# Patient Record
Sex: FEMALE | Race: BLACK OR AFRICAN AMERICAN | Employment: OTHER | URBAN - METROPOLITAN AREA
[De-identification: names, ages, dates, MRNs, and addresses within clinical notes are randomized per-mention and may not be internally consistent; named-entity substitution may affect disease eponyms.]

---

## 2017-03-15 ENCOUNTER — OFFICE VISIT (OUTPATIENT)
Dept: INTERNAL MEDICINE CLINIC | Age: 77
End: 2017-03-15

## 2017-03-15 VITALS
TEMPERATURE: 98.3 F | SYSTOLIC BLOOD PRESSURE: 136 MMHG | BODY MASS INDEX: 26.87 KG/M2 | RESPIRATION RATE: 14 BRPM | DIASTOLIC BLOOD PRESSURE: 64 MMHG | OXYGEN SATURATION: 99 % | WEIGHT: 146 LBS | HEIGHT: 62 IN | HEART RATE: 84 BPM

## 2017-03-15 DIAGNOSIS — Z23 ENCOUNTER FOR IMMUNIZATION: ICD-10-CM

## 2017-03-15 DIAGNOSIS — Z00.00 ROUTINE GENERAL MEDICAL EXAMINATION AT A HEALTH CARE FACILITY: Primary | ICD-10-CM

## 2017-03-15 DIAGNOSIS — G89.29 CHRONIC PAIN OF LEFT KNEE: ICD-10-CM

## 2017-03-15 DIAGNOSIS — N18.32 CHRONIC KIDNEY DISEASE (CKD) STAGE G3B/A1, MODERATELY DECREASED GLOMERULAR FILTRATION RATE (GFR) BETWEEN 30-44 ML/MIN/1.73 SQUARE METER AND ALBUMINURIA CREATININE RATIO LESS THAN 30 MG/G (HCC): ICD-10-CM

## 2017-03-15 DIAGNOSIS — Z13.39 SCREENING FOR ALCOHOLISM: ICD-10-CM

## 2017-03-15 DIAGNOSIS — Z13.5 GLAUCOMA SCREENING: ICD-10-CM

## 2017-03-15 DIAGNOSIS — Z71.89 ACP (ADVANCE CARE PLANNING): ICD-10-CM

## 2017-03-15 DIAGNOSIS — Z12.11 SCREEN FOR COLON CANCER: ICD-10-CM

## 2017-03-15 DIAGNOSIS — M25.562 CHRONIC PAIN OF LEFT KNEE: ICD-10-CM

## 2017-03-15 NOTE — PROGRESS NOTES
Thaddeus Dunn is a 78 y.o. female  Chief Complaint   Patient presents with    Complete Physical    Knee Pain     left knee hurting for 1 year, constant pain, rated 7 out of 10 has been taking aleve and tylenol   1. Have you been to the ER, urgent care clinic since your last visit? Hospitalized since your last visit? No    2. Have you seen or consulted any other health care providers outside of the [facility] since your last visit? Include any pap smears or colon screening. No    PHQ 2 / 9, over the last two weeks 3/15/2017   Little interest or pleasure in doing things Not at all   Feeling down, depressed or hopeless Not at all   Total Score PHQ 2 0       This is a Subsequent Medicare Annual Wellness Visit providing Personalized Prevention Plan Services (PPPS) (Performed 12 months after initial AWV and PPPS )    I have reviewed the patient's medical history in detail and updated the computerized patient record. History     Past Medical History:   Diagnosis Date    Advance care planning     documents pending    Anemia     Hypercholesterolemia     Hypertension       Past Surgical History:   Procedure Laterality Date    HX ORTHOPAEDIC      bunionectomy     Current Outpatient Prescriptions   Medication Sig Dispense Refill    CHOLECALCIFEROL, VITAMIN D3, (VITAMIN D3 PO) Take  by mouth.  losartan-hydrochlorothiazide (HYZAAR) 100-25 mg per tablet Take 1 Tab by mouth daily. 90 Tab 4    atorvastatin (LIPITOR) 40 mg tablet Take 1 Tab by mouth daily. 90 Tab 4    Biotin 2,500 mcg cap Take  by mouth.  aspirin delayed-release 81 mg tablet Take  by mouth daily.        Allergies   Allergen Reactions    Codeine Itching     Family History   Problem Relation Age of Onset    Heart Disease Mother     Heart Disease Father     Cancer Sister     Cancer Sister     Heart Disease Brother      Social History   Substance Use Topics    Smoking status: Never Smoker    Smokeless tobacco: Not on file  Alcohol use 0.5 oz/week     1 drink(s) per week     Patient Active Problem List   Diagnosis Code    HTN (hypertension) I10    Hyperlipidemia E78.5    Anemia D64.9    Chronic kidney disease (CKD) stage G3b/A1, moderately decreased glomerular filtration rate (GFR) between 30-44 mL/min/1.73 square meter and albuminuria creatinine ratio less than 30 mg/g N18.3    ACP (advance care planning) Z71.89       Depression Risk Factor Screening:     PHQ 2 / 9, over the last two weeks 3/15/2017   Little interest or pleasure in doing things Not at all   Feeling down, depressed or hopeless Not at all   Total Score PHQ 2 0     Alcohol Risk Factor Screening: On any occasion during the past 3 months, have you had more than 3 drinks containing alcohol? No    Do you average more than 7 drinks per week? No      Functional Ability and Level of Safety:     Hearing Loss   none    Activities of Daily Living   Self-care. Requires assistance with: no ADLs    Fall Risk     Fall Risk Assessment, last 12 mths 3/15/2017   Able to walk? Yes   Fall in past 12 months? No     Abuse Screen   Patient is not abused    Review of Systems   ROS: Negative except for BOLD  General: fever, chills, fatigue  Respiratory: cough, SOB, wheezing  Cardiovascular:  CP, palpitation, QUESADA, edema   Gastrointestinal: N/V/D, bleeding, constipation  Genito-Urinary: dysuria, hematuria  Musculoskeletal: muscle weakness, pain, swelling        Physical Examination     Evaluation of Cognitive Function:  Mood/affect:  happy  Appearance: age appropriate, casually dressed and well dressed  Family member/caregiver input: none present    Visit Vitals    /64 (BP 1 Location: Left arm, BP Patient Position: Sitting)    Pulse 84    Temp 98.3 °F (36.8 °C) (Oral)    Resp 14    Ht 5' 2\" (1.575 m)    Wt 146 lb (66.2 kg)    SpO2 99%    BMI 26.7 kg/m2     General:  Alert, cooperative, no distress, appears stated age.    Head:  Normocephalic, without obvious abnormality, atraumatic. Eyes:  Conjunctivae/corneas clear. PERRL, EOMs intact. Fundi benign. Ears:  Normal TMs and external ear canals both ears. Nose: Nares normal. Septum midline. Mucosa normal. No drainage or sinus tenderness. Throat: Lips, mucosa, and tongue normal. Teeth and gums normal.   Neck: Supple, symmetrical, trachea midline, no adenopathy, thyroid: no enlargement/tenderness/nodules, no carotid bruit and no JVD. Back:   Symmetric, no curvature. ROM normal. No CVA tenderness. Lungs:   Clear to auscultation bilaterally. Chest wall:  No tenderness or deformity. Heart:  Regular rate and rhythm, S1, S2 normal, no murmur, click, rub or gallop. Breast Exam:  No tenderness, masses, or nipple abnormality. Abdomen:   Soft, non-tender. Bowel sounds normal. No masses,  No organomegaly. Rectal:     Extremities: Extremities normal, atraumatic, no cyanosis or edema. Left knee FROM, bony knee prominence, no swelling. Pulses: 2+ and symmetric all extremities. Skin: Skin color, texture, turgor normal. No rashes or lesions. Lymph nodes: Cervical, supraclavicular, and axillary nodes normal.   Neurologic: CNII-XII intact. Normal strength, sensation and reflexes throughout. Patient Care Team:  Christian Mccarthy MD as PCP - General (Family Practice)  Acosta Kim MD (Family Practice)    Advice/Referrals/Counseling   Education and counseling provided:  Are appropriate based on today's review and evaluation  End-of-Life planning (with patient's consent)  Pneumococcal Vaccine  Influenza Vaccine  Hepatitis B Vaccine  Screening Mammography  Screening Pap and pelvic (covered once every 2 years)  Prostate cancer screening tests (PSA, covered annually)  Colorectal cancer screening tests  Cardiovascular screening blood test  Bone mass measurement (DEXA)  Screening for glaucoma      Assessment/Plan       ICD-10-CM ICD-9-CM    1.  Routine general medical examination at a health care facility Z00.00 V70.0 pneumococcal 13 edgar conj dip (PREVNAR-13) 0.5 mL syrg injection      LIPID PANEL      METABOLIC PANEL, BASIC      DEXA BONE DENSITY STUDY AXIAL      REFERRAL TO OPHTHALMOLOGY   2. Screening for alcoholism Z13.89 V79.1    3. Encounter for immunization Z23 V03.89 pneumococcal 13 edgar conj dip (PREVNAR-13) 0.5 mL syrg injection   4. Chronic pain of left knee M25.562 719.46 XR KNEE LT MIN 4 V    G89.29 338.29 No effusion , xray pending review by radiology. 5. Chronic kidney disease (CKD) stage G3b/A1, moderately decreased glomerular filtration rate (GFR) between 30-44 mL/min/1.73 square meter and albuminuria creatinine ratio less than 30 mg/g N18.3 585.3    6. ACP (advance care planning) Z71.89 V65.49    . Follow-up Disposition:  Return in about 4 weeks (around 4/12/2017), or if symptoms worsen or fail to improve, for Left knee.   Juan Alberto Shi MD

## 2017-03-16 LAB
BUN SERPL-MCNC: 24 MG/DL (ref 8–27)
BUN/CREAT SERPL: 18 (ref 11–26)
CALCIUM SERPL-MCNC: 9.4 MG/DL (ref 8.7–10.3)
CHLORIDE SERPL-SCNC: 102 MMOL/L (ref 96–106)
CHOLEST SERPL-MCNC: 164 MG/DL (ref 100–199)
CO2 SERPL-SCNC: 22 MMOL/L (ref 18–29)
CREAT SERPL-MCNC: 1.36 MG/DL (ref 0.57–1)
GLUCOSE SERPL-MCNC: 95 MG/DL (ref 65–99)
HDLC SERPL-MCNC: 47 MG/DL
LDLC SERPL CALC-MCNC: 100 MG/DL (ref 0–99)
POTASSIUM SERPL-SCNC: 3.4 MMOL/L (ref 3.5–5.2)
SODIUM SERPL-SCNC: 143 MMOL/L (ref 134–144)
TRIGL SERPL-MCNC: 83 MG/DL (ref 0–149)
VLDLC SERPL CALC-MCNC: 17 MG/DL (ref 5–40)

## 2017-03-23 ENCOUNTER — HOSPITAL ENCOUNTER (OUTPATIENT)
Dept: MAMMOGRAPHY | Age: 77
Discharge: HOME | End: 2017-03-23
Attending: FAMILY MEDICINE
Payer: MEDICARE

## 2017-03-23 DIAGNOSIS — Z00.00 ROUTINE GENERAL MEDICAL EXAMINATION AT A HEALTH CARE FACILITY: ICD-10-CM

## 2017-03-23 PROCEDURE — 77080 DXA BONE DENSITY AXIAL: CPT

## 2017-04-18 ENCOUNTER — TELEPHONE (OUTPATIENT)
Dept: INTERNAL MEDICINE CLINIC | Age: 77
End: 2017-04-18

## 2017-06-02 DIAGNOSIS — N18.32 CHRONIC KIDNEY DISEASE (CKD) STAGE G3B/A1, MODERATELY DECREASED GLOMERULAR FILTRATION RATE (GFR) BETWEEN 30-44 ML/MIN/1.73 SQUARE METER AND ALBUMINURIA CREATININE RATIO LESS THAN 30 MG/G (HCC): ICD-10-CM

## 2017-06-02 DIAGNOSIS — I10 ESSENTIAL HYPERTENSION WITH GOAL BLOOD PRESSURE LESS THAN 140/90: ICD-10-CM

## 2017-06-02 DIAGNOSIS — E78.2 MIXED HYPERLIPIDEMIA: ICD-10-CM

## 2017-06-02 RX ORDER — ATORVASTATIN CALCIUM 40 MG/1
40 TABLET, FILM COATED ORAL DAILY
Qty: 90 TAB | Refills: 4 | Status: SHIPPED | OUTPATIENT
Start: 2017-06-02 | End: 2018-08-09 | Stop reason: SDUPTHER

## 2017-06-02 RX ORDER — LOSARTAN POTASSIUM AND HYDROCHLOROTHIAZIDE 25; 100 MG/1; MG/1
1 TABLET ORAL DAILY
Qty: 90 TAB | Refills: 4 | Status: SHIPPED | OUTPATIENT
Start: 2017-06-02 | End: 2018-08-09 | Stop reason: SDUPTHER

## 2017-06-22 ENCOUNTER — HOSPITAL ENCOUNTER (OUTPATIENT)
Dept: MAMMOGRAPHY | Age: 77
Discharge: HOME | End: 2017-06-22
Attending: FAMILY MEDICINE
Payer: MEDICARE

## 2017-06-22 DIAGNOSIS — Z12.31 VISIT FOR SCREENING MAMMOGRAM: ICD-10-CM

## 2017-06-22 PROCEDURE — 77067 SCR MAMMO BI INCL CAD: CPT

## 2018-01-22 ENCOUNTER — OFFICE VISIT (OUTPATIENT)
Dept: INTERNAL MEDICINE CLINIC | Age: 78
End: 2018-01-22

## 2018-01-22 VITALS
OXYGEN SATURATION: 96 % | HEART RATE: 86 BPM | HEIGHT: 62 IN | WEIGHT: 147.5 LBS | RESPIRATION RATE: 18 BRPM | SYSTOLIC BLOOD PRESSURE: 146 MMHG | DIASTOLIC BLOOD PRESSURE: 67 MMHG | BODY MASS INDEX: 27.14 KG/M2 | TEMPERATURE: 97.6 F

## 2018-01-22 DIAGNOSIS — E78.2 MIXED HYPERLIPIDEMIA: ICD-10-CM

## 2018-01-22 DIAGNOSIS — I10 ESSENTIAL HYPERTENSION: ICD-10-CM

## 2018-01-22 DIAGNOSIS — M67.431 GANGLION CYST OF DORSUM OF RIGHT WRIST: Primary | ICD-10-CM

## 2018-01-22 DIAGNOSIS — N18.32 CHRONIC KIDNEY DISEASE (CKD) STAGE G3B/A1, MODERATELY DECREASED GLOMERULAR FILTRATION RATE (GFR) BETWEEN 30-44 ML/MIN/1.73 SQUARE METER AND ALBUMINURIA CREATININE RATIO LESS THAN 30 MG/G (HCC): ICD-10-CM

## 2018-01-22 DIAGNOSIS — Z13.5 SCREENING FOR GLAUCOMA: ICD-10-CM

## 2018-01-22 NOTE — PROGRESS NOTES
1. Have you been to the ER, urgent care clinic since your last visit? Hospitalized since your last visit? No    2. Have you seen or consulted any other health care providers outside of the [address] [place] [address] since your last visit? Include any pap smears or colon screening.  No

## 2018-01-22 NOTE — MR AVS SNAPSHOT
[address] [place] [address] 
[phone] Patient: Jes Francis MRN: [id number] SNK:[id number] Visit Information Date & Time Provider Department Dept. Phone Encounter #  
 1/22/2018 10:30 AM Randolph Barone MD [facility] [id number] Follow-up Instructions Return in about 8 weeks (around 3/19/2018) for Medicare Wellness, , Blood Pressure. Upcoming Health Maintenance Date Due  
 GLAUCOMA SCREENING Q2Y 11/15/2007 MEDICARE YEARLY EXAM 3/16/2018 Pneumococcal 65+ Low/Medium Risk (2 of 2 - PPSV23) 1/22/2019 DTaP/Tdap/Td series (2 - Td) 9/8/2024 Allergies as of 1/22/2018  Review Complete On: 1/22/2018 By: Tenzin Vega Severity Noted Reaction Type Reactions Codeine High 09/08/2014   Systemic Itching Current Immunizations  Reviewed on 9/8/2014 Name Date Influenza Vaccine 9/8/2014 Tdap 9/8/2014 Not reviewed this visit You Were Diagnosed With   
  
 Codes Comments Ganglion cyst of dorsum of right wrist    -  Primary ICD-10-CM: S57.936 ICD-9-CM: 727.41 Screening for glaucoma     ICD-10-CM: Z13.5 ICD-9-CM: V80.1 Essential hypertension     ICD-10-CM: I10 
ICD-9-CM: 401.9 Mixed hyperlipidemia     ICD-10-CM: E78.2 ICD-9-CM: 272.2 Chronic kidney disease (CKD) stage G3b/A1, moderately decreased glomerular filtration rate (GFR) between 30-44 mL/min/1.73 square meter and albuminuria creatinine ratio less than 30 mg/g     ICD-10-CM: N18.3 ICD-9-CM: 377. 3 Vitals BP Pulse Temp Resp Height(growth percentile) Weight(growth percentile) 146/67 (BP 1 Location: Left arm, BP Patient Position: Sitting) 86 97.6 °F (36.4 °C) (Oral) 18 5' 2\" (1.575 m) 147 lb 8 oz (66.9 kg) SpO2 BMI OB Status Smoking Status 96% 26.98 kg/m2 Menopause Never Smoker Vitals History BMI and BSA Data Body Mass Index Body Surface Area  
 26.98 kg/m 2 1.71 m 2 Preferred Pharmacy Pharmacy Name Phone [id number] [place], [address] [facility] [phone] Your Updated Medication List  
  
   
This list is accurate as of: 1/22/18 12:04 PM.  Always use your most recent med list.  
  
  
  
  
 aspirin delayed-release 81 mg tablet Take  by mouth daily. atorvastatin 40 mg tablet Commonly known as:  LIPITOR Take 1 Tab by mouth daily. Biotin 2,500 mcg Cap Take  by mouth.  
  
 losartan-hydroCHLOROthiazide 100-25 mg per tablet Commonly known as:  HYZAAR Take 1 Tab by mouth daily. VITAMIN D3 PO Take  by mouth. We Performed the Following LIPID PANEL [47346 CPT(R)] METABOLIC PANEL, BASIC [36865 CPT(R)] REFERRAL TO OPHTHALMOLOGY [REF57 Custom] Comments:  
 Screen glaucoma Follow-up Instructions Return in about 8 weeks (around 3/19/2018) for Medicare Wellness, , Blood Pressure. Referral Information Referral ID Referred By Referred To  
  
 [id number] Karl Mans [facility] [address] [place], [address] [facility] Visits Status Start Date End Date 1 New Request 1/22/18 1/22/19 If your referral has a status of pending review or denied, additional information will be sent to support the outcome of this decision. Patient Instructions [facility] Carleen Ganglions: Care Instructions Your Care Instructions A ganglion is a small sac, or cyst, filled with a clear fluid that is like jelly. A ganglion may look like a bump on the hand or wrist. It also can appear on your feet, ankles, knees, or shoulders. It is not cancer. A ganglion can grow out of the protective area, or capsule, around a joint.  It also can grow on a tendon sheath, which covers the ropelike tendons that connect muscle to bone. A ganglion may hurt or cause numbness if it presses on a nerve. Many ganglions do not need treatment, and they often go away on their own. But if a ganglion hurts, becomes larger, causes numbness, or limits your activity, your doctor may want to drain it with a needle and syringe or remove it with minor surgery. Follow-up care is a key part of your treatment and safety. Be sure to make and go to all appointments, and call your doctor if you are having problems. It's also a good idea to know your test results and keep a list of the medicines you take. How can you care for yourself at home? · Wear a wrist or finger splint as directed by your doctor. It will keep your wrist or hand from moving and help reduce the fluid in the cyst. This may be all you need for the ganglion to shrink and go away. · Do not smash a ganglion with a book or other heavy object. You may break a bone or otherwise injure your wrist by trying this folk remedy, and the ganglion may return anyway. · Do not try to drain the fluid by poking the ganglion with a pin or any other sharp object. You could cause an infection. When should you call for help? Call your doctor now or seek immediate medical care if: 
? · You have signs of infection, such as: 
¨ Increased pain, swelling, warmth, or redness. ¨ Red streaks leading from the cyst. 
¨ Pus draining from the cyst. 
¨ A fever. ? Watch closely for changes in your health, and be sure to contact your doctor if: 
? · You have increasing pain. ? · Your ganglion is getting larger. ? · You still have pain or numbness from a ganglion. Where can you learn more? Go to http://sukhwinder-korina.info/. Enter H263 in the search box to learn more about \"Ganglions: Care Instructions. \" Current as of: March 21, 2017 Content Version: 11.4 © 0444-1537 Healthwise, Incorporated.  Care instructions adapted under license by [address] (which disclaims liability or warranty for this information). If you have questions about a medical condition or this instruction, always ask your healthcare professional. [address] any warranty or liability for your use of this information. Introducing [facility]! [facility] introduces [employer] patient portal. Now you can access parts of your medical record, email your doctor's office, and request medication refills online. 1. In your internet browser, go to [email or web address] 2. Click on the First Time User? Click Here link in the Sign In box. You will see the New Member Sign Up page. 3. Enter your Clear Standards Access Code exactly as it appears below. You will not need to use this code after youve completed the sign-up process. If you do not sign up before the expiration date, you must request a new code. · [employer] Access Code: [email or web address] Expires: 4/22/2018 12:04 PM 
 
4. Enter the last four digits of your Social Security Number (xxxx) and Date of Birth (mm/dd/yyyy) as indicated and click Submit. You will be taken to the next sign-up page. 5. Create a Clear Standards ID. This will be your Clear Standards login ID and cannot be changed, so think of one that is secure and easy to remember. 6. Create a [employer] Standards password. You can change your password at any time. 7. Enter your Password Reset Question and Answer. This can be used at a later time if you forget your password. 8. Enter your e-mail address. You will receive e-mail notification when new information is available in [address] 9. Click Sign Up. You can now view and download portions of your medical record. 10. Click the Download Summary menu link to download a portable copy of your medical information. If you have questions, please visit the Frequently Asked Questions section of the [employer] website.  Remember, [employer] is NOT to be used for urgent needs. For medical emergencies, dial 911. Now available from your iPhone and Android! Please provide this summary of care documentation to your next provider. Your primary care clinician is listed as Sara Link. If you have any questions after today's visit, please call [phone].

## 2018-01-22 NOTE — LETTER
2/14/2018 1:26 PM 
 
Ms. Romayne Greenhouse [address] [place] [address] [id number] Dear Romayne Greenhouse: 
 
Please find your most recent results below. Resulted Orders METABOLIC PANEL, BASIC Result Value Ref Range Glucose 87 65 - 99 mg/dL BUN 24 8 - 27 mg/dL Creatinine 1.59 (H) 0.57 - 1.00 mg/dL GFR est non-AA 32 (L) >59 mL/min/1.73 GFR est AA 36 (L) >59 mL/min/1.73  
 BUN/Creatinine ratio 15 12 - 28 Sodium 143 134 - 144 mmol/L Potassium 4.1 3.5 - 5.2 mmol/L Chloride 103 96 - 106 mmol/L  
 CO2 24 18 - 29 mmol/L Calcium 9.8 8.7 - 10.3 mg/dL Narrative Performed at:  [address]  [id number] : Ihsan Brower MD, Phone:  [phone] LIPID PANEL Result Value Ref Range Cholesterol, total 183 100 - 199 mg/dL Triglyceride 79 0 - 149 mg/dL HDL Cholesterol 53 >39 mg/dL VLDL, calculated 16 5 - 40 mg/dL LDL, calculated 114 (H) 0 - 99 mg/dL Narrative Performed at:  [address]  [id number] : Ihsan Brower MD, Phone:  [phone] RECOMMENDATIONS: 
  
    
 Kidney function remains low. If you're not seeing a kidney specialist, this should be initiated. Cholesterol levels relatively well controlled. Please call me if you have any questions: [phone] Sincerely, 
 
 
Guido Costello MD

## 2018-01-22 NOTE — PROGRESS NOTES
Ms. Clarene Cockayne is a 78y.o. year old female who had concerns including Hypertension and Cyst.    HPI:  Chief Complaint   Patient presents with    Hypertension    Cyst     patient complain of having a cyst on her right hand. Patient states that it is painful at times   resolves on its own   BP elevated usually well controlled, K low on last lab visit. Dx hypertension  BP Readings from Last 3 Encounters:   01/22/18 146/67   03/15/17 136/64   05/16/16 158/83       Hx hyperlipidemia. Elevated above goal. On statin. Past Medical History:   Diagnosis Date    Advance care planning     documents pending    Anemia     Arthropathy     Hypercholesterolemia     Hypertension      Current Outpatient Prescriptions   Medication Sig Dispense    atorvastatin (LIPITOR) 40 mg tablet Take 1 Tab by mouth daily. 90 Tab    losartan-hydroCHLOROthiazide (HYZAAR) 100-25 mg per tablet Take 1 Tab by mouth daily. 90 Tab    CHOLECALCIFEROL, VITAMIN D3, (VITAMIN D3 PO) Take  by mouth.  Biotin 2,500 mcg cap Take  by mouth.  aspirin delayed-release 81 mg tablet Take  by mouth daily. No current facility-administered medications for this visit. Reviewed PmHx, RxHx, FmHx, SocHx, AllgHx and updated and dated in the chart. ROS: Negative except for BOLD  General: fever, chills, fatigue  Respiratory: cough, SOB, wheezing  Cardiovascular:  CP, palpitation, QUESADA, edema   Gastrointestinal: N/V/D, bleeding  Genito-Urinary: dysuria, hematuria  Musculoskeletal: muscle weakness, pain, swelling    OBJECTIVE:   Visit Vitals    /67 (BP 1 Location: Left arm, BP Patient Position: Sitting)    Pulse 86    Temp 97.6 °F (36.4 °C) (Oral)    Resp 18    Ht 5' 2\" (1.575 m)    Wt 147 lb 8 oz (66.9 kg)    SpO2 96%    BMI 26.98 kg/m2     GEN: The patient appears well, alert, oriented x 3, in no distress. Lungs: clear bilaterally, good air entry, no wheezes, rhonchi or rales. Cardiovascular: regular rate and rhythm.  S1 and S2 normal, no murmurs,  Abdomen: + BS, soft without tenderness, guarding, rebound, mass or organomegaly. Extremities: no edema, normal peripheral pulses. Neurological: normal, gross sensory and motor in tact without focal findings. Right dorsum of wrist - mild cystic elevation , non tender. Assessment/ Plan:       ICD-10-CM ICD-9-CM    1. Ganglion cyst of dorsum of right wrist M67.431 727.41 REFERRAL TO OPHTHALMOLOGY   2. Screening for glaucoma Z13.5 V80.1 REFERRAL TO OPHTHALMOLOGY   3. Essential hypertension Z47 236.8 METABOLIC PANEL, BASIC      LIPID PANEL   4. Mixed hyperlipidemia E78.2 272.2 LIPID PANEL   5. Chronic kidney disease (CKD) stage G3b/A1, moderately decreased glomerular filtration rate (GFR) between 30-44 mL/min/1.73 square meter and albuminuria creatinine ratio less than 30 mg/g F38.6 275.3 METABOLIC PANEL, BASIC     [facility] medical records release  Recheck BP  Spontaneous resolution of ganglion cyst started. Discussed character and details with patient. I have discussed the diagnosis with the patient and the intended plan as seen in the above orders. The patient has received an after-visit summary and questions were answered concerning future plans. Medication Side Effects and Warnings were discussed with patient. Follow-up Disposition:  Return in about 8 weeks (around 3/19/2018) for Medicare Wellness, , Blood Pressure. Clifton Hinkle MD      Discussed the patient's BMI with her. The BMI follow up plan is as follows:     dietary management education, guidance, and counseling  encourage exercise  monitor weight  prescribed dietary intake    An After Visit Summary was printed and given to the patient.

## 2018-01-23 LAB
BUN SERPL-MCNC: 24 MG/DL (ref 8–27)
BUN/CREAT SERPL: 15 (ref 12–28)
CALCIUM SERPL-MCNC: 9.8 MG/DL (ref 8.7–10.3)
CHLORIDE SERPL-SCNC: 103 MMOL/L (ref 96–106)
CHOLEST SERPL-MCNC: 183 MG/DL (ref 100–199)
CO2 SERPL-SCNC: 24 MMOL/L (ref 18–29)
CREAT SERPL-MCNC: 1.59 MG/DL (ref 0.57–1)
GLUCOSE SERPL-MCNC: 87 MG/DL (ref 65–99)
HDLC SERPL-MCNC: 53 MG/DL
LDLC SERPL CALC-MCNC: 114 MG/DL (ref 0–99)
POTASSIUM SERPL-SCNC: 4.1 MMOL/L (ref 3.5–5.2)
SODIUM SERPL-SCNC: 143 MMOL/L (ref 134–144)
TRIGL SERPL-MCNC: 79 MG/DL (ref 0–149)
VLDLC SERPL CALC-MCNC: 16 MG/DL (ref 5–40)

## 2018-02-13 NOTE — PROGRESS NOTES
Kidney function remains low. If patient is not seeing a kidney specialist, this should be initiated. Cholesterol levels relatively well controlled.

## 2018-02-14 ENCOUNTER — TELEPHONE (OUTPATIENT)
Dept: INTERNAL MEDICINE CLINIC | Age: 78
End: 2018-02-14

## 2018-02-14 NOTE — TELEPHONE ENCOUNTER
Patient states he is returning dr. Parisa Davies call and that she could not understand the message left and if possible could you leave it again. She does not get off until 4:30.

## 2018-02-14 NOTE — PROGRESS NOTES
I called patient and left a message to call back and I would be happy to review results with the patient will send letter as well.

## 2018-03-19 ENCOUNTER — OFFICE VISIT (OUTPATIENT)
Dept: INTERNAL MEDICINE CLINIC | Age: 78
End: 2018-03-19

## 2018-03-19 VITALS
WEIGHT: 147.9 LBS | TEMPERATURE: 98.5 F | SYSTOLIC BLOOD PRESSURE: 132 MMHG | BODY MASS INDEX: 27.22 KG/M2 | DIASTOLIC BLOOD PRESSURE: 75 MMHG | HEART RATE: 75 BPM | OXYGEN SATURATION: 97 % | RESPIRATION RATE: 18 BRPM | HEIGHT: 62 IN

## 2018-03-19 DIAGNOSIS — Z71.89 ADVANCED DIRECTIVES, COUNSELING/DISCUSSION: ICD-10-CM

## 2018-03-19 DIAGNOSIS — D63.1 ANEMIA IN STAGE 3 CHRONIC KIDNEY DISEASE (HCC): ICD-10-CM

## 2018-03-19 DIAGNOSIS — Z13.5 GLAUCOMA SCREENING: ICD-10-CM

## 2018-03-19 DIAGNOSIS — E78.2 MIXED HYPERLIPIDEMIA: ICD-10-CM

## 2018-03-19 DIAGNOSIS — I10 ESSENTIAL HYPERTENSION: ICD-10-CM

## 2018-03-19 DIAGNOSIS — Z66 DNR (DO NOT RESUSCITATE): ICD-10-CM

## 2018-03-19 DIAGNOSIS — N18.30 ANEMIA IN STAGE 3 CHRONIC KIDNEY DISEASE (HCC): ICD-10-CM

## 2018-03-19 DIAGNOSIS — Z00.00 MEDICARE ANNUAL WELLNESS VISIT, SUBSEQUENT: Primary | ICD-10-CM

## 2018-03-19 DIAGNOSIS — N18.32 CHRONIC KIDNEY DISEASE (CKD) STAGE G3B/A1, MODERATELY DECREASED GLOMERULAR FILTRATION RATE (GFR) BETWEEN 30-44 ML/MIN/1.73 SQUARE METER AND ALBUMINURIA CREATININE RATIO LESS THAN 30 MG/G (HCC): ICD-10-CM

## 2018-03-19 DIAGNOSIS — Z12.11 SCREEN FOR COLON CANCER: ICD-10-CM

## 2018-03-19 NOTE — MR AVS SNAPSHOT
[address] [place] [address] [facility] [id number] 
[phone] Patient: Benjy Del Rio MRN: [id number] Visit Information Date & Time Provider Department Dept. Phone Encounter #  
 3/19/2018 10:30 AM Marva Corley MD [facility] Sports Medicine and [address] [place] [address] [phone] [id number] Follow-up Instructions Return in about 6 months (around 9/19/2018) for Chronic Care, Annual Exam, yearly. Follow-up and Disposition History Upcoming Health Maintenance Date Due  
 MEDICARE YEARLY EXAM 3/16/2018 Pneumococcal 65+ Low/Medium Risk (2 of 2 - PPSV23) 1/22/2019 GLAUCOMA SCREENING Q2Y 3/5/2020 DTaP/Tdap/Td series (2 - Td) 9/8/2024 Allergies as of 3/19/2018  Review Complete On: 3/19/2018 By: Marva Corley MD  
  
 Severity Noted Reaction Type Reactions Codeine High 09/08/2014   Systemic Itching Current Immunizations  Reviewed on 9/8/2014 Name Date Influenza Vaccine 9/8/2014 Tdap 9/8/2014 Not reviewed this visit You Were Diagnosed With   
  
 Codes Comments Medicare annual wellness visit, subsequent    -  Primary ICD-10-CM: Z00.00 ICD-9-CM: V70.0 Glaucoma screening     ICD-10-CM: Z13.5 ICD-9-CM: V80.1 Chronic kidney disease (CKD) stage G3b/A1, moderately decreased glomerular filtration rate (GFR) between 30-44 mL/min/1.73 square meter and albuminuria creatinine ratio less than 30 mg/g     ICD-10-CM: N18.3 ICD-9-CM: 753. 3 Anemia in stage 3 chronic kidney disease     ICD-10-CM: N18.3, D63.1 ICD-9-CM: 285.21, 585.3 Essential hypertension     ICD-10-CM: I10 
ICD-9-CM: 401.9 Mixed hyperlipidemia     ICD-10-CM: E78.2 ICD-9-CM: 272.2 Screen for colon cancer     ICD-10-CM: Z12.11 ICD-9-CM: V76.51   
 DNR (do not resuscitate)     ICD-10-CM: A91 ICD-9-CM: V49.86 Advanced directives, counseling/discussion     ICD-10-CM: Z71.89 ICD-9-CM: V65.49 Vitals BP Pulse Temp Resp Height(growth percentile) Weight(growth percentile) 132/75 (BP 1 Location: Right arm, BP Patient Position: Sitting) 75 98.5 °F (36.9 °C) (Oral) 18 5' 2\" (1.575 m) 147 lb 14.4 oz (67.1 kg) SpO2 BMI OB Status Smoking Status 97% 27.05 kg/m2 Menopause Never Smoker Vitals History BMI and BSA Data Body Mass Index Body Surface Area  
 27.05 kg/m 2 1.71 m 2 Preferred Pharmacy Pharmacy Name Phone [id number] [place] [address] [facility] [phone] Your Updated Medication List  
  
   
This list is accurate as of 3/19/18 11:01 AM.  Always use your most recent med list.  
  
  
  
  
 aspirin delayed-release 81 mg tablet Take  by mouth daily. atorvastatin 40 mg tablet Commonly known as:  LIPITOR Take 1 Tab by mouth daily. Biotin 2,500 mcg Cap Take  by mouth.  
  
 losartan-hydroCHLOROthiazide 100-25 mg per tablet Commonly known as:  HYZAAR Take 1 Tab by mouth daily. VITAMIN D3 PO Take  by mouth. We Performed the Following ADVANCE CARE PLANNING FIRST 30 MINS [97136 CPT(R)] REFERRAL FOR COLONOSCOPY [YOV769 Custom] Comments:  
 Screening for colon cancer REFERRAL TO NEPHROLOGY [LIJ25 Custom] Comments:  
 Low gfr persistent. Evaluation. Follow-up Instructions Return in about 6 months (around 9/19/2018) for Chronic Care, Annual Exam, yearly. Referral Information Referral ID Referred By Referred To  
  
 [id number] RYAN, [address] [place], [address] [facility] Visits Status Start Date End Date 1 New Request 3/19/18 3/19/19 If your referral has a status of pending review or denied, additional information will be sent to support the outcome of this decision.   
 Referral ID Referred By Referred To  
 [id number] Bayron Cabezas MD  
 [address] [place] [address] [facility] [address], [address] [address] Phone: [phone] Fax: [phone] Visits Status Start Date End Date 1 New Request 3/19/18 3/19/19 If your referral has a status of pending review or denied, additional information will be sent to support the outcome of this decision. Patient Instructions Medicare Wellness Visit, Female The best way to live healthy is to have a healthy lifestyle by eating a well-balanced diet, exercising regularly, limiting alcohol and stopping smoking. Regular physical exams and screening tests are another way to keep healthy. Preventive exams provided by your health care provider can find health problems before they become diseases or illnesses. Preventive services including immunizations, screening tests, monitoring and exams can help you take care of your own health. All people over age 72 should have a pneumovax  and and a prevnar shot to prevent pneumonia. These are once in a lifetime unless you and your provider decide differently. All people over 65 should have a yearly flu shot and a tetanus vaccine every 10 years. A bone mass density to screen for osteoporosis or thinning of the bones should be done every 2 years after 65. Screening for diabetes mellitus with a blood sugar test should be done every year. Glaucoma is a disease of the eye due to increased ocular pressure that can lead to blindness and it should be done every year by an eye professional. 
 
Cardiovascular screening tests that check for elevated lipids (fatty part of blood) which can lead to heart disease and strokes should be done every 5 years. Colorectal screening that evaluates for blood or polyps in your colon should be done yearly as a stool test or every five years as a flexible sigmoidoscope or every 10 years as a colonoscopy up to age 76.  
 
Breast cancer screening with a mammogram is recommended biennially  for women age 54-69. Screening for cervical cancer with a pap smear and pelvic exam is recommended for women after age 72 years every 2 years up to age 79 or when the provider and patient decide to stop. If there is a history of cervical abnormalities or other increased risk for cancer then the test is recommended yearly. Hepatitis C screening is also recommended for anyone born between 80 through [address]. A shingles vaccine is also recommended once in a lifetime after age 61. Your Medicare Wellness Exam is recommended annually. Here is a list of your current Health Maintenance items with a due date: 
Health Maintenance Due Topic Date Due  Glaucoma Screening   11/15/2007 Francie Miller Annual Well Visit  03/16/2018 Medicare Wellness Visit, Female The best way to live healthy is to have a healthy lifestyle by eating a well-balanced diet, exercising regularly, limiting alcohol and stopping smoking. Regular physical exams and screening tests are another way to keep healthy. Preventive exams provided by your health care provider can find health problems before they become diseases or illnesses. Preventive services including immunizations, screening tests, monitoring and exams can help you take care of your own health. All people over age 72 should have a pneumovax  and and a prevnar shot to prevent pneumonia. These are once in a lifetime unless you and your provider decide differently. All people over 65 should have a yearly flu shot and a tetanus vaccine every 10 years. A bone mass density to screen for osteoporosis or thinning of the bones should be done every 2 years after 65. Screening for diabetes mellitus with a blood sugar test should be done every year.  
 
Glaucoma is a disease of the eye due to increased ocular pressure that can lead to blindness and it should be done every year by an eye professional. 
 
Cardiovascular screening tests that check for elevated lipids (fatty part of blood) which can lead to heart disease and strokes should be done every 5 years. Colorectal screening that evaluates for blood or polyps in your colon should be done yearly as a stool test or every five years as a flexible sigmoidoscope or every 10 years as a colonoscopy up to age 76. Breast cancer screening with a mammogram is recommended biennially  for women age 54-69. Screening for cervical cancer with a pap smear and pelvic exam is recommended for women after age 72 years every 2 years up to age 79 or when the provider and patient decide to stop. If there is a history of cervical abnormalities or other increased risk for cancer then the test is recommended yearly. Hepatitis C screening is also recommended for anyone born between 80 through [address]. A shingles vaccine is also recommended once in a lifetime after age 61. Your Medicare Wellness Exam is recommended annually. Here is a list of your current Health Maintenance items with a due date: 
Health Maintenance Due Topic Date Due  
 Annual Well Visit  03/16/2018 Patient Instructions History Introducing [facility]! Chandin Vo introduces Kickit With patient portal. Now you can access parts of your medical record, email your doctor's office, and request medication refills online. 1. In your internet browser, go to [email or web address] 2. Click on the First Time User? Click Here link in the Sign In box. You will see the New Member Sign Up page. 3. Enter your Kickit With Access Code exactly as it appears below. You will not need to use this code after youve completed the sign-up process. If you do not sign up before the expiration date, you must request a new code. · Kickit With Access Code: [email or web address] Expires: 4/22/2018  1:04 PM 
 
4. Enter the last four digits of your Social Security Number (xxxx) and Date of Birth (mm/dd/yyyy) as indicated and click Submit.  You will be taken to the next sign-up page. 5. Create a Good Photo ID. This will be your Good Photo login ID and cannot be changed, so think of one that is secure and easy to remember. 6. Create a Good Photo password. You can change your password at any time. 7. Enter your Password Reset Question and Answer. This can be used at a later time if you forget your password. 8. Enter your e-mail address. You will receive e-mail notification when new information is available in [address] 9. Click Sign Up. You can now view and download portions of your medical record. 10. Click the Download Summary menu link to download a portable copy of your medical information. If you have questions, please visit the Frequently Asked Questions section of the Good Photo website. Remember, Good Photo is NOT to be used for urgent needs. For medical emergencies, dial 911. Now available from your iPhone and Android! Please provide this summary of care documentation to your next provider. Your primary care clinician is listed as [employer] If you have any questions after today's visit, please call [phone].

## 2018-03-19 NOTE — PATIENT INSTRUCTIONS

## 2018-03-19 NOTE — PROGRESS NOTES
Chief Complaint   Patient presents with   [facility]     rm 6 doing well, patient complains that her leg still hurts      1. Have you been to the ER, urgent care clinic since your last visit? Hospitalized since your last visit? No    2. Have you seen or consulted any other health care providers outside of the [address] [place] [address] since your last visit? Include any pap smears or colon screening. No      This is the Subsequent Medicare Annual Wellness Exam, performed 12 months or more after the Initial AWV or the last Subsequent AWV    I have reviewed the patient's medical history in detail and updated the computerized patient record. History     Past Medical History:   Diagnosis Date    Advance care planning     documents pending    Anemia     Arthropathy     Hypercholesterolemia     Hypertension       Past Surgical History:   Procedure Laterality Date    HX ORTHOPAEDIC      bunionectomy     Current Outpatient Prescriptions   Medication Sig Dispense Refill    atorvastatin (LIPITOR) 40 mg tablet Take 1 Tab by mouth daily. 90 Tab 4    losartan-hydroCHLOROthiazide (HYZAAR) 100-25 mg per tablet Take 1 Tab by mouth daily. 90 Tab 4    CHOLECALCIFEROL, VITAMIN D3, (VITAMIN D3 PO) Take  by mouth.  Biotin 2,500 mcg cap Take  by mouth.  aspirin delayed-release 81 mg tablet Take  by mouth daily.        Allergies   Allergen Reactions    Codeine Itching     Family History   Problem Relation Age of Onset    Heart Disease Mother     Heart Disease Father     Cancer Sister     Cancer Sister     Heart Disease Brother      Social History   Substance Use Topics    Smoking status: Never Smoker    Smokeless tobacco: Never Used    Alcohol use 0.5 oz/week     1 Standard drinks or equivalent per week     Patient Active Problem List   Diagnosis Code    HTN (hypertension) I10    Hyperlipidemia E78.5    Anemia D64.9    Chronic kidney disease (CKD) stage G3b/A1, moderately decreased glomerular filtration rate (GFR) between 30-44 mL/min/1.73 square meter and albuminuria creatinine ratio less than 30 mg/g N18.3    ACP (advance care planning) Z71.89    Glaucoma screening Z13.5       Depression Risk Factor Screening:     PHQ over the last two weeks 3/19/2018   Little interest or pleasure in doing things Not at all   Feeling down, depressed or hopeless Not at all   Total Score PHQ 2 0     Alcohol Risk Factor Screening: You do not drink alcohol or very rarely. On any occasion in the past three months you have had more than 3 drinks containing alcohol. Functional Ability and Level of Safety:   Hearing Loss  Hearing is good. Activities of Daily Living  The home contains: grab bars  Patient does total self care    Fall Risk  Fall Risk Assessment, last 12 mths 3/19/2018   Able to walk? Yes   Fall in past 12 months?  No       Abuse Screen  Patient is not abused    Cognitive Screening   Evaluation of Cognitive Function:  Has your family/caregiver stated any concerns about your memory: no  Normal    Patient Care Team   Patient Care Team:  Ermelinda Cuevas MD as PCP - General (Family Practice)  Jory Mirza MD (Family Practice)    Assessment/Plan   Education and counseling provided:  Are appropriate based on today's review and evaluation  End-of-Life planning (with patient's consent)  Influenza Vaccine  Screening Mammography  Screening Pap and pelvic (covered once every 2 years)  Colorectal cancer screening tests  Cardiovascular screening blood test  Bone mass measurement (DEXA)  Screening for glaucoma  Diabetes screening test    Results for orders placed or performed in visit on 74/16/94   METABOLIC PANEL, BASIC   Result Value Ref Range    Glucose 87 65 - 99 mg/dL    BUN 24 8 - 27 mg/dL    Creatinine 1.59 (H) 0.57 - 1.00 mg/dL    GFR est non-AA 32 (L) >59 mL/min/1.73    GFR est AA 36 (L) >59 mL/min/1.73    BUN/Creatinine ratio 15 12 - 28    Sodium 143 134 - 144 mmol/L    Potassium 4.1 3.5 - 5.2 mmol/L    Chloride 103 96 - 106 mmol/L    CO2 24 18 - 29 mmol/L    Calcium 9.8 8.7 - 10.3 mg/dL   LIPID PANEL   Result Value Ref Range    Cholesterol, total 183 100 - 199 mg/dL    Triglyceride 79 0 - 149 mg/dL    HDL Cholesterol 53 >39 mg/dL    VLDL, calculated 16 5 - 40 mg/dL    LDL, calculated 114 (H) 0 - 99 mg/dL         Health Maintenance Due   Topic Date Due    MEDICARE YEARLY EXAM  03/16/2018

## 2018-06-05 ENCOUNTER — OFFICE VISIT (OUTPATIENT)
Dept: INTERNAL MEDICINE CLINIC | Age: 78
End: 2018-06-05

## 2018-06-05 VITALS
RESPIRATION RATE: 18 BRPM | HEIGHT: 62 IN | WEIGHT: 145.8 LBS | SYSTOLIC BLOOD PRESSURE: 145 MMHG | BODY MASS INDEX: 26.83 KG/M2 | OXYGEN SATURATION: 96 % | TEMPERATURE: 97.9 F | DIASTOLIC BLOOD PRESSURE: 73 MMHG | HEART RATE: 69 BPM

## 2018-06-05 DIAGNOSIS — N18.30 CKD (CHRONIC KIDNEY DISEASE), STAGE III (HCC): ICD-10-CM

## 2018-06-05 DIAGNOSIS — N18.30 ANEMIA IN STAGE 3 CHRONIC KIDNEY DISEASE (HCC): ICD-10-CM

## 2018-06-05 DIAGNOSIS — D63.1 ANEMIA IN STAGE 3 CHRONIC KIDNEY DISEASE (HCC): ICD-10-CM

## 2018-06-05 DIAGNOSIS — N18.32 CHRONIC KIDNEY DISEASE (CKD) STAGE G3B/A1, MODERATELY DECREASED GLOMERULAR FILTRATION RATE (GFR) BETWEEN 30-44 ML/MIN/1.73 SQUARE METER AND ALBUMINURIA CREATININE RATIO LESS THAN 30 MG/G (HCC): Primary | ICD-10-CM

## 2018-06-05 DIAGNOSIS — I10 ESSENTIAL HYPERTENSION: ICD-10-CM

## 2018-06-05 NOTE — PROGRESS NOTES
SPORTS MEDICINE AND PRIMARY CARE  Loraine Pope MD, [place], [place] [address] [address]  Phone:  [phone]  Fax: [phone]       Chief Complaint   Patient presents with    Leg Pain     left   . SUBJECTIVE:    Gerard Estrada is a 78 y.o. female Patient comes in today with a known history of hypertension, dyslipidemia, DJD, and was previously a patient of Dr. Raymond Manning. She complains of left leg discomfort. Her right knee was [facility] by Juli Graham [facility]. Patient is seen for evaluation. Since we last saw her she also was seen by renal specialist.           Current Outpatient Prescriptions   Medication Sig Dispense Refill    atorvastatin (LIPITOR) 40 mg tablet Take 1 Tab by mouth daily. 90 Tab 4    losartan-hydroCHLOROthiazide (HYZAAR) 100-25 mg per tablet Take 1 Tab by mouth daily. 90 Tab 4    CHOLECALCIFEROL, VITAMIN D3, (VITAMIN D3 PO) Take  by mouth.  Biotin 2,500 mcg cap Take  by mouth.  aspirin delayed-release 81 mg tablet Take  by mouth daily.        Past Medical History:   Diagnosis Date    Advance care planning     documents pending    Anemia     Arthropathy     CKD (chronic kidney disease), stage III     Hypercholesterolemia     Hypertension      Past Surgical History:   Procedure Laterality Date    HX ORTHOPAEDIC      bunionectomy     Allergies   Allergen Reactions    Codeine Itching         REVIEW OF SYSTEMS:  General: negative for - chills or fever  ENT: negative for - headaches, nasal congestion or tinnitus  Respiratory: negative for - cough, hemoptysis, shortness of breath or wheezing  Cardiovascular : negative for - chest pain, edema, palpitations or shortness of breath  Gastrointestinal: negative for - abdominal pain, blood in stools, heartburn or nausea/vomiting  Genito-Urinary: no dysuria, trouble voiding, or hematuria  Musculoskeletal: negative for - gait disturbance, joint pain, joint stiffness or joint swelling  Neurological: no TIA or stroke symptoms  Hematologic: no bruises, no bleeding, no swollen glands  Integument: no lumps, mole changes, nail changes or rash  Endocrine: no malaise/lethargy or unexpected weight changes      Social History     Social History    Marital status: SINGLE     Spouse name: N/A    Number of children: N/A    Years of education: N/A     Social History Main Topics    Smoking status: Never Smoker    Smokeless tobacco: Never Used    Alcohol use 0.5 oz/week     1 Standard drinks or equivalent per week      Comment: ocassional    Drug use: No    Sexual activity: Not Currently     Other Topics Concern    None     Social History Narrative     Family History   Problem Relation Age of Onset    Heart Disease Mother     Heart Disease Father     Cancer Sister     Cancer Sister     Heart Disease Brother        OBJECTIVE:    Visit Vitals    /73    Pulse 69    Temp 97.9 °F (36.6 °C) (Oral)    Resp 18    Ht 5' 2\" (1.575 m)    Wt 145 lb 12.8 oz (66.1 kg)    SpO2 96%    BMI 26.67 kg/m2     CONSTITUTIONAL: well , well nourished, appears age appropriate  EYES: perrla, eom intact  ENMT:moist mucous membranes, pharynx clear  NECK: supple. Thyroid normal  RESPIRATORY: Chest: clear bilaterally   CARDIOVASCULAR: Heart: regular rate and rhythm  GASTROINTESTINAL: Abdomen: soft, bowel sounds active  HEMATOLOGIC: no pathological lymph nodes palpated  MUSCULOSKELETAL: Extremities: no edema, pulse 1+   INTEGUMENT: No unusual rashes or suspicious skin lesions noted. Nails appear normal.  NEUROLOGIC: non-focal exam   MENTAL STATUS: alert and oriented, appropriate affect           ASSESSMENT:  1. Chronic kidney disease (CKD) stage G3b/A1, moderately decreased glomerular filtration rate (GFR) between 30-44 mL/min/1.73 square meter and albuminuria creatinine ratio less than 30 mg/g    2. CKD (chronic kidney disease), stage III    3. Anemia in stage 3 chronic kidney disease    4.  Essential hypertension      Patient has advanced DJD of the knee and we suggest she should consider total knee replacement on the left sooner than later. She flexes only to 90 degrees. BP is a little higher than we'd like to see it, but this is our first visit with her and therefore change will not be made. She's now seeing a kidney specialist, doesn't remember the name, but has an appointment to see him on the 9th. We asked her to give him our name so we can start communicating. She'll return to the office in four months or as needed. PLAN:  . No orders of the defined types were placed in this encounter. Follow-up Disposition:  Return in about 4 months (around 10/5/2018). ATTENTION:   This medical record was transcribed using an electronic medical records system. Although proofread, it may and can contain electronic and spelling errors. Other human spelling and other errors may be present. Corrections may be executed at a later time. Please feel free to contact us for any clarifications as needed.

## 2018-06-05 NOTE — MR AVS SNAPSHOT
[address] [facility] 
 
 
 [place] [address] [address] 
[phone] Patient: Miya Troncoso MRN: [id number] MU Visit Information Date & Time Provider Department Dept. Phone Encounter #  
 2018  1:00 PM [facility] [phone] [id number] Your Appointments 10/8/2018  9:45 AM  
Any with Camden Bustillo MD  
[address] [place] [address] and Primary Care [address] [place] [address]) Appt Note: 4 month follow up  
 [place] [address] [facility]  
  
   
 [place] [address] [address] Upcoming Health Maintenance Date Due Influenza Age 5 to Adult 2018 Pneumococcal 65+ Low/Medium Risk (2 of 2 - PPSV23) 2019 MEDICARE YEARLY EXAM 3/20/2019 GLAUCOMA SCREENING Q2Y 3/5/2020 DTaP/Tdap/Td series (2 - Td) 2024 Allergies as of 2018  Review Complete On: 2018 By: Stefany Saini LPN Severity Noted Reaction Type Reactions Codeine High 2014   Systemic Itching Current Immunizations  Reviewed on 2014 Name Date Influenza Vaccine 2014 Tdap 2014 Not reviewed this visit Vitals BP Pulse Temp Resp Height(growth percentile) Weight(growth percentile) 145/73 69 97.9 °F (36.6 °C) (Oral) 18 5' 2\" (1.575 m) 145 lb 12.8 oz (66.1 kg) SpO2 BMI OB Status Smoking Status 96% 26.67 kg/m2 Menopause Never Smoker Vitals History BMI and BSA Data Body Mass Index Body Surface Area  
 26.67 kg/m 2 1.7 m 2 Preferred Pharmacy Pharmacy Name Phone [id number] [place], [address] [facility] [phone] Your Updated Medication List  
  
   
This list is accurate as of 18  2:20 PM.  Always use your most recent med list.  
  
  
  
  
 aspirin delayed-release 81 mg tablet Take  by mouth daily. atorvastatin 40 mg tablet Commonly known as:  LIPITOR Take 1 Tab by mouth daily. Biotin 2,500 mcg Cap Take  by mouth.  
  
 losartan-hydroCHLOROthiazide 100-25 mg per tablet Commonly known as:  HYZAAR Take 1 Tab by mouth daily. VITAMIN D3 PO Take  by mouth. Introducing [facility] & HEALTH SERVICES! Phill Agarwal introduces EcoLogicLiving patient portal. Now you can access parts of your medical record, email your doctor's office, and request medication refills online. 1. In your internet browser, go to [email or web address] 2. Click on the First Time User? Click Here link in the Sign In box. You will see the New Member Sign Up page. 3. Enter your EcoLogicLiving Access Code exactly as it appears below. You will not need to use this code after youve completed the sign-up process. If you do not sign up before the expiration date, you must request a new code. · EcoLogicLiving Access Code: [email or web address] Expires: 9/3/2018  1:13 PM 
 
4. Enter the last four digits of your Social Security Number (xxxx) and Date of Birth (mm/dd/yyyy) as indicated and click Submit. You will be taken to the next sign-up page. 5. Create a EcoLogicLiving ID. This will be your EcoLogicLiving login ID and cannot be changed, so think of one that is secure and easy to remember. 6. Create a EcoLogicLiving password. You can change your password at any time. 7. Enter your Password Reset Question and Answer. This can be used at a later time if you forget your password. 8. Enter your e-mail address. You will receive e-mail notification when new information is available in [address] 9. Click Sign Up. You can now view and download portions of your medical record. 10. Click the Download Summary menu link to download a portable copy of your medical information. If you have questions, please visit the Frequently Asked Questions section of the EcoLogicLiving website.  Remember, EcoLogicLiving is NOT to be used for urgent needs. For medical emergencies, dial 911. Now available from your iPhone and Android! Please provide this summary of care documentation to your next provider. Your primary care clinician is listed as Valeriano Stringer. If you have any questions after today's visit, please call [phone]. No

## 2018-06-05 NOTE — PROGRESS NOTES
1. Have you been to the ER, urgent care clinic since your last visit? Hospitalized since your last visit? No    2. Have you seen or consulted any other health care providers outside of the [address] [place] [address] since your last visit? Include any pap smears or colon screening.  No     Left leg pain

## 2018-06-25 ENCOUNTER — HOSPITAL ENCOUNTER (OUTPATIENT)
Dept: MAMMOGRAPHY | Age: 78
Discharge: HOME | End: 2018-06-25
Attending: INTERNAL MEDICINE
Payer: MEDICARE

## 2018-06-25 DIAGNOSIS — Z12.31 VISIT FOR SCREENING MAMMOGRAM: ICD-10-CM

## 2018-06-25 PROCEDURE — 77067 SCR MAMMO BI INCL CAD: CPT

## 2018-08-09 DIAGNOSIS — E78.2 MIXED HYPERLIPIDEMIA: ICD-10-CM

## 2018-08-09 DIAGNOSIS — N18.32 CHRONIC KIDNEY DISEASE (CKD) STAGE G3B/A1, MODERATELY DECREASED GLOMERULAR FILTRATION RATE (GFR) BETWEEN 30-44 ML/MIN/1.73 SQUARE METER AND ALBUMINURIA CREATININE RATIO LESS THAN 30 MG/G (HCC): ICD-10-CM

## 2018-08-09 DIAGNOSIS — I10 ESSENTIAL HYPERTENSION WITH GOAL BLOOD PRESSURE LESS THAN 140/90: ICD-10-CM

## 2018-08-09 RX ORDER — LOSARTAN POTASSIUM AND HYDROCHLOROTHIAZIDE 25; 100 MG/1; MG/1
1 TABLET ORAL DAILY
Qty: 90 TAB | Refills: 4 | Status: SHIPPED | OUTPATIENT
Start: 2018-08-09 | End: 2019-02-04 | Stop reason: CLARIF

## 2018-08-09 RX ORDER — ATORVASTATIN CALCIUM 40 MG/1
40 TABLET, FILM COATED ORAL DAILY
Qty: 90 TAB | Refills: 4 | Status: SHIPPED | OUTPATIENT
Start: 2018-08-09 | End: 2019-10-30 | Stop reason: SDUPTHER

## 2018-10-08 ENCOUNTER — OFFICE VISIT (OUTPATIENT)
Dept: INTERNAL MEDICINE CLINIC | Age: 78
End: 2018-10-08

## 2018-10-08 VITALS
HEIGHT: 62 IN | OXYGEN SATURATION: 95 % | SYSTOLIC BLOOD PRESSURE: 121 MMHG | BODY MASS INDEX: 26.89 KG/M2 | RESPIRATION RATE: 18 BRPM | HEART RATE: 79 BPM | WEIGHT: 146.1 LBS | TEMPERATURE: 98.1 F | DIASTOLIC BLOOD PRESSURE: 72 MMHG

## 2018-10-08 DIAGNOSIS — N18.30 ANEMIA DUE TO STAGE 3 CHRONIC KIDNEY DISEASE (HCC): Primary | ICD-10-CM

## 2018-10-08 DIAGNOSIS — I10 ESSENTIAL HYPERTENSION: ICD-10-CM

## 2018-10-08 DIAGNOSIS — N18.30 CKD (CHRONIC KIDNEY DISEASE), STAGE III (HCC): ICD-10-CM

## 2018-10-08 DIAGNOSIS — D63.1 ANEMIA DUE TO STAGE 3 CHRONIC KIDNEY DISEASE (HCC): Primary | ICD-10-CM

## 2018-10-08 DIAGNOSIS — Z80.0 FAMILY HISTORY OF COLON CANCER: ICD-10-CM

## 2018-10-08 DIAGNOSIS — E78.2 MIXED HYPERLIPIDEMIA: ICD-10-CM

## 2018-10-08 NOTE — PROGRESS NOTES
SPORTS MEDICINE AND PRIMARY CARE David Pantoja MD, [address] [address] Phone:  [phone]  Fax: [phone] Chief Complaint Patient presents with  Hypertension  
  f/u  
.  ++++ SUBJECTIVE: 
  Geetha Valle is a 78 y.o. female Patient returns today with known history of primary hypertension, CKD stage 3, dyslipidemia, anemia and is seen for evaluation. Since I last saw her she's been in [place] with her niece, who has multiple myeloma. She goes to [facility], is currently hospitalized for pneumonia. She's had a stem cell transplant. Current Outpatient Prescriptions Medication Sig Dispense Refill  atorvastatin (LIPITOR) 40 mg tablet Take 1 Tab by mouth daily. 90 Tab 4  
 losartan-hydroCHLOROthiazide (HYZAAR) 100-25 mg per tablet Take 1 Tab by mouth daily. 90 Tab 4  
 CHOLECALCIFEROL, VITAMIN D3, (VITAMIN D3 PO) Take  by mouth.  Biotin 2,500 mcg cap Take  by mouth.  aspirin delayed-release 81 mg tablet Take  by mouth daily. Past Medical History:  
Diagnosis Date  Advance care planning   
 documents pending  Anemia  Arthropathy  CKD (chronic kidney disease), stage III ([facility] [address] [place]  
 Family history of colon cancer  Hypercholesterolemia  Hypertension Past Surgical History:  
Procedure Laterality Date  HX ORTHOPAEDIC    
 bunionectomy Allergies Allergen Reactions  Codeine Itching REVIEW OF SYSTEMS: 
General: negative for - chills or fever ENT: negative for - headaches, nasal congestion or tinnitus Respiratory: negative for - cough, hemoptysis, shortness of breath or wheezing Cardiovascular : negative for - chest pain, edema, palpitations or shortness of breath Gastrointestinal: negative for - abdominal pain, blood in stools, heartburn or nausea/vomiting Genito-Urinary: no dysuria, trouble voiding, or hematuria Musculoskeletal: negative for - gait disturbance, joint pain, joint stiffness or joint swelling Neurological: no TIA or stroke symptoms Hematologic: no bruises, no bleeding, no swollen glands Integument: no lumps, mole changes, nail changes or rash Endocrine: no malaise/lethargy or unexpected weight changes Social History Social History  Marital status: SINGLE Spouse name: N/A  
 Number of children: N/A  
 Years of education: N/A Social History Main Topics  Smoking status: Never Smoker  Smokeless tobacco: Never Used  Alcohol use 0.5 oz/week 1 Standard drinks or equivalent per week Comment: ocassional  
 Drug use: No  
 Sexual activity: Not Currently Other Topics Concern  None Social History Narrative Family History Problem Relation Age of Onset  Heart Disease Mother  Heart Disease Father  Cancer Sister  Cancer Sister  Heart Disease Brother   
 
Habits:  Occasional alcohol use. No cigarette or drug abuse. Social History:  The patient is . She has no children. She lives alone. She completed high school. 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 She's a member of [email or web address]. Family History:  Father passed at 59 of heart disease. Mother  80 of heart disease. She also had diabetes. One brother, one sister alive and well. One brother  of seizure disorder, one brother  of heart disease. He had LVAD. One brother  of liver issues. One sister  of colon cancer and one sister  of lung cancer. OBJECTIVE: 
 
Visit Vitals  /72  Pulse 79  Temp 98.1 °F (36.7 °C) (Oral)  Resp 18  Ht 5' 2\" (1.575 m)  Wt 146 lb 1.6 oz (66.3 kg)  SpO2 95%  BMI 26.72 kg/m2 CONSTITUTIONAL: well , well nourished, appears age appropriate EYES: perrla, eom intact ENMT:moist mucous membranes, pharynx clear NECK: supple. Thyroid normal 
RESPIRATORY: Chest: clear bilaterally CARDIOVASCULAR: Heart: regular rate and rhythm GASTROINTESTINAL: Abdomen: soft, bowel sounds active HEMATOLOGIC: no pathological lymph nodes palpated MUSCULOSKELETAL: Extremities: no edema, pulse 1+ INTEGUMENT: No unusual rashes or suspicious skin lesions noted. Nails appear normal. 
NEUROLOGIC: non-focal exam  
MENTAL STATUS: alert and oriented, appropriate affect ASSESSMENT: 
1. Anemia due to stage 3 chronic kidney disease ([address] 2. CKD (chronic kidney disease), stage III ([address] 3. Essential hypertension 4. Mixed hyperlipidemia 5. Family history of colon cancer Patient's medical status is stable, blood pressure control is at goal.  BMI is discussed. She has CKD and we'll check her blood studies and send the results to her nephrologist for his information. I'm concerned about her family history of colon cancer and will ask that she see a gastroenterologist for colonoscopy and she agrees. Discussed the patient's BMI with her. The BMI follow up plan is as follows:  
 
dietary management education, guidance, and counseling 
encourage exercise 
monitor weight 
prescribed dietary intake I have discussed the diagnosis with the patient and the intended plan as seen in the 
orders above. The patient understands and agees with the plan. The patient has  
received an after visit summary and questions were answered concerning 
future plans Patient labs and/or xrays were reviewed Past records were reviewed. PLAN: 
. Orders Placed This Encounter  URINALYSIS W/ RFLX MICROSCOPIC  CBC WITH AUTOMATED DIFF  
 METABOLIC PANEL, COMPREHENSIVE  LIPID PANEL  
 TSH 3RD GENERATION  
 HEMOGLOBIN A1C WITH EAG  
 REFERRAL TO GASTROENTEROLOGY Follow-up Disposition: 
Return in about 4 months (around 2/8/2019).   
 
 
 
 
ATTENTION:  
 This medical record was transcribed using an electronic medical records system. Although proofread, it may and can contain electronic and spelling errors. Other human spelling and other errors may be present. Corrections may be executed at a later time. Please feel free to contact us for any clarifications as needed.

## 2018-10-08 NOTE — MR AVS SNAPSHOT
[address] [place] [address] [facility] [address] [phone] Patient: Otto Gaffney MRN: [id number] Visit Information Date & Time Provider Department Dept. Phone Encounter #  
 10/8/2018  9:45 AM Domi Collazo MD [facility] Sports Medicine and [address] [address] [id number] Follow-up Instructions Return in about 4 months (around 2/8/2019). Follow-up and Disposition History Upcoming Health Maintenance Date Due Influenza Age 5 to Adult 11/8/2018* Shingrix Vaccine Age 50> (1 of 2) 10/8/2019* Pneumococcal 65+ Low/Medium Risk (2 of 2 - PPSV23) 1/22/2019 MEDICARE YEARLY EXAM 3/20/2019 GLAUCOMA SCREENING Q2Y 3/5/2020 DTaP/Tdap/Td series (2 - Td) 9/8/2024 *Topic was postponed. The date shown is not the original due date. Allergies as of 10/8/2018  Review Complete On: 10/8/2018 By: Domi Collazo MD  
  
 Severity Noted Reaction Type Reactions Codeine High 09/08/2014   Systemic Itching Current Immunizations  Reviewed on 9/8/2014 Name Date Influenza Vaccine 9/8/2014 Tdap 9/8/2014 Not reviewed this visit You Were Diagnosed With   
  
 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 3 Essential hypertension     ICD-10-CM: I10 
ICD-9-CM: 401.9 Mixed hyperlipidemia     ICD-10-CM: E78.2 ICD-9-CM: 272.2 Family history of colon cancer     ICD-10-CM: Z80.0 ICD-9-CM: V16.0 Vitals BP Pulse Temp Resp Height(growth percentile) Weight(growth percentile) 121/72 79 98.1 °F (36.7 °C) (Oral) 18 5' 2\" (1.575 m) 146 lb 1.6 oz (66.3 kg) SpO2 BMI OB Status Smoking Status 95% 26.72 kg/m2 Menopause Never Smoker Vitals History BMI and BSA Data Body Mass Index Body Surface Area  
 26.72 kg/m 2 1.7 m 2 Preferred Pharmacy Pharmacy Name Phone [id number] Lemus [address] [facility] 0 [phone] Your Updated Medication List  
  
   
This list is accurate as of 10/8/18 11:11 AM.  Always use your most recent med list.  
  
  
  
  
 aspirin delayed-release 81 mg tablet Take  by mouth daily. atorvastatin 40 mg tablet Commonly known as:  LIPITOR Take 1 Tab by mouth daily. Biotin 2,500 mcg Cap Take  by mouth.  
  
 losartan-hydroCHLOROthiazide 100-25 mg per tablet Commonly known as:  HYZAAR Take 1 Tab by mouth daily. VITAMIN D3 PO Take  by mouth. We Performed the Following CBC WITH AUTOMATED DIFF [35425 CPT(R)] COLLECTION VENOUS BLOOD,VENIPUNCTURE [id number] CPT(R)] HEMOGLOBIN A1C WITH EAG [72742 CPT(R)] LIPID PANEL [42653 CPT(R)] METABOLIC PANEL, COMPREHENSIVE [43640 CPT(R)] REFERRAL TO GASTROENTEROLOGY [SNL99 Custom] Comments:  
 Please evaluate patient for colon. TSH 3RD GENERATION [19471 CPT(R)] URINALYSIS W/ RFLX MICROSCOPIC [77447 CPT(R)] Follow-up Instructions Return in about 4 months (around 2/8/2019). Referral Information Referral ID Referred By Referred To  
  
 [id number] Damien Yeboah MD   
   [address] [facility] [address] [facility], [address] Phone: [phone] Fax: [phone] Visits Status Start Date End Date 1 New Request 10/8/18 10/8/19 If your referral has a status of pending review or denied, additional information will be sent to support the outcome of this decision. Patient Instructions Body Mass Index: Care Instructions Your Care Instructions Body mass index (BMI) can help you see if your weight is raising your risk for health problems.  It uses a formula to compare how much you weigh with how tall you are. · A BMI lower than 18.5 is considered underweight. · A BMI between 18.5 and 24.9 is considered healthy. · A BMI between 25 and 29.9 is considered overweight. A BMI of 30 or higher is considered obese. If your BMI is in the normal range, it means that you have a lower risk for weight-related health problems. If your BMI is in the overweight or obese range, you may be at increased risk for weight-related health problems, such as high blood pressure, heart disease, stroke, arthritis or joint pain, and diabetes. If your BMI is in the underweight range, you may be at increased risk for health problems such as fatigue, lower protection (immunity) against illness, muscle loss, bone loss, hair loss, and hormone problems. BMI is just one measure of your risk for weight-related health problems. You may be at higher risk for health problems if you are not active, you eat an unhealthy diet, or you drink too much alcohol or use tobacco products. Follow-up care is a key part of your treatment and safety. Be sure to make and go to all appointments, and call your doctor if you are having problems. It's also a good idea to know your test results and keep a list of the medicines you take. How can you care for yourself at home? · Practice healthy eating habits. This includes eating plenty of fruits, vegetables, whole grains, lean protein, and low-fat dairy. · If your doctor recommends it, get more exercise. Walking is a good choice. Bit by bit, increase the amount you walk every day. Try for at least 30 minutes on most days of the week. · Do not smoke. Smoking can increase your risk for health problems. If you need help quitting, talk to your doctor about stop-smoking programs and medicines. These can increase your chances of quitting for good. · Limit alcohol to 2 drinks a day for men and 1 drink a day for women. Too much alcohol can cause health problems. If you have a BMI higher than 25 · Your doctor may do other tests to check your risk for weight-related health problems. This may include measuring the distance around your waist. A waist measurement of more than 40 inches in men or 35 inches in women can increase the risk of weight-related health problems. · Talk with your doctor about steps you can take to stay healthy or improve your health. You may need to make lifestyle changes to lose weight and stay healthy, such as changing your diet and getting regular exercise. If you have a BMI lower than 18.5 · Your doctor may do other tests to check your risk for health problems. · Talk with your doctor about steps you can take to stay healthy or improve your health. You may need to make lifestyle changes to gain or maintain weight and stay healthy, such as getting more healthy foods in your diet and doing exercises to build muscle. Where can you learn more? Go to http://sukhwinder-korina.info/. Enter S176 in the search box to learn more about \"Body Mass Index: Care Instructions. \" Current as of: October 13, 2016 Content Version: 11.4 © 1815-6827 EDAN. Care instructions adapted under license by [employer] (which disclaims liability or warranty for this information). If you have questions about a medical condition or this instruction, always ask your healthcare professional. [address] any warranty or liability for your use of this information. Introducing [facility]! Jagdish Simon introduces [employer] patient portal. Now you can access parts of your medical record, email your doctor's office, and request medication refills online. 1. In your internet browser, go to [email or web address] 2. Click on the First Time User? Click Here link in the Sign In box. You will see the New Member Sign Up page. 3. Enter your [employer] Access Code exactly as it appears below.  You will not need to use this code after youve completed the sign-up process. If you do not sign up before the expiration date, you must request a new code. · [employer] Access Code: [email or web address] Expires: 1/6/2019 10:50 AM 
 
4. Enter the last four digits of your Social Security Number (xxxx) and Date of Birth (mm/dd/yyyy) as indicated and click Submit. You will be taken to the next sign-up page. 5. Create a [employer] ID. This will be your [employer] login ID and cannot be changed, so think of one that is secure and easy to remember. 6. Create a [employer] password. You can change your password at any time. 7. Enter your Password Reset Question and Answer. This can be used at a later time if you forget your password. 8. Enter your e-mail address. You will receive e-mail notification when new information is available in [address] 9. Click Sign Up. You can now view and download portions of your medical record. 10. Click the Download Summary menu link to download a portable copy of your medical information. If you have questions, please visit the Frequently Asked Questions section of the [employer] website. Remember, [employer] is NOT to be used for urgent needs. For medical emergencies, dial 911. Now available from your iPhone and Android! Please provide this summary of care documentation to your next provider. Your primary care clinician is listed as Jorje Duverney. If you have any questions after today's visit, please call [phone].

## 2018-10-08 NOTE — PATIENT INSTRUCTIONS
Body Mass Index: Care Instructions Your Care Instructions Body mass index (BMI) can help you see if your weight is raising your risk for health problems. It uses a formula to compare how much you weigh with how tall you are. · A BMI lower than 18.5 is considered underweight. · A BMI between 18.5 and 24.9 is considered healthy. · A BMI between 25 and 29.9 is considered overweight. A BMI of 30 or higher is considered obese. If your BMI is in the normal range, it means that you have a lower risk for weight-related health problems. If your BMI is in the overweight or obese range, you may be at increased risk for weight-related health problems, such as high blood pressure, heart disease, stroke, arthritis or joint pain, and diabetes. If your BMI is in the underweight range, you may be at increased risk for health problems such as fatigue, lower protection (immunity) against illness, muscle loss, bone loss, hair loss, and hormone problems. BMI is just one measure of your risk for weight-related health problems. You may be at higher risk for health problems if you are not active, you eat an unhealthy diet, or you drink too much alcohol or use tobacco products. Follow-up care is a key part of your treatment and safety. Be sure to make and go to all appointments, and call your doctor if you are having problems. It's also a good idea to know your test results and keep a list of the medicines you take. How can you care for yourself at home? · Practice healthy eating habits. This includes eating plenty of fruits, vegetables, whole grains, lean protein, and low-fat dairy. · If your doctor recommends it, get more exercise. Walking is a good choice. Bit by bit, increase the amount you walk every day. Try for at least 30 minutes on most days of the week. · Do not smoke. Smoking can increase your risk for health problems.  If you need help quitting, talk to your doctor about stop-smoking programs and medicines. These can increase your chances of quitting for good. · Limit alcohol to 2 drinks a day for men and 1 drink a day for women. Too much alcohol can cause health problems. If you have a BMI higher than 25 · Your doctor may do other tests to check your risk for weight-related health problems. This may include measuring the distance around your waist. A waist measurement of more than 40 inches in men or 35 inches in women can increase the risk of weight-related health problems. · Talk with your doctor about steps you can take to stay healthy or improve your health. You may need to make lifestyle changes to lose weight and stay healthy, such as changing your diet and getting regular exercise. If you have a BMI lower than 18.5 · Your doctor may do other tests to check your risk for health problems. · Talk with your doctor about steps you can take to stay healthy or improve your health. You may need to make lifestyle changes to gain or maintain weight and stay healthy, such as getting more healthy foods in your diet and doing exercises to build muscle. Where can you learn more? Go to http://sukhwinder-korina.info/. Enter S176 in the search box to learn more about \"Body Mass Index: Care Instructions. \" Current as of: October 13, 2016 Content Version: 11.4 © 1635-8163 Healthwise, Incorporated. Care instructions adapted under license by [employer] (which disclaims liability or warranty for this information). If you have questions about a medical condition or this instruction, always ask your healthcare professional. [address] any warranty or liability for your use of this information.

## 2018-10-08 NOTE — PROGRESS NOTES
1. Have you been to the ER, urgent care clinic since your last visit? Hospitalized since your last visit? No 
 
2. Have you seen or consulted any other health care providers outside of the [address] [place] [address] since your last visit? Include any pap smears or colon screening.  No

## 2018-10-09 LAB
ALBUMIN SERPL-MCNC: 4.4 G/DL (ref 3.5–4.8)
ALBUMIN/GLOB SERPL: 1.3 {RATIO} (ref 1.2–2.2)
ALP SERPL-CCNC: 140 IU/L (ref 39–117)
ALT SERPL-CCNC: 9 IU/L (ref 0–32)
APPEARANCE UR: ABNORMAL
AST SERPL-CCNC: 15 IU/L (ref 0–40)
BACTERIA #/AREA URNS HPF: ABNORMAL /[HPF]
BASOPHILS # BLD AUTO: 0 X10E3/UL (ref 0–0.2)
BASOPHILS NFR BLD AUTO: 1 %
BILIRUB SERPL-MCNC: 0.3 MG/DL (ref 0–1.2)
BILIRUB UR QL STRIP: NEGATIVE
BUN SERPL-MCNC: 35 MG/DL (ref 8–27)
BUN/CREAT SERPL: 23 (ref 12–28)
CALCIUM SERPL-MCNC: 9.6 MG/DL (ref 8.7–10.3)
CASTS URNS QL MICRO: ABNORMAL /LPF
CHLORIDE SERPL-SCNC: 102 MMOL/L (ref 96–106)
CHOLEST SERPL-MCNC: 154 MG/DL (ref 100–199)
CO2 SERPL-SCNC: 22 MMOL/L (ref 20–29)
COLOR UR: YELLOW
CREAT SERPL-MCNC: 1.52 MG/DL (ref 0.57–1)
EOSINOPHIL # BLD AUTO: 0.2 X10E3/UL (ref 0–0.4)
EOSINOPHIL NFR BLD AUTO: 3 %
EPI CELLS #/AREA URNS HPF: ABNORMAL /HPF
ERYTHROCYTE [DISTWIDTH] IN BLOOD BY AUTOMATED COUNT: 14.6 % (ref 12.3–15.4)
EST. AVERAGE GLUCOSE BLD GHB EST-MCNC: 126 MG/DL
GLOBULIN SER CALC-MCNC: 3.5 G/DL (ref 1.5–4.5)
GLUCOSE SERPL-MCNC: 95 MG/DL (ref 65–99)
GLUCOSE UR QL: NEGATIVE
HBA1C MFR BLD: 6 % (ref 4.8–5.6)
HCT VFR BLD AUTO: 34.6 % (ref 34–46.6)
HDLC SERPL-MCNC: 46 MG/DL
HGB BLD-MCNC: 11 G/DL (ref 11.1–15.9)
HGB UR QL STRIP: NEGATIVE
IMM GRANULOCYTES # BLD: 0 X10E3/UL (ref 0–0.1)
IMM GRANULOCYTES NFR BLD: 0 %
KETONES UR QL STRIP: NEGATIVE
LDLC SERPL CALC-MCNC: 97 MG/DL (ref 0–99)
LEUKOCYTE ESTERASE UR QL STRIP: ABNORMAL
LYMPHOCYTES # BLD AUTO: 1.8 X10E3/UL (ref 0.7–3.1)
LYMPHOCYTES NFR BLD AUTO: 24 %
MCH RBC QN AUTO: 26.1 PG (ref 26.6–33)
MCHC RBC AUTO-ENTMCNC: 31.8 G/DL (ref 31.5–35.7)
MCV RBC AUTO: 82 FL (ref 79–97)
MICRO URNS: ABNORMAL
MONOCYTES # BLD AUTO: 0.5 X10E3/UL (ref 0.1–0.9)
MONOCYTES NFR BLD AUTO: 7 %
MUCOUS THREADS URNS QL MICRO: PRESENT
NEUTROPHILS # BLD AUTO: 5 X10E3/UL (ref 1.4–7)
NEUTROPHILS NFR BLD AUTO: 65 %
NITRITE UR QL STRIP: NEGATIVE
PH UR STRIP: 5.5 [PH] (ref 5–7.5)
PLATELET # BLD AUTO: 312 X10E3/UL (ref 150–379)
POTASSIUM SERPL-SCNC: 4.8 MMOL/L (ref 3.5–5.2)
PROT SERPL-MCNC: 7.9 G/DL (ref 6–8.5)
PROT UR QL STRIP: NEGATIVE
RBC # BLD AUTO: 4.22 X10E6/UL (ref 3.77–5.28)
RBC #/AREA URNS HPF: ABNORMAL /HPF
SODIUM SERPL-SCNC: 143 MMOL/L (ref 134–144)
SP GR UR: 1.01 (ref 1–1.03)
TRIGL SERPL-MCNC: 55 MG/DL (ref 0–149)
TSH SERPL DL<=0.005 MIU/L-ACNC: 1.47 UIU/ML (ref 0.45–4.5)
UROBILINOGEN UR STRIP-MCNC: 0.2 MG/DL (ref 0.2–1)
VLDLC SERPL CALC-MCNC: 11 MG/DL (ref 5–40)
WBC # BLD AUTO: 7.5 X10E3/UL (ref 3.4–10.8)
WBC #/AREA URNS HPF: ABNORMAL /HPF

## 2018-11-29 ENCOUNTER — ANESTHESIA EVENT (OUTPATIENT)
Dept: ENDOSCOPY | Age: 78
End: 2018-11-29
Payer: MEDICARE

## 2018-11-29 ENCOUNTER — ANESTHESIA (OUTPATIENT)
Dept: ENDOSCOPY | Age: 78
End: 2018-11-29
Payer: MEDICARE

## 2018-11-29 ENCOUNTER — HOSPITAL ENCOUNTER (OUTPATIENT)
Age: 78
Setting detail: OUTPATIENT SURGERY
Discharge: HOME | End: 2018-11-29
Attending: INTERNAL MEDICINE | Admitting: INTERNAL MEDICINE
Payer: MEDICARE

## 2018-11-29 VITALS
TEMPERATURE: 97.6 F | DIASTOLIC BLOOD PRESSURE: 72 MMHG | SYSTOLIC BLOOD PRESSURE: 122 MMHG | HEART RATE: 69 BPM | RESPIRATION RATE: 17 BRPM | OXYGEN SATURATION: 100 %

## 2018-11-29 PROCEDURE — 77030027957 HC TBNG IRR ENDOGTR BUSS -B: Performed by: INTERNAL MEDICINE

## 2018-11-29 PROCEDURE — 77030009426 HC FCPS BIOP ENDOSC BSC -B: Performed by: INTERNAL MEDICINE

## 2018-11-29 PROCEDURE — 76040000019: Performed by: INTERNAL MEDICINE

## 2018-11-29 PROCEDURE — 74011250636 HC RX REV CODE- 250/636

## 2018-11-29 PROCEDURE — 76060000031 HC ANESTHESIA FIRST 0.5 HR: Performed by: INTERNAL MEDICINE

## 2018-11-29 PROCEDURE — 88305 TISSUE EXAM BY PATHOLOGIST: CPT

## 2018-11-29 RX ORDER — FENTANYL CITRATE 50 UG/ML
200 INJECTION, SOLUTION INTRAMUSCULAR; INTRAVENOUS
Status: DISCONTINUED | OUTPATIENT
Start: 2018-11-29 | End: 2018-11-29 | Stop reason: HOSPADM

## 2018-11-29 RX ORDER — FLUMAZENIL 0.1 MG/ML
0.2 INJECTION INTRAVENOUS
Status: DISCONTINUED | OUTPATIENT
Start: 2018-11-29 | End: 2018-11-29 | Stop reason: HOSPADM

## 2018-11-29 RX ORDER — SODIUM CHLORIDE 9 MG/ML
100 INJECTION, SOLUTION INTRAVENOUS CONTINUOUS
Status: DISCONTINUED | OUTPATIENT
Start: 2018-11-29 | End: 2018-11-29 | Stop reason: HOSPADM

## 2018-11-29 RX ORDER — EPINEPHRINE 0.1 MG/ML
1 INJECTION INTRACARDIAC; INTRAVENOUS
Status: DISCONTINUED | OUTPATIENT
Start: 2018-11-29 | End: 2018-11-29 | Stop reason: HOSPADM

## 2018-11-29 RX ORDER — LIDOCAINE HYDROCHLORIDE 20 MG/ML
INJECTION, SOLUTION EPIDURAL; INFILTRATION; INTRACAUDAL; PERINEURAL AS NEEDED
Status: DISCONTINUED | OUTPATIENT
Start: 2018-11-29 | End: 2018-12-04 | Stop reason: HOSPADM

## 2018-11-29 RX ORDER — ATROPINE SULFATE 0.1 MG/ML
0.5 INJECTION INTRAVENOUS
Status: DISCONTINUED | OUTPATIENT
Start: 2018-11-29 | End: 2018-11-29 | Stop reason: HOSPADM

## 2018-11-29 RX ORDER — NALOXONE HYDROCHLORIDE 0.4 MG/ML
0.4 INJECTION, SOLUTION INTRAMUSCULAR; INTRAVENOUS; SUBCUTANEOUS
Status: DISCONTINUED | OUTPATIENT
Start: 2018-11-29 | End: 2018-11-29 | Stop reason: HOSPADM

## 2018-11-29 RX ORDER — SODIUM CHLORIDE 0.9 % (FLUSH) 0.9 %
5-10 SYRINGE (ML) INJECTION AS NEEDED
Status: DISCONTINUED | OUTPATIENT
Start: 2018-11-29 | End: 2018-11-29 | Stop reason: HOSPADM

## 2018-11-29 RX ORDER — DEXTROMETHORPHAN/PSEUDOEPHED 2.5-7.5/.8
1.2 DROPS ORAL
Status: DISCONTINUED | OUTPATIENT
Start: 2018-11-29 | End: 2018-11-29 | Stop reason: HOSPADM

## 2018-11-29 RX ORDER — SODIUM CHLORIDE 0.9 % (FLUSH) 0.9 %
5-10 SYRINGE (ML) INJECTION EVERY 8 HOURS
Status: DISCONTINUED | OUTPATIENT
Start: 2018-11-29 | End: 2018-11-29 | Stop reason: HOSPADM

## 2018-11-29 RX ORDER — PROPOFOL 10 MG/ML
INJECTION, EMULSION INTRAVENOUS AS NEEDED
Status: DISCONTINUED | OUTPATIENT
Start: 2018-11-29 | End: 2018-12-04 | Stop reason: HOSPADM

## 2018-11-29 RX ORDER — SODIUM CHLORIDE 9 MG/ML
INJECTION, SOLUTION INTRAVENOUS
Status: DISCONTINUED | OUTPATIENT
Start: 2018-11-29 | End: 2018-12-04 | Stop reason: HOSPADM

## 2018-11-29 RX ORDER — MIDAZOLAM HYDROCHLORIDE 1 MG/ML
.25-1 INJECTION, SOLUTION INTRAMUSCULAR; INTRAVENOUS
Status: DISCONTINUED | OUTPATIENT
Start: 2018-11-29 | End: 2018-11-29 | Stop reason: HOSPADM

## 2018-11-29 RX ADMIN — PROPOFOL 100 MG: 10 INJECTION, EMULSION INTRAVENOUS at 14:17

## 2018-11-29 RX ADMIN — SODIUM CHLORIDE: 9 INJECTION, SOLUTION INTRAVENOUS at 14:07

## 2018-11-29 RX ADMIN — LIDOCAINE HYDROCHLORIDE 100 MG: 20 INJECTION, SOLUTION EPIDURAL; INFILTRATION; INTRACAUDAL; PERINEURAL at 14:17

## 2018-11-29 RX ADMIN — PROPOFOL 50 MG: 10 INJECTION, EMULSION INTRAVENOUS at 14:31

## 2018-11-29 RX ADMIN — PROPOFOL 100 MG: 10 INJECTION, EMULSION INTRAVENOUS at 14:24

## 2018-11-29 NOTE — PERIOP NOTES

## 2018-11-29 NOTE — DISCHARGE INSTRUCTIONS
Colon Polyps: Care Instructions  Your Care Instructions    Colon polyps are growths in the colon or the rectum. The cause of most colon polyps is not known, and most people who get them do not have any problems. But a certain kind can turn into cancer. For this reason, regular testing for colon polyps is important for people age 48 and older and anyone who has an increased risk for colon cancer. Polyps are usually found through routine colon cancer screening tests. Although most colon polyps are not cancerous, they are usually removed and then tested for cancer. Screening for colon cancer saves lives because the cancer can usually be cured if it is caught early. If you have a polyp that is the type that can turn into cancer, you may need more tests to examine your entire colon. The doctor will remove any other polyps that he or she finds, and you will be tested more often. Follow-up care is a key part of your treatment and safety. Be sure to make and go to all appointments, and call your doctor if you are having problems. It's also a good idea to know your test results and keep a list of the medicines you take. How can you care for yourself at home? Regular exams to look for colon polyps are the best way to prevent polyps from turning into colon cancer. These can include stool tests, sigmoidoscopy, colonoscopy, and CT colonography. Talk with your doctor about a testing schedule that is right for you. To prevent polyps  There is no home treatment that can prevent colon polyps. But these steps may help lower your risk for cancer. · Stay active. Being active can help you get to and stay at a healthy weight. Try to exercise on most days of the week. Walking is a good choice. · Eat well. Choose a variety of vegetables, fruits, legumes (such as peas and beans), fish, poultry, and whole grains. · Do not smoke. If you need help quitting, talk to your doctor about stop-smoking programs and medicines.  These can increase your chances of quitting for good. · If you drink alcohol, limit how much you drink. Limit alcohol to 2 drinks a day for men and 1 drink a day for women. When should you call for help? Call your doctor now or seek immediate medical care if:    · You have severe belly pain.     · Your stools are maroon or very bloody.    Watch closely for changes in your health, and be sure to contact your doctor if:    · You have a fever.     · You have nausea or vomiting.     · You have a change in bowel habits (new constipation or diarrhea).     · Your symptoms get worse or are not improving as expected. Where can you learn more? Go to http://sukhwinder-korina.info/. Enter 95 [id number] in the search box to learn more about \"Colon Polyps: Care Instructions. \"  Current as of: March 28, 2018  Content Version: 11.8  © 8770-1813 NEWLINE SOFTWARE. Care instructions adapted under license by [employer] (which disclaims liability or warranty for this information). If you have questions about a medical condition or this instruction, always ask your healthcare professional. [address] [place] [address] any warranty or liability for your use of this information.     [facility]    COLON DISCHARGE INSTRUCTIONS    Joel Plascencia  [id number]  1940    Discomfort:  Redness at IV site- apply warm compress to area; if redness or soreness persist- contact your physician  There may be a slight amount of blood passed from the rectum  Gaseous discomfort- walking, belching will help relieve any discomfort  You may not operate a vehicle for 12 hours  You may not engage in an occupation involving machinery or appliances for rest of today  You may not drink alcoholic beverages for at least 12 hours  Avoid making any critical decisions for at least 24 hour  DIET:  You may resume your regular diet - however -  remember your colon is empty and a heavy meal will produce gas. Avoid these foods:  vegetables, fried / greasy foods, carbonated drinks     ACTIVITY:  You may  resume your normal daily activities it is recommended that you spend the remainder of the day resting -  avoid any strenuous activity. CALL M.D. ANY SIGN OF:   Increasing pain, nausea, vomiting  Abdominal distension (swelling)  New increased bleeding (oral or rectal)  Fever (chills)  Pain in chest area  Bloody discharge from nose or mouth  Shortness of breath      Follow-up Instructions:   Call Dr. Haroon Richey for any questions or problems at [address] [place] [address]:   Your colonoscopy showed one polyp I removed.   Telephone # [phone]      Signed By: Haroon Richey MD     11/29/2018  2:41 PM       DISCHARGE SUMMARY from Nurse    The following personal items collected during your admission are returned to you:   Dental Appliance: Dental Appliances: Lowers, Uppers  Vision: Visual Aid: None  Hearing Aid:    Jewelry:    Clothing:    Other Valuables:    Valuables sent to safe:

## 2018-11-29 NOTE — PROCEDURES
[address]  [address]      Colonoscopy Operative Report    Sixto Poon  [id number]  1940      Procedure Type:   Colonoscopy with polypectomy (hot biopsy)     Indications:    Family history of coloretal cancer (screening only)   Date of last colonoscopy: 7 years ago, Polyps  No    Pre-operative Diagnosis: see indication above    Post-operative Diagnosis:  See findings below    :  Kelsey Vazquez MD      Referring Provider: Theola Kehr, MD      Sedation:  MAC anesthesia Propofol      Procedure Details:  After informed consent was obtained with all risks and benefits of procedure explained and preoperative exam completed, the patient was taken to the endoscopy suite and placed in the left lateral decubitus position. Upon sequential sedation as per above, a digital rectal exam was performed demonstrating internal hemorrhoids. The Olympus videocolonoscope  was inserted in the rectum and carefully advanced to the cecum, which was identified by the ileocecal valve and appendiceal orifice. The cecum was identified by the ileocecal valve and appendiceal orifice. The quality of preparation was good. The colonoscope was slowly withdrawn with careful evaluation between folds. Retroflexion in the rectum was completed . Findings:   Rectum: normal  Sigmoid: normal  Descending Colon: normal  Transverse Colon: normal  Ascending Colon: normal  Cecum: 9 mm flat polyp removed by hot biopsy        Specimen Removed:  see findings    Complications: None. EBL:  None. Impression:    see findings    Recommendations: --Await pathology.       Recommendation for next colonoscopy in 3 years      Signed By: Kelsye Vazquez MD     11/29/2018  2:38 PM

## 2018-11-29 NOTE — H&P
The patient is a 78year old female who presents with a complaint of Bloating. The patient presents consultation at the request of Dr. Estrella Serna. The bloating has been occurring in an intermittent pattern for months. There has been no associated abdominal pain, amenorrhea, anorexia, anxiety, bloody stools, chewing gum, constipation, diarrhea, drinking large amounts of carbonated beverages, dysmenorrhea, dysuria, hard stools, hematemesis, hematuria, history of abdominal surgery, melena, peripheral edema, poor fitting dentures, possible pregnancy, post-nasal drip, rapid eating, steatorrhea, vaginal bleeding, weight gain or weight loss. Note for \"Bloating\": she has strong family h/o colon cancer, her sister had it, gets colonoscopy every 5 years, last one more than 5 years ago, no major GI complaints, just mild intermittent bloating Problem List/Past Medical Jordon Woodson; 11/7/2018 11:33 AM) Hypercholesterolemia   
Hypertension   
 
Past Surgical History Jordon Woodson; 11/7/2018 11:33 AM) Foot Surgery - Both   
Carpal Tunnel Surgery - Both   
RESECTION, LARGE INTESTINE, OPEN (20193)   
Knee Replacement, Total   Right. Allergies Jordon Woodson; 11/7/2018 11:33 AM) Codeine/Codeine Derivatives   
 
Medication History (Mariya Woodson; 11/7/2018 11:33 AM) Vitamin D3  (2000UNIT Capsule, Oral daily) Active. Vitamin C ER  (1000MG Tablet ER, Oral daily) Active. Losartan Potassium-HCTZ  (100-25MG Tablet, Oral daily) Active. Atorvastatin Calcium  (40MG Tablet, Oral at bedtime) Active. Aspirin  (81MG Tablet DR, Oral daily) Active. Medications Reconciled  
 
Family History Jordon Woodson; 11/7/2018 11:33 AM) Diabetes Mellitus   Mother, Brother. Social History Jordon Woodson; 11/7/2018 11:33 AM) Blood Transfusion   Yes. Alcohol Use   Occasional alcohol use. Employment status   Retired. Marital status   . Diagnostic Studies History Ninfa MERCADO Debbie Vasquez; 11/7/2018 11:33 AM) Colonoscopy   
Endoscopy   
 
Health Maintenance History Ninfa MERCADO Debbie Vasquez; 11/7/2018 11:33 AM) Flu Vaccine  [10/2018]: 
Pneumovax   none Review of Systems ([id number] Krish Vasquez; 11/7/2018 11:33 AM) General Not Present- Chronic Fatigue, Poor Appetite, Weight Gain and Weight Loss. Skin Not Present- Itching, Rash and Skin Color Changes. HEENT Not Present- Hearing Loss and Vertigo. Respiratory Not Present- Difficulty Breathing and TB exposure. Cardiovascular Present- Hypertension. Not Present- Chest Pain, Use of Antibiotics before Dental Procedures and Use of Blood Thinners. Gastrointestinal Present- See HPI. Musculoskeletal Not Present- Arthritis, Hip Replacement Surgery and Knee Replacement Surgery. Neurological Not Present- Weakness. Psychiatric Not Present- Depression. Endocrine Not Present- Diabetes and Thyroid Problems. Hematology Not Present- Anemia. Vitals Ninfa MERCADO Debbie Vasquez; 11/7/2018 11:37 AM) 11/7/2018 11:33 AM 
Weight: 143 lb   Height: 62 in  
Body Surface Area: 1.66 m²   Body Mass Index: 26.15 kg/m²   
Pulse: 80 (Regular)    Resp.: 20 (Unlabored)    
BP: 128/70 (Sitting, Left Arm, Standard) Physical Exam Curt Chavira MD; 11/7/2018 12:18 PM) General 
Mental Status - Alert. General Appearance - Cooperative, Pleasant, Not in acute distress. Orientation - Oriented X3. Build & Nutrition - Well nourished and Well developed. Integumentary General Characteristics Overall examination of the patient's skin reveals - no rashes, no bruises and no spider angiomas. Color - normal coloration of skin. Head and Neck Neck Global Assessment - full range of motion and supple, no bruit auscultated on the right, no bruit auscultated on the left, non-tender, no lymphadenopathy. Thyroid Gland Characteristics - normal size and consistency. Eye Eyeball - Left - No Exophthalmos. Eyeball - Right - No Exophthalmos. Sclera/Conjunctiva - Left - No Jaundice. Sclera/Conjunctiva - Right - No Jaundice. Chest and Lung Exam 
Chest and lung exam reveals  - quiet, even and easy respiratory effort with no use of accessory muscles. Auscultation Breath sounds - Normal. Adventitious sounds - No Adventitious sounds. Cardiovascular Auscultation Rhythm - Regular, No Tachycardia, No Bradycardia . Heart Sounds - Normal heart sounds , S1 WNL and S2 WNL, No S3, No Summation Gallop. Murmurs & Other Heart Sounds - Auscultation of the heart reveals - No Murmurs. Abdomen Palpation/Percussion Tenderness - Non-Tender. Rebound tenderness - No rebound. Rigidity (guarding) - No Rigidity. Dullness to percussion - No abnormal dullness to percussion. Liver - No hepatosplenomegaly. Abdominal Mass Palpable - No masses. Other Characteristics - No Ascites. Auscultation Auscultation of the abdomen reveals - Bowel sounds normal, No Abdominal bruits and No Succussion splash. Rectal - Did not examine. Peripheral Vascular Upper Extremity Inspection - Left - Normal - No Clubbing, No Cyanosis, No Edema, Pulses Intact. Right - Normal - No Clubbing, No Cyanosis, No Edema, Pulses Intact. Palpation - Edema - Left - No edema. Right - No edema. Lower Extremity Inspection - Left - Inspection Normal. Right - Inspection Normal. Palpation - Edema - Left - No edema. Right - No edema. Neurologic Neurologic evaluation reveals  - Cranial nerves grossly intact, no focal neurologic deficits. Motor Involuntary Movements - Asterixis - not present. Musculoskeletal 
Global Assessment Gait and Station - normal gait and station. Assessment & Plan Tate Lan MD; 11/7/2018 12:18 PM) Bloating (787.3  R14.0) Impression: she has strong family h/o colon cancer, her sister had it, gets colonoscopy every 5 years, last one more than 5 years ago, no major GI complaints, just mild intermittent bloating Current Plans Pt Education - How to access health information online: discussed with patient and provided information. Family history of cancer of GI tract (V16.0  Z80.0) Current Plans Discussed the risks and benefits of colonoscopy with the patient. Colonoscopy (70424) (Discussed risks and benefits with the patient to include:; perforation, post polypectomy, or post biopsy bleeding, missed lesions, and sedation reactions.) Started Suprep Bowel Prep Kit 17.5-3.13-1.6GM/180ML, 1 (one) KIt container Milliliter as directed, 354 Milliliter, 1 day starting 11/07/2018, No Refill. Date of Surgery Update: 
Suad Roche was seen and examined. History and physical has been reviewed. The patient has been examined.  There have been no significant clinical changes since the completion of the originally dated History and Physical. 
 
Signed By: Sukh Vogt MD   
 November 29, 2018 2:10 PM

## 2018-11-29 NOTE — ROUTINE PROCESS
Thor Nichols 1940 
[id number] Situation: 
Verbal report received from: CLARENCE Zheng  Procedure: Procedure(s): 
COLONOSCOPY 
ENDOSCOPIC POLYPECTOMY Background: 
 
Preoperative diagnosis: FAMILY HISTORY OF COLON CANCER Postoperative diagnosis: Colon Polyp :  Dr. Penny Flaherty Assistant(s): Endoscopy Technician-1: Joy Sawant Endoscopy RN-1: Claudia Kaplan RN Specimens:  
ID Type Source Tests Collected by Time Destination 1 : cecal polyp Preservative Andrea Redd MD 2018 1430 Pathology H. Pylori  no Assessment: 
Intra-procedure medications Anesthesia gave intra-procedure sedation and medications, see anesthesia flow sheet yes Intravenous fluids: NS@ Lorry Boozer Vital signs stable Abdominal assessment: round and soft Recommendation: 
Discharge patient per MD order. Family or Friend Permission to share finding with family or friend yes

## 2018-11-29 NOTE — ANESTHESIA PREPROCEDURE EVALUATION
Anesthetic History   No history of anesthetic complications            Review of Systems / Medical History  Patient summary reviewed, nursing notes reviewed and pertinent labs reviewed    Pulmonary  Within defined limits                 Neuro/Psych   Within defined limits           Cardiovascular  Within defined limits  Hypertension                   GI/Hepatic/Renal  Within defined limits              Endo/Other  Within defined limits           Other Findings              Physical Exam    Airway  Mallampati: II  TM Distance: > 6 cm  Neck ROM: normal range of motion   Mouth opening: Normal     Cardiovascular  Regular rate and rhythm,  S1 and S2 normal,  no murmur, click, rub, or gallop             Dental  No notable dental hx       Pulmonary  Breath sounds clear to auscultation               Abdominal  GI exam deferred       Other Findings            Anesthetic Plan    ASA: 2  Anesthesia type: MAC            Anesthetic plan and risks discussed with: Patient

## 2018-12-05 NOTE — ANESTHESIA POSTPROCEDURE EVALUATION
Procedure(s):  COLONOSCOPY  ENDOSCOPIC POLYPECTOMY. Anesthesia Post Evaluation        Comments: Post-Anesthesia Evaluation and Assessment    I have evaluated the patient and they are ready for PACU discharge. Patient: Ángel Granado MRN: [id number]  SSN: [id number]   YOB: 1940  Age: 70 y.o. Sex: female      Cardiovascular Function/Vital Signs  /72   Pulse 69   Temp 36.4 °C (97.6 °F)   Resp 17   SpO2 100%   Breastfeeding? No     Patient is status post MAC anesthesia for Procedure(s):  COLONOSCOPY  ENDOSCOPIC POLYPECTOMY. Nausea/Vomiting: None    Postoperative hydration reviewed and adequate. Pain:  Pain Scale 1: Numeric (0 - 10) (11/29/18 1502)  Pain Intensity 1: 0 (11/29/18 1502)   Managed    Neurological Status: At baseline    Mental Status, Level of Consciousness: Alert and  oriented to person, place, and time    Pulmonary Status:   O2 Device: Room air (11/29/18 1502)   Adequate oxygenation and airway patent    Complications related to anesthesia: None    Post-anesthesia assessment completed. No concerns    Signed By: Pedro Dodge MD    December 5, 2018                   Visit Vitals  /72   Pulse 69   Temp 36.4 °C (97.6 °F)   Resp 17   SpO2 100%   Breastfeeding?  No

## 2018-12-13 ENCOUNTER — OFFICE VISIT (OUTPATIENT)
Dept: INTERNAL MEDICINE CLINIC | Age: 78
End: 2018-12-13

## 2018-12-13 VITALS
SYSTOLIC BLOOD PRESSURE: 150 MMHG | OXYGEN SATURATION: 92 % | RESPIRATION RATE: 20 BRPM | TEMPERATURE: 97.4 F | WEIGHT: 142.8 LBS | BODY MASS INDEX: 26.28 KG/M2 | HEIGHT: 62 IN | HEART RATE: 102 BPM | DIASTOLIC BLOOD PRESSURE: 77 MMHG

## 2018-12-13 DIAGNOSIS — N18.32 CHRONIC KIDNEY DISEASE (CKD) STAGE G3B/A1, MODERATELY DECREASED GLOMERULAR FILTRATION RATE (GFR) BETWEEN 30-44 ML/MIN/1.73 SQUARE METER AND ALBUMINURIA CREATININE RATIO LESS THAN 30 MG/G (HCC): ICD-10-CM

## 2018-12-13 DIAGNOSIS — E78.2 MIXED HYPERLIPIDEMIA: ICD-10-CM

## 2018-12-13 DIAGNOSIS — I10 ESSENTIAL HYPERTENSION: Primary | ICD-10-CM

## 2018-12-13 RX ORDER — BUDESONIDE AND FORMOTEROL FUMARATE DIHYDRATE 160; 4.5 UG/1; UG/1
2 AEROSOL RESPIRATORY (INHALATION) 2 TIMES DAILY
Qty: 1 INHALER | Refills: 11 | Status: SHIPPED | OUTPATIENT
Start: 2018-12-13 | End: 2022-03-28

## 2018-12-13 RX ORDER — AZITHROMYCIN 250 MG/1
250 TABLET, FILM COATED ORAL SEE ADMIN INSTRUCTIONS
Qty: 6 TAB | Refills: 1 | Status: SHIPPED | OUTPATIENT
Start: 2018-12-13 | End: 2018-12-18

## 2018-12-13 RX ORDER — ALBUTEROL SULFATE 90 UG/1
2 AEROSOL, METERED RESPIRATORY (INHALATION)
Qty: 1 INHALER | Refills: 11 | Status: SHIPPED | OUTPATIENT
Start: 2018-12-13 | End: 2022-03-28

## 2018-12-13 NOTE — PROGRESS NOTES
1. Have you been to the ER, urgent care clinic since your last visit? Hospitalized since your last visit? No    2. Have you seen or consulted any other health care providers outside of the [address] [place] [address] since your last visit? Include any pap smears or colon screening.  No     Wants to discuss cough

## 2018-12-13 NOTE — PROGRESS NOTES
SPORTS [facility]ICINE [facility]ARE  Xenia Colin MD, [address] [address]  Phone:  [phone]  Fax: [phone]      Chief Complaint   Patient presents with    Cough         SUBECTIVE:    Lis Christensen is a 70 y.o. female Patient returns today with known history of primary hypertension, chronic kidney disease, anemia, dyslipidemia and is seen for evaluation. Since yesterday she has noted the onset of a cough. It is now productive of mucopurulent sputum. She also has pleuritic right sided anterior chest discomfort. Patient is seen for evaluation. Current Outpatient Medications   Medication Sig Dispense Refill    azithromycin (ZITHROMAX) 250 mg tablet Take 1 Tab by mouth See Admin Instructions for 5 days. 6 Tab 1    albuterol (PROVENTIL HFA, VENTOLIN HFA, PROAIR HFA) 90 mcg/actuation inhaler Take 2 Puffs by inhalation every four (4) hours as needed for Wheezing. 1 Inhaler 11    budesonide-formoterol (SYMBICORT) 160-4.5 mcg/actuation HFAA Take 2 Puffs by inhalation two (2) times a day. 1 Inhaler 11    atorvastatin (LIPITOR) 40 mg tablet Take 1 Tab by mouth daily. 90 Tab 4    losartan-hydroCHLOROthiazide (HYZAAR) 100-25 mg per tablet Take 1 Tab by mouth daily. 90 Tab 4    CHOLECALCIFEROL, VITAMIN D3, (VITAMIN D3 PO) Take  by mouth.  Biotin 2,500 mcg cap Take  by mouth. Past Medical History:   Diagnosis Date    Advance care planning     documents pending    Anemia     Arthropathy     CKD (chronic kidney disease), stage III ([address]     Jazmin Velasquez    Family history of colon cancer     Hypercholesterolemia     Hypertension      Past Surgical History:   Procedure Laterality Date    COLONOSCOPY N/A 11/29/2018    COL[facility] performed [facility] at [facility] ENDOSCOPY    HX ORTHOPAEDIC      bunionectomy     Allergies   Allergen Reactions    Codeine Itching       REVIEW OF SYSTEMS:   No chills, no fever.         Social History     Socioeconomic History    Marital status: SINGLE     Spouse name: Not on file    Number of children: Not on file    Years of education: Not on file    Highest education level: Not on file   Tobacco Use    Smoking status: Never Smoker    Smokeless tobacco: Never Used   Substance and Sexual Activity    Alcohol use: Yes     Alcohol/week: 0.5 oz     Types: 1 Standard drinks or equivalent per week     Comment: ocassional    Drug use: No    Sexual activity: Not Currently   Social History Narrative    Habits:  Occasional alcohol use. No cigarette or drug abuse. Social History:  The patient is . She has no children. She lives alone. She completed high school. 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 She's a member of [employer]. Family History:  Father passed at 59 of heart disease. Mother  80 of heart disease. She also had diabetes. One brother, one sister alive and well. One brother  of seizure disorder, one brother  of heart disease. He had LVAD. One brother  of liver issues. One sister  of colon cancer and one sister  of lung cancer.   r  Family History   Problem Relation Age of Onset    Heart Disease Mother     Heart Disease Father     Cancer Sister     Cancer Sister     Heart Disease Brother        OBJECTIVE:  Visit Vitals  /77   Pulse (!) 102   Temp 97.4 °F (36.3 °C) (Oral)   Resp 20   Ht 5' 2\" (1.575 m)   Wt 142 lb 12.8 oz (64.8 kg)   SpO2 92%   BMI 26.12 kg/m²     ENT: perrla,  eom intact  NECK: supple.  Thyroid normal  CHEST: clear to ascultation and percussion   HEART: regular rate and rhythm  ABD: soft, bowel sounds active  EXTREMITIES: no edema, pulse 1+     Office Visit on 10/08/2018   Component Date Value Ref Range Status    Specific Gravity 10/08/2018 1.015  1.005 - 1.030 Final    pH (UA) 10/08/2018 5.5  5.0 - 7.5 Final    Color 10/08/2018 Yellow  Yellow Final    Appearance 10/08/2018 Cloudy* Clear Final    Leukocyte Esterase 10/08/2018 2+* Negative Final    Protein 10/08/2018 Negative  Negative/Trace Final    Glucose 10/08/2018 Negative  Negative Final    Ketone 10/08/2018 Negative  Negative Final    Blood 10/08/2018 Negative  Negative Final    Bilirubin 10/08/2018 Negative  Negative Final    Urobilinogen 10/08/2018 0.2  0.2 - 1.0 mg/dL Final    Nitrites 10/08/2018 Negative  Negative Final    Microscopic Examination 10/08/2018 See additional order   Final    Microscopic was indicated and was performed.  WBC 10/08/2018 7.5  3.4 - 10.8 x10E3/uL Final    RBC 10/08/2018 4.22  3.77 - 5.28 x10E6/uL Final    HGB 10/08/2018 11.0* 11.1 - 15.9 g/dL Final    HCT 10/08/2018 34.6  34.0 - 46.6 % Final    MCV 10/08/2018 82  79 - 97 fL Final    MCH 10/08/2018 26.1* 26.6 - 33.0 pg Final    MCHC 10/08/2018 31.8  31.5 - 35.7 g/dL Final    RDW 10/08/2018 14.6  12.3 - 15.4 % Final    PLATELET 70/13/3578 373  150 - 379 x10E3/uL Final    NEUTROPHILS 10/08/2018 65  Not Estab. % Final    Lymphocytes 10/08/2018 24  Not Estab. % Final    MONOCYTES 10/08/2018 7  Not Estab. % Final    EOSINOPHILS 10/08/2018 3  Not Estab. % Final    BASOPHILS 10/08/2018 1  Not Estab. % Final    ABS. NEUTROPHILS 10/08/2018 5.0  1.4 - 7.0 x10E3/uL Final    Abs Lymphocytes 10/08/2018 1.8  0.7 - 3.1 x10E3/uL Final    ABS. MONOCYTES 10/08/2018 0.5  0.1 - 0.9 x10E3/uL Final    ABS. EOSINOPHILS 10/08/2018 0.2  0.0 - 0.4 x10E3/uL Final    ABS. BASOPHILS 10/08/2018 0.0  0.0 - 0.2 x10E3/uL Final    IMMATURE GRANULOCYTES 10/08/2018 0  Not Estab. % Final    ABS. IMM. GRANS.  10/08/2018 0.0  0.0 - 0.1 x10E3/uL Final    Glucose 10/08/2018 95  65 - 99 mg/dL Final    BUN 10/08/2018 35* 8 - 27 mg/dL Final    Creatinine 10/08/2018 1.52* 0.57 - 1.00 mg/dL Final    GFR est non-AA 10/08/2018 33* >59 mL/min/1.73 Final    GFR est AA 10/08/2018 38* >59 mL/min/1.73 Final    BUN/Creatinine ratio 10/08/2018 23  12 - 28 Final    Sodium 10/08/2018 143  134 - 144 mmol/L Final    Potassium 10/08/2018 4.8  3.5 - 5.2 mmol/L Final    Chloride 10/08/2018 102  96 - 106 mmol/L Final    CO2 10/08/2018 22  20 - 29 mmol/L Final    Calcium 10/08/2018 9.6  8.7 - 10.3 mg/dL Final    Protein, total 10/08/2018 7.9  6.0 - 8.5 g/dL Final    Albumin 10/08/2018 4.4  3.5 - 4.8 g/dL Final    GLOBULIN, TOTAL 10/08/2018 3.5  1.5 - 4.5 g/dL Final    A-G Ratio 10/08/2018 1.3  1.2 - 2.2 Final    Bilirubin, total 10/08/2018 0.3  0.0 - 1.2 mg/dL Final    Alk. phosphatase 10/08/2018 140* 39 - 117 IU/L Final    AST (SGOT) 10/08/2018 15  0 - 40 IU/L Final    ALT (SGPT) 10/08/2018 9  0 - 32 IU/L Final    Cholesterol, total 10/08/2018 154  100 - 199 mg/dL Final    Triglyceride 10/08/2018 55  0 - 149 mg/dL Final    HDL Cholesterol 10/08/2018 46  >39 mg/dL Final    Comment: **Effective October 22, 2018, HDL Cholesterol**    reference interval will be changing to: Male        Female                                36 - [id number]   48 - [id number]      VLDL, calculated 10/08/2018 11  5 - 40 mg/dL Final    LDL, calculated 10/08/2018 97  0 - 99 mg/dL Final    TSH 10/08/2018 1.470  0.450 - 4.500 uIU/mL Final    Hemoglobin A1c 10/08/2018 6.0* 4.8 - 5.6 % Final    Comment:          Prediabetes: 5.7 - 6.4           Diabetes: >6.4           Glycemic control for adults with diabetes: <7.0      Estimated average glucose 10/08/2018 126  mg/dL Final    WBC 10/08/2018 11-30* 0 - 5 /hpf Final    RBC 10/08/2018 0-2  0 - 2 /hpf Final    Epithelial cells 10/08/2018 0-10  0 - 10 /hpf Final    Casts 10/08/2018 None seen  None seen /lpf Final    Mucus 10/08/2018 Present  Not Estab. Final    Bacteria 10/08/2018 Moderate* None seen/Few Final   Patient has acute bronchitis, I think there is a bronchospastic component. Will give her a Z-Aj, as well as steroid and rescue inhaler.     Her blood pressure elevation is I think directly related to this acute illness, as well as slight tachycardia. If there is no improvement she will let us know, otherwise she will be back to see us as scheduled. Her last BMP reflected stable GFR. ASSESSMENT:  1. Essential hypertension    2. Mixed hyperlipidemia    3. Chronic kidney disease (CKD) stage G3b/A1, moderately decreased glomerular filtration rate (GFR) between 30-44 mL/min/1.73 square meter and albuminuria creatinine ratio less than 30 mg/g (HCC)        I have discussed the diagnosis with the patient and the intended plan as seen in the  orders above. The patient understands and agees with the plan. The patient has   received an after visit summary and questions were answered concerning  future plans  Patient labs and/or xrays were reviewed  Past records were reviewed. PLAN:  .  Orders Placed This Encounter    azithromycin (ZITHROMAX) 250 mg tablet    albuterol (PROVENTIL HFA, VENTOLIN HFA, PROAIR HFA) 90 mcg/actuation inhaler    budesonide-formoterol (SYMBICORT) 160-4.5 mcg/actuation HFAA       Follow-up Disposition:  Return in about 2 months (around 2/19/2019). ATTENTION:   This medical record was transcribed using an electronic medical records system. Although proofread, it may and can contain electronic and spelling errors. Other human spelling and other errors may be present. Corrections may be executed at a later time. Please feel free to contact us for any clarifications as needed.

## 2019-02-04 ENCOUNTER — OFFICE VISIT (OUTPATIENT)
Dept: INTERNAL MEDICINE CLINIC | Age: 79
End: 2019-02-04

## 2019-02-04 VITALS
HEIGHT: 62 IN | HEART RATE: 85 BPM | OXYGEN SATURATION: 98 % | SYSTOLIC BLOOD PRESSURE: 129 MMHG | RESPIRATION RATE: 18 BRPM | WEIGHT: 142.8 LBS | TEMPERATURE: 97.4 F | DIASTOLIC BLOOD PRESSURE: 72 MMHG | BODY MASS INDEX: 26.28 KG/M2

## 2019-02-04 DIAGNOSIS — N18.30 ANEMIA DUE TO STAGE 3 CHRONIC KIDNEY DISEASE (HCC): ICD-10-CM

## 2019-02-04 DIAGNOSIS — I10 ESSENTIAL HYPERTENSION: Primary | ICD-10-CM

## 2019-02-04 DIAGNOSIS — D63.1 ANEMIA DUE TO STAGE 3 CHRONIC KIDNEY DISEASE (HCC): ICD-10-CM

## 2019-02-04 DIAGNOSIS — N18.30 CKD (CHRONIC KIDNEY DISEASE), STAGE III (HCC): ICD-10-CM

## 2019-02-04 RX ORDER — LOSARTAN POTASSIUM 100 MG/1
100 TABLET ORAL DAILY
Qty: 30 TAB | Refills: 11 | Status: SHIPPED | OUTPATIENT
Start: 2019-02-04 | End: 2019-08-05 | Stop reason: SDUPTHER

## 2019-02-04 NOTE — PROGRESS NOTES
1. Have you been to the ER, urgent care clinic since your last visit? Hospitalized since your last visit? No 
 
2. Have you seen or consulted any other health care providers outside of the [address] [place] [address] since your last visit? Include any pap smears or colon screening.  No

## 2019-02-04 NOTE — PROGRESS NOTES
SPORTS MEDICINE AND PRIMARY CARE Larisa Reyes MD, [address] [address] Phone:  [phone]  Fax: [phone] Chief Complaint Patient presents with  Hypertension  
  f/u Delores Gardner SUBJECTIVE: 
  Homero Bonilla is a 70 y.o. female Patient returns today with known history of chronic kidney disease, bronchial asthma, dyslipidemia, primary hypertension, and is seen for evaluation. Patient returns today voicing no new complaints. She did stop aspirin. She is doing well and is seen for evaluation. Current Outpatient Medications Medication Sig Dispense Refill  losartan (COZAAR) 100 mg tablet Take 1 Tab by mouth daily. 30 Tab 11  
 albuterol (PROVENTIL HFA, VENTOLIN HFA, PROAIR HFA) 90 mcg/actuation inhaler Take 2 Puffs by inhalation every four (4) hours as needed for Wheezing. 1 Inhaler 11  
 budesonide-formoterol (SYMBICORT) 160-4.5 mcg/actuation HFAA Take 2 Puffs by inhalation two (2) times a day. 1 Inhaler 11  
 atorvastatin (LIPITOR) 40 mg tablet Take 1 Tab by mouth daily. 90 Tab 4  
 CHOLECALCIFEROL, VITAMIN D3, (VITAMIN D3 PO) Take  by mouth.  Biotin 2,500 mcg cap Take  by mouth. Past Medical History:  
Diagnosis Date  Advance care planning   
 documents pending  Anemia  Arthropathy  CKD (chronic kidney disease), stage III ([facility] [address] [place]  
 Family history of colon cancer  Hypercholesterolemia  Hypertension Past Surgical History:  
Procedure Laterality Date  COLONOSCOPY N/A 11/29/2018 COLONOSCOPY performed by Amalia Hillman MD at [facility] ENDOSCOPY  
 HX ORTHOPAEDIC    
 bunionectomy Allergies Allergen Reactions  Codeine Itching REVIEW OF SYSTEMS: 
General: negative for - chills or fever ENT: negative for - headaches, nasal congestion or tinnitus Respiratory: negative for - cough, hemoptysis, shortness of breath or wheezing Cardiovascular : negative for - chest pain, edema, palpitations or shortness of breath Gastrointestinal: negative for - abdominal pain, blood in stools, heartburn or nausea/vomiting Genito-Urinary: no dysuria, trouble voiding, or hematuria Musculoskeletal: negative for - gait disturbance, joint pain, joint stiffness or joint swelling Neurological: no TIA or stroke symptoms Hematologic: no bruises, no bleeding, no swollen glands Integument: no lumps, mole changes, nail changes or rash Endocrine: no malaise/lethargy or unexpected weight changes Social History Socioeconomic History  Marital status: SINGLE Spouse name: Not on file  Number of children: Not on file  Years of education: Not on file  Highest education level: Not on file Tobacco Use  Smoking status: Never Smoker  Smokeless tobacco: Never Used Substance and Sexual Activity  Alcohol use: Yes Alcohol/week: 0.5 oz Types: 1 Standard drinks or equivalent per week Comment: ocassional  
 Drug use: No  
 Sexual activity: Not Currently Social History Narrative Habits:  Occasional alcohol use. No cigarette or drug abuse. Social History:  The patient is . She has no children. She lives alone. She completed high school. 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 She's a member of [employer]. Family History:  Father passed at 59 of heart disease. Mother  80 of heart disease. She also had diabetes. One brother, one sister alive and well. One brother  of seizure disorder, one brother  of heart disease. He had LVAD. One brother  of liver issues. One sister  of colon cancer and one sister  of lung cancer. Family History Problem Relation Age of Onset  Heart Disease Mother  Heart Disease Father  Cancer Sister  Cancer Sister  Heart Disease Brother OBJECTIVE: 
 
Visit Vitals /72 Pulse 85 Temp 97.4 °F (36.3 °C) (Oral) Resp 18 Ht 5' 2\" (1.575 m) Wt 142 lb 12.8 oz (64.8 kg) SpO2 98% BMI 26.12 kg/m² CONSTITUTIONAL: well , well nourished, appears age appropriate EYES: perrla, eom intact ENMT:moist mucous membranes, pharynx clear NECK: supple. Thyroid normal 
RESPIRATORY: Chest: clear bilaterally CARDIOVASCULAR: Heart: regular rate and rhythm GASTROINTESTINAL: Abdomen: soft, bowel sounds active HEMATOLOGIC: no pathological lymph nodes palpated MUSCULOSKELETAL: Extremities: no edema, pulse 1+ INTEGUMENT: No unusual rashes or suspicious skin lesions noted. Nails appear normal. 
NEUROLOGIC: non-focal exam  
MENTAL STATUS: alert and oriented, appropriate affect ASSESSMENT: 
1. Essential hypertension 2. CKD (chronic kidney disease), stage III ([address] 3. Anemia due to stage 3 chronic kidney disease ([address] Our primary concern is related to her chronic kidney disease. She is drinking fluids, but would like her to drink more than three glasses of water a day and suggested double that amount. She agrees to do that today. Her renal function has been stable as of the last visit. I think with the increase in fluids it will remain stable. Her blood pressure control is at goal. 
 
Anemia of chronic disease has been stable. Medications are reviewed with no changes indicated at this time. Will consider discontinuing the Hydrochlorothiazide and Hyzaar as I am not sure it has shown any benefit to her at this time and so we will change Hyzaar to Losartan 100 mg daily. She will return to see us in four months, sooner if she needs to. I have discussed the diagnosis with the patient and the intended plan as seen in the 
orders above. The patient understands and agees with the plan. The patient has  
received an after visit summary and questions were answered concerning 
future plans Patient labs and/or xrays were reviewed Past records were reviewed. PLAN: 
. Orders Placed This Encounter  RENAL FUNCTION PANEL  
 CBC WITH AUTOMATED DIFF  
 losartan (COZAAR) 100 mg tablet Follow-up Disposition: 
Return in about 4 months (around 6/4/2019). ATTENTION:  
This medical record was transcribed using an electronic medical records system. Although proofread, it may and can contain electronic and spelling errors. Other human spelling and other errors may be present. Corrections may be executed at a later time. Please feel free to contact us for any clarifications as needed.

## 2019-02-05 LAB
ALBUMIN SERPL-MCNC: 4.2 G/DL (ref 3.5–4.8)
BASOPHILS # BLD AUTO: 0 X10E3/UL (ref 0–0.2)
BASOPHILS NFR BLD AUTO: 1 %
BUN SERPL-MCNC: 30 MG/DL (ref 8–27)
BUN/CREAT SERPL: 22 (ref 12–28)
CALCIUM SERPL-MCNC: 9.5 MG/DL (ref 8.7–10.3)
CHLORIDE SERPL-SCNC: 102 MMOL/L (ref 96–106)
CO2 SERPL-SCNC: 23 MMOL/L (ref 20–29)
CREAT SERPL-MCNC: 1.35 MG/DL (ref 0.57–1)
EOSINOPHIL # BLD AUTO: 0.2 X10E3/UL (ref 0–0.4)
EOSINOPHIL NFR BLD AUTO: 3 %
ERYTHROCYTE [DISTWIDTH] IN BLOOD BY AUTOMATED COUNT: 14.8 % (ref 12.3–15.4)
GLUCOSE SERPL-MCNC: 93 MG/DL (ref 65–99)
HCT VFR BLD AUTO: 34.9 % (ref 34–46.6)
HGB BLD-MCNC: 10.7 G/DL (ref 11.1–15.9)
IMM GRANULOCYTES # BLD AUTO: 0 X10E3/UL (ref 0–0.1)
IMM GRANULOCYTES NFR BLD AUTO: 0 %
LYMPHOCYTES # BLD AUTO: 2.1 X10E3/UL (ref 0.7–3.1)
LYMPHOCYTES NFR BLD AUTO: 25 %
MCH RBC QN AUTO: 26 PG (ref 26.6–33)
MCHC RBC AUTO-ENTMCNC: 30.7 G/DL (ref 31.5–35.7)
MCV RBC AUTO: 85 FL (ref 79–97)
MONOCYTES # BLD AUTO: 0.5 X10E3/UL (ref 0.1–0.9)
MONOCYTES NFR BLD AUTO: 6 %
NEUTROPHILS # BLD AUTO: 5.5 X10E3/UL (ref 1.4–7)
NEUTROPHILS NFR BLD AUTO: 65 %
PHOSPHATE SERPL-MCNC: 3.9 MG/DL (ref 2.5–4.5)
PLATELET # BLD AUTO: 304 X10E3/UL (ref 150–379)
POTASSIUM SERPL-SCNC: 4.2 MMOL/L (ref 3.5–5.2)
RBC # BLD AUTO: 4.11 X10E6/UL (ref 3.77–5.28)
SODIUM SERPL-SCNC: 142 MMOL/L (ref 134–144)
WBC # BLD AUTO: 8.4 X10E3/UL (ref 3.4–10.8)

## 2019-04-01 ENCOUNTER — OFFICE VISIT (OUTPATIENT)
Dept: INTERNAL MEDICINE CLINIC | Age: 79
End: 2019-04-01

## 2019-04-01 VITALS
HEIGHT: 62 IN | DIASTOLIC BLOOD PRESSURE: 82 MMHG | WEIGHT: 140.5 LBS | OXYGEN SATURATION: 97 % | RESPIRATION RATE: 20 BRPM | TEMPERATURE: 97.9 F | SYSTOLIC BLOOD PRESSURE: 138 MMHG | HEART RATE: 84 BPM | BODY MASS INDEX: 25.86 KG/M2

## 2019-04-01 DIAGNOSIS — I10 ESSENTIAL HYPERTENSION: Primary | ICD-10-CM

## 2019-04-01 DIAGNOSIS — E78.2 MIXED HYPERLIPIDEMIA: ICD-10-CM

## 2019-04-01 DIAGNOSIS — N18.30 CKD (CHRONIC KIDNEY DISEASE), STAGE III (HCC): ICD-10-CM

## 2019-04-01 DIAGNOSIS — R73.02 IGT (IMPAIRED GLUCOSE TOLERANCE): ICD-10-CM

## 2019-04-01 NOTE — PROGRESS NOTES
1. Have you been to the ER, urgent care clinic since your last visit? Hospitalized since your last visit? No 
 
2. Have you seen or consulted any other health care providers outside of the [address] [place] [address] since your last visit? Include any pap smears or colon screening. No  
 
Stressed out requesting time off from work Wants to discuss arthritis

## 2019-04-01 NOTE — PROGRESS NOTES
SPORTS MEDICINE AND PRIMARY CARE Lara Basurto MD, [address] [address] Phone:  [phone]  Fax: [phone] Chief Complaint Patient presents with  Hypertension Nadia Hernandez SUBJECTIVE: 
  Omar Sarmiento is a 70 y.o. female Patient returns today with known history of primary hypertension, impaired glucose tolerance, dyslipidemia, anemia, chronic kidney disease, and is seen for evaluation. Patient returns today worried about her niece, who has cancer of the breast, and her cousin, who has multiple myeloma, and she thinks she needs some time off of work. She is under a great deal of stress. Patient is seen for evaluation. Current Outpatient Medications Medication Sig Dispense Refill  losartan (COZAAR) 100 mg tablet Take 1 Tab by mouth daily. 30 Tab 11  
 albuterol (PROVENTIL HFA, VENTOLIN HFA, PROAIR HFA) 90 mcg/actuation inhaler Take 2 Puffs by inhalation every four (4) hours as needed for Wheezing. 1 Inhaler 11  
 budesonide-formoterol (SYMBICORT) 160-4.5 mcg/actuation HFAA Take 2 Puffs by inhalation two (2) times a day. 1 Inhaler 11  
 atorvastatin (LIPITOR) 40 mg tablet Take 1 Tab by mouth daily. 90 Tab 4  
 CHOLECALCIFEROL, VITAMIN D3, (VITAMIN D3 PO) Take  by mouth. Past Medical History:  
Diagnosis Date  Advance care planning   
 documents pending  Anemia  Arthropathy  CKD (chronic kidney disease), stage III ([address] Herb Clemenss  
 Family history of colon cancer  Hypercholesterolemia  Hypertension  IGT (impaired glucose tolerance) 10/08/2018 Past Surgical History:  
Procedure Laterality Date  COLONOSCOPY N/A 11/29/2018 COLONOSCOPY performed by Vijaya Griggs MD at [facility] ENDOSCOPY  
 HX ORTHOPAEDIC    
 bunionectomy Allergies Allergen Reactions  Codeine Itching REVIEW OF SYSTEMS: 
General: negative for - chills or fever ENT: negative for - headaches, nasal congestion or tinnitus Respiratory: negative for - cough, hemoptysis, shortness of breath or wheezing Cardiovascular : negative for - chest pain, edema, palpitations or shortness of breath Gastrointestinal: negative for - abdominal pain, blood in stools, heartburn or nausea/vomiting Genito-Urinary: no dysuria, trouble voiding, or hematuria Musculoskeletal: negative for - gait disturbance, joint pain, joint stiffness or joint swelling Neurological: no TIA or stroke symptoms Hematologic: no bruises, no bleeding, no swollen glands Integument: no lumps, mole changes, nail changes or rash Endocrine: no malaise/lethargy or unexpected weight changes Social History Socioeconomic History  Marital status: SINGLE Spouse name: Not on file  Number of children: Not on file  Years of education: Not on file  Highest education level: Not on file Tobacco Use  Smoking status: Never Smoker  Smokeless tobacco: Never Used Substance and Sexual Activity  Alcohol use: Yes Alcohol/week: 0.5 oz Types: 1 Standard drinks or equivalent per week Comment: ocassional  
 Drug use: No  
 Sexual activity: Not Currently Social History Narrative Habits:  Occasional alcohol use. No cigarette or drug abuse. Social History:  The patient is . She has no children. She lives alone. She completed high school. 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 Family History:  Father passed at 59 of heart disease. Mother  80 of heart disease. She also had diabetes. One brother, one sister alive and well. One brother  of seizure disorder, one brother  of heart disease. He had LVAD. One brother  of liver issues. One sister  of colon cancer and one sister  of lung cancer. Family History Problem Relation Age of Onset  Heart Disease Mother  Heart Disease Father  Cancer Sister  Cancer Sister  Heart Disease Brother OBJECTIVE: 
 
Visit Vitals BP (!) 179/94 Pulse 84 Temp 97.9 °F (36.6 °C) (Oral) Resp 20 Ht 5' 2\" (1.575 m) Wt 140 lb 8 oz (63.7 kg) SpO2 97% BMI 25.70 kg/m² CONSTITUTIONAL: well , well nourished, appears age appropriate EYES: perrla, eom intact ENMT:moist mucous membranes, pharynx clear NECK: supple. Thyroid normal 
RESPIRATORY: Chest: clear bilaterally CARDIOVASCULAR: Heart: regular rate and rhythm GASTROINTESTINAL: Abdomen: soft, bowel sounds active HEMATOLOGIC: no pathological lymph nodes palpated MUSCULOSKELETAL: Extremities: no edema, pulse 1+ INTEGUMENT: No unusual rashes or suspicious skin lesions noted. Nails appear normal. 
NEUROLOGIC: non-focal exam  
MENTAL STATUS: alert and oriented, appropriate affect ASSESSMENT: 
1. Essential hypertension 2. Mixed hyperlipidemia 3. CKD (chronic kidney disease), stage III ([address] 4. IGT (impaired glucose tolerance) The stress she is experiencing is of such magnitude it raised her blood pressure to unusually high range. Repeat blood pressure after discussion is 138/82. I agree with her that she should be off work for the rest of the week. BMI is at her ideal body weight. We continue to follow her renal function on a regular basis. Her creatinine in February was 1.35, which is the best that we have seen since we have been following her. We encouraged her to continue fluids and advised her to avoid nephrotoxic agents. She will be back to see us in three to four months, sooner if she needs to. She is advised she can walk in to see us any time should she have an issue and unable to get an appointment. She will have a return to work date on Monday.  
 
 
I have discussed the diagnosis with the patient and the intended plan as seen in the 
 orders above. The patient understands and agees with the plan. The patient has  
received an after visit summary and questions were answered concerning 
future plans Patient labs and/or xrays were reviewed Past records were reviewed. PLAN: 
. No orders of the defined types were placed in this encounter. Follow-up and Dispositions · Return in about 3 months (around 7/1/2019). ATTENTION:  
This medical record was transcribed using an electronic medical records system. Although proofread, it may and can contain electronic and spelling errors. Other human spelling and other errors may be present. Corrections may be executed at a later time. Please feel free to contact us for any clarifications as needed.

## 2019-07-09 ENCOUNTER — HOSPITAL ENCOUNTER (OUTPATIENT)
Dept: MAMMOGRAPHY | Age: 79
Discharge: HOME | End: 2019-07-09
Attending: INTERNAL MEDICINE
Payer: MEDICARE

## 2019-07-09 DIAGNOSIS — Z12.39 BREAST SCREENING, UNSPECIFIED: ICD-10-CM

## 2019-07-09 PROCEDURE — 77067 SCR MAMMO BI INCL CAD: CPT

## 2019-08-05 ENCOUNTER — OFFICE VISIT (OUTPATIENT)
Dept: INTERNAL MEDICINE CLINIC | Age: 79
End: 2019-08-05

## 2019-08-05 VITALS
RESPIRATION RATE: 16 BRPM | BODY MASS INDEX: 25.47 KG/M2 | SYSTOLIC BLOOD PRESSURE: 171 MMHG | DIASTOLIC BLOOD PRESSURE: 90 MMHG | HEIGHT: 62 IN | WEIGHT: 138.4 LBS | TEMPERATURE: 97.7 F | HEART RATE: 81 BPM

## 2019-08-05 DIAGNOSIS — N18.32 CHRONIC KIDNEY DISEASE (CKD) STAGE G3B/A1, MODERATELY DECREASED GLOMERULAR FILTRATION RATE (GFR) BETWEEN 30-44 ML/MIN/1.73 SQUARE METER AND ALBUMINURIA CREATININE RATIO LESS THAN 30 MG/G (HCC): ICD-10-CM

## 2019-08-05 DIAGNOSIS — I10 ESSENTIAL HYPERTENSION: Primary | ICD-10-CM

## 2019-08-05 DIAGNOSIS — R73.02 IGT (IMPAIRED GLUCOSE TOLERANCE): ICD-10-CM

## 2019-08-05 DIAGNOSIS — E78.2 MIXED HYPERLIPIDEMIA: ICD-10-CM

## 2019-08-05 RX ORDER — AMLODIPINE BESYLATE 2.5 MG/1
2.5 TABLET ORAL DAILY
Qty: 30 TAB | Refills: 11 | Status: SHIPPED | OUTPATIENT
Start: 2019-08-05 | End: 2019-08-12 | Stop reason: SDUPTHER

## 2019-08-05 RX ORDER — LOSARTAN POTASSIUM 100 MG/1
100 TABLET ORAL DAILY
Qty: 30 TAB | Refills: 11 | Status: SHIPPED | OUTPATIENT
Start: 2019-08-05 | End: 2020-09-03 | Stop reason: SDUPTHER

## 2019-08-05 NOTE — PROGRESS NOTES
SPORTS MEDICINE AND PRIMARY CARE  Balbir Ge MD, [place] [address] [address]  Phone:  [phone]  Fax: [phone]       Chief Complaint   Patient presents with    Hypertension     follow up    . SUBJECTIVE:    Brittany Woodward is a 70 y.o. female Patient returns today with known history of primary hypertension, dyslipidemia, impaired glucose tolerance, family history of colon cancer, chronic kidney disease stage 3, and is seen for evaluation. Patient states that she walks, does her yard work, has not worked since the last time we saw her. She was seen by Mario Terrazas, MSN, ANP, and was found to have a blood pressure of 146/74. Currently she is on Losartan for blood pressure control only and is seen for evaluation. She takes her medications regularly and is taking the medication this morning. Current Outpatient Medications   Medication Sig Dispense Refill    amLODIPine (NORVASC) 2.5 mg tablet Take 1 Tab by mouth daily. 30 Tab 11    losartan (COZAAR) 100 mg tablet Take 1 Tab by mouth daily. 30 Tab 11    albuterol (PROVENTIL HFA, VENTOLIN HFA, PROAIR HFA) 90 mcg/actuation inhaler Take 2 Puffs by inhalation every four (4) hours as needed for Wheezing. 1 Inhaler 11    budesonide-formoterol (SYMBICORT) 160-4.5 mcg/actuation HFAA Take 2 Puffs by inhalation two (2) times a day. 1 Inhaler 11    atorvastatin (LIPITOR) 40 mg tablet Take 1 Tab by mouth daily. 90 Tab 4    CHOLECALCIFEROL, VITAMIN D3, (VITAMIN D3 PO) Take  by mouth.        Past Medical History:   Diagnosis Date    Advance care planning     documents pending    Anemia     Arthropathy     CKD (chronic kidney disease), stage III ([facility] [address] 09/08/2014    Yessica Cuadra md    Family history of colon cancer     Hypercholesterolemia     Hypertension     IGT (impaired glucose tolerance) 10/08/2018     Past Surgical History:   Procedure Laterality Date    COLONOSCOPY N/A 11/29/2018 COLONOSCOPY performed by Burgess Torito MD at [address] [place] [address] ENDOSCOPY    HX ORTHOPAEDIC      bunionectomy     Allergies   Allergen Reactions    Codeine Itching         REVIEW OF SYSTEMS:  General: negative for - chills or fever  ENT: negative for - headaches, nasal congestion or tinnitus  Respiratory: negative for - cough, hemoptysis, shortness of breath or wheezing  Cardiovascular : negative for - chest pain, edema, palpitations or shortness of breath  Gastrointestinal: negative for - abdominal pain, blood in stools, heartburn or nausea/vomiting  Genito-Urinary: no dysuria, trouble voiding, or hematuria  Musculoskeletal: negative for - gait disturbance, joint pain, joint stiffness or joint swelling  Neurological: no TIA or stroke symptoms  Hematologic: no bruises, no bleeding, no swollen glands  Integument: no lumps, mole changes, nail changes or rash  Endocrine: no malaise/lethargy or unexpected weight changes      Social History     Socioeconomic History    Marital status: SINGLE     Spouse name: Not on file    Number of children: Not on file    Years of education: Not on file    Highest education level: Not on file   Tobacco Use    Smoking status: Never Smoker    Smokeless tobacco: Never Used   Substance and Sexual Activity    Alcohol use: Yes     Alcohol/week: 0.8 standard drinks     Types: 1 Standard drinks or equivalent per week     Comment: ocassional    Drug use: No    Sexual activity: Not Currently   Social History Narrative    Habits:  Occasional alcohol use. No cigarette or drug abuse. Social History:  The patient is . She has no children. She lives alone. She completed high school. 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 Family History:  Father passed at 59 of heart disease. Mother  80 of heart disease. She also had diabetes.   One brother, one sister alive and well.  One brother  of seizure disorder, one brother  of heart disease. He had LVAD. One brother  of liver issues. One sister  of colon cancer and one sister  of lung cancer. Family History   Problem Relation Age of Onset    Heart Disease Mother     Heart Disease Father     Cancer Sister     Cancer Sister     Heart Disease Brother        OBJECTIVE:    Visit Vitals  /90   Pulse 81   Temp 97.7 °F (36.5 °C) (Oral)   Resp 16   Ht 5' 2\" (1.575 m)   Wt 138 lb 6.4 oz (62.8 kg)   BMI 25.31 kg/m²     CONSTITUTIONAL: well , well nourished, appears age appropriate  EYES: perrla, eom intact  ENMT:moist mucous membranes, pharynx clear  NECK: supple. Thyroid normal  RESPIRATORY: Chest: clear bilaterally   CARDIOVASCULAR: Heart: regular rate and rhythm  GASTROINTESTINAL: Abdomen: soft, bowel sounds active  HEMATOLOGIC: no pathological lymph nodes palpated  MUSCULOSKELETAL: Extremities: no edema, pulse 1+   INTEGUMENT: No unusual rashes or suspicious skin lesions noted. Nails appear normal.  NEUROLOGIC: non-focal exam   MENTAL STATUS: alert and oriented, appropriate affect           ASSESSMENT:  1. Essential hypertension    2. Mixed hyperlipidemia    3. IGT (impaired glucose tolerance)    4. Chronic kidney disease (CKD) stage G3b/A1, moderately decreased glomerular filtration rate (GFR) between 30-44 mL/min/1.73 square meter and albuminuria creatinine ratio less than 30 mg/g (HCC)      Blood pressure control is lacking. When I check the blood pressure it went up to 190/80. When my nurse checked it, it was 171/90, and obviously it was elevated when the nurse practitioner checked it from [facility]. Will ask her to get a blood pressure check periodically at her home if she can afford to do so, but she states she cannot. We will place her on Amlodipine for additional blood pressure control.   Will ask her to come back in a week so the blood pressure can be repeated as I would like to see the blood pressure less than 130/80. Her BMI approaches ideal body weight. We note that the [facility] nurse practitioner raised the question of her last A1c, which was performed in October and is done on a yearly exam and is not due for her yearly exam for laboratory studies until that date. Each time we see her, her renal function has been stable with chronic kidney disease stage 3 with a GFR of 44 on the last visit, which is acceptable. Mammograms are up to date and were just done in July and are unremarkable. She will come by the office just to get a blood pressure check in about a week. She will see us in about four months. I have discussed the diagnosis with the patient and the intended plan as seen in the  orders above. The patient understands and agees with the plan. The patient has   received an after visit summary and questions were answered concerning  future plans  Patient labs and/or xrays were reviewed  Past records were reviewed. PLAN:  .  Orders Placed This Encounter    RENAL FUNCTION PANEL    amLODIPine (NORVASC) 2.5 mg tablet    losartan (COZAAR) 100 mg tablet       Follow-up and Dispositions    · Return in about 1 week (around 8/12/2019). ATTENTION:   This medical record was transcribed using an electronic medical records system. Although proofread, it may and can contain electronic and spelling errors. Other human spelling and other errors may be present. Corrections may be executed at a later time. Please feel free to contact us for any clarifications as needed.

## 2019-08-05 NOTE — PROGRESS NOTES
Chief Complaint   Patient presents with    Hypertension     follow up      1. Have you been to the ER, urgent care clinic since your last visit? Hospitalized since your last visit? No    2. Have you seen or consulted any other health care providers outside of the [address] [place] [address] since your last visit? Include any pap smears or colon screening.  No

## 2019-08-06 LAB
ALBUMIN SERPL-MCNC: 4.2 G/DL (ref 3.5–4.8)
BUN SERPL-MCNC: 16 MG/DL (ref 8–27)
BUN/CREAT SERPL: 14 (ref 12–28)
CALCIUM SERPL-MCNC: 9.6 MG/DL (ref 8.7–10.3)
CHLORIDE SERPL-SCNC: 106 MMOL/L (ref 96–106)
CO2 SERPL-SCNC: 24 MMOL/L (ref 20–29)
CREAT SERPL-MCNC: 1.12 MG/DL (ref 0.57–1)
GLUCOSE SERPL-MCNC: 95 MG/DL (ref 65–99)
PHOSPHATE SERPL-MCNC: 3.6 MG/DL (ref 2.5–4.5)
POTASSIUM SERPL-SCNC: 4.3 MMOL/L (ref 3.5–5.2)
SODIUM SERPL-SCNC: 147 MMOL/L (ref 134–144)

## 2019-08-12 ENCOUNTER — CLINICAL SUPPORT (OUTPATIENT)
Dept: INTERNAL MEDICINE CLINIC | Age: 79
End: 2019-08-12

## 2019-08-12 VITALS
SYSTOLIC BLOOD PRESSURE: 158 MMHG | WEIGHT: 137.9 LBS | BODY MASS INDEX: 25.22 KG/M2 | HEART RATE: 90 BPM | DIASTOLIC BLOOD PRESSURE: 84 MMHG

## 2019-08-12 DIAGNOSIS — I10 ESSENTIAL HYPERTENSION: Primary | ICD-10-CM

## 2019-08-12 RX ORDER — AMLODIPINE BESYLATE 5 MG/1
5 TABLET ORAL DAILY
Qty: 30 TAB | Refills: 11 | Status: SHIPPED | OUTPATIENT
Start: 2019-08-12 | End: 2020-08-12

## 2019-08-12 NOTE — PROGRESS NOTES
Pt came in today for a bp check. Pt states she is taking   Amlodipine 2.5 mg 1 tab PO daily  Losartan 100 mg 1 tab PO daily  Weight-137.9lb  Pulse-90  BP-158/84  Per Dr. Liliam Storm pt is to start taking Amlodipine 5 mg 1 tab PO daily as well as the Losartan 100 mg. Return in 3 weeks for another BP check.     Yamilet De Los Santos

## 2019-09-03 ENCOUNTER — CLINICAL SUPPORT (OUTPATIENT)
Dept: INTERNAL MEDICINE CLINIC | Age: 79
End: 2019-09-03

## 2019-09-03 VITALS
HEART RATE: 61 BPM | DIASTOLIC BLOOD PRESSURE: 73 MMHG | SYSTOLIC BLOOD PRESSURE: 165 MMHG | BODY MASS INDEX: 25.15 KG/M2 | WEIGHT: 137.5 LBS

## 2019-09-03 DIAGNOSIS — I10 ESSENTIAL HYPERTENSION: Primary | ICD-10-CM

## 2019-09-03 NOTE — PROGRESS NOTES
Pt came in today for a bp check. Pt states she is taking   Amlodipine 5 mg 1 tab PO daily  Losartan 100 mg 1 tab PO daily  Weight-137.5lb  Pulse-61  BP-165/73  Per Dr. Louis Brunner pt is to start taking Hydralazine 25 mg PO TID as well as continue other bp meds as directed above. Return in 1 week for another bp check.     Sabrina Kinney

## 2019-09-04 RX ORDER — HYDRALAZINE HYDROCHLORIDE 25 MG/1
25 TABLET, FILM COATED ORAL 3 TIMES DAILY
Qty: 90 TAB | Refills: 11 | Status: SHIPPED | OUTPATIENT
Start: 2019-09-04 | End: 2019-09-10

## 2019-09-10 ENCOUNTER — CLINICAL SUPPORT (OUTPATIENT)
Dept: INTERNAL MEDICINE CLINIC | Age: 79
End: 2019-09-10

## 2019-09-10 DIAGNOSIS — I10 ESSENTIAL HYPERTENSION: Primary | ICD-10-CM

## 2019-09-10 RX ORDER — HYDRALAZINE HYDROCHLORIDE 50 MG/1
25 TABLET, FILM COATED ORAL 2 TIMES DAILY
Qty: 60 TAB | Refills: 11 | Status: SHIPPED | OUTPATIENT
Start: 2019-09-10 | End: 2020-01-10 | Stop reason: SDUPTHER

## 2019-09-13 VITALS
OXYGEN SATURATION: 96 % | BODY MASS INDEX: 25.33 KG/M2 | WEIGHT: 138.5 LBS | HEART RATE: 99 BPM | SYSTOLIC BLOOD PRESSURE: 141 MMHG | DIASTOLIC BLOOD PRESSURE: 74 MMHG

## 2019-09-13 NOTE — PROGRESS NOTES
Chief Complaint   Patient presents with    Blood Pressure Check     Patient is here for  a blood pressure check. Per patient she is taking Hydalazine 25mg, Losartan 100mg, Amlodipine 5mg, Atorvastatin 40mg. Visit Vitals  /74   Pulse 99   Wt 138 lb 8 oz (62.8 kg)   SpO2 96%   BMI 25.33 kg/m²     Per Dr. Sheyla Salamanca Patient is to take Hydralazine 25mg twice a day and return in 1 month for a blood pressure check.

## 2019-09-18 RX ORDER — HYDRALAZINE HYDROCHLORIDE 25 MG/1
25 TABLET, FILM COATED ORAL 3 TIMES DAILY
Qty: 100 TAB | Refills: 11 | Status: SHIPPED | OUTPATIENT
Start: 2019-09-18 | End: 2020-01-10 | Stop reason: SDUPTHER

## 2019-10-10 ENCOUNTER — CLINICAL SUPPORT (OUTPATIENT)
Dept: INTERNAL MEDICINE CLINIC | Age: 79
End: 2019-10-10

## 2019-10-30 DIAGNOSIS — E78.2 MIXED HYPERLIPIDEMIA: ICD-10-CM

## 2019-10-30 RX ORDER — ATORVASTATIN CALCIUM 40 MG/1
40 TABLET, FILM COATED ORAL DAILY
Qty: 90 TAB | Refills: 4 | Status: SHIPPED | OUTPATIENT
Start: 2019-10-30 | End: 2020-12-17

## 2020-01-10 ENCOUNTER — OFFICE VISIT (OUTPATIENT)
Dept: INTERNAL MEDICINE CLINIC | Age: 80
End: 2020-01-10

## 2020-01-10 VITALS
WEIGHT: 137.7 LBS | BODY MASS INDEX: 25.34 KG/M2 | SYSTOLIC BLOOD PRESSURE: 114 MMHG | HEART RATE: 93 BPM | RESPIRATION RATE: 18 BRPM | HEIGHT: 62 IN | OXYGEN SATURATION: 96 % | TEMPERATURE: 97.9 F | DIASTOLIC BLOOD PRESSURE: 69 MMHG

## 2020-01-10 DIAGNOSIS — Z80.0 FAMILY HISTORY OF COLON CANCER: ICD-10-CM

## 2020-01-10 DIAGNOSIS — Z13.31 SCREENING FOR DEPRESSION: ICD-10-CM

## 2020-01-10 DIAGNOSIS — E78.2 MIXED HYPERLIPIDEMIA: ICD-10-CM

## 2020-01-10 DIAGNOSIS — R73.02 IGT (IMPAIRED GLUCOSE TOLERANCE): ICD-10-CM

## 2020-01-10 DIAGNOSIS — Z13.39 SCREENING FOR ALCOHOLISM: ICD-10-CM

## 2020-01-10 DIAGNOSIS — D63.1 ANEMIA DUE TO STAGE 3 CHRONIC KIDNEY DISEASE (HCC): ICD-10-CM

## 2020-01-10 DIAGNOSIS — R73.03 PREDIABETES: ICD-10-CM

## 2020-01-10 DIAGNOSIS — N18.30 CKD (CHRONIC KIDNEY DISEASE), STAGE III (HCC): ICD-10-CM

## 2020-01-10 DIAGNOSIS — I10 ESSENTIAL HYPERTENSION: ICD-10-CM

## 2020-01-10 DIAGNOSIS — Z00.00 MEDICARE ANNUAL WELLNESS VISIT, SUBSEQUENT: Primary | ICD-10-CM

## 2020-01-10 DIAGNOSIS — N18.30 ANEMIA DUE TO STAGE 3 CHRONIC KIDNEY DISEASE (HCC): ICD-10-CM

## 2020-01-10 RX ORDER — HYDRALAZINE HYDROCHLORIDE 25 MG/1
25 TABLET, FILM COATED ORAL 2 TIMES DAILY
Qty: 60 TAB | Refills: 11 | Status: SHIPPED | OUTPATIENT
Start: 2020-01-10 | End: 2020-11-26

## 2020-01-10 NOTE — PATIENT INSTRUCTIONS
Medicare Wellness Visit, Female     The best way to live healthy is to have a lifestyle where you eat a well-balanced diet, exercise regularly, limit alcohol use, and quit all forms of tobacco/nicotine, if applicable. Regular preventive services are another way to keep healthy. Preventive services (vaccines, screening tests, monitoring & exams) can help personalize your care plan, which helps you manage your own care. Screening tests can find health problems at the earliest stages, when they are easiest to treat. [facility] follows the current, evidence-based guidelines published by the [facility] ([facility]) when recommending preventive services for our patients. Because we follow these guidelines, sometimes recommendations change over time as research supports it. (For example, mammograms used to be recommended annually. Even though Medicare will still pay for an annual mammogram, the newer guidelines recommend a mammogram every two years for women of average risk). Of course, you and your doctor may decide to screen more often for some diseases, based on your risk and your co-morbidities (chronic disease you are already diagnosed with). Preventive services for you include:  - Medicare offers their members a free annual wellness visit, which is time for you and your primary care provider to discuss and plan for your preventive service needs. Take advantage of this benefit every year!  -All adults over the age of 72 should receive the recommended pneumonia vaccines. Current USPSTF guidelines recommend a series of two vaccines for the best pneumonia protection.   -All adults should have a flu vaccine yearly and a tetanus vaccine every 10 years.   -All adults age 48 and older should receive the shingles vaccines (series of two vaccines).       -All adults age 38-68 who are overweight should have a diabetes screening test once every three years.   -All adults born between [address] and 1963 should be screened once for Hepatitis C.  -Other screening tests and preventive services for persons with diabetes include: an eye exam to screen for diabetic retinopathy, a kidney function test, a foot exam, and stricter control over your cholesterol.   -Cardiovascular screening for adults with routine risk involves an electrocardiogram (ECG) at intervals determined by your doctor.   -Colorectal cancer screenings should be done for adults age 54-65 with no increased risk factors for colorectal cancer. There are a number of acceptable methods of screening for this type of cancer. Each test has its own benefits and drawbacks. Discuss with your doctor what is most appropriate for you during your annual wellness visit. The different tests include: colonoscopy (considered the best screening method), a fecal occult blood test, a fecal DNA test, and sigmoidoscopy.    -A bone mass density test is recommended when a woman turns 65 to screen for osteoporosis. This test is only recommended one time, as a screening. Some providers will use this same test as a disease monitoring tool if you already have osteoporosis. -Breast cancer screenings are recommended every other year for women of normal risk, age 54-69.  -Cervical cancer screenings for women over age 72 are only recommended with certain risk factors.      Here is a list of your current Health Maintenance items (your personalized list of preventive services) with a due date:  Health Maintenance Due   Topic Date Due    Annual Well Visit  03/20/2019    Glaucoma Screening   03/05/2020

## 2020-01-10 NOTE — PROGRESS NOTES
1. Have you been to the ER, urgent care clinic since your last visit? Hospitalized since your last visit? No    2. Have you seen or consulted any other health care providers outside of the [address] [place] [address] since your last visit? Include any pap smears or colon screening. No  This is the Subsequent Medicare Annual Wellness Exam, performed 12 months or more after the Initial AWV or the last Subsequent AWV    I have reviewed the patient's medical history in detail and updated the computerized patient record. History     Patient Active Problem List   Diagnosis Code    HTN (hypertension) I10    Hyperlipidemia E78.5    Anemia D64.9    Chronic kidney disease (CKD) stage G3b/A1, moderately decreased glomerular filtration rate (GFR) between 30-44 mL/min/1.73 square meter and albuminuria creatinine ratio less than 30 mg/g (HCC) N18.3    ACP (advance care planning) Z71.89    Glaucoma screening Z13.5    CKD (chronic kidney disease), stage III ([facility] [address] N18.3    Family history of colon cancer Z80.0    IGT (impaired glucose tolerance) R73.02     Past Medical History:   Diagnosis Date    Advance care planning     documents pending    Anemia     Arthropathy     CKD (chronic kidney disease), stage III ([facility] [address] 09/08/2014    Jordin Zamudio    Family history of colon cancer     Hypercholesterolemia     Hypertension     IGT (impaired glucose tolerance) 10/08/2018      Past Surgical History:   Procedure Laterality Date    COLONOSCOPY N/A 11/29/2018    COLONOSCOPY performed by René Ordoñez MD [facility]    HX ORTHOPAEDIC      bunionectomy     Current Outpatient Medications   Medication Sig Dispense Refill    atorvastatin (LIPITOR) 40 mg tablet Take 1 Tab by mouth daily. 90 Tab 4    hydrALAZINE (APRESOLINE) 50 mg tablet Take 0.5 Tabs by mouth two (2) times a day. 60 Tab 11    amLODIPine (NORVASC) 5 mg tablet Take 1 Tab by mouth daily.  30 Tab 11    losartan (COZAAR) 100 mg tablet Take 1 Tab by mouth daily. 30 Tab 11    albuterol (PROVENTIL HFA, VENTOLIN HFA, PROAIR HFA) 90 mcg/actuation inhaler Take 2 Puffs by inhalation every four (4) hours as needed for Wheezing. 1 Inhaler 11    budesonide-formoterol (SYMBICORT) 160-4.5 mcg/actuation HFAA Take 2 Puffs by inhalation two (2) times a day. 1 Inhaler 11    CHOLECALCIFEROL, VITAMIN D3, (VITAMIN D3 PO) Take  by mouth.  hydrALAZINE (APRESOLINE) 25 mg tablet Take 1 Tab by mouth three (3) times daily. 100 Tab 11     Allergies   Allergen Reactions    Codeine Itching       Family History   Problem Relation Age of Onset    Heart Disease Mother     Heart Disease Father     Cancer Sister     Cancer Sister     Heart Disease Brother      Social History     Tobacco Use    Smoking status: Never Smoker    Smokeless tobacco: Never Used   Substance Use Topics    Alcohol use: Yes     Alcohol/week: 0.8 standard drinks     Types: 1 Standard drinks or equivalent per week     Comment: ocassional       Depression Risk Factor Screening:     3 most recent PHQ Screens 1/10/2020   Little interest or pleasure in doing things Not at all   Feeling down, depressed, irritable, or hopeless Not at all   Total Score PHQ 2 0       Alcohol Risk Factor Screening:   Do you average 1 drink per night or more than 7 drinks a week:  No    On any one occasion in the past three months have you have had more than 3 drinks containing alcohol:  No      Functional Ability and Level of Safety:   Hearing: Hearing is good. Activities of Daily Living: The home contains: no safety equipment. Patient does total self care    Ambulation: with no difficulty    Fall Risk:  Fall Risk Assessment, last 12 mths 1/10/2020   Able to walk? Yes   Fall in past 12 months?  No   Fall with injury? -   Number of falls in past 12 months -   Fall Risk Score -       Abuse Screen:  Patient is not abused    Cognitive Screening   Has your family/caregiver stated any concerns about your memory: no  Cognitive Screening: Normal - MMSE (Mini Mental Status Exam)    Patient Care Team   Patient Care Team:  Sheldon Smiley MD as PCP - General (Internal Medicine)  Sheldon Smiley MD as PCP - [facility]  Deyanira Pantoja MD (Family Practice)    Assessment/Plan   Education and counseling provided:  Are appropriate based on today's review and evaluation        Health Maintenance Due   Topic Date Due    MEDICARE YEARLY EXAM  03/20/2019    GLAUCOMA SCREENING Q2Y  03/05/2020

## 2020-01-10 NOTE — ACP (ADVANCE CARE PLANNING)
Advance Care Planning (ACP) Provider Conversation Snapshot    Date of ACP Conversation: 01/10/20  Persons included in Conversation:  patient  Length of ACP Conversation in minutes:  <16 minutes (Non-Billable)    Authorized Decision Maker (if patient is incapable of making informed decisions):    This person is:   Healthcare Agent/Medical Power of  under Advance Directive            For Patients with Decision Making Capacity:   Values/Goals: Exploration of values, goals, and preferences if recovery is not expected, even with continued medical treatment in the event of:  Imminent death    Conversation Outcomes / Follow-Up Plan:   Entered DNR order (If yes, complete Durable DNR form)

## 2020-01-12 LAB
ALBUMIN SERPL-MCNC: 4.5 G/DL (ref 3.5–4.8)
ALBUMIN/GLOB SERPL: 1.4 {RATIO} (ref 1.2–2.2)
ALP SERPL-CCNC: 116 IU/L (ref 39–117)
ALT SERPL-CCNC: 12 IU/L (ref 0–32)
APPEARANCE UR: ABNORMAL
AST SERPL-CCNC: 23 IU/L (ref 0–40)
BACTERIA #/AREA URNS HPF: ABNORMAL /[HPF]
BASOPHILS # BLD AUTO: 0.1 X10E3/UL (ref 0–0.2)
BASOPHILS NFR BLD AUTO: 1 %
BILIRUB SERPL-MCNC: 0.3 MG/DL (ref 0–1.2)
BILIRUB UR QL STRIP: NEGATIVE
BUN SERPL-MCNC: 24 MG/DL (ref 8–27)
BUN/CREAT SERPL: 17 (ref 12–28)
CALCIUM SERPL-MCNC: 10.1 MG/DL (ref 8.7–10.3)
CASTS URNS QL MICRO: ABNORMAL /LPF
CHLORIDE SERPL-SCNC: 106 MMOL/L (ref 96–106)
CHOLEST SERPL-MCNC: 165 MG/DL (ref 100–199)
CO2 SERPL-SCNC: 16 MMOL/L (ref 20–29)
COLOR UR: YELLOW
CREAT SERPL-MCNC: 1.39 MG/DL (ref 0.57–1)
EOSINOPHIL # BLD AUTO: 0.1 X10E3/UL (ref 0–0.4)
EOSINOPHIL NFR BLD AUTO: 1 %
EPI CELLS #/AREA URNS HPF: ABNORMAL /HPF (ref 0–10)
ERYTHROCYTE [DISTWIDTH] IN BLOOD BY AUTOMATED COUNT: 13 % (ref 11.7–15.4)
EST. AVERAGE GLUCOSE BLD GHB EST-MCNC: 114 MG/DL
GLOBULIN SER CALC-MCNC: 3.2 G/DL (ref 1.5–4.5)
GLUCOSE SERPL-MCNC: 92 MG/DL (ref 65–99)
GLUCOSE UR QL: NEGATIVE
HBA1C MFR BLD: 5.6 % (ref 4.8–5.6)
HCT VFR BLD AUTO: 34.1 % (ref 34–46.6)
HDLC SERPL-MCNC: 55 MG/DL
HGB BLD-MCNC: 11.1 G/DL (ref 11.1–15.9)
HGB UR QL STRIP: NEGATIVE
IMM GRANULOCYTES # BLD AUTO: 0 X10E3/UL (ref 0–0.1)
IMM GRANULOCYTES NFR BLD AUTO: 0 %
KETONES UR QL STRIP: NEGATIVE
LDLC SERPL CALC-MCNC: 98 MG/DL (ref 0–99)
LEUKOCYTE ESTERASE UR QL STRIP: ABNORMAL
LYMPHOCYTES # BLD AUTO: 1.4 X10E3/UL (ref 0.7–3.1)
LYMPHOCYTES NFR BLD AUTO: 16 %
MCH RBC QN AUTO: 26.2 PG (ref 26.6–33)
MCHC RBC AUTO-ENTMCNC: 32.6 G/DL (ref 31.5–35.7)
MCV RBC AUTO: 80 FL (ref 79–97)
MICRO URNS: ABNORMAL
MONOCYTES # BLD AUTO: 0.6 X10E3/UL (ref 0.1–0.9)
MONOCYTES NFR BLD AUTO: 7 %
MUCOUS THREADS URNS QL MICRO: PRESENT
NEUTROPHILS # BLD AUTO: 6.6 X10E3/UL (ref 1.4–7)
NEUTROPHILS NFR BLD AUTO: 75 %
NITRITE UR QL STRIP: NEGATIVE
PH UR STRIP: 7 [PH] (ref 5–7.5)
PLATELET # BLD AUTO: 301 X10E3/UL (ref 150–450)
POTASSIUM SERPL-SCNC: 5 MMOL/L (ref 3.5–5.2)
PROT SERPL-MCNC: 7.7 G/DL (ref 6–8.5)
PROT UR QL STRIP: ABNORMAL
RBC # BLD AUTO: 4.24 X10E6/UL (ref 3.77–5.28)
RBC #/AREA URNS HPF: ABNORMAL /HPF (ref 0–2)
SODIUM SERPL-SCNC: 144 MMOL/L (ref 134–144)
SP GR UR: 1.01 (ref 1–1.03)
TRIGL SERPL-MCNC: 62 MG/DL (ref 0–149)
TSH SERPL DL<=0.005 MIU/L-ACNC: 1.81 UIU/ML (ref 0.45–4.5)
UROBILINOGEN UR STRIP-MCNC: 0.2 MG/DL (ref 0.2–1)
VLDLC SERPL CALC-MCNC: 12 MG/DL (ref 5–40)
WBC # BLD AUTO: 8.9 X10E3/UL (ref 3.4–10.8)
WBC #/AREA URNS HPF: ABNORMAL /HPF (ref 0–5)

## 2020-07-09 ENCOUNTER — HOSPITAL ENCOUNTER (OUTPATIENT)
Dept: MAMMOGRAPHY | Age: 80
Discharge: HOME | End: 2020-07-09
Attending: INTERNAL MEDICINE
Payer: MEDICARE

## 2020-07-09 DIAGNOSIS — Z12.31 VISIT FOR SCREENING MAMMOGRAM: ICD-10-CM

## 2020-07-09 PROCEDURE — 77067 SCR MAMMO BI INCL CAD: CPT

## 2020-07-21 NOTE — PROGRESS NOTES
Routing refill request to provider for review/approval because:  Estrace was dc'd on 9/23/19.    Estriol micronized powder:  0.2% Estriol in Vanicream. 0.25 g 2x/week vaginally was ordered instead.    Clarify.  Advise.  KpavelRN            [facility]  Kuldeep Dill MD, [place] [address] [address]  Phone:  [phone]  Fax: [phone]      Chief Complaint   Patient presents with    Annual Wellness Visit         SUBECTIVE:    Gosia Pimentel is a 70 y.o. female Patient returns today with known history of impaired glucose tolerance, dyslipidemia, primary hypertension, family history of colon cancer, CKD stage 3, and is seen for evaluation. Patient returns today voicing no new complaints. She had the sniffles over the holidays, but is feeling better now. Current Outpatient Medications   Medication Sig Dispense Refill    hydrALAZINE (APRESOLINE) 25 mg tablet Take 1 Tab by mouth two (2) times a day. 60 Tab 11    atorvastatin (LIPITOR) 40 mg tablet Take 1 Tab by mouth daily. 90 Tab 4    amLODIPine (NORVASC) 5 mg tablet Take 1 Tab by mouth daily. 30 Tab 11    losartan (COZAAR) 100 mg tablet Take 1 Tab by mouth daily. 30 Tab 11    albuterol (PROVENTIL HFA, VENTOLIN HFA, PROAIR HFA) 90 mcg/actuation inhaler Take 2 Puffs by inhalation every four (4) hours as needed for Wheezing. 1 Inhaler 11    budesonide-formoterol (SYMBICORT) 160-4.5 mcg/actuation HFAA Take 2 Puffs by inhalation two (2) times a day. 1 Inhaler 11    CHOLECALCIFEROL, VITAMIN D3, (VITAMIN D3 PO) Take  by mouth.        Past Medical History:   Diagnosis Date    Advance care planning     documents pending    Anemia     Arthropathy     CKD (chronic kidney disease), stage III ([facility] [address] 09/08/2014    Yessica Cuadra md    Family history of colon cancer     Hypercholesterolemia     Hypertension     IGT (impaired glucose tolerance) 10/08/2018     Past Surgical History:   Procedure Laterality Date    COLONOSCOPY N/A 11/29/2018    COLON[facility] performed by Perlita Eaton [facility] at [facility]COPY    HX ORTHOPAEDIC      bunionectomy     Allergies   Allergen Reactions    Codeine Itching       REVIEW OF SYSTEMS:   No chest pain, no shortness of breath. Social History     Socioeconomic History    Marital status: SINGLE     Spouse name: Not on file    Number of children: Not on file    Years of education: Not on file    Highest education level: Not on file   Tobacco Use    Smoking status: Never Smoker    Smokeless tobacco: Never Used   Substance and Sexual Activity    Alcohol use: Yes     Alcohol/week: 0.8 standard drinks     Types: 1 Standard drinks or equivalent per week     Comment: ocassional    Drug use: No    Sexual activity: Not Currently   Social History Narrative    Habits:  Occasional alcohol use. No cigarette or drug abuse. Social History:  The patient is . She has no children. She lives alone. She completed high school. 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 She's a member of [employer]. Family History:  Father passed at 59 of heart disease. Mother  80 of heart disease. She also had diabetes. One brother, one sister alive and well. One brother  of seizure disorder, one brother  of heart disease. He had LVAD. One brother  of liver issues. One sister  of colon cancer and one sister  of lung cancer.   r  Family History   Problem Relation Age of Onset    Heart Disease Mother     Heart Disease Father     Cancer Sister     Cancer Sister     Heart Disease Brother        OBJECTIVE:  Visit Vitals  /69   Pulse 93   Temp 97.9 °F (36.6 °C) (Oral)   Resp 18   Ht 5' 2\" (1.575 m)   Wt 137 lb 11.2 oz (62.5 kg)   SpO2 96%   BMI 25.19 kg/m²     ENT: perrla,  eom intact  NECK: supple. Thyroid normal  CHEST: clear to ascultation and percussion   HEART: regular rate and rhythm  ABD: soft, bowel sounds active  EXTREMITIES: no edema, pulse 1+     No visits with results within 3 Month(s) from this visit.    Latest known visit with results is:   Office Visit on 2019   Component Date Value Ref Range Status    Glucose 08/05/2019 95  65 - 99 mg/dL Final    BUN 08/05/2019 16  8 - 27 mg/dL Final    Creatinine 08/05/2019 1.12* 0.57 - 1.00 mg/dL Final    GFR est non-AA 08/05/2019 48* >59 mL/min/1.73 Final    GFR est AA 08/05/2019 55* >59 mL/min/1.73 Final    BUN/Creatinine ratio 08/05/2019 14  12 - 28 Final    Sodium 08/05/2019 147* 134 - 144 mmol/L Final    Potassium 08/05/2019 4.3  3.5 - 5.2 mmol/L Final    Chloride 08/05/2019 106  96 - 106 mmol/L Final    CO2 08/05/2019 24  20 - 29 mmol/L Final    Calcium 08/05/2019 9.6  8.7 - 10.3 mg/dL Final    Phosphorus 08/05/2019 3.6  2.5 - 4.5 mg/dL Final    Albumin 08/05/2019 4.2  3.5 - 4.8 g/dL Final          ASSESSMENT:  1. Medicare annual wellness visit, subsequent    2. Screening for alcoholism    3. Screening for depression    4. Mixed hyperlipidemia    5. IGT (impaired glucose tolerance)    6. Essential hypertension    7. Family history of colon cancer    8. CKD (chronic kidney disease), stage III ([facility] [address]    9. Anemia due to stage 3 chronic kidney disease ([facility] [address]    10. Prediabetes       This completes her Medicare wellness screening exam.  In addition, we discussed advanced care planning with her today and she requested DNR status. Will also check her lipid panel, as well as hemoglobin A1c, with a history of impaired glucose tolerance. Blood pressure control is at goal.  We decreased Hydralazine to 25 b.i.d. On next visit hopefully we can stop the Hydralazine. We continue to follow her kidneys because of her history of chronic kidney disease. She has a family history of colon cancer, but is now 70, so hopefully she is out of that risk range. She will return to our office in four months. I have discussed the diagnosis with the patient and the intended plan as seen in the  orders above. The patient understands and agees with the plan.   The patient has   received an after visit summary and questions were answered concerning  future plans  Patient labs and/or xrays were reviewed  Past records were reviewed. PLAN:  .  Orders Placed This Encounter    Depression Screen Annual    URINALYSIS W/ RFLX MICROSCOPIC    CBC WITH AUTOMATED DIFF    METABOLIC PANEL, COMPREHENSIVE    LIPID PANEL    TSH 3RD GENERATION    HEMOGLOBIN A1C WITH EAG    hydrALAZINE (APRESOLINE) 25 mg tablet       Follow-up and Dispositions    · Return in about 4 months (around 5/10/2020). ATTENTION:   This medical record was transcribed using an electronic medical records system. Although proofread, it may and can contain electronic and spelling errors. Other human spelling and other errors may be present. Corrections may be executed at a later time. Please feel free to contact us for any clarifications as needed.

## 2020-08-11 ENCOUNTER — OFFICE VISIT (OUTPATIENT)
Dept: INTERNAL MEDICINE CLINIC | Age: 80
End: 2020-08-11
Payer: MEDICARE

## 2020-08-11 VITALS
HEART RATE: 85 BPM | TEMPERATURE: 97.9 F | WEIGHT: 136.9 LBS | RESPIRATION RATE: 18 BRPM | DIASTOLIC BLOOD PRESSURE: 78 MMHG | SYSTOLIC BLOOD PRESSURE: 129 MMHG | OXYGEN SATURATION: 95 % | BODY MASS INDEX: 25.19 KG/M2 | HEIGHT: 62 IN

## 2020-08-11 DIAGNOSIS — M25.562 CHRONIC PAIN OF LEFT KNEE: Primary | ICD-10-CM

## 2020-08-11 DIAGNOSIS — N18.30 ANEMIA DUE TO STAGE 3 CHRONIC KIDNEY DISEASE (HCC): ICD-10-CM

## 2020-08-11 DIAGNOSIS — I10 ESSENTIAL HYPERTENSION: ICD-10-CM

## 2020-08-11 DIAGNOSIS — R73.02 IGT (IMPAIRED GLUCOSE TOLERANCE): ICD-10-CM

## 2020-08-11 DIAGNOSIS — E78.2 MIXED HYPERLIPIDEMIA: ICD-10-CM

## 2020-08-11 DIAGNOSIS — M70.50 PES ANSERINE BURSITIS: ICD-10-CM

## 2020-08-11 DIAGNOSIS — D63.1 ANEMIA DUE TO STAGE 3 CHRONIC KIDNEY DISEASE (HCC): ICD-10-CM

## 2020-08-11 DIAGNOSIS — N18.30 CKD (CHRONIC KIDNEY DISEASE), STAGE III (HCC): ICD-10-CM

## 2020-08-11 DIAGNOSIS — G89.29 CHRONIC PAIN OF LEFT KNEE: Primary | ICD-10-CM

## 2020-08-11 PROCEDURE — G8754 DIAS BP LESS 90: HCPCS | Performed by: INTERNAL MEDICINE

## 2020-08-11 PROCEDURE — G8752 SYS BP LESS 140: HCPCS | Performed by: INTERNAL MEDICINE

## 2020-08-11 PROCEDURE — 1101F PT FALLS ASSESS-DOCD LE1/YR: CPT | Performed by: INTERNAL MEDICINE

## 2020-08-11 PROCEDURE — 99214 OFFICE O/P EST MOD 30 MIN: CPT | Performed by: INTERNAL MEDICINE

## 2020-08-11 PROCEDURE — 1090F PRES/ABSN URINE INCON ASSESS: CPT | Performed by: INTERNAL MEDICINE

## 2020-08-11 PROCEDURE — G8419 CALC BMI OUT NRM PARAM NOF/U: HCPCS | Performed by: INTERNAL MEDICINE

## 2020-08-11 PROCEDURE — G8427 DOCREV CUR MEDS BY ELIG CLIN: HCPCS | Performed by: INTERNAL MEDICINE

## 2020-08-11 PROCEDURE — 36415 COLL VENOUS BLD VENIPUNCTURE: CPT | Performed by: INTERNAL MEDICINE

## 2020-08-11 PROCEDURE — G8536 NO DOC ELDER MAL SCRN: HCPCS | Performed by: INTERNAL MEDICINE

## 2020-08-11 PROCEDURE — G8399 PT W/DXA RESULTS DOCUMENT: HCPCS | Performed by: INTERNAL MEDICINE

## 2020-08-11 PROCEDURE — G8432 DEP SCR NOT DOC, RNG: HCPCS | Performed by: INTERNAL MEDICINE

## 2020-08-11 NOTE — PROGRESS NOTES
1. Have you been to the ER, urgent care clinic since your last visit? Hospitalized since your last visit? No    2. Have you seen or consulted any other health care providers outside of the [address] [place] [address] since your last visit? Include any pap smears or colon screening.  No     Wants to discuss left knee pain

## 2020-08-11 NOTE — PATIENT INSTRUCTIONS
Bursitis: Care Instructions Your Care Instructions A bursa is a small sac of fluid that helps the tissues around a joint slide over one another easily. Injury or overuse of a joint can cause pain, redness, and inflammation in the bursa (bursitis). Bursitis usually gets better if you avoid the activity that caused it. You can help prevent bursitis from coming back by doing stretching and strengthening exercises. You may also need to change the way you do some activities. Follow-up care is a key part of your treatment and safety. Be sure to make and go to all appointments, and call your doctor if you are having problems. It's also a good idea to know your test results and keep a list of the medicines you take. How can you care for yourself at home? · Put ice or a cold pack on the area for 10 to 20 minutes at a time. Try to do this every 1 to 2 hours for the next 3 days (when you are awake) or until the swelling goes down. Put a thin cloth between the ice and your skin. · After the 3 days of using ice, you may use heat on the area. You can use a hot water bottle; a warm, moist towel; or a heating pad set on low. You can also try alternating heat and ice. · Rest the area where you have pain. Stop any activities that cause pain. Switch to activities that do not stress the area. · Take pain medicines exactly as directed. ? If the doctor gave you a prescription medicine for pain, take it as prescribed. ? If you are not taking a prescription pain medicine, ask your doctor if you can take an over-the-counter medicine. ? Do not take two or more pain medicines at the same time unless the doctor told you to. Many pain medicines have acetaminophen, which is Tylenol. Too much acetaminophen (Tylenol) can be harmful. · To prevent stiffness, gently move the joint as much as you can without pain every day.  As the pain gets better, keep doing range-of-motion exercises. Ask your doctor for exercises that will make the muscles around the joint stronger. Do these as directed. · You can slowly return to the activity that caused the pain, but do it with less effort until you can do it without pain or swelling. Be sure to warm up before and stretch after you do the activity. When should you call for help? Call your doctor now or seek immediate medical care if: 
· You have new or worse symptoms of infection, such as: 
? Increased pain, swelling, warmth, or redness. ? Red streaks leading from the area. ? Pus draining from the area. ? A fever. Watch closely for changes in your health, and be sure to contact your doctor if: 
· You do not get better as expected. Where can you learn more? Go to http://sukhwinder-korina.info/ Enter X345 in the search box to learn more about \"Bursitis: Care Instructions. \" Current as of: March 2, 2020               Content Version: 12.5 © 2006-2020 [employer]. Care instructions adapted under license by [employer] (which disclaims liability or warranty for this information). If you have questions about a medical condition or this instruction, always ask your healthcare professional. [place] [address] any warranty or liability for your use of this information.

## 2020-08-11 NOTE — PROGRESS NOTES
SPORTS MEDICINE AND PRIMARY CARE  Wanda Corado MD, [address] [address]  Phone:  [phone]  Fax: [phone]       Chief Complaint   Patient presents with    Hypertension   . SUBJECTIVE:    Vu Rubin is a 70 y.o. female Patient complains of pain on the medial aspect of her left knee. She has tried Aleve once in a while, Tylenol, and she has rubbed it with Biofreeze, all with little success, she still has the pain. She ends up just dealing with it, she states. She still works at the [employer] 2-3 days a week. She has been tested for COVID virus 2-3 times, fortunately all have been negative, but she is very mindful when she goes to work. She wears the mask, wears gloves and does everything she is supposed to do to stay safe. There is a history of anemia with hypertension and dyslipidemia, and she is seen for evaluation. Current Outpatient Medications   Medication Sig Dispense Refill    hydrALAZINE (APRESOLINE) 25 mg tablet Take 1 Tab by mouth two (2) times a day. 60 Tab 11    atorvastatin (LIPITOR) 40 mg tablet Take 1 Tab by mouth daily. 90 Tab 4    amLODIPine (NORVASC) 5 mg tablet Take 1 Tab by mouth daily. 30 Tab 11    losartan (COZAAR) 100 mg tablet Take 1 Tab by mouth daily. 30 Tab 11    albuterol (PROVENTIL HFA, VENTOLIN HFA, PROAIR HFA) 90 mcg/actuation inhaler Take 2 Puffs by inhalation every four (4) hours as needed for Wheezing. 1 Inhaler 11    budesonide-formoterol (SYMBICORT) 160-4.5 mcg/actuation HFAA Take 2 Puffs by inhalation two (2) times a day. 1 Inhaler 11    CHOLECALCIFEROL, VITAMIN D3, (VITAMIN D3 PO) Take  by mouth.        Past Medical History:   Diagnosis Date    Advance care planning     documents pending    Anemia     Arthropathy     CKD (chronic kidney disease), stage III ([facility] [address] 09/08/2014    Nalini Jacob    Family history of colon cancer     Hypercholesterolemia     Hypertension     IGT (impaired glucose tolerance) 10/08/2018    Knee pain, left     Pes anserine bursitis 08/11/2020     Past Surgical History:   Procedure Laterality Date    COLONOSCOPY N/A 11/29/2018    COLONOSCOPY performed by Po Mckenzie [facility] at [facility] ENDOSCOPY    HX ORTHOPAEDIC      bunionectomy     Allergies   Allergen Reactions    Codeine Itching         REVIEW OF SYSTEMS:  General: negative for - chills or fever  ENT: negative for - headaches, nasal congestion or tinnitus  Respiratory: negative for - cough, hemoptysis, shortness of breath or wheezing  Cardiovascular : negative for - chest pain, edema, palpitations or shortness of breath  Gastrointestinal: negative for - abdominal pain, blood in stools, heartburn or nausea/vomiting  Genito-Urinary: no dysuria, trouble voiding, or hematuria  Musculoskeletal: negative for - gait disturbance, joint pain, joint stiffness or joint swelling  Neurological: no TIA or stroke symptoms  Hematologic: no bruises, no bleeding, no swollen glands  Integument: no lumps, mole changes, nail changes or rash  Endocrine: no malaise/lethargy or unexpected weight changes      Social History     Socioeconomic History    Marital status: SINGLE     Spouse name: Not on file    Number of children: Not on file    Years of education: Not on file    Highest education level: Not on file   Tobacco Use    Smoking status: Never Smoker    Smokeless tobacco: Never Used   Substance and Sexual Activity    Alcohol use: Yes     Alcohol/week: 0.8 standard drinks     Types: 1 Standard drinks or equivalent per week     Comment: ocassional    Drug use: No    Sexual activity: Not Currently   Social History Narrative    Habits:  Occasional alcohol use. No cigarette or drug abuse. Social History:  The patient is . She has no children. She lives alone. She completed high school. 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   She's a member of [employer]. Family History:  Father passed at 59 of heart disease. Mother  80 of heart disease. She also had diabetes. One brother, one sister alive and well. One brother  of seizure disorder, one brother  of heart disease. He had LVAD. One brother  of liver issues. One sister  of colon cancer and one sister  of lung cancer. Family History   Problem Relation Age of Onset    Heart Disease Mother     Heart Disease Father     Cancer Sister     Cancer Sister     Heart Disease Brother        OBJECTIVE:    Visit Vitals  /78   Pulse 85   Temp 97.9 °F (36.6 °C) (Oral)   Resp 18   Ht 5' 2\" (1.575 m)   Wt 136 lb 14.4 oz (62.1 kg)   SpO2 95%   BMI 25.04 kg/m²     CONSTITUTIONAL: well , well nourished, appears age appropriate  EYES: perrla, eom intact  ENMT:moist mucous membranes, pharynx clear  NECK: supple. Thyroid normal  RESPIRATORY: Chest: clear bilaterally   CARDIOVASCULAR: Heart: regular rate and rhythm  GASTROINTESTINAL: Abdomen: soft, bowel sounds active  HEMATOLOGIC: no pathological lymph nodes palpated  MUSCULOSKELETAL: Extremities: no edema, pulse 1+   INTEGUMENT: No unusual rashes or suspicious skin lesions noted. Nails appear normal.  NEUROLOGIC: non-focal exam   MENTAL STATUS: alert and oriented, appropriate affect           ASSESSMENT:  1. Chronic pain of left knee    2. IGT (impaired glucose tolerance)    3. CKD (chronic kidney disease), stage III ([address]    4. Essential hypertension    5. Anemia due to stage 3 chronic kidney disease ([address]    6. Mixed hyperlipidemia    7. Pes anserine bursitis      The left knee pain is actually more of a discomfort in the anserine bursa area, so I think she has an anserine bursitis and she would prefer not have a steroid injection. She has an ace-type brace that she wears, I think that is good. She is also using Biofreeze, which is completely appropriate.   Because of her CKD, we suggest she avoid NSAIDs, which she will do. BP control is at goal.    She has dyslipidemia, for which she takes Atorvastatin. Her last cholesterol was in January, LDL was acceptable at 98, total cholesterol 165. No adjustments are needed. Her CKD has been stable, although the last GFR was 42. It ranges from 36-55. For that, we encourage her to drink plenty of fluids, particularly water. There is a previous history of prediabetes and hemoglobin A1c in 2018 was 6.0. Her most recent hemoglobin A1c was normal at 5.6. She will be back to see us in four to six months, sooner if needed. I have discussed the diagnosis with the patient and the intended plan as seen in the  orders above. The patient understands and agees with the plan. The patient has   received an after visit summary and questions were answered concerning  future plans  Patient labs and/or xrays were reviewed  Past records were reviewed. PLAN:  .  Orders Placed This Encounter    RENAL FUNCTION PANEL    CBC WITH AUTOMATED DIFF                  ATTENTION:   This medical record was transcribed using an electronic medical records system. Although proofread, it may and can contain electronic and spelling errors. Other human spelling and other errors may be present. Corrections may be executed at a later time. Please feel free to contact us for any clarifications as needed.

## 2020-08-12 LAB
ALBUMIN SERPL-MCNC: 4.3 G/DL (ref 3.7–4.7)
BASOPHILS # BLD AUTO: 0 X10E3/UL (ref 0–0.2)
BASOPHILS NFR BLD AUTO: 1 %
BUN SERPL-MCNC: 20 MG/DL (ref 8–27)
BUN/CREAT SERPL: 14 (ref 12–28)
CALCIUM SERPL-MCNC: 9.5 MG/DL (ref 8.7–10.3)
CHLORIDE SERPL-SCNC: 104 MMOL/L (ref 96–106)
CO2 SERPL-SCNC: 21 MMOL/L (ref 20–29)
CREAT SERPL-MCNC: 1.47 MG/DL (ref 0.57–1)
EOSINOPHIL # BLD AUTO: 0.1 X10E3/UL (ref 0–0.4)
EOSINOPHIL NFR BLD AUTO: 2 %
ERYTHROCYTE [DISTWIDTH] IN BLOOD BY AUTOMATED COUNT: 13.6 % (ref 11.7–15.4)
GLUCOSE SERPL-MCNC: 94 MG/DL (ref 65–99)
HCT VFR BLD AUTO: 32.4 % (ref 34–46.6)
HGB BLD-MCNC: 10.2 G/DL (ref 11.1–15.9)
IMM GRANULOCYTES # BLD AUTO: 0 X10E3/UL (ref 0–0.1)
IMM GRANULOCYTES NFR BLD AUTO: 0 %
LYMPHOCYTES # BLD AUTO: 1.2 X10E3/UL (ref 0.7–3.1)
LYMPHOCYTES NFR BLD AUTO: 21 %
MCH RBC QN AUTO: 25.8 PG (ref 26.6–33)
MCHC RBC AUTO-ENTMCNC: 31.5 G/DL (ref 31.5–35.7)
MCV RBC AUTO: 82 FL (ref 79–97)
MONOCYTES # BLD AUTO: 0.6 X10E3/UL (ref 0.1–0.9)
MONOCYTES NFR BLD AUTO: 10 %
NEUTROPHILS # BLD AUTO: 3.7 X10E3/UL (ref 1.4–7)
NEUTROPHILS NFR BLD AUTO: 66 %
PHOSPHATE SERPL-MCNC: 3.1 MG/DL (ref 3–4.3)
PLATELET # BLD AUTO: 170 X10E3/UL (ref 150–450)
POTASSIUM SERPL-SCNC: 4.4 MMOL/L (ref 3.5–5.2)
RBC # BLD AUTO: 3.96 X10E6/UL (ref 3.77–5.28)
SODIUM SERPL-SCNC: 143 MMOL/L (ref 134–144)
WBC # BLD AUTO: 5.7 X10E3/UL (ref 3.4–10.8)

## 2020-08-12 RX ORDER — AMLODIPINE BESYLATE 5 MG/1
TABLET ORAL
Qty: 30 TAB | Refills: 11 | Status: SHIPPED | OUTPATIENT
Start: 2020-08-12 | End: 2021-02-24 | Stop reason: SDUPTHER

## 2020-09-03 RX ORDER — LOSARTAN POTASSIUM 100 MG/1
100 TABLET ORAL DAILY
Qty: 90 TAB | Refills: 3 | Status: SHIPPED | OUTPATIENT
Start: 2020-09-03 | End: 2020-10-12

## 2020-10-12 RX ORDER — LOSARTAN POTASSIUM 100 MG/1
TABLET ORAL
Qty: 90 TAB | Refills: 3 | Status: SHIPPED | OUTPATIENT
Start: 2020-10-12 | End: 2021-08-12

## 2020-11-26 RX ORDER — HYDRALAZINE HYDROCHLORIDE 25 MG/1
TABLET, FILM COATED ORAL
Qty: 180 TAB | Refills: 3 | Status: SHIPPED | OUTPATIENT
Start: 2020-11-26 | End: 2022-04-07

## 2020-12-17 DIAGNOSIS — E78.2 MIXED HYPERLIPIDEMIA: ICD-10-CM

## 2020-12-17 RX ORDER — ATORVASTATIN CALCIUM 40 MG/1
TABLET, FILM COATED ORAL
Qty: 90 TAB | Refills: 4 | Status: SHIPPED | OUTPATIENT
Start: 2020-12-17 | End: 2022-08-08 | Stop reason: SDUPTHER

## 2021-02-15 ENCOUNTER — OFFICE VISIT (OUTPATIENT)
Dept: INTERNAL MEDICINE CLINIC | Age: 81
End: 2021-02-15
Payer: MEDICARE

## 2021-02-15 VITALS
DIASTOLIC BLOOD PRESSURE: 74 MMHG | HEART RATE: 97 BPM | TEMPERATURE: 97.6 F | HEIGHT: 62 IN | SYSTOLIC BLOOD PRESSURE: 134 MMHG | WEIGHT: 142.2 LBS | RESPIRATION RATE: 20 BRPM | OXYGEN SATURATION: 97 % | BODY MASS INDEX: 26.17 KG/M2

## 2021-02-15 DIAGNOSIS — M25.562 CHRONIC PAIN OF LEFT KNEE: ICD-10-CM

## 2021-02-15 DIAGNOSIS — R73.02 IGT (IMPAIRED GLUCOSE TOLERANCE): ICD-10-CM

## 2021-02-15 DIAGNOSIS — Z13.31 SCREENING FOR DEPRESSION: ICD-10-CM

## 2021-02-15 DIAGNOSIS — E78.2 MIXED HYPERLIPIDEMIA: ICD-10-CM

## 2021-02-15 DIAGNOSIS — G89.29 CHRONIC PAIN OF LEFT KNEE: ICD-10-CM

## 2021-02-15 DIAGNOSIS — I10 ESSENTIAL HYPERTENSION: ICD-10-CM

## 2021-02-15 DIAGNOSIS — N18.32 CHRONIC KIDNEY DISEASE (CKD) STAGE G3B/A1, MODERATELY DECREASED GLOMERULAR FILTRATION RATE (GFR) BETWEEN 30-44 ML/MIN/1.73 SQUARE METER AND ALBUMINURIA CREATININE RATIO LESS THAN 30 MG/G (HCC): ICD-10-CM

## 2021-02-15 DIAGNOSIS — Z00.00 MEDICARE ANNUAL WELLNESS VISIT, SUBSEQUENT: Primary | ICD-10-CM

## 2021-02-15 DIAGNOSIS — Z80.0 FAMILY HISTORY OF COLON CANCER: ICD-10-CM

## 2021-02-15 PROCEDURE — 1090F PRES/ABSN URINE INCON ASSESS: CPT | Performed by: INTERNAL MEDICINE

## 2021-02-15 PROCEDURE — G0439 PPPS, SUBSEQ VISIT: HCPCS | Performed by: INTERNAL MEDICINE

## 2021-02-15 PROCEDURE — G8754 DIAS BP LESS 90: HCPCS | Performed by: INTERNAL MEDICINE

## 2021-02-15 PROCEDURE — G8536 NO DOC ELDER MAL SCRN: HCPCS | Performed by: INTERNAL MEDICINE

## 2021-02-15 PROCEDURE — G8432 DEP SCR NOT DOC, RNG: HCPCS | Performed by: INTERNAL MEDICINE

## 2021-02-15 PROCEDURE — 36415 COLL VENOUS BLD VENIPUNCTURE: CPT | Performed by: INTERNAL MEDICINE

## 2021-02-15 PROCEDURE — G8752 SYS BP LESS 140: HCPCS | Performed by: INTERNAL MEDICINE

## 2021-02-15 PROCEDURE — 1101F PT FALLS ASSESS-DOCD LE1/YR: CPT | Performed by: INTERNAL MEDICINE

## 2021-02-15 PROCEDURE — 99213 OFFICE O/P EST LOW 20 MIN: CPT | Performed by: INTERNAL MEDICINE

## 2021-02-15 PROCEDURE — G8419 CALC BMI OUT NRM PARAM NOF/U: HCPCS | Performed by: INTERNAL MEDICINE

## 2021-02-15 PROCEDURE — G8399 PT W/DXA RESULTS DOCUMENT: HCPCS | Performed by: INTERNAL MEDICINE

## 2021-02-15 PROCEDURE — G8427 DOCREV CUR MEDS BY ELIG CLIN: HCPCS | Performed by: INTERNAL MEDICINE

## 2021-02-15 NOTE — PROGRESS NOTES
SPORTS MEDICINE AND PRIMARY CARE  Lio Go MD, [address] [address]  Phone:  [phone]  Fax: [phone]      Chief Complaint   Patient presents with    Annual Wellness Visit         SUBECTIVE:    Gal Cash is a 68 y.o. female Patient returns today with a known history of primary hypertension, dyslipidemia, chronic kidney disease, family history of colon cancer, impaired glucose tolerance and arthritic pains in her knees. Patient returns today saying she works at the [employer] sometimes. She has had both COVID vaccines, she received the Ferrell Peter vaccine on December 31st and second one on January 21st.  No ill effects. She is seen for evaluation. Current Outpatient Medications   Medication Sig Dispense Refill    atorvastatin (LIPITOR) 40 mg tablet TAKE 1 TABLET EVERY DAY 90 Tab 4    hydrALAZINE (APRESOLINE) 25 mg tablet TAKE 1 TABLET TWICE DAILY 180 Tab 3    losartan (COZAAR) 100 mg tablet TAKE 1 TABLET BY MOUTH EVERY DAY 90 Tab 3    amLODIPine (NORVASC) 5 mg tablet TAKE 1 TABLET BY MOUTH EVERY DAY 30 Tab 11    albuterol (PROVENTIL HFA, VENTOLIN HFA, PROAIR HFA) 90 mcg/actuation inhaler Take 2 Puffs by inhalation every four (4) hours as needed for Wheezing. 1 Inhaler 11    budesonide-formoterol (SYMBICORT) 160-4.5 mcg/actuation HFAA Take 2 Puffs by inhalation two (2) times a day. 1 Inhaler 11    CHOLECALCIFEROL, VITAMIN D3, (VITAMIN D3 PO) Take  by mouth.        Past Medical History:   Diagnosis Date    Advance care planning     documents pending    Anemia     Arthropathy     CKD (chronic kidney disease), stage III 09/08/2014    Yessica Cuadra md    Family history of colon cancer     Hypercholesterolemia     Hypertension     IGT (impaired glucose tolerance) 10/08/2018    Knee pain, left     Pes anserine bursitis 08/11/2020     Past Surgical History:   Procedure Laterality Date    COLONOSCOPY N/A 11/29/2018    COL[facility] by Felix MD Marielena at [facility] ENDOSCOPY    HX ORTHOPAEDIC      bunionectomy     Allergies   Allergen Reactions    Codeine Itching       REVIEW OF SYSTEMS:   She complains of knee pain. She fell on the right knee, which is where she had a total knee replacement by Dr. Cynthia Marrero, and the left knee is bothering her. She would like to get his opinion again. Social History     Socioeconomic History    Marital status: SINGLE     Spouse name: Not on file    Number of children: Not on file    Years of education: Not on file    Highest education level: Not on file   Tobacco Use    Smoking status: Never Smoker    Smokeless tobacco: Never Used   Substance and Sexual Activity    Alcohol use: Yes     Alcohol/week: 0.8 standard drinks     Types: 1 Standard drinks or equivalent per week     Comment: ocassional    Drug use: No    Sexual activity: Not Currently   Social History Narrative    Habits:  Occasional alcohol use. No cigarette or drug abuse. Social History:  The patient is . She has no children. She lives alone. She completed high school. 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 Part time at [employer]        Family History:  Father passed at 59 of heart disease. Mother  80 of heart disease. She also had diabetes. One brother, one sister alive and well. One brother  of seizure disorder, one brother  of heart disease. He had LVAD. One brother  of liver issues.   One sister  of colon cancer and one sister  of lung cancer.   r  Family History   Problem Relation Age of Onset    Heart Disease Mother     Heart Disease Father     Cancer Sister     Cancer Sister     Heart Disease Brother        OBJECTIVE:  Visit Vitals  /74   Pulse 97   Temp 97.6 °F (36.4 °C) (Oral)   Resp 20   Ht 5' 2\" (1.575 m)   Wt 142 lb 3.2 oz (64.5 kg)   SpO2 97%   BMI 26.01 kg/m²     ENT: perrla,  eom intact  NECK: supple. Thyroid normal  CHEST: clear to ascultation and percussion   HEART: regular rate and rhythm  ABD: soft, bowel sounds active  EXTREMITIES: no edema, pulse 1+     No visits with results within 3 Month(s) from this visit. Latest known visit with results is:   Office Visit on 08/11/2020   Component Date Value Ref Range Status    Glucose 08/11/2020 94  65 - 99 mg/dL Final    BUN 08/11/2020 20  8 - 27 mg/dL Final    Creatinine 08/11/2020 1.47* 0.57 - 1.00 mg/dL Final    GFR est non-AA 08/11/2020 34* >59 mL/min/1.73 Final    GFR est AA 08/11/2020 39* >59 mL/min/1.73 Final    BUN/Creatinine ratio 08/11/2020 14  12 - 28 Final    Sodium 08/11/2020 143  134 - 144 mmol/L Final    Potassium 08/11/2020 4.4  3.5 - 5.2 mmol/L Final    Chloride 08/11/2020 104  96 - 106 mmol/L Final    CO2 08/11/2020 21  20 - 29 mmol/L Final    Calcium 08/11/2020 9.5  8.7 - 10.3 mg/dL Final    Phosphorus 08/11/2020 3.1  3.0 - 4.3 mg/dL Final    Albumin 08/11/2020 4.3  3.7 - 4.7 g/dL Final    WBC 08/11/2020 5.7  3.4 - 10.8 x10E3/uL Final    RBC 08/11/2020 3.96  3.77 - 5.28 x10E6/uL Final    HGB 08/11/2020 10.2* 11.1 - 15.9 g/dL Final    HCT 08/11/2020 32.4* 34.0 - 46.6 % Final    MCV 08/11/2020 82  79 - 97 fL Final    MCH 08/11/2020 25.8* 26.6 - 33.0 pg Final    MCHC 08/11/2020 31.5  31.5 - 35.7 g/dL Final    RDW 08/11/2020 13.6  11.7 - 15.4 % Final    PLATELET 98/57/0973 489  150 - 450 x10E3/uL Final    NEUTROPHILS 08/11/2020 66  Not Estab. % Final    Lymphocytes 08/11/2020 21  Not Estab. % Final    MONOCYTES 08/11/2020 10  Not Estab. % Final    EOSINOPHILS 08/11/2020 2  Not Estab. % Final    BASOPHILS 08/11/2020 1  Not Estab. % Final    ABS. NEUTROPHILS 08/11/2020 3.7  1.4 - 7.0 x10E3/uL Final    Abs Lymphocytes 08/11/2020 1.2  0.7 - 3.1 x10E3/uL Final    ABS. MONOCYTES 08/11/2020 0.6  0.1 - 0.9 x10E3/uL Final    ABS.  EOSINOPHILS 08/11/2020 0.1  0.0 - 0.4 x10E3/uL Final    ABS. BASOPHILS 08/11/2020 0.0  0.0 - 0.2 x10E3/uL Final    IMMATURE GRANULOCYTES 08/11/2020 0  Not Estab. % Final    ABS. IMM. GRANS. 08/11/2020 0.0  0.0 - 0.1 x10E3/uL Final          ASSESSMENT:  1. Medicare annual wellness visit, subsequent    2. Screening for depression    3. Chronic kidney disease (CKD) stage G3b/A1, moderately decreased glomerular filtration rate (GFR) between 30-44 mL/min/1.73 square meter and albuminuria creatinine ratio less than 30 mg/g    4. IGT (impaired glucose tolerance)    5. Family history of colon cancer    6. Chronic pain of left knee    7. Essential hypertension    8. Mixed hyperlipidemia      She has chronic kidney disease, stage 3, and we cautioned her about the use of Motrin and Aleve. We will check her renal function today. She also has impaired glucose tolerance, for which we will check hemoglobin A1c. She has chronic knee pain on the left with crepitation with range of motion, suggesting some significant arthritis in her left knee now, which is probably contributing to the pain. Blood pressure control is at goal and we will check her cholesterol for the lipid studies. She will be back to see us in four to six months, sooner if needed. I have discussed the diagnosis with the patient and the intended plan as seen in the  orders above. The patient understands and agees with the plan. The patient has   received an after visit summary and questions were answered concerning  future plans  Patient labs and/or xrays were reviewed  Past records were reviewed. PLAN:  .  Orders Placed This Encounter    [address] 8-15 MIN    URINALYSIS W/ RFLX MICROSCOPIC    CBC WITH AUTOMATED DIFF    METABOLIC PANEL, COMPREHENSIVE    LIPID PANEL    TSH 3RD GENERATION    HEMOGLOBIN A1C WITH EAG    VITAMIN D, 25 HYDROXY    REFERRAL TO ORTHOPEDIC SURGERY       Follow-up and Dispositions    · Return in about 4 months (around 6/15/2021).                 ATTENTION:   This medical record was transcribed using an electronic medical records system.  Although proofread, it may and can contain electronic and spelling errors.  Other human spelling and other errors may be present.  Corrections may be executed at a later time.  Please feel free to contact us for any clarifications as needed.

## 2021-02-15 NOTE — PATIENT INSTRUCTIONS
Medicare Wellness Visit, Female The best way to live healthy is to have a lifestyle where you eat a well-balanced diet, exercise regularly, limit alcohol use, and quit all forms of tobacco/nicotine, if applicable. Regular preventive services are another way to keep healthy. Preventive services (vaccines, screening tests, monitoring & exams) can help personalize your care plan, which helps you manage your own care. Screening tests can find health problems at the earliest stages, when they are easiest to treat. [facility] follows the current, evidence-based guidelines published by the [facility] ([facility]) when recommending preventive services for our patients. Because we follow these guidelines, sometimes recommendations change over time as research supports it. (For example, mammograms used to be recommended annually. Even though Medicare will still pay for an annual mammogram, the newer guidelines recommend a mammogram every two years for women of average risk). Of course, you and your doctor may decide to screen more often for some diseases, based on your risk and your co-morbidities (chronic disease you are already diagnosed with). Preventive services for you include: - Medicare offers their members a free annual wellness visit, which is time for you and your primary care provider to discuss and plan for your preventive service needs. Take advantage of this benefit every year! 
-All adults over the age of 72 should receive the recommended pneumonia vaccines. Current USPSTF guidelines recommend a series of two vaccines for the best pneumonia protection.  
-All adults should have a flu vaccine yearly and a tetanus vaccine every 10 years.  
-All adults age 48 and older should receive the shingles vaccines (series of two vaccines). -All adults age 38-68 who are overweight should have a diabetes screening test once every three years. -All adults born between [birth year] and 1963 should be screened once for Hepatitis C. 
-Other screening tests and preventive services for persons with diabetes include: an eye exam to screen for diabetic retinopathy, a kidney function test, a foot exam, and stricter control over your cholesterol.  
-Cardiovascular screening for adults with routine risk involves an electrocardiogram (ECG) at intervals determined by your doctor.  
-Colorectal cancer screenings should be done for adults age 54-65 with no increased risk factors for colorectal cancer. There are a number of acceptable methods of screening for this type of cancer. Each test has its own benefits and drawbacks. Discuss with your doctor what is most appropriate for you during your annual wellness visit. The different tests include: colonoscopy (considered the best screening method), a fecal occult blood test, a fecal DNA test, and sigmoidoscopy. 
 
-A bone mass density test is recommended when a woman turns 65 to screen for osteoporosis. This test is only recommended one time, as a screening. Some providers will use this same test as a disease monitoring tool if you already have osteoporosis. -Breast cancer screenings are recommended every other year for women of normal risk, age 54-69. 
-Cervical cancer screenings for women over age 72 are only recommended with certain risk factors. Here is a list of your current Health Maintenance items (your personalized list of preventive services) with a due date: 
Health Maintenance Due Topic Date Due  Shingles Vaccine (1 of 2) 11/15/1992  Glaucoma Screening   03/05/2020  Yearly Flu Vaccine (1) 09/01/2020  Cholesterol Test   01/10/2021  COVID-19 Vaccine (2 of 2 - Pfizer series) 01/21/2021

## 2021-02-15 NOTE — PROGRESS NOTES
1. Have you been to the ER, urgent care clinic since your last visit? Hospitalized since your last visit? No    2. Have you seen or consulted any other health care providers outside of the [address] [place] [address] since your last visit? Include any pap smears or colon screening. No     Wants to discuss L&R knee pain  This is the Subsequent Medicare Annual Wellness Exam, performed 12 months or more after the Initial AWV or the last Subsequent AWV    I have reviewed the patient's medical history in detail and updated the computerized patient record. Depression Risk Factor Screening:     3 most recent PHQ Screens 2/15/2021   Little interest or pleasure in doing things Not at all   Feeling down, depressed, irritable, or hopeless Not at all   Total Score PHQ 2 0       Alcohol Risk Screen    Do you average more than 1 drink per night or more than 7 drinks a week:  No    On any one occasion in the past three months have you have had more than 3 drinks containing alcohol:  No        Functional Ability and Level of Safety:    Hearing: Hearing is good. Activities of Daily Living: The home contains: no safety equipment. Patient does total self care      Ambulation: with no difficulty     Fall Risk:  Fall Risk Assessment, last 12 mths 2/15/2021   Able to walk? Yes   Fall in past 12 months? 0   Do you feel unsteady? 0   Are you worried about falling 0   Number of falls in past 12 months -   Fall with injury?  -      Abuse Screen:  Patient is not abused       Cognitive Screening    Has your family/caregiver stated any concerns about your memory: no     Cognitive Screening: not necessary    Assessment/Plan   Education and counseling provided:  Are appropriate based on today's review and evaluation        Health Maintenance Due     Health Maintenance Due   Topic Date Due    COVID-19 Vaccine (1 of 2) 11/15/1956    Shingrix Vaccine Age 50> (1 of 2) 11/15/1992    GLAUCOMA SCREENING Q2Y  03/05/2020    Flu Vaccine (1) 09/01/2020    Medicare Yearly Exam  01/10/2021    Lipid Screen  01/10/2021       Patient Care Team   Patient Care Team:  Jeramy Mcdowell MD as PCP - General (Internal Medicine)  Jeramy Mcdowell MD as PCP - [facility]  Apolinar Carbajal MD (Family Medicine)    History     Patient Active Problem List   Diagnosis Code    HTN (hypertension) I10    Hyperlipidemia E78.5    Anemia D64.9    Chronic kidney disease (CKD) stage G3b/A1, moderately decreased glomerular filtration rate (GFR) between 30-44 mL/min/1.73 square meter and albuminuria creatinine ratio less than 30 mg/g N18.32    ACP (advance care planning) Z71.89    Glaucoma screening Z13.5    CKD (chronic kidney disease), stage III N18.30    Family history of colon cancer Z80.0    IGT (impaired glucose tolerance) R73.02    Knee pain, left M25.562    Pes anserine bursitis M70.50     Past Medical History:   Diagnosis Date    Advance care planning     documents pending    Anemia     Arthropathy     CKD (chronic kidney disease), stage III ([address] 09/08/2014    Cale Fierro    Family history of colon cancer     Hypercholesterolemia     Hypertension     IGT (impaired glucose tolerance) 10/08/2018    Knee pain, left     Pes anserine bursitis 08/11/2020      Past Surgical History:   Procedure Laterality Date    COLONOSCOPY N/A 11/29/2018    COL[facility]he Camargo [facility] at [facility] ENDOSCOPY    HX ORTHOPAEDIC      bunionectomy     Current Outpatient Medications   Medication Sig Dispense Refill    atorvastatin (LIPITOR) 40 mg tablet TAKE 1 TABLET EVERY DAY 90 Tab 4    hydrALAZINE (APRESOLINE) 25 mg tablet TAKE 1 TABLET TWICE DAILY 180 Tab 3    losartan (COZAAR) 100 mg tablet TAKE 1 TABLET BY MOUTH EVERY DAY 90 Tab 3    amLODIPine (NORVASC) 5 mg tablet TAKE 1 TABLET BY MOUTH EVERY DAY 30 Tab 11    albuterol (PROVENTIL HFA, VENTOLIN HFA, PROAIR HFA) 90 mcg/actuation inhaler Take 2 Puffs by inhalation every four (4) hours as needed for Wheezing. 1 Inhaler 11    budesonide-formoterol (SYMBICORT) 160-4.5 mcg/actuation HFAA Take 2 Puffs by inhalation two (2) times a day. 1 Inhaler 11    CHOLECALCIFEROL, VITAMIN D3, (VITAMIN D3 PO) Take  by mouth. Allergies   Allergen Reactions    Codeine Itching       Family History   Problem Relation Age of Onset    Heart Disease Mother     Heart Disease Father     Cancer Sister     Cancer Sister     Heart Disease Brother      Social History     Tobacco Use    Smoking status: Never Smoker    Smokeless tobacco: Never Used   Substance Use Topics    Alcohol use:  Yes     Alcohol/week: 0.8 standard drinks     Types: 1 Standard drinks or equivalent per week     Comment: ocassional

## 2021-02-16 LAB
25(OH)D3 SERPL-MCNC: 73.3 NG/ML (ref 30–100)
ALBUMIN SERPL-MCNC: 4.4 G/DL (ref 3.5–5)
ALBUMIN/GLOB SERPL: 1.2 {RATIO} (ref 1.1–2.2)
ALP SERPL-CCNC: 136 U/L (ref 45–117)
ALT SERPL-CCNC: 25 U/L (ref 12–78)
AMORPH CRY URNS QL MICRO: ABNORMAL
ANION GAP SERPL CALC-SCNC: 7 MMOL/L (ref 5–15)
APPEARANCE UR: CLEAR
AST SERPL-CCNC: 28 U/L (ref 15–37)
BACTERIA URNS QL MICRO: NEGATIVE /HPF
BASOPHILS # BLD: 0.1 K/UL (ref 0–0.1)
BASOPHILS NFR BLD: 1 % (ref 0–1)
BILIRUB SERPL-MCNC: 0.5 MG/DL (ref 0.2–1)
BILIRUB UR QL: NEGATIVE
BUN SERPL-MCNC: 23 MG/DL (ref 6–20)
BUN/CREAT SERPL: 19 (ref 12–20)
CALCIUM SERPL-MCNC: 9.8 MG/DL (ref 8.5–10.1)
CHLORIDE SERPL-SCNC: 109 MMOL/L (ref 97–108)
CHOLEST SERPL-MCNC: 149 MG/DL
CO2 SERPL-SCNC: 26 MMOL/L (ref 21–32)
COLOR UR: ABNORMAL
CREAT SERPL-MCNC: 1.19 MG/DL (ref 0.55–1.02)
DIFFERENTIAL METHOD BLD: ABNORMAL
EOSINOPHIL # BLD: 0.3 K/UL (ref 0–0.4)
EOSINOPHIL NFR BLD: 4 % (ref 0–7)
EPITH CASTS URNS QL MICRO: ABNORMAL /LPF
ERYTHROCYTE [DISTWIDTH] IN BLOOD BY AUTOMATED COUNT: 15.9 % (ref 11.5–14.5)
EST. AVERAGE GLUCOSE BLD GHB EST-MCNC: 117 MG/DL
GLOBULIN SER CALC-MCNC: 3.6 G/DL (ref 2–4)
GLUCOSE SERPL-MCNC: 95 MG/DL (ref 65–100)
GLUCOSE UR STRIP.AUTO-MCNC: NEGATIVE MG/DL
HBA1C MFR BLD: 5.7 % (ref 4–5.6)
HCT VFR BLD AUTO: 36.1 % (ref 35–47)
HDLC SERPL-MCNC: 64 MG/DL
HDLC SERPL: 2.3 {RATIO} (ref 0–5)
HGB BLD-MCNC: 10.7 G/DL (ref 11.5–16)
HGB UR QL STRIP: NEGATIVE
IMM GRANULOCYTES # BLD AUTO: 0 K/UL (ref 0–0.04)
IMM GRANULOCYTES NFR BLD AUTO: 0 % (ref 0–0.5)
KETONES UR QL STRIP.AUTO: NEGATIVE MG/DL
LDLC SERPL CALC-MCNC: 75.8 MG/DL (ref 0–100)
LEUKOCYTE ESTERASE UR QL STRIP.AUTO: ABNORMAL
LIPID PROFILE,FLP: NORMAL
LYMPHOCYTES # BLD: 1.4 K/UL (ref 0.8–3.5)
LYMPHOCYTES NFR BLD: 21 % (ref 12–49)
MCH RBC QN AUTO: 24.9 PG (ref 26–34)
MCHC RBC AUTO-ENTMCNC: 29.6 G/DL (ref 30–36.5)
MCV RBC AUTO: 84.1 FL (ref 80–99)
MONOCYTES # BLD: 0.6 K/UL (ref 0–1)
MONOCYTES NFR BLD: 8 % (ref 5–13)
NEUTS SEG # BLD: 4.3 K/UL (ref 1.8–8)
NEUTS SEG NFR BLD: 66 % (ref 32–75)
NITRITE UR QL STRIP.AUTO: NEGATIVE
NRBC # BLD: 0 K/UL (ref 0–0.01)
NRBC BLD-RTO: 0 PER 100 WBC
PH UR STRIP: 7 [PH] (ref 5–8)
PLATELET # BLD AUTO: 268 K/UL (ref 150–400)
PMV BLD AUTO: 11.3 FL (ref 8.9–12.9)
POTASSIUM SERPL-SCNC: 4.3 MMOL/L (ref 3.5–5.1)
PROT SERPL-MCNC: 8 G/DL (ref 6.4–8.2)
PROT UR STRIP-MCNC: NEGATIVE MG/DL
RBC # BLD AUTO: 4.29 M/UL (ref 3.8–5.2)
RBC #/AREA URNS HPF: ABNORMAL /HPF (ref 0–5)
SODIUM SERPL-SCNC: 142 MMOL/L (ref 136–145)
SP GR UR REFRACTOMETRY: 1.01 (ref 1–1.03)
TRIGL SERPL-MCNC: 46 MG/DL (ref ?–150)
TSH SERPL DL<=0.05 MIU/L-ACNC: 1.48 UIU/ML (ref 0.36–3.74)
UROBILINOGEN UR QL STRIP.AUTO: 0.2 EU/DL (ref 0.2–1)
VLDLC SERPL CALC-MCNC: 9.2 MG/DL
WBC # BLD AUTO: 6.7 K/UL (ref 3.6–11)
WBC URNS QL MICRO: ABNORMAL /HPF (ref 0–4)

## 2021-02-24 RX ORDER — AMLODIPINE BESYLATE 5 MG/1
TABLET ORAL
Qty: 90 TAB | Refills: 3 | Status: SHIPPED | OUTPATIENT
Start: 2021-02-24 | End: 2021-03-04 | Stop reason: SDUPTHER

## 2021-03-04 RX ORDER — AMLODIPINE BESYLATE 5 MG/1
TABLET ORAL
Qty: 90 TAB | Refills: 3 | Status: SHIPPED | OUTPATIENT
Start: 2021-03-04 | End: 2022-02-03

## 2021-06-21 ENCOUNTER — OFFICE VISIT (OUTPATIENT)
Dept: INTERNAL MEDICINE CLINIC | Age: 81
End: 2021-06-21
Payer: MEDICARE

## 2021-06-21 VITALS
HEIGHT: 62 IN | BODY MASS INDEX: 25.58 KG/M2 | DIASTOLIC BLOOD PRESSURE: 64 MMHG | SYSTOLIC BLOOD PRESSURE: 145 MMHG | RESPIRATION RATE: 16 BRPM | TEMPERATURE: 98.8 F | OXYGEN SATURATION: 99 % | HEART RATE: 97 BPM | WEIGHT: 139 LBS

## 2021-06-21 DIAGNOSIS — Z96.651 HISTORY OF TOTAL RIGHT KNEE REPLACEMENT (TKR): ICD-10-CM

## 2021-06-21 DIAGNOSIS — Z80.0 FAMILY HISTORY OF COLON CANCER: ICD-10-CM

## 2021-06-21 DIAGNOSIS — M25.562 CHRONIC PAIN OF LEFT KNEE: ICD-10-CM

## 2021-06-21 DIAGNOSIS — I10 ESSENTIAL HYPERTENSION: Primary | ICD-10-CM

## 2021-06-21 DIAGNOSIS — E78.2 MIXED HYPERLIPIDEMIA: ICD-10-CM

## 2021-06-21 DIAGNOSIS — R73.02 IGT (IMPAIRED GLUCOSE TOLERANCE): ICD-10-CM

## 2021-06-21 DIAGNOSIS — G89.29 CHRONIC PAIN OF LEFT KNEE: ICD-10-CM

## 2021-06-21 DIAGNOSIS — N18.31 STAGE 3A CHRONIC KIDNEY DISEASE (HCC): ICD-10-CM

## 2021-06-21 PROBLEM — M16.11 OSTEOARTHRITIS OF RIGHT HIP: Status: ACTIVE | Noted: 2021-02-01

## 2021-06-21 LAB
ALBUMIN SERPL-MCNC: 3.9 G/DL (ref 3.5–5)
ANION GAP SERPL CALC-SCNC: 6 MMOL/L (ref 5–15)
BUN SERPL-MCNC: 20 MG/DL (ref 6–20)
BUN/CREAT SERPL: 17 (ref 12–20)
CALCIUM SERPL-MCNC: 9.7 MG/DL (ref 8.5–10.1)
CHLORIDE SERPL-SCNC: 113 MMOL/L (ref 97–108)
CO2 SERPL-SCNC: 25 MMOL/L (ref 21–32)
CREAT SERPL-MCNC: 1.16 MG/DL (ref 0.55–1.02)
GLUCOSE SERPL-MCNC: 102 MG/DL (ref 65–100)
PHOSPHATE SERPL-MCNC: 2.8 MG/DL (ref 2.6–4.7)
POTASSIUM SERPL-SCNC: 4.3 MMOL/L (ref 3.5–5.1)
SODIUM SERPL-SCNC: 144 MMOL/L (ref 136–145)

## 2021-06-21 PROCEDURE — G8399 PT W/DXA RESULTS DOCUMENT: HCPCS | Performed by: INTERNAL MEDICINE

## 2021-06-21 PROCEDURE — 1101F PT FALLS ASSESS-DOCD LE1/YR: CPT | Performed by: INTERNAL MEDICINE

## 2021-06-21 PROCEDURE — G8427 DOCREV CUR MEDS BY ELIG CLIN: HCPCS | Performed by: INTERNAL MEDICINE

## 2021-06-21 PROCEDURE — G8510 SCR DEP NEG, NO PLAN REQD: HCPCS | Performed by: INTERNAL MEDICINE

## 2021-06-21 PROCEDURE — 1090F PRES/ABSN URINE INCON ASSESS: CPT | Performed by: INTERNAL MEDICINE

## 2021-06-21 PROCEDURE — G8753 SYS BP > OR = 140: HCPCS | Performed by: INTERNAL MEDICINE

## 2021-06-21 PROCEDURE — G8419 CALC BMI OUT NRM PARAM NOF/U: HCPCS | Performed by: INTERNAL MEDICINE

## 2021-06-21 PROCEDURE — G8536 NO DOC ELDER MAL SCRN: HCPCS | Performed by: INTERNAL MEDICINE

## 2021-06-21 PROCEDURE — G8754 DIAS BP LESS 90: HCPCS | Performed by: INTERNAL MEDICINE

## 2021-06-21 PROCEDURE — 99214 OFFICE O/P EST MOD 30 MIN: CPT | Performed by: INTERNAL MEDICINE

## 2021-06-21 RX ORDER — PREDNISONE 20 MG/1
40 TABLET ORAL DAILY
Qty: 100 TABLET | Refills: 0 | Status: SHIPPED | OUTPATIENT
Start: 2021-06-21 | End: 2021-07-15

## 2021-06-21 NOTE — PROGRESS NOTES
SPORTS MEDICINE AND PRIMARY CARE  Anne Barfield MD, [address] [address]  Phone:  [phone]  Fax: [phone]       Chief Complaint   Patient presents with    Knee Pain     Patient is here for a knee pain. .      SUBJECTIVE:    Cristina Masters is a 68 y.o. female Patient returns today with a known history of primary hypertension, dyslipidemia, right total knee replacement, left knee pain, impaired glucose tolerance and a history of colon cancer, history of chronic kidney disease, stage 3, and is seen for evaluation. Patient returns today complaining of bilateral knee pain. Dr. Chelsey Rojas injected the left knee when he saw her in February and gave her some Diclofenac. The injection lasted maybe a good month. She tried Diclofenac, which is not helping that much, she has tried Biofreeze, she has different things she has tried and nothing seems to help with the pain. Now she is also complaining of pain in her right groin. She is still working, however. She works in housekeeping. Other new complaints denied and patient is seen for evaluation. Current Outpatient Medications   Medication Sig Dispense Refill    predniSONE (DELTASONE) 20 mg tablet Take 40 mg by mouth daily. For 7 days then 30 mg daily for 7 days then 20 mg daily for 7 days then 10 mg daily 100 Tablet 0    amLODIPine (NORVASC) 5 mg tablet TAKE 1 TABLET BY MOUTH EVERY DAY 90 Tab 3    atorvastatin (LIPITOR) 40 mg tablet TAKE 1 TABLET EVERY DAY 90 Tab 4    hydrALAZINE (APRESOLINE) 25 mg tablet TAKE 1 TABLET TWICE DAILY 180 Tab 3    losartan (COZAAR) 100 mg tablet TAKE 1 TABLET BY MOUTH EVERY DAY 90 Tab 3    albuterol (PROVENTIL HFA, VENTOLIN HFA, PROAIR HFA) 90 mcg/actuation inhaler Take 2 Puffs by inhalation every four (4) hours as needed for Wheezing. 1 Inhaler 11    budesonide-formoterol (SYMBICORT) 160-4.5 mcg/actuation HFAA Take 2 Puffs by inhalation two (2) times a day.  1 Inhaler 11    CHOLECALCIFEROL, VITAMIN D3, (VITAMIN D3 PO) Take  by mouth. Past Medical History:   Diagnosis Date    Advance care planning     documents pending    Anemia     Arthropathy     CKD (chronic kidney disease), stage III ([facility] [address] 09/08/2014    Yessica Cuadra md    Family history of colon cancer     History of total right knee replacement (TKR) 2011    ghazal mirza md    Hypercholesterolemia     Hypertension     IGT (impaired glucose tolerance) 10/08/2018    Knee pain, left     Osteoarthritis of right hip 02/2021    Pes anserine bursitis 08/11/2020     Past Surgical History:   Procedure Laterality Date    COLONOSCOPY N/A 11/29/2018    COL[facility] performed by Gladis Braun [facility] at [facility] ENDOSCOPY    HX ORTHOPAEDIC      bunionectomy     Allergies   Allergen Reactions    Codeine Itching         REVIEW OF SYSTEMS:  General: negative for - chills or fever  ENT: negative for - headaches, nasal congestion or tinnitus  Respiratory: negative for - cough, hemoptysis, shortness of breath or wheezing  Cardiovascular : negative for - chest pain, edema, palpitations or shortness of breath  Gastrointestinal: negative for - abdominal pain, blood in stools, heartburn or nausea/vomiting  Genito-Urinary: no dysuria, trouble voiding, or hematuria  Musculoskeletal: negative for - gait disturbance, joint pain, joint stiffness or joint swelling  Neurological: no TIA or stroke symptoms  Hematologic: no bruises, no bleeding, no swollen glands  Integument: no lumps, mole changes, nail changes or rash  Endocrine: no malaise/lethargy or unexpected weight changes      Social History     Socioeconomic History    Marital status: SINGLE     Spouse name: Not on file    Number of children: Not on file    Years of education: Not on file    Highest education level: Not on file   Tobacco Use    Smoking status: Never Smoker    Smokeless tobacco: Never Used   Substance and Sexual Activity    Alcohol use:  Yes     Alcohol/week: 0.8 standard drinks     Types: 1 Standard drinks or equivalent per week     Comment: ocassional    Drug use: No    Sexual activity: Not Currently   Social History Narrative    Habits:  Occasional alcohol use. No cigarette or drug abuse. Social History:  The patient is . She has no children. She lives alone. She completed high school. 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 She's a member of [employer]. Part time at [employer]        Family History:  Father passed at 59 of heart disease. Mother  80 of heart disease. She also had diabetes. One brother, one sister alive and well. One brother  of seizure disorder, one brother  of heart disease. He had LVAD. One brother  of liver issues. One sister  of colon cancer and one sister  of lung cancer. Social Determinants of Health     Financial Resource Strain:     Difficulty of Paying Living Expenses:    Food Insecurity:     Worried About Running Out of Food in the Last Year:     [address] in the Last Year:    Transportation Needs:     Lack of Transportation (Medical):      Lack of Transportation (Non-Medical):    Physical Activity:     Days of Exercise per Week:     Minutes of Exercise per Session:    Stress:     Feeling of Stress :    Social Connections:     Frequency of Communication with Friends and Family:     Frequency of Social Gatherings with Friends and Family:     Attends Nondenominational Services:     Active Member of Clubs or Organizations:     Attends Club or Organization Meetings:     Marital Status:      Family History   Problem Relation Age of Onset    Heart Disease Mother     Heart Disease Father     Cancer Sister     Cancer Sister     Heart Disease Brother        OBJECTIVE:    Visit Vitals  BP (!) 145/64   Pulse 97   Temp 98.8 °F (37.1 °C)   Resp 16   Ht 5' 2\" (1.575 m)   Wt 139 lb (63 kg)   SpO2 99%   BMI 25.42 kg/m²     CONSTITUTIONAL: well , well nourished, appears age appropriate  EYES: perrla, eom intact  ENMT:moist mucous membranes, pharynx clear  NECK: supple. Thyroid normal  RESPIRATORY: Chest: clear bilaterally   CARDIOVASCULAR: Heart: regular rate and rhythm  GASTROINTESTINAL: Abdomen: soft, bowel sounds active  HEMATOLOGIC: no pathological lymph nodes palpated  MUSCULOSKELETAL: Extremities: no edema, pulse 1+   INTEGUMENT: No unusual rashes or suspicious skin lesions noted. Nails appear normal.  NEUROLOGIC: non-focal exam   MENTAL STATUS: alert and oriented, appropriate affect           ASSESSMENT:  1. Essential hypertension    2. Mixed hyperlipidemia    3. History of total right knee replacement (TKR)    4. Chronic pain of left knee    5. IGT (impaired glucose tolerance)    6. Family history of colon cancer    7. Stage 3a chronic kidney disease (HCC)      Blood pressure repeat is 130/68, which is completely acceptable. She was up to 145/64 when she first came in. She has had a total knee replacement on the right. She has advanced DJD in both the left knee and the right hip. This is most likely the cause of the pain. She would like to try a course of prednisone to see if there is an inflammatory component. We give her a prednisone taper and then ask her to take the smallest dose possible. For impaired glucose tolerance, we continue to monitor her blood sugars. There is a family history of colon cancer. She had a colonoscopy three years ago and Dr. Gladis Ayala suggested repeat colonoscopy in three years. Therefore, we will refer her back to Dr. Gladis Ayala for repeat colonoscopy. Because of the NSAIDs she has been using and the fact that she has a history of chronic kidney disease stage 3, we will ask for a renal panel today. She will be back to see me in four to six months, sooner if needed.       I have discussed the diagnosis with the patient and the intended plan as seen in the  Orders. The patient understands and agees with the plan. The patient has   received an after visit summary and questions were answered concerning  future plans  Patient labs and/or xrays were reviewed  Past records were reviewed. PLAN:  .  Orders Placed This Encounter    RENAL FUNCTION PANEL    REFERRAL TO GASTROENTEROLOGY    predniSONE (DELTASONE) 20 mg tablet       Follow-up and Dispositions    · Return in about 4 months (around 10/21/2021). ATTENTION:   This medical record was transcribed using an electronic medical records system. Although proofread, it may and can contain electronic and spelling errors. Other human spelling and other errors may be present. Corrections may be executed at a later time. Please feel free to contact us for any clarifications as needed.

## 2021-06-21 NOTE — PROGRESS NOTES
Chief Complaint   Patient presents with    Knee Pain     Patient is here for a knee pain. 1. Have you been to the ER, urgent care clinic since your last visit? Hospitalized since your last visit? No    2. Have you seen or consulted any other health care providers outside of the [address] [place] [address] since your last visit? Include any pap smears or colon screening.  No

## 2021-06-22 ENCOUNTER — TRANSCRIBE ORDER (OUTPATIENT)
Dept: SCHEDULING | Age: 81
End: 2021-06-22

## 2021-06-22 DIAGNOSIS — Z12.31 VISIT FOR SCREENING MAMMOGRAM: Primary | ICD-10-CM

## 2021-07-15 RX ORDER — PREDNISONE 20 MG/1
TABLET ORAL
Qty: 100 TABLET | Refills: 0 | Status: SHIPPED | OUTPATIENT
Start: 2021-07-15 | End: 2022-02-22

## 2021-08-12 ENCOUNTER — HOSPITAL ENCOUNTER (OUTPATIENT)
Dept: MAMMOGRAPHY | Age: 81
Discharge: HOME | End: 2021-08-12
Attending: INTERNAL MEDICINE
Payer: MEDICARE

## 2021-08-12 DIAGNOSIS — Z12.31 VISIT FOR SCREENING MAMMOGRAM: ICD-10-CM

## 2021-08-12 PROCEDURE — 77067 SCR MAMMO BI INCL CAD: CPT

## 2021-08-12 RX ORDER — LOSARTAN POTASSIUM 100 MG/1
TABLET ORAL
Qty: 90 TABLET | Refills: 3 | Status: SHIPPED | OUTPATIENT
Start: 2021-08-12 | End: 2022-10-07

## 2021-10-21 ENCOUNTER — OFFICE VISIT (OUTPATIENT)
Dept: INTERNAL MEDICINE CLINIC | Age: 81
End: 2021-10-21
Payer: MEDICARE

## 2021-10-21 VITALS
OXYGEN SATURATION: 94 % | HEART RATE: 101 BPM | SYSTOLIC BLOOD PRESSURE: 144 MMHG | DIASTOLIC BLOOD PRESSURE: 68 MMHG | WEIGHT: 141 LBS | TEMPERATURE: 98.1 F | RESPIRATION RATE: 14 BRPM | HEIGHT: 62 IN | BODY MASS INDEX: 25.95 KG/M2

## 2021-10-21 DIAGNOSIS — N18.31 ANEMIA DUE TO STAGE 3A CHRONIC KIDNEY DISEASE (HCC): ICD-10-CM

## 2021-10-21 DIAGNOSIS — M17.0 PRIMARY OSTEOARTHRITIS OF BOTH KNEES: ICD-10-CM

## 2021-10-21 DIAGNOSIS — I10 PRIMARY HYPERTENSION: ICD-10-CM

## 2021-10-21 DIAGNOSIS — E78.2 MIXED HYPERLIPIDEMIA: ICD-10-CM

## 2021-10-21 DIAGNOSIS — R68.89 OTHER GENERAL SYMPTOMS AND SIGNS: ICD-10-CM

## 2021-10-21 DIAGNOSIS — R73.02 IGT (IMPAIRED GLUCOSE TOLERANCE): ICD-10-CM

## 2021-10-21 DIAGNOSIS — D63.1 ANEMIA DUE TO STAGE 3A CHRONIC KIDNEY DISEASE (HCC): ICD-10-CM

## 2021-10-21 DIAGNOSIS — N18.31 STAGE 3A CHRONIC KIDNEY DISEASE (HCC): Primary | ICD-10-CM

## 2021-10-21 PROBLEM — M17.9 DJD (DEGENERATIVE JOINT DISEASE) OF KNEE: Status: ACTIVE | Noted: 2021-10-21

## 2021-10-21 PROCEDURE — 1101F PT FALLS ASSESS-DOCD LE1/YR: CPT | Performed by: INTERNAL MEDICINE

## 2021-10-21 PROCEDURE — 99214 OFFICE O/P EST MOD 30 MIN: CPT | Performed by: INTERNAL MEDICINE

## 2021-10-21 PROCEDURE — G8754 DIAS BP LESS 90: HCPCS | Performed by: INTERNAL MEDICINE

## 2021-10-21 PROCEDURE — 1090F PRES/ABSN URINE INCON ASSESS: CPT | Performed by: INTERNAL MEDICINE

## 2021-10-21 PROCEDURE — G8419 CALC BMI OUT NRM PARAM NOF/U: HCPCS | Performed by: INTERNAL MEDICINE

## 2021-10-21 PROCEDURE — G8399 PT W/DXA RESULTS DOCUMENT: HCPCS | Performed by: INTERNAL MEDICINE

## 2021-10-21 PROCEDURE — G8432 DEP SCR NOT DOC, RNG: HCPCS | Performed by: INTERNAL MEDICINE

## 2021-10-21 PROCEDURE — G8427 DOCREV CUR MEDS BY ELIG CLIN: HCPCS | Performed by: INTERNAL MEDICINE

## 2021-10-21 PROCEDURE — G8753 SYS BP > OR = 140: HCPCS | Performed by: INTERNAL MEDICINE

## 2021-10-21 PROCEDURE — G8536 NO DOC ELDER MAL SCRN: HCPCS | Performed by: INTERNAL MEDICINE

## 2021-10-21 NOTE — PROGRESS NOTES
Chief Complaint   Patient presents with    Hypertension     4 month follow up          1. Have you been to the ER, urgent care clinic since your last visit? Hospitalized since your last visit? No    2. Have you seen or consulted any other health care providers outside of the [address] [place] [address] since your last visit? Include any pap smears or colon screening.  No

## 2021-10-21 NOTE — PROGRESS NOTES
SPORTS MEDICINE AND PRIMARY CARE  Yanet Jansen MD, [address] [address]  Phone:  [phone]  Fax: [phone]       Chief Complaint   Patient presents with    Hypertension     4 month follow up    . SUBJECTIVE:    Eleazar Pinto is a 68 y.o. female Patient returns today with a known history of CKD stage 3, primary hypertension, dyslipidemia, anemia, impaired glucose tolerance and is seen for evaluation. Patient complains bitterly of her knees. She saw Nalini Dean MD at [address] [place] [address] at [address] [facility], who gave her an injection and told her nothing was wrong with her knees. Other new complaints denied and patient is seen for evaluation. She is up to date with her COVID shot and she has had her booster. Patient is still working at the [employer]. She lives right across the street from [employer], so therefore can walk to it. She works Mondays and Wednesdays and sometimes they will call her in for extra. She enjoys her job. Other new complaints denied and patient is seen for evaluation. Current Outpatient Medications   Medication Sig Dispense Refill    losartan (COZAAR) 100 mg tablet TAKE 1 TABLET EVERY DAY 90 Tablet 3    predniSONE (DELTASONE) 20 mg tablet TAKE 2 TABLETS BY MOUTH DAILY. FOR 7 DAYS THEN 1 AND 1/2 TABLETS DAILY FOR 7 DAYS THEN 1 TABLET DAILY FOR 7 DAYS THEN 1/2 TABLET DAILY 100 Tablet 0    amLODIPine (NORVASC) 5 mg tablet TAKE 1 TABLET BY MOUTH EVERY DAY 90 Tab 3    atorvastatin (LIPITOR) 40 mg tablet TAKE 1 TABLET EVERY DAY 90 Tab 4    hydrALAZINE (APRESOLINE) 25 mg tablet TAKE 1 TABLET TWICE DAILY 180 Tab 3    albuterol (PROVENTIL HFA, VENTOLIN HFA, PROAIR HFA) 90 mcg/actuation inhaler Take 2 Puffs by inhalation every four (4) hours as needed for Wheezing. 1 Inhaler 11    budesonide-formoterol (SYMBICORT) 160-4.5 mcg/actuation HFAA Take 2 Puffs by inhalation two (2) times a day.  1 Inhaler 11    CHOLECALCIFEROL, VITAMIN D3, (VITAMIN D3 PO) Take  by mouth.        Past Medical History:   Diagnosis Date    Advance care planning     documents pending    Anemia     Arthropathy     CKD (chronic kidney disease), stage III ([facility] [address] 09/08/2014    Yessica Cuadra md    DJD (degenerative joint disease) of knee 10/21/2021    Mellisa Knox    Family history of colon cancer     History of total right knee replacement (TKR) 2011    ghazal mirza md    Hypercholesterolemia     Hypertension     IGT (impaired glucose tolerance) 10/08/2018    Knee pain, left     Osteoarthritis of right hip 02/2021    Pes anserine bursitis 08/11/2020     Past Surgical History:   Procedure Laterality Date    COLONOSCOPY N/A 11/29/2018    COL[facility] performed by Kiko Collins MD at [facility] ENDOSCOPY    HX ORTHOPAEDIC      bunionectomy     Allergies   Allergen Reactions    Codeine Itching         REVIEW OF SYSTEMS:  General: negative for - chills or fever  ENT: negative for - headaches, nasal congestion or tinnitus  Respiratory: negative for - cough, hemoptysis, shortness of breath or wheezing  Cardiovascular : negative for - chest pain, edema, palpitations or shortness of breath  Gastrointestinal: negative for - abdominal pain, blood in stools, heartburn or nausea/vomiting  Genito-Urinary: no dysuria, trouble voiding, or hematuria  Musculoskeletal: negative for - gait disturbance, joint pain, joint stiffness or joint swelling  Neurological: no TIA or stroke symptoms  Hematologic: no bruises, no bleeding, no swollen glands  Integument: no lumps, mole changes, nail changes or rash  Endocrine: no malaise/lethargy or unexpected weight changes      Social History     Socioeconomic History    Marital status: SINGLE     Spouse name: Not on file    Number of children: Not on file    Years of education: Not on file    Highest education level: Not on file   Tobacco Use    Smoking status: Never Smoker    Smokeless tobacco: Never Used   Vaping Use    Vaping Use: Never used Substance and Sexual Activity    Alcohol use: Yes     Alcohol/week: 0.8 standard drinks     Types: 1 Standard drinks or equivalent per week     Comment: ocassional    Drug use: No    Sexual activity: Not Currently   Social History Narrative    Habits:  Occasional alcohol use. No cigarette or drug abuse. Social History:  The patient is . She has no children. She lives alone. She completed high school. 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 Part time at [employer] home        Family History:  Father passed at 59 of heart disease. Mother  80 of heart disease. She also had diabetes. One brother, one sister alive and well. One brother  of seizure disorder, one brother  of heart disease. He had LVAD. One brother  of liver issues. One sister  of colon cancer and one sister  of lung cancer. Social Determinants of Health     Financial Resource Strain:     Difficulty of Paying Living Expenses:    Food Insecurity:     Worried About Running Out of Food in the Last Year:     [address] in the Last Year:    Transportation Needs:     Lack of Transportation (Medical):      Lack of Transportation (Non-Medical):    Physical Activity:     Days of Exercise per Week:     Minutes of Exercise per Session:    Stress:     Feeling of Stress :    Social Connections:     Frequency of Communication with Friends and Family:     Frequency of Social Gatherings with Friends and Family:     Attends Evangelical Services:     Active Member of Clubs or Organizations:     Attends Club or Organization Meetings:     Marital Status:      Family History   Problem Relation Age of Onset    Heart Disease Mother     Heart Disease Father     Cancer Sister     Cancer Sister     Heart Disease Brother        OBJECTIVE:    Visit Vitals  BP (!) 144/68   Pulse (!) 101 Temp 98.1 °F (36.7 °C) (Oral)   Resp 14   Ht 5' 2\" (1.575 m)   Wt 141 lb (64 kg)   SpO2 94%   BMI 25.79 kg/m²     CONSTITUTIONAL: well , well nourished, appears age appropriate  EYES: perrla, eom intact  ENMT:moist mucous membranes, pharynx clear  NECK: supple. Thyroid normal  RESPIRATORY: Chest: clear bilaterally   CARDIOVASCULAR: Heart: regular rate and rhythm  GASTROINTESTINAL: Abdomen: soft, bowel sounds active  HEMATOLOGIC: no pathological lymph nodes palpated  MUSCULOSKELETAL: Extremities: no edema, pulse 1+   INTEGUMENT: No unusual rashes or suspicious skin lesions noted. Nails appear normal.  NEUROLOGIC: non-focal exam   MENTAL STATUS: alert and oriented, appropriate affect           ASSESSMENT:  1. Stage 3a chronic kidney disease ([address]    2. Primary hypertension    3. Mixed hyperlipidemia    4. Anemia due to stage 3a chronic kidney disease ([address]    5. IGT (impaired glucose tolerance)    6. Primary osteoarthritis of both knees    7. Other general symptoms and signs       Repeat blood pressure is 134/68, which is excellent. Patient has arranged for a colonoscopy and will have it done by Dr. Heather Mckeon on November 30th. She has CKD stage 3A. We remind her not to use NSAIDs. Will continue to follow renal function and do appropriate lab studies to rule out a particular cause for it. Cholesterol control is at goal with an LDL of 75.8. She has anemia, presumably due to CKD, other pathology will be excluded. She has impaired glucose tolerance and hemoglobin A1c has remained at a stable level. She will be back to see me in four months, sooner if any problems. [facility] and we will communicate with her that way. I have discussed the diagnosis with the patient and the intended plan as seen in the  Orders. The patient understands and agees with the plan.   The patient has   received an after visit summary and questions were answered concerning  future plans  Patient labs and/or xrays were reviewed  Past records were reviewed. PLAN:  .  Orders Placed This Encounter    GAMMOPATHY EVAL, SPEP/ANNAMARIA, IG QT/FLC    RENAL FUNCTION PANEL    CBC WITH AUTOMATED DIFF    IRON PROFILE    VITAMIN B12 & FOLATE    RETICULOCYTE COUNT    FERRITIN    HEPATITIS C AB    REFERRAL TO PHYSICAL THERAPY       Follow-up and Dispositions    · Return in about 4 months (around 2/21/2022). ATTENTION:   This medical record was transcribed using an electronic medical records system. Although proofread, it may and can contain electronic and spelling errors. Other human spelling and other errors may be present. Corrections may be executed at a later time. Please feel free to contact us for any clarifications as needed.

## 2021-10-22 LAB
ALBUMIN SERPL-MCNC: 3.8 G/DL (ref 3.5–5)
ANION GAP SERPL CALC-SCNC: 7 MMOL/L (ref 5–15)
BASOPHILS # BLD: 0.1 K/UL (ref 0–0.1)
BASOPHILS NFR BLD: 1 % (ref 0–1)
BUN SERPL-MCNC: 21 MG/DL (ref 6–20)
BUN/CREAT SERPL: 16 (ref 12–20)
CALCIUM SERPL-MCNC: 10.2 MG/DL (ref 8.5–10.1)
CHLORIDE SERPL-SCNC: 111 MMOL/L (ref 97–108)
CO2 SERPL-SCNC: 24 MMOL/L (ref 21–32)
CREAT SERPL-MCNC: 1.34 MG/DL (ref 0.55–1.02)
DIFFERENTIAL METHOD BLD: ABNORMAL
EOSINOPHIL # BLD: 0.1 K/UL (ref 0–0.4)
EOSINOPHIL NFR BLD: 1 % (ref 0–7)
ERYTHROCYTE [DISTWIDTH] IN BLOOD BY AUTOMATED COUNT: 14 % (ref 11.5–14.5)
FERRITIN SERPL-MCNC: 74 NG/ML (ref 8–252)
FOLATE SERPL-MCNC: 10 NG/ML (ref 5–21)
GLUCOSE SERPL-MCNC: 98 MG/DL (ref 65–100)
HCT VFR BLD AUTO: 36.5 % (ref 35–47)
HCV AB SERPL QL IA: NONREACTIVE
HGB BLD-MCNC: 10.8 G/DL (ref 11.5–16)
IMM GRANULOCYTES # BLD AUTO: 0 K/UL (ref 0–0.04)
IMM GRANULOCYTES NFR BLD AUTO: 0 % (ref 0–0.5)
IRON SATN MFR SERPL: 22 % (ref 20–50)
IRON SERPL-MCNC: 63 UG/DL (ref 35–150)
LYMPHOCYTES # BLD: 2 K/UL (ref 0.8–3.5)
LYMPHOCYTES NFR BLD: 19 % (ref 12–49)
MCH RBC QN AUTO: 26.4 PG (ref 26–34)
MCHC RBC AUTO-ENTMCNC: 29.6 G/DL (ref 30–36.5)
MCV RBC AUTO: 89.2 FL (ref 80–99)
MONOCYTES # BLD: 0.9 K/UL (ref 0–1)
MONOCYTES NFR BLD: 9 % (ref 5–13)
NEUTS SEG # BLD: 7.4 K/UL (ref 1.8–8)
NEUTS SEG NFR BLD: 70 % (ref 32–75)
NRBC # BLD: 0 K/UL (ref 0–0.01)
NRBC BLD-RTO: 0 PER 100 WBC
PHOSPHATE SERPL-MCNC: 3.4 MG/DL (ref 2.6–4.7)
PLATELET # BLD AUTO: 302 K/UL (ref 150–400)
PMV BLD AUTO: 10.9 FL (ref 8.9–12.9)
POTASSIUM SERPL-SCNC: 4.3 MMOL/L (ref 3.5–5.1)
RBC # BLD AUTO: 4.09 M/UL (ref 3.8–5.2)
RETICS # AUTO: 0.04 M/UL (ref 0.02–0.08)
RETICS/RBC NFR AUTO: 0.9 % (ref 0.7–2.1)
SODIUM SERPL-SCNC: 142 MMOL/L (ref 136–145)
TIBC SERPL-MCNC: 283 UG/DL (ref 250–450)
VIT B12 SERPL-MCNC: 540 PG/ML (ref 193–986)
WBC # BLD AUTO: 10.6 K/UL (ref 3.6–11)

## 2021-10-25 LAB
ALBUMIN SERPL ELPH-MCNC: 3.7 G/DL (ref 2.9–4.4)
ALBUMIN/GLOB SERPL: 1.3 {RATIO} (ref 0.7–1.7)
ALPHA1 GLOB SERPL ELPH-MCNC: 0.2 G/DL (ref 0–0.4)
ALPHA2 GLOB SERPL ELPH-MCNC: 0.7 G/DL (ref 0.4–1)
B-GLOBULIN SERPL ELPH-MCNC: 1.1 G/DL (ref 0.7–1.3)
GAMMA GLOB SERPL ELPH-MCNC: 0.9 G/DL (ref 0.4–1.8)
GLOBULIN SER-MCNC: 2.9 G/DL (ref 2.2–3.9)
IGA SERPL-MCNC: 267 MG/DL (ref 64–422)
IGG SERPL-MCNC: 975 MG/DL (ref 586–1602)
IGM SERPL-MCNC: 75 MG/DL (ref 26–217)
INTERPRETATION SERPL IEP-IMP: ABNORMAL
KAPPA LC FREE SER-MCNC: 33.9 MG/L (ref 3.3–19.4)
KAPPA LC FREE/LAMBDA FREE SER: 1.63 {RATIO} (ref 0.26–1.65)
LAMBDA LC FREE SERPL-MCNC: 20.8 MG/L (ref 5.7–26.3)
M PROTEIN SERPL ELPH-MCNC: ABNORMAL G/DL
PROT SERPL-MCNC: 6.6 G/DL (ref 6–8.5)

## 2021-10-26 ENCOUNTER — HOSPITAL ENCOUNTER (OUTPATIENT)
Dept: PHYSICAL THERAPY | Age: 81
Discharge: HOME | End: 2021-10-26
Payer: MEDICARE

## 2021-10-26 DIAGNOSIS — M17.0 PRIMARY OSTEOARTHRITIS OF BOTH KNEES: ICD-10-CM

## 2021-10-26 DIAGNOSIS — N18.31 STAGE 3A CHRONIC KIDNEY DISEASE (HCC): ICD-10-CM

## 2021-10-26 PROCEDURE — 97161 PT EVAL LOW COMPLEX 20 MIN: CPT

## 2021-10-26 PROCEDURE — 97110 THERAPEUTIC EXERCISES: CPT

## 2021-10-26 NOTE — PROGRESS NOTES
PT INITIAL EVALUATION NOTE - [facility] 2-15    Patient Name: Christian Angulo  Date:10/26/2021  : 1940  [x]  Patient  Verified  Payor: Yanelis Yap / Plan: [address] [place] [address] HMO / Product Type: Managed Care Medicare /    In time: 10:10 A  Out time: 11:10 A  Total Treatment Time (min):  60  Total Timed Codes (min):  15  1:1 Treatment Time ( only): 15   Visit #: 1     Treatment Area: Stage 3a chronic kidney disease ([facility] [address] [N18.31]  Primary osteoarthritis of both knees [M17.0]    SUBJECTIVE  Pain Level (0-10 scale):  0/10  Any medication changes, allergies to medications, adverse drug reactions, diagnosis change, or new procedure performed?: [] No    [x] Yes (see summary sheet for update)  Subjective:      Pt presents with chief complaint of bilat knee/lower leg pain, R>L; describes long hx of R knee pain, R TKA , performed by Jessica Saldaña MD w/ [address] [place] [address]; reports she had a fall on to the R knee ~ 2 years ago, had R knee pain afterward and became concerned about her knee replacement; saw Dr. Sam Bateman ~ 1 year ago, xrays bilat knee, R \"said was good\", L \"arthritis\", gave cortisone injection L knee, no signif change in sx, also prescribed topical diclofenac which she did not feel had any signif impact on sx; reports that she has tried several different over the counter topical creams w/ minimal benefit; also purchased compression sleeve - feels this gives some relief however is inconsistent w/ wear; saw Dr. Nasrin Gorman for regular visit last week, discussed bilat knee/LE pain, per pt discussed concerns regarding oral medications such as aleve and advil to control sx due to her kidney disease, advised she take tylenol; pt reports she also takes prednisone prescription PRN for pain, sometimes every few days or sometimes 1x/wk, unsure if she discussed this specifically w MD; was given referral for for PT       Pt expresses interest in aquatic PT, reports she has friends that do that and [employer] which she was going to join prior to pandemic.     Pain:   7/10 max 0/10 min 0/10 now     Aggravated by: standing >5 min, walking, occas wakes at night   Eased by: rest, arthritis cream   Location/description of symptoms:  R>L, R knee lat dital LE to ankle, L knee ant/med/lat; also reports occas R ant hip pain      Diagnostic Tests: [] Lab work [x] X-rays    [] CT [] MRI     [] Other:  Results (per report of the patient): ~ 1 year ago w/ Dr. Rodger Jason, R TKA normal, L knee OA     PMH: Significant for R TKA 2011, stage 3 kidney disease - currently stable, HTN, arthritis, high cholesterol    Social/Recreation/Work: pt works at [employer], assisting at elevator, occasionally requires pushing wheelchair, primarily sits; lives alone, all ADLs w/out limitation, has yard and does all the yard work independently - just rests as needed; home has steps to enter, no handrail; steps in home with handrail, bedroom on second floor; pt does not drive, takes bus or walks; walks for exercise ~ 1 mile - no signif incr pain     Prior level of function: ADLs without limitation     Patient goal(s): \"walk and feel better\"     OBJECTIVE/EXAMINATION    Observation:   In stance   L LE severe genu varus w/ resulting functional leg length discrepancy   Normal scar s/p R TKA   L knee bony changes     Movement/gait:   Trunk flexion & R SB, decr step length & foot clear, decr knee movement throughout gait cycle, L knee severe genu varus, decr R UE swing - held in elbow flexed position      Sit to stand w/out UE assist, good control     SLS - unable to perform > 2 sec bilat before UE assist    DL stance NBOS EO & EC able to hold 30 seconds    Tandem stance lead  R 6 secs                L 3 secs        ROM:  [] Unable to assess                 PROM                                           Right Left   Hip Flexion limited  wnl    Extension limited limited    Abduction wnl wnl    Adduction wnl wnl    IR Severe limited limited    ER Severe limited wnl   Knee Flexion 90 90    Extension 5 hypo 10 hypo   Ankle Plantarflexion wnl wnl    Dorsiflexion limited limited    Inversion wnl wnl    Eversion wnl wnl       Strength:  [] Unable to assess    Right Left   Hip Flexion 4 4    Extension 4 4    Abduction 4 4    Adduction 5 5    IR 5 5    ER 5 5   Knee Flexion 4 4    Extension 4 4   Ankle Plantarflexion 4 4    Dorsiflexion 5 5    Inversion 5 5    Eversion 5 5       QS R good L fair   SLR 3/5 bilat     Neurosensory:   bilat LE sensation intact to light touch     Palpation:   Tender to     Joint Mobility:  Patellar    []R []L  Hypermobile     [x]R [x]L Hypomobile    Special Tests:  90-90                           [] Neg    [x] Pos  Ely's   [] Neg    [x] Pos R LE limited by knee joint, L LE limited by knee joint and muscle tighness  Other:                         Outcome Measure: Patient presents with an initial FOTO score pt started but did not complete       15 min Therapeutic Exercise:  [x] See flow sheet : patient education, instruction HEP    Rationale: increase ROM, increase strength, improve coordination, improve balance and increase proprioception to improve the patients ability to tolerate standing, walking           With   [x] TE   [] TA   [] neuro   [] other: Patient Education: [x] Review HEP    [] Progressed/Changed HEP based on:   [x] positioning   [x] body mechanics   [] transfers   [x] heat/ice application    [x] other:  ethel/path, POC and role of PT, activity modification, postural principles, sleeping position, workstation ergonomic         Pain Level (0-10 scale) post treatment: 0/10      ASSESSMENT:      [x]  See Plan of [address] [facility], PT 10/26/2021  10:05 AM

## 2021-10-26 NOTE — PROGRESS NOTES
Physical Therapy at [facility],   a part of [address] [place], [place] [address], [address] [place] [address], [address] [place] [address]  Phone: [phone]  Fax: [phone]      Plan of Care/Statement of Necessity for Physical Therapy Services  2-15    Patient name: Andres Addison  : 1940  Provider#: [id number]  Referral source: Maryjane Billings, *      Medical/Treatment Diagnosis: Stage 3a chronic kidney disease ([facility] [address] [N18.31]  Primary osteoarthritis of both knees [M17.0]     Prior Hospitalization: see medical history     Comorbidities: kidney disease, R TKA, HTN, high cholesterol   Prior Level of Function: able to keron prol standing, walk, sleep through the night w/out pain/limitation   Medications: Verified on Patient Summary List  Start of Care: 10/26/2021      Onset Date:    The Plan of Care and following information is based on the information from the initial evaluation.     Assessment/ key information: pt is a 68year old female presents w/ signs/symptoms consistent with bilat knee OA including gait/balance dysfunction resulting from loss of joint mobility, muscle weakness and faulty movement patterns     Evaluation Complexity History MEDIUM  Complexity : 1-2 comorbidities / personal factors will impact the outcome/ POC ; Examination LOW Complexity : 1-2 Standardized tests and measures addressing body structure, function, activity limitation and / or participation in recreation  ;Presentation LOW Complexity : Stable, uncomplicated  ;Clinical Decision Making MEDIUM Complexity : FOTO score of 26-74  Overall Complexity Rating: LOW     Problem List: pain affecting function, decrease ROM, decrease strength, impaired gait/ balance, decrease ADL/ functional abilitiies, decrease activity tolerance, decrease flexibility/ joint mobility and decrease transfer abilities   Treatment Plan may include any combination of the following: Therapeutic exercise, Therapeutic activities, Physical agent/modality, Gait/balance training, Manual therapy, Patient education, Functional mobility training, Home safety training and Stair training  Patient / Family readiness to learn indicated by: asking questions, trying to perform skills and interest  Persons(s) to be included in education: patient (P)  Barriers to Learning/Limitations: None  Patient Goal (s): walk and feel better   Patient Self Reported Health Status: good  Rehabilitation Potential: good    Short Term Goals: To be accomplished in 3-4 treatments:  1) Patient will be independent with HEP  2) Patient will demonstrate understanding/application of ice/heat recommendations to assist w/ managing pain    Long Term Goals: To be accomplished in 8-10 treatments:  1) Patient will report ability to stand >/= 10 minutes without increase knee pain so can cook breakfast   2) Patient will increase FOTO score by >/= 10 points to demonstrate increased function   3) Patient will report ability to amb >/= 30 minutes without increase knee pain  4) Patient will demo independent with modified gym/exercise program without aggravation of symptoms      Frequency / Duration: Patient to be seen 1-2 times per week for 8-10 treatments. Patient/ Caregiver education and instruction: self care, activity modification, brace/ splint application and exercises    [x]  Plan of care has been reviewed with PTA    Certification Period: 10/26/2021-1/20/2022    Juani Foley, PT 10/26/2021     ________________________________________________________________________    I certify that the above Therapy Services are being furnished while the patient is under my care. I agree with the treatment plan and certify that this therapy is necessary.     Physician's Signature:____________________  Date:____________Time: _________         Ced Menjivar, *

## 2021-10-27 ENCOUNTER — TELEPHONE (OUTPATIENT)
Dept: INTERNAL MEDICINE CLINIC | Age: 81
End: 2021-10-27

## 2021-10-27 NOTE — TELEPHONE ENCOUNTER
We spoke to the patient about taking the prednisone for knee pain anytime. She states she only did that maybe 1 pill for maybe 2 or 3 days. She does not do it all the time.

## 2021-11-02 ENCOUNTER — HOSPITAL ENCOUNTER (OUTPATIENT)
Dept: PHYSICAL THERAPY | Age: 81
Discharge: HOME | End: 2021-11-02
Payer: MEDICARE

## 2021-11-02 PROCEDURE — 97110 THERAPEUTIC EXERCISES: CPT

## 2021-11-02 NOTE — PROGRESS NOTES
PT DAILY TREATMENT NOTE - [facility] 2-15    Patient Name: Zoila Section  Date:2021  : 1940  [x]  Patient  Verified  Payor: Zay Barnard / Plan: [address] [place] [address] HMO / Product Type: Managed Care Medicare /    In time: 11:45A  Out time: 12:30P  Total Treatment Time (min): 45  Total Timed Codes (min): 45  1:1 Treatment Time ([facility] only): 40   Visit #:  2    Treatment Area: Bilateral primary osteoarthritis of knee [M17.0]    SUBJECTIVE  Pain Level (0-10 scale): 8/10 R knee  Any medication changes, allergies to medications, adverse drug reactions, diagnosis change, or new procedure performed?: [x] No    [] Yes (see summary sheet for update)  Subjective functional status/changes:   [] No changes reported   Pt reports she has been working on the HEP at home the SLS causes some pain    OBJECTIVE    45 min Therapeutic Exercise:  [x] See flow sheet :   Rationale: increase ROM, increase strength, improve coordination, improve balance and increase proprioception to improve the patients ability to increase function and mobility            With   [] TE   [] TA   [] Neuro   [] SC   [] other: Patient Education: [x] Review HEP    [] Progressed/Changed HEP based on:   [] positioning   [] body mechanics   [] transfers   [] heat/ice application    [] other:      Other Objective/Functional Measures: --     Pain Level (0-10 scale) post treatment: 7/10    ASSESSMENT/Changes in Function:   Pt tolerated added strengthening well today. Pt declined ice at end of session/ Educated on benefits of ice. Did provide information on aquatic PT location. pt will let us know if she decides to transition.  Patient will continue to benefit from skilled PT services to modify and progress therapeutic interventions, address functional mobility deficits, address ROM deficits, address strength deficits, analyze and address soft tissue restrictions, analyze and cue movement patterns, analyze and modify body mechanics/ergonomics and assess and modify postural abnormalities to attain remaining goals. []  See Plan of Care  []  See progress note/recertification  []  See Discharge Summary         Progress towards goals / Updated goals:  Short Term Goals: To be accomplished in 3-4 treatments:  1) Patient will be independent with HEP  2) Patient will demonstrate understanding/application of ice/heat recommendations to assist w/ managing pain     Long Term Goals: To be accomplished in 8-10 treatments:  1) Patient will report ability to stand >/= 10 minutes without increase knee pain so can cook breakfast   2) Patient will increase FOTO score by >/= 10 points to demonstrate increased function   3) Patient will report ability to amb >/= 30 minutes without increase knee pain  4) Patient will demo independent with modified gym/exercise program without aggravation of symptoms       Frequency / Duration: Patient to be seen 1-2 times per week for 8-10 treatments.     PLAN  [x]  Upgrade activities as tolerated     [x]  Continue plan of care  []  Update interventions per flow sheet       []  Discharge due to:_  []  Other:_      Seema Chavez PTA, [facility] 11/2/2021

## 2021-11-04 ENCOUNTER — HOSPITAL ENCOUNTER (OUTPATIENT)
Dept: PHYSICAL THERAPY | Age: 81
Discharge: HOME | End: 2021-11-04
Payer: MEDICARE

## 2021-11-04 PROCEDURE — 97110 THERAPEUTIC EXERCISES: CPT

## 2021-11-04 NOTE — PROGRESS NOTES
PT DAILY TREATMENT NOTE - [facility] 2-15    Patient Name: Janette Blanco  Date:2021  : 1940  [x]  Patient  Verified  Payor: Janina Chatterjee / Plan: [address] [place] [address] HMO / Product Type: Managed Care Medicare /    In time:3:17 pm  Out time:4:18 pm  Total Treatment Time (min): 61  Total Timed Codes (min): 51  1:1 Treatment Time ( [address] only): 55  Visit #: 3    Treatment Area: Bilateral primary osteoarthritis of knee [M17.0]    SUBJECTIVE  Pain Level (0-10 scale): 7/10 L knee  Any medication changes, allergies to medications, adverse drug reactions, diagnosis change, or new procedure performed?: [x]? No    []? Yes (see summary sheet for update)  Subjective functional status/changes:   []? No changes reported   Pt reports she was feeling pretty good yesterday, but quite a bit of left knee pain today.      OBJECTIVE    Modality rationale: decrease pain and increase tissue extensibility to improve the patients ability to ambulate with decreased pain or discomfort. Min Type Additional Details       [] Estim: []Att   []Unatt    []TENS instruct                  []IFC  []Premod   []NMES                     []Other:  []w/US   []w/ice   []w/heat  Position:  Location:       []  Traction: [] Cervical       []Lumbar                       [] Prone          []Supine                       []Intermittent   []Continuous Lbs:  [] before manual  [] after manual  []w/heat    []  Ultrasound: []Continuous   [] Pulsed                       at: []1MHz   []3MHz Location:  W/cm2:    [] Paraffin         Location:   []w/heat   10 []  Ice     [x]  Heat  []  Ice massage Position: Supine  Location: Bilateral knees    []  Laser  []  Other: Position:  Location:      []  Vasopneumatic Device Pressure:       [] lo [] med [] hi   Temperature:      [x] Skin assessment post-treatment:  [x]intact []redness- no adverse reaction    []redness  adverse reaction:        46 min Therapeutic Exercise:  [x]?  See flow sheet :   Rationale: increase ROM, increase strength, improve coordination, improve balance and increase proprioception to improve the patients ability to increase function and mobility                                                                    With   []? TE   []? TA   []? Neuro   []? SC   []? other: Patient Education: [x]? Review HEP    []? Progressed/Changed HEP based on:   []? positioning   []? body mechanics   []? transfers   []? heat/ice application    []? other:       Other Objective/Functional Measures:   Knee flexion:  R: 95  L: 93 (no pain, just restricted)       Pain Level (0-10 scale) post treatment: 5/10     ASSESSMENT/Changes in Function:   Pt tolerated exercises well with cueing provided for correct form and technique. Pt responded well to heat at end of session, stating knees felt much better than when she came in. Patient will continue to benefit from skilled PT services to modify and progress therapeutic interventions, address functional mobility deficits, address ROM deficits, address strength deficits, analyze and address soft tissue restrictions, analyze and cue movement patterns, analyze and modify body mechanics/ergonomics and assess and modify postural abnormalities to attain remaining goals. []? See Plan of Care  []? See progress note/recertification  []?   See Discharge Summary         Progress towards goals / Updated goals:  Short Term Goals: To be accomplished in 3-4 treatments:  1) Patient will be independent with HEP  2) Patient will demonstrate understanding/application of ice/heat recommendations to assist w/ managing pain     Long Term Goals: To be accomplished in 8-10 treatments:  1) Patient will report ability to stand >/= 10 minutes without increase knee pain so can cook breakfast   2) Patient will increase FOTO score by >/= 10 points to demonstrate increased function   3) Patient will report ability to amb >/= 30 minutes without increase knee pain  4) Patient will demo independent with modified gym/exercise program without aggravation of symptoms       Frequency / Duration: Patient to be seen 1-2 times per week for 8-10 treatments.     PLAN  [x]? Upgrade activities as tolerated     [x]? Continue plan of care  []? Update interventions per flow sheet       []? Discharge due to:_  []?   Other:_        Mar Smith PT, DPT 11/4/2021

## 2021-11-09 ENCOUNTER — HOSPITAL ENCOUNTER (OUTPATIENT)
Dept: PHYSICAL THERAPY | Age: 81
Discharge: HOME | End: 2021-11-09
Payer: MEDICARE

## 2021-11-09 PROCEDURE — 97110 THERAPEUTIC EXERCISES: CPT

## 2021-11-09 NOTE — PROGRESS NOTES
PT DAILY TREATMENT NOTE - [facility] 2-15    Patient Name: Rosario Range  Date:2021  : 1940  [x]  Patient  Verified  Payor: Shanon Staton / Plan: [address] [place] [address] HMO / Product Type: Managed Care Medicare /    In time: 11:44A  Out time: 12:46P  Total Treatment Time (min): 62  Total Timed Codes (min): 52  1:1 Treatment Time ([facility] only): 52  Visit #: 4    Treatment Area: Bilateral primary osteoarthritis of knee [M17.0]    SUBJECTIVE  Pain Level (0-10 scale): 6/10 L knee  Any medication changes, allergies to medications, adverse drug reactions, diagnosis change, or new procedure performed?: [x]? No    []? Yes (see summary sheet for update)  Subjective functional status/changes:   []? No changes reported   Pt reports less pain in the knee today. Feels like she still waddles when she walks     OBJECTIVE    Modality rationale: decrease pain and increase tissue extensibility to improve the patients ability to ambulate with decreased pain or discomfort. Min Type Additional Details       [] Estim: []Att   []Unatt    []TENS instruct                  []IFC  []Premod   []NMES                     []Other:  []w/US   []w/ice   []w/heat  Position:  Location:       []  Traction: [] Cervical       []Lumbar                       [] Prone          []Supine                       []Intermittent   []Continuous Lbs:  [] before manual  [] after manual  []w/heat    []  Ultrasound: []Continuous   [] Pulsed                       at: []1MHz   []3MHz Location:  W/cm2:    [] Paraffin         Location:   []w/heat   10 []  Ice     [x]  Heat  []  Ice massage Position: Supine  Location: Bilateral knees    []  Laser  []  Other: Position:  Location:      []  Vasopneumatic Device Pressure:       [] lo [] med [] hi   Temperature:      [x] Skin assessment post-treatment:  [x]intact []redness- no adverse reaction    []redness  adverse reaction:        52 min Therapeutic Exercise:  [x]?  See flow sheet :   Rationale: increase ROM, increase strength, improve coordination, improve balance and increase proprioception to improve the patients ability to increase function and mobility                                                                    With   []? TE   []? TA   []? Neuro   []? SC   []? other: Patient Education: [x]? Review HEP    []? Progressed/Changed HEP based on:   []? positioning   []? body mechanics   []? transfers   []? heat/ice application    []? other:       Other Objective/Functional Measures: --     Pain Level (0-10 scale) post treatment: 6/10     ASSESSMENT/Changes in Function:   Pt requires cuing for proper form with HS curls. No pain noted with exercises today. Patient will continue to benefit from skilled PT services to modify and progress therapeutic interventions, address functional mobility deficits, address ROM deficits, address strength deficits, analyze and address soft tissue restrictions, analyze and cue movement patterns, analyze and modify body mechanics/ergonomics and assess and modify postural abnormalities to attain remaining goals. []? See Plan of Care  []? See progress note/recertification  []? See Discharge Summary         Progress towards goals / Updated goals:  Short Term Goals: To be accomplished in 3-4 treatments:  1) Patient will be independent with HEP  2) Patient will demonstrate understanding/application of ice/heat recommendations to assist w/ managing pain     Long Term Goals: To be accomplished in 8-10 treatments:  1) Patient will report ability to stand >/= 10 minutes without increase knee pain so can cook breakfast   2) Patient will increase FOTO score by >/= 10 points to demonstrate increased function   3) Patient will report ability to amb >/= 30 minutes without increase knee pain  4) Patient will demo independent with modified gym/exercise program without aggravation of symptoms       Frequency / Duration: Patient to be seen 1-2 times per week for 8-10 treatments.     PLAN  [x]? Upgrade activities as tolerated     [x]? Continue plan of care  []? Update interventions per flow sheet       []? Discharge due to:_  []?   Other:_        Catarina Lyn PTA, [facility] 11/9/2021

## 2021-11-11 ENCOUNTER — HOSPITAL ENCOUNTER (OUTPATIENT)
Dept: PHYSICAL THERAPY | Age: 81
Discharge: HOME | End: 2021-11-11
Payer: MEDICARE

## 2021-11-11 PROCEDURE — 97110 THERAPEUTIC EXERCISES: CPT

## 2021-11-11 NOTE — PROGRESS NOTES
PT DAILY TREATMENT NOTE - [facility] 2-15    Patient Name: Shaji Salgado  Date:2021  : 1940  [x]  Patient  Verified  Payor: Kenzie Morgan / Plan: [address] [place] [address] HMO / Product Type: Managed Care Medicare /    In time:10:51am Out time:12:01 pm  Total Treatment Time (min): 70  Total Timed Codes (min): 60  1:1 Treatment Time ( [address] only): 54   Visit #: 5    Treatment Area: Bilateral primary osteoarthritis of knee [M17.0]    SUBJECTIVE  Pain Level (0-10 scale): 5/10  Any medication changes, allergies to medications, adverse drug reactions, diagnosis change, or new procedure performed?: [x]? ? No    []? ? Yes (see summary sheet for update)  Subjective functional status/changes:   []? ? No changes reported   Pt reports her knees are feeling pretty good today, but still frequent pain in bilateral knees. She thinks her mobility has been better overall. OBJECTIVE            Modality rationale: decrease pain and increase tissue extensibility to improve the patients ability to ambulate with decreased pain or discomfort. Min Type Additional Details        []? Estim: []? Att   []? Unatt    []? TENS instruct                  []?IFC  []? Premod   []? NMES                     []?Other:  []?w/US   []?w/ice   []?w/heat  Position:  Location:        []? Traction: []? Cervical       []? Lumbar                       []? Prone          []? Supine                       []?Intermittent   []? Continuous Lbs:  []? before manual  []? after manual  []?w/heat     []? Ultrasound: []? Continuous   []? Pulsed                       at: []?1MHz   []? 3MHz Location:  W/cm2:     []? Paraffin         Location:   []?w/heat   10 []? Ice     [x]? Heat  []? Ice massage Position: Supine  Location: Bilateral knees     []? Laser  []? Other: Position:  Location:        []? Vasopneumatic Device Pressure:       []? lo []? med []? hi   Temperature:       [x]? Skin assessment post-treatment:  [x]? intact []? redness- no adverse reaction []?redness  adverse reaction:      55 min Therapeutic Exercise:  [x]? ? See flow sheet :   Rationale: increase ROM, increase strength, improve coordination, improve balance and increase proprioception to improve the patients ability to increase function and mobility                                                                 With   []?? TE   []?? TA   []? ? Neuro   []?? SC   []?? other: Patient Education: [x]? ? Review HEP    []? ? Progressed/Changed HEP based on:   []?? positioning   []? ? body mechanics   []? ? transfers   []? ? heat/ice application    []? ? other:       Other Objective/Functional Measures: --     Pain Level (0-10 scale) post treatment: 4/10     ASSESSMENT/Changes in Function:   Pt reported being able to hear cracking within her knees with forward step-ups, though no associated pain; felt \"good\" muscle fatigue with exercise. Introduced standing hip flexor stretch due to hip hinge posture. Patient will continue to benefit from skilled PT services to modify and progress therapeutic interventions, address functional mobility deficits, address ROM deficits, address strength deficits, analyze and address soft tissue restrictions, analyze and cue movement patterns, analyze and modify body mechanics/ergonomics and assess and modify postural abnormalities to attain remaining goals.     [x]? ?  See Plan of Care  []? ?  See progress note/recertification  []? ?  See Discharge Summary         Progress towards goals / Updated goals:  Short Term Goals: To be accomplished in 3-4 treatments:  1) Patient will be independent with HEP. - MET  2) Patient will demonstrate understanding/application of ice/heat recommendations to assist w/ managing pain     Long Term Goals: To be accomplished in 8-10 treatments:  1) Patient will report ability to stand >/= 10 minutes without increase knee pain so can cook breakfast   2) Patient will increase FOTO score by >/= 10 points to demonstrate increased function   3) Patient will report ability to amb >/= 30 minutes without increase knee pain   4) Patient will demo independent with modified gym/exercise program without aggravation of symptoms       Frequency / Duration: Patient to be seen 1-2 times per week for 8-10 treatments.     PLAN  [x]? ?  Upgrade activities as tolerated     [x]? ?  Continue plan of care  []? ?  Update interventions per flow sheet       []? ?  Discharge due to:_  []??  Other:_      Sapna Hills, PT, DPT 11/11/2021

## 2021-11-16 ENCOUNTER — APPOINTMENT (OUTPATIENT)
Dept: PHYSICAL THERAPY | Age: 81
End: 2021-11-16
Payer: MEDICARE

## 2021-11-18 ENCOUNTER — HOSPITAL ENCOUNTER (OUTPATIENT)
Dept: PHYSICAL THERAPY | Age: 81
Discharge: HOME | End: 2021-11-18
Payer: MEDICARE

## 2021-11-18 PROCEDURE — 97110 THERAPEUTIC EXERCISES: CPT

## 2021-11-18 NOTE — PROGRESS NOTES
PT DAILY TREATMENT NOTE - [facility] 2-15    Patient Name: Gil Espinosa  Date:2021  : 1940  [x]  Patient  Verified  Payor: Sonam Brizuela / Plan: [address] [place] [address] HMO / Product Type: Managed Care Medicare /    In time: 10:55 am Out time: 12:06 pm  Total Treatment Time (min): 71  Total Timed Codes (min): 61  1:1 Treatment Time ( [address] only): 62   Visit #: 6    Treatment Area: Bilateral primary osteoarthritis of knee [M17.0]    SUBJECTIVE  Pain Level (0-10 scale): 8/10  Any medication changes, allergies to medications, adverse drug reactions, diagnosis change, or new procedure performed?: [x]? ? No    []? ? Yes (see summary sheet for update)  Subjective functional status/changes:   []? ? No changes reported   Pt reports she \"felt so bad\" on Tuesday; she felt pretty good up until that point. She worked on Monday night, but does not think it was due to her work, and is unsure what caused the increase in pain. OBJECTIVE            Modality rationale: decrease pain and increase tissue extensibility to improve the patients ability to ambulate with decreased pain or discomfort. Min Type Additional Details        []? Estim: []? Att   []? Unatt    []? TENS instruct                  []?IFC  []? Premod   []? NMES                     []?Other:  []?w/US   []?w/ice   []?w/heat  Position:  Location:        []? Traction: []? Cervical       []? Lumbar                       []? Prone          []? Supine                       []?Intermittent   []? Continuous Lbs:  []? before manual  []? after manual  []?w/heat     []? Ultrasound: []? Continuous   []? Pulsed                       at: []?1MHz   []? 3MHz Location:  W/cm2:     []? Paraffin         Location:   []?w/heat   10 []? Ice     [x]? Heat  []? Ice massage Position: Supine  Location: Bilateral knees     []? Laser  []? Other: Position:  Location:        []? Vasopneumatic Device Pressure:       []? lo []? med []? hi   Temperature:       [x]?  Skin assessment post-treatment:  [x]? intact []? redness- no adverse reaction    []? redness  adverse reaction:      61 min Therapeutic Exercise:  [x]? ? See flow sheet :   Rationale: increase ROM, increase strength, improve coordination, improve balance and increase proprioception to improve the patients ability to increase function and mobility                                                                 With   []?? TE   []?? TA   []? ? Neuro   []?? SC   []?? other: Patient Education: [x]? ? Review HEP    []? ? Progressed/Changed HEP based on:   []?? positioning   []? ? body mechanics   []? ? transfers   []? ? heat/ice application    []? ? other:       Other Objective/Functional Measures: --     Pain Level (0-10 scale) post treatment: 2/10     ASSESSMENT/Changes in Function:   Pt unable to tolerate stationary bike warm-up due to increased knee pain today. Modified session to more [place] strengthening and stretching today due to increased pain; Pt tolerated well and noted reduced pain post-therex. Patient will continue to benefit from skilled PT services to modify and progress therapeutic interventions, address functional mobility deficits, address ROM deficits, address strength deficits, analyze and address soft tissue restrictions, analyze and cue movement patterns, analyze and modify body mechanics/ergonomics and assess and modify postural abnormalities to attain remaining goals.     [x]? ?  See Plan of Care  []? ?  See progress note/recertification  []? ?  See Discharge Summary         Progress towards goals / Updated goals:  Short Term Goals: To be accomplished in 3-4 treatments:  1) Patient will be independent with HEP. - MET  2) Patient will demonstrate understanding/application of ice/heat recommendations to assist w/ managing pain - MET     Long Term Goals: To be accomplished in 8-10 treatments:  1) Patient will report ability to stand >/= 10 minutes without increase knee pain so can cook breakfast   2) Patient will increase FOTO score by >/= 10 points to demonstrate increased function   3) Patient will report ability to amb >/= 30 minutes without increase knee pain   4) Patient will demo independent with modified gym/exercise program without aggravation of symptoms        PLAN  [x]? ?  Upgrade activities as tolerated     [x]? ?  Continue plan of care  []? ?  Update interventions per flow sheet       []? ?  Discharge due to:_  []??  Other:_      Ann Cough, PT, DPT 11/18/2021

## 2021-11-23 ENCOUNTER — APPOINTMENT (OUTPATIENT)
Dept: PHYSICAL THERAPY | Age: 81
End: 2021-11-23
Payer: MEDICARE

## 2021-11-26 ENCOUNTER — TRANSCRIBE ORDER (OUTPATIENT)
Dept: REGISTRATION | Age: 81
End: 2021-11-26

## 2021-11-26 ENCOUNTER — HOSPITAL ENCOUNTER (OUTPATIENT)
Dept: PREADMISSION TESTING | Age: 81
Discharge: HOME | End: 2021-11-26
Attending: INTERNAL MEDICINE
Payer: MEDICARE

## 2021-11-26 DIAGNOSIS — U07.1 COVID-19: Primary | ICD-10-CM

## 2021-11-26 DIAGNOSIS — U07.1 COVID-19: ICD-10-CM

## 2021-11-26 PROCEDURE — U0005 INFEC AGEN DETEC AMPLI PROBE: HCPCS

## 2021-11-27 LAB
SARS-COV-2, XPLCVT: NOT DETECTED
SOURCE, COVRS: NORMAL

## 2021-11-30 ENCOUNTER — ANESTHESIA EVENT (OUTPATIENT)
Dept: ENDOSCOPY | Age: 81
End: 2021-11-30
Payer: MEDICARE

## 2021-11-30 ENCOUNTER — ANESTHESIA (OUTPATIENT)
Dept: ENDOSCOPY | Age: 81
End: 2021-11-30
Payer: MEDICARE

## 2021-11-30 ENCOUNTER — HOSPITAL ENCOUNTER (OUTPATIENT)
Age: 81
Setting detail: OUTPATIENT SURGERY
Discharge: HOME | End: 2021-11-30
Attending: INTERNAL MEDICINE | Admitting: INTERNAL MEDICINE
Payer: MEDICARE

## 2021-11-30 ENCOUNTER — APPOINTMENT (OUTPATIENT)
Dept: PHYSICAL THERAPY | Age: 81
End: 2021-11-30
Payer: MEDICARE

## 2021-11-30 VITALS
OXYGEN SATURATION: 100 % | HEIGHT: 62 IN | WEIGHT: 133 LBS | SYSTOLIC BLOOD PRESSURE: 112 MMHG | BODY MASS INDEX: 24.48 KG/M2 | HEART RATE: 80 BPM | TEMPERATURE: 97.1 F | DIASTOLIC BLOOD PRESSURE: 55 MMHG | RESPIRATION RATE: 16 BRPM

## 2021-11-30 PROCEDURE — 76040000019: Performed by: INTERNAL MEDICINE

## 2021-11-30 PROCEDURE — 74011250637 HC RX REV CODE- 250/637: Performed by: INTERNAL MEDICINE

## 2021-11-30 PROCEDURE — 74011250636 HC RX REV CODE- 250/636: Performed by: INTERNAL MEDICINE

## 2021-11-30 PROCEDURE — 74011250636 HC RX REV CODE- 250/636: Performed by: NURSE ANESTHETIST, CERTIFIED REGISTERED

## 2021-11-30 PROCEDURE — 76060000031 HC ANESTHESIA FIRST 0.5 HR: Performed by: INTERNAL MEDICINE

## 2021-11-30 RX ORDER — FENTANYL CITRATE 50 UG/ML
25-200 INJECTION, SOLUTION INTRAMUSCULAR; INTRAVENOUS
Status: DISCONTINUED | OUTPATIENT
Start: 2021-11-30 | End: 2021-11-30 | Stop reason: HOSPADM

## 2021-11-30 RX ORDER — SODIUM CHLORIDE 0.9 % (FLUSH) 0.9 %
5-40 SYRINGE (ML) INJECTION EVERY 8 HOURS
Status: DISCONTINUED | OUTPATIENT
Start: 2021-11-30 | End: 2021-11-30 | Stop reason: HOSPADM

## 2021-11-30 RX ORDER — PROPOFOL 10 MG/ML
INJECTION, EMULSION INTRAVENOUS AS NEEDED
Status: DISCONTINUED | OUTPATIENT
Start: 2021-11-30 | End: 2021-11-30 | Stop reason: HOSPADM

## 2021-11-30 RX ORDER — SODIUM CHLORIDE 9 MG/ML
INJECTION, SOLUTION INTRAVENOUS
Status: DISCONTINUED | OUTPATIENT
Start: 2021-11-30 | End: 2021-11-30 | Stop reason: HOSPADM

## 2021-11-30 RX ORDER — NALOXONE HYDROCHLORIDE 0.4 MG/ML
0.4 INJECTION, SOLUTION INTRAMUSCULAR; INTRAVENOUS; SUBCUTANEOUS
Status: DISCONTINUED | OUTPATIENT
Start: 2021-11-30 | End: 2021-11-30 | Stop reason: HOSPADM

## 2021-11-30 RX ORDER — ATROPINE SULFATE 0.1 MG/ML
0.5 INJECTION INTRAVENOUS
Status: DISCONTINUED | OUTPATIENT
Start: 2021-11-30 | End: 2021-11-30 | Stop reason: HOSPADM

## 2021-11-30 RX ORDER — EPINEPHRINE 0.1 MG/ML
1 INJECTION INTRACARDIAC; INTRAVENOUS
Status: DISCONTINUED | OUTPATIENT
Start: 2021-11-30 | End: 2021-11-30 | Stop reason: HOSPADM

## 2021-11-30 RX ORDER — SODIUM CHLORIDE 9 MG/ML
50 INJECTION, SOLUTION INTRAVENOUS CONTINUOUS
Status: DISCONTINUED | OUTPATIENT
Start: 2021-11-30 | End: 2021-11-30 | Stop reason: HOSPADM

## 2021-11-30 RX ORDER — DEXTROMETHORPHAN/PSEUDOEPHED 2.5-7.5/.8
1.2 DROPS ORAL
Status: DISCONTINUED | OUTPATIENT
Start: 2021-11-30 | End: 2021-11-30 | Stop reason: HOSPADM

## 2021-11-30 RX ORDER — SODIUM CHLORIDE 0.9 % (FLUSH) 0.9 %
5-40 SYRINGE (ML) INJECTION AS NEEDED
Status: DISCONTINUED | OUTPATIENT
Start: 2021-11-30 | End: 2021-11-30 | Stop reason: HOSPADM

## 2021-11-30 RX ORDER — FLUMAZENIL 0.1 MG/ML
0.2 INJECTION INTRAVENOUS
Status: DISCONTINUED | OUTPATIENT
Start: 2021-11-30 | End: 2021-11-30 | Stop reason: HOSPADM

## 2021-11-30 RX ORDER — MIDAZOLAM HYDROCHLORIDE 1 MG/ML
.25-5 INJECTION, SOLUTION INTRAMUSCULAR; INTRAVENOUS
Status: DISCONTINUED | OUTPATIENT
Start: 2021-11-30 | End: 2021-11-30 | Stop reason: HOSPADM

## 2021-11-30 RX ADMIN — PROPOFOL 50 MG: 10 INJECTION, EMULSION INTRAVENOUS at 09:44

## 2021-11-30 RX ADMIN — PROPOFOL 50 MG: 10 INJECTION, EMULSION INTRAVENOUS at 09:36

## 2021-11-30 RX ADMIN — PROPOFOL 50 MG: 10 INJECTION, EMULSION INTRAVENOUS at 09:33

## 2021-11-30 RX ADMIN — SODIUM CHLORIDE: 900 INJECTION, SOLUTION INTRAVENOUS at 09:30

## 2021-11-30 RX ADMIN — PROPOFOL 50 MG: 10 INJECTION, EMULSION INTRAVENOUS at 09:40

## 2021-11-30 NOTE — ANESTHESIA POSTPROCEDURE EVALUATION
Procedure(s):  COLONOSCOPY   V.    MAC    Anesthesia Post Evaluation        Patient participation: complete - patient participated  Level of consciousness: awake  Pain management: adequate  Airway patency: patent  Anesthetic complications: no  Cardiovascular status: hemodynamically stable  Respiratory status: acceptable  Hydration status: acceptable  Comments: The patient is ready for PACU discharge. Lindsay Lion DO                   Post anesthesia nausea and vomiting:  controlled      INITIAL Post-op Vital signs:   Vitals Value Taken Time   /70 11/30/21 1035   Temp 36.2 °C (97.1 °F) 11/30/21 0956   Pulse 71 11/30/21 1035   Resp 16 11/30/21 1035   SpO2 96 % 11/30/21 1035   Vitals shown include unvalidated device data.

## 2021-11-30 NOTE — H&P
[address]  [address] [facility], [address] [place] [address]      History and Physical       NAME:  Gal Cash   :   1940   MRN:   [id number]             History of Present Illness:  Patient is a 80 y.o. who is seen for h/o colon polyps. PMH:  Past Medical History:   Diagnosis Date    Advance care planning     documents pending    Anemia     Arthropathy     CKD (chronic kidney disease), stage III ([address] 2014    Yessica Cuadra md    DJD (degenerative joint disease) of knee 10/21/2021    Sheryle Pipe    Family history of colon cancer     History of total right knee replacement (TKR)     ghazal mirza md    Hypercholesterolemia     Hypertension     IGT (impaired glucose tolerance) 10/08/2018    Knee pain, left     Osteoarthritis of right hip 2021    Pes anserine bursitis 2020       PSH:  Past Surgical History:   Procedure Laterality Date    COLONOSCOPY N/A 2018    COL[facility] by [facility] at [facility] ENDOSCOPY    HX ORTHOPAEDIC      bunionectomy       Allergies: Allergies   Allergen Reactions    Codeine Itching       Home Medications:  Prior to Admission Medications   Prescriptions Last Dose Informant Patient Reported? Taking? CHOLECALCIFEROL, VITAMIN D3, (VITAMIN D3 PO)   Yes No   Sig: Take  by mouth. albuterol (PROVENTIL HFA, VENTOLIN HFA, PROAIR HFA) 90 mcg/actuation inhaler   No No   Sig: Take 2 Puffs by inhalation every four (4) hours as needed for Wheezing. amLODIPine (NORVASC) 5 mg tablet 2021 at Unknown time  No Yes   Sig: TAKE 1 TABLET BY MOUTH EVERY DAY   atorvastatin (LIPITOR) 40 mg tablet   No No   Sig: TAKE 1 TABLET EVERY DAY   budesonide-formoterol (SYMBICORT) 160-4.5 mcg/actuation HFAA   No No   Sig: Take 2 Puffs by inhalation two (2) times a day.    hydrALAZINE (APRESOLINE) 25 mg tablet 2021 at Unknown time  No Yes   Sig: TAKE 1 TABLET TWICE DAILY   losartan (COZAAR) 100 mg tablet 2021 at Unknown time  No Yes   Sig: TAKE 1 TABLET EVERY DAY   predniSONE (DELTASONE) 20 mg tablet   No No   Sig: TAKE 2 TABLETS BY MOUTH DAILY. FOR 7 DAYS THEN 1 AND 1/2 TABLETS DAILY FOR 7 DAYS THEN 1 TABLET DAILY FOR 7 DAYS THEN 1/2 TABLET DAILY      Facility-Administered Medications: None       Hospital Medications:  Current Facility-Administered Medications   Medication Dose Route Frequency    0.9% sodium chloride infusion  50 mL/hr IntraVENous CONTINUOUS    sodium chloride (NS) flush 5-40 mL  5-40 mL IntraVENous Q8H    sodium chloride (NS) flush 5-40 mL  5-40 mL IntraVENous PRN    midazolam (VERSED) injection 0.25-5 mg  0.25-5 mg IntraVENous Multiple    fentaNYL citrate (PF) injection  mcg   mcg IntraVENous Multiple    naloxone (NARCAN) injection 0.4 mg  0.4 mg IntraVENous Multiple    flumazeniL (ROMAZICON) 0.1 mg/mL injection 0.2 mg  0.2 mg IntraVENous Multiple    simethicone (MYLICON) 26SF/0.3GW oral drops 80 mg  1.2 mL Oral Multiple    atropine injection 0.5 mg  0.5 mg IntraVENous ONCE PRN    EPINEPHrine (ADRENALIN) 0.1 mg/mL syringe 1 mg  1 mg Endoscopically ONCE PRN       Social History:  Social History     Tobacco Use    Smoking status: Never Smoker    Smokeless tobacco: Never Used   Substance Use Topics    Alcohol use: Yes     Alcohol/week: 0.8 standard drinks     Types: 1 Standard drinks or equivalent per week     Comment: ocassional       Family History:  Family History   Problem Relation Age of Onset    Heart Disease Mother     Heart Disease Father     Cancer Sister         colon    Cancer Sister         lung    Heart Disease Brother          The patient was counseled at length about the risks of mallory Covid-19 in the myra-operative and post-operative states including the recovery window of their procedure.   The patient was made aware that mallory Covid-19 after a surgical procedure may worsen their prognosis for recovering from the virus and lend to a higher morbidity and or mortality risk. The patient was given the options of postponing their procedure. All of the risks, benefits, and alternatives were discussed. The patient does  wish to proceed with the procedure. Review of Systems:      Constitutional: negative fever, negative chills, negative weight loss  Eyes:   negative visual changes  ENT:   negative sore throat, tongue or lip swelling  Respiratory:  negative cough, negative dyspnea  Cards:  negative for chest pain, palpitations, lower extremity edema  GI:   See HPI  :  negative for frequency, dysuria  Integument:  negative for rash and pruritus  Heme:  negative for easy bruising and gum/nose bleeding  Musculoskel: negative for myalgias,  back pain and muscle weakness  Neuro: negative for headaches, dizziness, vertigo  Psych:  negative for feelings of anxiety, depression       Objective:     Patient Vitals for the past 8 hrs:   Height Weight   11/30/21 0925 5' 2\" (1.575 m) 60.3 kg (133 lb)     No intake/output data recorded. No intake/output data recorded. EXAM:     NEURO-a&o   HEENT-wnl   LUNGS-clear    COR-regular rate and rhythym     ABD-soft , no tenderness, no rebound, good bs     EXT-no edema     Data Review     No results for input(s): WBC, HGB, HCT, PLT, HGBEXT, HCTEXT, PLTEXT in the last 72 hours. No results for input(s): NA, K, CL, CO2, BUN, CREA, GLU, PHOS, CA in the last 72 hours. No results for input(s): AP, TBIL, TP, ALB, GLOB, GGT, AML, LPSE in the last 72 hours. No lab exists for component: SGOT, GPT, AMYP, HLPSE  No results for input(s): INR, PTP, APTT, INREXT in the last 72 hours.        Assessment:     · FHX of colon cancer  · H/o colon polyps     Patient Active Problem List   Diagnosis Code    HTN (hypertension) I10    Hyperlipidemia E78.5    Anemia D64.9    ACP (advance care planning) Z71.89    Glaucoma screening Z13.5    CKD (chronic kidney disease), stage III ([facility] [address] N18.30    Family history of colon cancer Z80.0    IGT (impaired glucose tolerance) R73.02    Knee pain, left M25.562    Pes anserine bursitis M70.50    History of total right knee replacement (TKR) Z96.651    Osteoarthritis of right hip M16.11    DJD (degenerative joint disease) of knee M17.10         Plan:   ·   · Endoscopic procedure with sedation     Signed By: Moustapha Sung MD     11/30/2021  9:28 AM

## 2021-11-30 NOTE — DISCHARGE INSTRUCTIONS
[facility]    COLON DISCHARGE INSTRUCTIONS    Maryann Morgan  [id number]  1940    Discomfort:  Redness at IV site- apply warm compress to area; if redness or soreness persist- contact your physician  There may be a slight amount of blood passed from the rectum  Gaseous discomfort- walking, belching will help relieve any discomfort  You may not operate a vehicle for 12 hours  You may not engage in an occupation involving machinery or appliances for rest of today  You may not drink alcoholic beverages for at least 12 hours  Avoid making any critical decisions for at least 24 hour  DIET:  You may resume your regular diet - however -  remember your colon is empty and a heavy meal will produce gas. Avoid these foods:  vegetables, fried / greasy foods, carbonated drinks     ACTIVITY:  You may  resume your normal daily activities it is recommended that you spend the remainder of the day resting -  avoid any strenuous activity. CALL M.D. ANY SIGN OF:   Increasing pain, nausea, vomiting  Abdominal distension (swelling)  New increased bleeding (oral or rectal)  Fever (chills)  Pain in chest area  Bloody discharge from nose or mouth  Shortness of breath      Follow-up Instructions:   Call Dr. Mac Marrero for any questions or problems at [phone]   High fiber diet          ENDOSCOPY FINDINGS:   Your colonoscopy showed small internal hemorrhoids, rest of exam was normal.  Telephone # [phone]      Signed By: Mac Marrero MD     11/30/2021  9:50 AM       DISCHARGE SUMMARY from Nurse    The following personal items collected during your admission are returned to you:   Dental Appliance: Dental Appliances:  With patient  Vision: Visual Aid: None  Hearing Aid:    Jewelry:    Clothing:    Other Valuables:    Valuables sent to safe:        Learning About Coronavirus (COVID-19)  Coronavirus (COVID-19): Overview  What is coronavirus (COVID-19)? The coronavirus disease (COVID-19) is caused by a virus. It is an illness that was first found in [place], [place], in December 2019. It has since spread worldwide. The virus can cause fever, cough, and trouble breathing. In severe cases, it can cause pneumonia and make it hard to breathe without help. It can cause death. Coronaviruses are a large group of viruses. They cause the common cold. They also cause more serious illnesses like Middle East respiratory syndrome (MERS) and severe acute respiratory syndrome (SARS). COVID-19 is caused by a novel coronavirus. That means it's a new type that has not been seen in people before. This virus spreads person-to-person through droplets from coughing and sneezing. It can also spread when you are close to someone who is infected. And it can spread when you touch something that has the virus on it, such as a doorknob or a tabletop. What can you do to protect yourself from coronavirus (COVID-19)? The best way to protect yourself from getting sick is to:  · Avoid areas where there is an outbreak. · Avoid contact with people who may be infected. · Wash your hands often with soap or alcohol-based hand sanitizers. · Avoid crowds and try to stay at least 6 feet away from other people. · Wash your hands often, especially after you cough or sneeze. Use soap and water, and scrub for at least 20 seconds. If soap and water aren't available, use an alcohol-based hand . · Avoid touching your mouth, nose, and eyes. What can you do to avoid spreading the virus to others? To help avoid spreading the virus to others:  · Cover your mouth with a tissue when you cough or sneeze. Then throw the tissue in the trash. · Use a disinfectant to clean things that you touch often. · Stay home if you are sick or have been exposed to the virus. Don't go to school, work, or public areas. And don't use public transportation.   · If you are sick:  ? Leave your home only if you need to get medical care. But call the doctor's office first so they know you're coming. And wear a face mask, if you have one.  ? If you have a face mask, wear it whenever you're around other people. It can help stop the spread of the virus when you cough or sneeze. ? Clean and disinfect your home every day. Use household  and disinfectant wipes or sprays. Take special care to clean things that you grab with your hands. These include doorknobs, remote controls, phones, and handles on your refrigerator and microwave. And don't forget countertops, tabletops, bathrooms, and computer keyboards. When to call for help  Call 911 anytime you think you may need emergency care. For example, call if:  · You have severe trouble breathing. (You can't talk at all.)  · You have constant chest pain or pressure. · You are severely dizzy or lightheaded. · You are confused or can't think clearly. · Your face and lips have a blue color. · You pass out (lose consciousness) or are very hard to wake up. Call your doctor now if you develop symptoms such as:  · Shortness of breath. · Fever. · Cough. If you need to get care, call ahead to the doctor's office for instructions before you go. Make sure you wear a face mask, if you have one, to prevent exposing other people to the virus. Where can you get the latest information? The following health organizations are tracking and studying this virus. Their websites contain the most up-to-date information. Arron Main also learn what to do if you think you may have been exposed to the virus. · U.S. Centers for Disease Control and Prevention (CDC): The CDC provides updated news about the disease and travel advice. The website also tells you how to prevent the spread of infection. www.cdc.gov  · World Health Organization [facility]): WHO offers information about the virus outbreaks.  WHO also has travel advice. www.who.int  Current as of: April 1, 2020               Content Version: 12.4  © 2483-6921 [employer]orporated. Care instructions adapted under license by your healthcare professional. If you have questions about a medical condition or this instruction, always ask your healthcare professional. [address] any warranty or liability for your use of this information.

## 2021-11-30 NOTE — PROGRESS NOTES
Rosario Range  1940  [id number]    Situation:  Verbal report received from: Jenae Plaza, procedural RN  Procedure: Procedure(s):  COLONOSCOPY   V    Background:    Preoperative diagnosis: FAMILY HX OF COLON CANCER  Postoperative diagnosis: 1. hemorrhoids      :  Dr. Brittany Shah  Assistant(s): Endoscopy Technician-1: Kamar Pereira  Endoscopy RN-1: Sanju Godoy    Specimens: * No specimens in log *  H. Pylori  no    Assessment:    Anesthesia gave intra-procedure sedation and medications, see anesthesia flow sheet yes    Intravenous fluids: NS@ KVO     Vital signs stable     Abdominal assessment: round and soft     Recommendation:  Discharge patient per MD order.     Family : sister-in-law in waiting room  Permission to share finding with family or friend no

## 2021-11-30 NOTE — PROCEDURES
[address]  174 [facility], [address] [place] [address]      Colonoscopy Operative Report    Gal Cash  [id number]  1940      Procedure Type:   Colonoscopy --diagnostic     Indications:    Family history of coloretal cancer (screening only), Personal history of colon polyps (screening only)       Pre-operative Diagnosis: see indication above    Post-operative Diagnosis:  See findings below    :  Jermaine Riley MD    Surgical Assistant: Endoscopy Technician-1: Monica Adams  Endoscopy RN-1: Jasen Dasilva    Implants:  None    Referring Provider: Diane Handley MD      Sedation:  MAC anesthesia Propofol      Procedure Details:  After informed consent was obtained with all risks and benefits of procedure explained and preoperative exam completed, the patient was taken to the endoscopy suite and placed in the left lateral decubitus position. Upon sequential sedation as per above, a digital rectal exam was performed demonstrating internal hemorrhoids. The Olympus videocolonoscope  was inserted in the rectum and carefully advanced to the cecum, which was identified by the ileocecal valve and appendiceal orifice. The cecum was identified by the ileocecal valve and appendiceal orifice. The quality of preparation was good. The colonoscope was slowly withdrawn with careful evaluation between folds. Retroflexion in the rectum was completed . Findings:   Rectum: normal  Grade 2 internal hemorrhoids  Sigmoid: normal  Descending Colon: normal  Transverse Colon: normal  Ascending Colon: normal  Cecum: normal  Specimen Removed:  none    Complications: None. EBL:  None.     Impression:    see findings    Recommendations: --Repeat colonoscopy in 3 years if still in good medical condition  High fiber diet    Signed By: Jermaine Riley MD     11/30/2021  9:48 AM

## 2021-11-30 NOTE — PERIOP NOTES

## 2021-12-02 ENCOUNTER — HOSPITAL ENCOUNTER (OUTPATIENT)
Dept: PHYSICAL THERAPY | Age: 81
Discharge: HOME | End: 2021-12-02
Payer: MEDICARE

## 2021-12-02 PROCEDURE — 97110 THERAPEUTIC EXERCISES: CPT

## 2021-12-02 NOTE — PROGRESS NOTES
PT DAILY TREATMENT NOTE - [facility] 2-15    Patient Name: Janette Blanco  Date:2021  : 1940  [x]  Patient  Verified  Payor: Janina Chatterjee / Plan: [address] [place] [address] HMO / Product Type: Managed Care Medicare /    In time: 11:46 am Out time: 12:50 pm  Total Treatment Time (min): 64  Total Timed Codes (min): 54  1:1 Treatment Time ( only): 40   Visit #: 7    Treatment Area: Bilateral primary osteoarthritis of knee [M17.0]    SUBJECTIVE  Pain Level (0-10 scale): 7/10  Any medication changes, allergies to medications, adverse drug reactions, diagnosis change, or new procedure performed?: [x]? ? No    []? ? Yes (see summary sheet for update)  Subjective functional status/changes:   []? ? No changes reported  Pt reports she has had a lot going on with a burst pipe and colonoscopy. She feels like she has improved significantly since beginning therapy. OBJECTIVE    54 min Therapeutic Exercise:  [x]? ? See flow sheet :   Rationale: increase ROM, increase strength, improve coordination, improve balance and increase proprioception to improve the patients ability to increase function and mobility                                                                 With   []?? TE   []?? TA   []? ? Neuro   []?? SC   []?? other: Patient Education: [x]? ? Review HEP    []? ? Progressed/Changed HEP based on:   []?? positioning   []? ? body mechanics   []? ? transfers   []? ? heat/ice application    []? ? other:       Other Objective/Functional Measures:   0-100% improved - 90% improved  Tandem stance lead   R 6 secs  30+\"                                      L 3 secs    30+\"    Knee flexion: L: 93 no pain; R: 95 no pain     Pain Level (0-10 scale) post treatment: 2/10     ASSESSMENT/Changes in Function:      []? ?  See Plan of Care  []? ?  See progress note/recertification  [x]? ?  See Discharge Summary         Progress towards goals / Updated goals:  Short Term Goals: To be accomplished in 3-4 treatments:  1) Patient will be independent with HEP. - MET  2) Patient will demonstrate understanding/application of ice/heat recommendations to assist w/ managing pain - MET     Long Term Goals: To be accomplished in 8-10 treatments:  1) Patient will report ability to stand >/= 10 minutes without increase knee pain so can cook breakfast  - MET (30 minutes)  2) Patient will increase FOTO score by >/= 10 points to demonstrate increased function -  Not assessed  3) Patient will report ability to amb >/= 30 minutes without increase knee pain - MET  4) Patient will demo independent with modified gym/exercise program without aggravation of symptoms  - MET      PLAN  []??  Upgrade activities as tolerated     []? ?  Continue plan of care  []? ?  Update interventions per flow sheet       [x]? ?  Discharge due to: Pt decision based on upcoming holidays, and feeling better.   []??  Other:_      Kita Rock, PT, DPT 12/2/2021

## 2021-12-02 NOTE — PROGRESS NOTES
Physical Therapy at [facility],   a part of [address], [address] [place] [address], [address]  Phone: [phone] Fax: [phone]      Discharge Summary 2-15      Patient name: Janette Blanco  : 1940  Provider#: [id number]  Referral source: Evin Saleh, *      Medical/Treatment Diagnosis: Bilateral primary osteoarthritis of knee [M17.0]     Prior Hospitalization: see medical history     Comorbidities: kidney disease, R TKA, HTN, high cholesterol   Prior Level of Function: able to keron prol standing, walk, sleep through the night w/out pain/limitation   Medications: Verified on Patient Summary List    Start of Care: 10/26/2021                                                          Onset Date:             Visits from Start of Care: 7     Missed Visits: 0  Reporting Period : 10/26/21 to 21    Assessment/Summary of care: Ms. Claudy Benavides was seen for a total of 7 skilled physical therapy visits secondary to primary OA of bilateral knees with history of R TKA (). Pt demonstrated good improvement with her therapy program overall and reports feeling 90% improved since initial evaluation. Pt continues to demonstrate very limited knee flexion (95 degrees), though presents with a hard end-feel, and Pt does not experience any pain with the motion. She is now able to tolerate prolonged standing and walking up to 30 minutes without increased knee pain. Ms. Claudy Benavides also demonstrates significantly improved balance/proprioception. She has been provided a comprehensive HEP and is planning to begin aquatics therapy in January to further improve pain and function.  Thank you for this referral!    0-100% improved - 90% improved    Knee flexion: L: 93 no pain; R: 95 no pain    Tandem stance lead   R 6 secs on eval  30+\" today                                      L 3 secs on eval    30+\" today     Pain Level (0-10 scale) post treatment: 2/10     ASSESSMENT/Changes in Function:      []? ??  See Plan of Care  []? ??  See progress note/recertification  [x]? ??  See Discharge Summary         Progress towards goals / Updated goals:  Short Term Goals: To be accomplished in 3-4 treatments:  1) Patient will be independent with HEP. - MET  2) Patient will demonstrate understanding/application of ice/heat recommendations to assist w/ managing pain - MET     Long Term Goals: To be accomplished in 8-10 treatments:  1) Patient will report ability to stand >/= 10 minutes without increase knee pain so can cook breakfast  - MET (30 minutes)  2) Patient will increase FOTO score by >/= 10 points to demonstrate increased function -  Not assessed  3) Patient will report ability to amb >/= 30 minutes without increase knee pain - MET  4) Patient will demo independent with modified gym/exercise program without aggravation of symptoms  - MET      RECOMMENDATIONS:  [x]Discontinue therapy: [x]Patient has reached or is progressing toward set goals     []Patient is non-compliant or has abdicated     []Due to lack of appreciable progress towards set goals    Jaylin Flores, PT, DPT 12/2/2021

## 2021-12-14 ENCOUNTER — TELEPHONE (OUTPATIENT)
Dept: INTERNAL MEDICINE CLINIC | Age: 81
End: 2021-12-14

## 2021-12-14 NOTE — TELEPHONE ENCOUNTER
Patient called complaining of leg pain and she thinks its her cholesterol medication. Patient is told per Dr. Collin Srivastava to stay off cholesterol med x 1 monthwhiich is atorvastatin.  She states she stopped last night and her legs did not hurt last night

## 2022-02-03 RX ORDER — AMLODIPINE BESYLATE 5 MG/1
TABLET ORAL
Qty: 90 TABLET | Refills: 3 | Status: SHIPPED | OUTPATIENT
Start: 2022-02-03 | End: 2022-04-07

## 2022-02-22 ENCOUNTER — OFFICE VISIT (OUTPATIENT)
Dept: INTERNAL MEDICINE CLINIC | Age: 82
End: 2022-02-22
Payer: MEDICARE

## 2022-02-22 VITALS
OXYGEN SATURATION: 98 % | DIASTOLIC BLOOD PRESSURE: 81 MMHG | BODY MASS INDEX: 24.95 KG/M2 | WEIGHT: 135.6 LBS | HEART RATE: 103 BPM | RESPIRATION RATE: 18 BRPM | TEMPERATURE: 98.1 F | SYSTOLIC BLOOD PRESSURE: 138 MMHG | HEIGHT: 62 IN

## 2022-02-22 DIAGNOSIS — R06.02 SOB (SHORTNESS OF BREATH): ICD-10-CM

## 2022-02-22 DIAGNOSIS — N18.31 ANEMIA DUE TO STAGE 3A CHRONIC KIDNEY DISEASE (HCC): ICD-10-CM

## 2022-02-22 DIAGNOSIS — M16.11 PRIMARY OSTEOARTHRITIS OF RIGHT HIP: ICD-10-CM

## 2022-02-22 DIAGNOSIS — I10 PRIMARY HYPERTENSION: ICD-10-CM

## 2022-02-22 DIAGNOSIS — D63.1 ANEMIA DUE TO STAGE 3A CHRONIC KIDNEY DISEASE (HCC): ICD-10-CM

## 2022-02-22 DIAGNOSIS — Z98.890 S/P COLONOSCOPY: ICD-10-CM

## 2022-02-22 DIAGNOSIS — N18.31 STAGE 3A CHRONIC KIDNEY DISEASE (HCC): ICD-10-CM

## 2022-02-22 DIAGNOSIS — R73.02 IGT (IMPAIRED GLUCOSE TOLERANCE): ICD-10-CM

## 2022-02-22 DIAGNOSIS — Z96.651 HISTORY OF TOTAL RIGHT KNEE REPLACEMENT (TKR): ICD-10-CM

## 2022-02-22 DIAGNOSIS — Z79.891 USE OF OPIATES FOR THERAPEUTIC PURPOSES: ICD-10-CM

## 2022-02-22 DIAGNOSIS — Z13.39 SCREENING FOR ALCOHOLISM: ICD-10-CM

## 2022-02-22 DIAGNOSIS — M17.0 PRIMARY OSTEOARTHRITIS OF BOTH KNEES: ICD-10-CM

## 2022-02-22 DIAGNOSIS — Z00.00 MEDICARE ANNUAL WELLNESS VISIT, SUBSEQUENT: Primary | ICD-10-CM

## 2022-02-22 DIAGNOSIS — E78.2 MIXED HYPERLIPIDEMIA: ICD-10-CM

## 2022-02-22 DIAGNOSIS — Z80.0 FAMILY HISTORY OF COLON CANCER: ICD-10-CM

## 2022-02-22 PROCEDURE — 99214 OFFICE O/P EST MOD 30 MIN: CPT | Performed by: INTERNAL MEDICINE

## 2022-02-22 PROCEDURE — G8420 CALC BMI NORM PARAMETERS: HCPCS | Performed by: INTERNAL MEDICINE

## 2022-02-22 PROCEDURE — 1090F PRES/ABSN URINE INCON ASSESS: CPT | Performed by: INTERNAL MEDICINE

## 2022-02-22 PROCEDURE — G8752 SYS BP LESS 140: HCPCS | Performed by: INTERNAL MEDICINE

## 2022-02-22 PROCEDURE — G8510 SCR DEP NEG, NO PLAN REQD: HCPCS | Performed by: INTERNAL MEDICINE

## 2022-02-22 PROCEDURE — G8427 DOCREV CUR MEDS BY ELIG CLIN: HCPCS | Performed by: INTERNAL MEDICINE

## 2022-02-22 PROCEDURE — G8754 DIAS BP LESS 90: HCPCS | Performed by: INTERNAL MEDICINE

## 2022-02-22 PROCEDURE — 93000 ELECTROCARDIOGRAM COMPLETE: CPT | Performed by: INTERNAL MEDICINE

## 2022-02-22 PROCEDURE — G8536 NO DOC ELDER MAL SCRN: HCPCS | Performed by: INTERNAL MEDICINE

## 2022-02-22 PROCEDURE — G0439 PPPS, SUBSEQ VISIT: HCPCS | Performed by: INTERNAL MEDICINE

## 2022-02-22 PROCEDURE — G8399 PT W/DXA RESULTS DOCUMENT: HCPCS | Performed by: INTERNAL MEDICINE

## 2022-02-22 PROCEDURE — 1101F PT FALLS ASSESS-DOCD LE1/YR: CPT | Performed by: INTERNAL MEDICINE

## 2022-02-22 RX ORDER — TRAMADOL HYDROCHLORIDE 50 MG/1
50 TABLET ORAL
Qty: 60 TABLET | Refills: 1 | Status: SHIPPED | OUTPATIENT
Start: 2022-02-22 | End: 2022-03-24

## 2022-02-22 NOTE — PROGRESS NOTES
SPORTS MEDICINE AND PRIMARY CARE  Krish Soliz MD, [address] [address] [address]  Phone:  [phone]  Fax: [phone]       Chief Complaint   Patient presents with    Knee Pain    Hypertension   . SUBJECTIVE:    Chula Choudhury is a 80 y.o. female Patient returns today with a known history of primary hypertension, CKD stage 3, osteoarthritis, and since we last saw her, in November she had a colonoscopy by Dr. Barry Means that was unremarkable and recommendation that if she is in good health three years from now, to repeat it. She has a family history of colon cancer in both her mother and sister. Patient returns today saying that her knees are just bothering her. It is interfering with her mobility and her standing. She has not heard from Dr. Saira Glover and would like to get a second opinion from a different orthopedic physician. The only thing physical therapy did was give her a pill, she states. Patient notes when she fell, she continued to have pain in her left hip. Patient is seen for evaluation. Current Outpatient Medications   Medication Sig Dispense Refill    traMADoL (ULTRAM) 50 mg tablet Take 1 Tablet by mouth every six (6) hours as needed for Pain for up to 30 days. Max Daily Amount: 200 mg. 60 Tablet 1    amLODIPine (NORVASC) 5 mg tablet TAKE 1 TABLET EVERY DAY 90 Tablet 3    losartan (COZAAR) 100 mg tablet TAKE 1 TABLET EVERY DAY 90 Tablet 3    atorvastatin (LIPITOR) 40 mg tablet TAKE 1 TABLET EVERY DAY 90 Tab 4    hydrALAZINE (APRESOLINE) 25 mg tablet TAKE 1 TABLET TWICE DAILY 180 Tab 3    CHOLECALCIFEROL, VITAMIN D3, (VITAMIN D3 PO) Take  by mouth.  predniSONE (DELTASONE) 20 mg tablet TAKE 2 TABLETS BY MOUTH DAILY.  FOR 7 DAYS THEN 1 AND 1/2 TABLETS DAILY FOR 7 DAYS THEN 1 TABLET DAILY FOR 7 DAYS THEN 1/2 TABLET DAILY 100 Tablet 0    albuterol (PROVENTIL HFA, VENTOLIN HFA, PROAIR HFA) 90 mcg/actuation inhaler Take 2 Puffs by inhalation every four (4) hours as needed for Wheezing. 1 Inhaler 11    budesonide-formoterol (SYMBICORT) 160-4.5 mcg/actuation HFAA Take 2 Puffs by inhalation two (2) times a day.  1 Inhaler 11     Past Medical History:   Diagnosis Date    Advance care planning     documents pending    Anemia     Arthropathy     CKD (chronic kidney disease), stage III ([facility] [address] 09/08/2014    Yessica Cuadra md    DJD (degenerative joint disease) of knee 10/21/2021    Yuliya Forrest    Family history of colon cancer     History of total right knee replacement (TKR) 2011    ghazal mirza md    Hypercholesterolemia     Hypertension     IGT (impaired glucose tolerance) 10/08/2018    Knee pain, left     Osteoarthritis of right hip 02/2021    Pes anserine bursitis 08/11/2020    S/P colonoscopy 11/30/2021    Larry Jasmine MD -repeat 3 yrs     Past Surgical History:   Procedure Laterality Date    COLONOSCOPY N/A 11/29/2018    COLONOSCOPY performed by Larry Jasmine MD at [facility] ENDOSCOPY    COLONOSCOPY N/A 11/30/2021    COLONOSCOPY   V performed by Larry Jasmine MD at [facility] ENDOSCOPY    HX ORTHOPAEDIC      bunionectomy     Allergies   Allergen Reactions    Codeine Itching         REVIEW OF SYSTEMS:  General: negative for - chills or fever  ENT: negative for - headaches, nasal congestion or tinnitus  Respiratory: negative for - cough, hemoptysis, shortness of breath or wheezing  Cardiovascular : negative for - chest pain, edema, palpitations or shortness of breath  Gastrointestinal: negative for - abdominal pain, blood in stools, heartburn or nausea/vomiting  Genito-Urinary: no dysuria, trouble voiding, or hematuria  Musculoskeletal: negative for - gait disturbance, joint pain, joint stiffness or joint swelling  Neurological: no TIA or stroke symptoms  Hematologic: no bruises, no bleeding, no swollen glands  Integument: no lumps, mole changes, nail changes or rash  Endocrine: no malaise/lethargy or unexpected weight changes      Social History     Socioeconomic History    Marital status: SINGLE   Tobacco Use    Smoking status: Never Smoker    Smokeless tobacco: Never Used   Vaping Use    Vaping Use: Never used   Substance and Sexual Activity    Alcohol use: Yes     Alcohol/week: 0.8 standard drinks     Types: 1 Standard drinks or equivalent per week     Comment: ocassional    Drug use: No    Sexual activity: Not Currently   Social History Narrative    Habits:  Occasional alcohol use. No cigarette or drug abuse. Social History:  The patient is . She has no children. She lives alone. She completed high school. 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 She's a member of [employer]. Part time at [employer]        Family History:  Father passed at 59 of heart disease. Mother  80 of heart disease. She also had diabetes. One brother, one sister alive and well. One brother  of seizure disorder, one brother  of heart disease. He had LVAD. One brother  of liver issues. One sister  of colon cancer and one sister  of lung cancer. Family History   Problem Relation Age of Onset    Heart Disease Mother     Heart Disease Father     Cancer Sister         colon    Cancer Sister         lung    Heart Disease Brother        OBJECTIVE:    Visit Vitals  /81 (BP 1 Location: Right arm, BP Patient Position: Sitting)   Pulse (!) 103   Temp 98.1 °F (36.7 °C) (Oral)   Resp 18   Ht 5' 2\" (1.575 m)   Wt 135 lb 9.6 oz (61.5 kg)   SpO2 98%   BMI 24.80 kg/m²     CONSTITUTIONAL: well , well nourished, appears age appropriate  EYES: perrla, eom intact  ENMT:moist mucous membranes, pharynx clear  NECK: supple.  Thyroid normal  RESPIRATORY: Chest: clear bilaterally   CARDIOVASCULAR: Heart: regular rate and rhythm  GASTROINTESTINAL: Abdomen: soft, bowel sounds active  HEMATOLOGIC: no pathological lymph nodes palpated  MUSCULOSKELETAL: Extremities: no edema, pulse 1+   INTEGUMENT: No unusual rashes or suspicious skin lesions noted. Nails appear normal.  NEUROLOGIC: non-focal exam   MENTAL STATUS: alert and oriented, appropriate affect           ASSESSMENT:  1. Medicare annual wellness visit, subsequent    2. Primary hypertension    3. IGT (impaired glucose tolerance)    4. Primary osteoarthritis of both knees    5. Primary osteoarthritis of right hip    6. Stage 3a chronic kidney disease ([facility] [address]    7. Mixed hyperlipidemia    8. Anemia due to stage 3a chronic kidney disease ([facility]    9. Family history of colon cancer    10. History of total right knee replacement (TKR)    11. S/P colonoscopy    12. Screening for alcoholism    13. Use of opiates for therapeutic purposes      Blood pressure control is at goal and no titration is needed. However, she has a tachycardia, I am not sure if it is related to pain or if there is other pathology present. We will check thyroid studies, as well as an EKG, as well as her remaining yearly evaluation. She has impaired glucose tolerance and we will check hemoglobin A1c. Osteoarthritis of both the hip and the knees, but the knees are causing the most problem. She states she would be willing to undergo a knee replacement if necessary. It has been almost ten years snice she had the right total knee replacement and it may be that the knee is just wearing out on the right; the left obviously is related to the primary osteoarthritis. We will get an opinion from Dr. Deacon Guardado. History of CKD, which precludes the use of NSAIDs for pain and therefore for the pain we have given her Tramadol with appropriate precautions, even to the point that we have asked a family member to call her after she takes the first dose to be sure there are no ill effects. She agrees with the plan. She has dyslipidemia, for which we will check a lipid panel today.     The anemia is felt to be related to chronic kidney disease, but we will rule out other pathology. It is reassuring that the GI evaluation was negative. She will be back to see me in three months, sooner if she has any problems. We ask for an echocardiogram, EKG, as well as BMP in preparation for a possible surgical procedure on her knees. I have discussed the diagnosis with the patient and the intended plan as seen in the  Orders. The patient understands and agees with the plan. The patient has   received an after visit summary and questions were answered concerning  future plans  Patient labs and/or xrays were reviewed  Past records were reviewed. PLAN:  .  Orders Placed This Encounter    GAMMOPATHY EVAL, SPEP/ANNAMARIA, IG QT/FLC    URINALYSIS W/ RFLX MICROSCOPIC    CBC WITH AUTOMATED DIFF    METABOLIC PANEL, COMPREHENSIVE    LIPID PANEL    HEMOGLOBIN A1C WITH EAG    TSH 3RD GENERATION    T4, FREE    PAIN MGMT PANEL, URINE W/RFLX CONFIRM    [facility]    AMB POC EKG ROUTINE W/ 12 LEADS, INTER & REP    traMADoL (ULTRAM) 50 mg tablet       Follow-up and Dispositions    · Return in about 3 months (around 5/22/2022). ATTENTION:   This medical record was transcribed using an electronic medical records system. Although proofread, it may and can contain electronic and spelling errors. Other human spelling and other errors may be present. Corrections may be executed at a later time. Please feel free to contact us for any clarifications as needed.

## 2022-02-22 NOTE — PROGRESS NOTES
Lyric Grande is a 80 y.o. female    Chief Complaint   Patient presents with    Knee Pain    Hypertension     1. Have you been to the ER, urgent care clinic since your last visit? Hospitalized since your last visit? No       2. Have you seen or consulted any other health care providers outside of the [address] [place] [address] since your last visit? Include any pap smears or colon screening. No  This is the Subsequent Medicare Annual Wellness Exam, performed 12 months or more after the Initial AWV or the last Subsequent AWV    I have reviewed the patient's medical history in detail and updated the computerized patient record. Assessment/Plan   Education and counseling provided:  Are appropriate based on today's review and evaluation    1. Primary hypertension  2. IGT (impaired glucose tolerance)  3. Primary osteoarthritis of both knees  4. Primary osteoarthritis of right hip  5. Stage 3a chronic kidney disease ([facility] [address]  Assessment & Plan:   monitored by specialist. No acute findings meriting change in the plan  6. Mixed hyperlipidemia  7. Anemia due to stage 3a chronic kidney disease ([facility] [address]  8. Family history of colon cancer  9. History of total right knee replacement (TKR)  10. S/P colonoscopy       Depression Risk Factor Screening     3 most recent PHQ Screens 2/22/2022   Little interest or pleasure in doing things Not at all   Feeling down, depressed, irritable, or hopeless Not at all   Total Score PHQ 2 0       Alcohol & Drug Abuse Risk Screen    Do you average more than 1 drink per night or more than 7 drinks a week:  No    On any one occasion in the past three months have you have had more than 3 drinks containing alcohol:  No          Functional Ability and Level of Safety    Hearing: Hearing is good. Activities of Daily Living: The home contains: no safety equipment.   Patient does total self care      Ambulation: with no difficulty     Fall Risk:  Fall Risk Assessment, last 12 mths 2/22/2022 Able to walk? Yes   Fall in past 12 months? 0   Do you feel unsteady? 0   Are you worried about falling 0   Number of falls in past 12 months -   Fall with injury?  -      Abuse Screen:  Patient is not abused       Cognitive Screening    Has your family/caregiver stated any concerns about your memory: no     Cognitive Screening: Normal - Verbal Fluency Test    Health Maintenance Due     Health Maintenance Due   Topic Date Due    Pneumococcal 65+ years (1 of 1 - PPSV23) Never done    Lipid Screen  02/15/2022    Medicare Yearly Exam  02/16/2022       Patient Care Team   Patient Care Team:  Aries Davis MD as PCP - General (Internal Medicine)  Aries Davis MD as PCP - [address] [place] [address] [facility]  Fitz Barone MD (Family Medicine)    History     Patient Active Problem List   Diagnosis Code    HTN (hypertension) I10    Hyperlipidemia E78.5    Anemia D64.9    ACP (advance care planning) Z71.89    Glaucoma screening Z13.5    CKD (chronic kidney disease), stage III ([address] N18.30    Family history of colon cancer Z80.0    IGT (impaired glucose tolerance) R73.02    Knee pain, left M25.562    Pes anserine bursitis M70.50    History of total right knee replacement (TKR) Z96.651    Osteoarthritis of right hip M16.11    DJD (degenerative joint disease) of knee M17.10    S/P colonoscopy Z98.890     Past Medical History:   Diagnosis Date    Advance care planning     documents pending    Anemia     Arthropathy     CKD (chronic kidney disease), stage III ([address] 09/08/2014    Yessica Cudara md    DJD (degenerative joint disease) of knee 10/21/2021    Gasendy Cj    Family history of colon cancer     History of total right knee replacement (TKR) 2011    ghazal mirza md    Hypercholesterolemia     Hypertension     IGT (impaired glucose tolerance) 10/08/2018    Knee pain, left     Osteoarthritis of right hip 02/2021    Pes anserine bursitis 08/11/2020    S/P colonoscopy 11/30/2021 Roberts Kocher, MD -repeat 3 yrs      Past Surgical History:   Procedure Laterality Date    COLONOSCOPY N/A 11/29/2018    COLONOSCOPY performed by Roberts Kocher, MD at [address] COLONOSCOPY N/A 11/30/2021    COLONOSCOPY   V performed by Roberts Kocher, MD at [facility] ENDOSCOPY    HX ORTHOPAEDIC      bunionectomy     Current Outpatient Medications   Medication Sig Dispense Refill    amLODIPine (NORVASC) 5 mg tablet TAKE 1 TABLET EVERY DAY 90 Tablet 3    losartan (COZAAR) 100 mg tablet TAKE 1 TABLET EVERY DAY 90 Tablet 3    atorvastatin (LIPITOR) 40 mg tablet TAKE 1 TABLET EVERY DAY 90 Tab 4    hydrALAZINE (APRESOLINE) 25 mg tablet TAKE 1 TABLET TWICE DAILY 180 Tab 3    CHOLECALCIFEROL, VITAMIN D3, (VITAMIN D3 PO) Take  by mouth.  predniSONE (DELTASONE) 20 mg tablet TAKE 2 TABLETS BY MOUTH DAILY. FOR 7 DAYS THEN 1 AND 1/2 TABLETS DAILY FOR 7 DAYS THEN 1 TABLET DAILY FOR 7 DAYS THEN 1/2 TABLET DAILY 100 Tablet 0    albuterol (PROVENTIL HFA, VENTOLIN HFA, PROAIR HFA) 90 mcg/actuation inhaler Take 2 Puffs by inhalation every four (4) hours as needed for Wheezing. 1 Inhaler 11    budesonide-formoterol (SYMBICORT) 160-4.5 mcg/actuation HFAA Take 2 Puffs by inhalation two (2) times a day. 1 Inhaler 11     Allergies   Allergen Reactions    Codeine Itching       Family History   Problem Relation Age of Onset    Heart Disease Mother     Heart Disease Father     Cancer Sister         colon    Cancer Sister         lung    Heart Disease Brother      Social History     Tobacco Use    Smoking status: Never Smoker    Smokeless tobacco: Never Used   Substance Use Topics    Alcohol use:  Yes     Alcohol/week: 0.8 standard drinks     Types: 1 Standard drinks or equivalent per week     Comment: yasmany Apple MA

## 2022-02-22 NOTE — PATIENT INSTRUCTIONS
Medicare Wellness Visit, Female     The best way to live healthy is to have a lifestyle where you eat a well-balanced diet, exercise regularly, limit alcohol use, and quit all forms of tobacco/nicotine, if applicable. Regular preventive services are another way to keep healthy. Preventive services (vaccines, screening tests, monitoring & exams) can help personalize your care plan, which helps you manage your own care. Screening tests can find health problems at the earliest stages, when they are easiest to treat. [facility] follows the current, evidence-based guidelines published by the [facility] ([facility]) when recommending preventive services for our patients. Because we follow these guidelines, sometimes recommendations change over time as research supports it. (For example, mammograms used to be recommended annually. Even though Medicare will still pay for an annual mammogram, the newer guidelines recommend a mammogram every two years for women of average risk). Of course, you and your doctor may decide to screen more often for some diseases, based on your risk and your co-morbidities (chronic disease you are already diagnosed with). Preventive services for you include:  - Medicare offers their members a free annual wellness visit, which is time for you and your primary care provider to discuss and plan for your preventive service needs. Take advantage of this benefit every year!  -All adults over the age of 72 should receive the recommended pneumonia vaccines. Current USPSTF guidelines recommend a series of two vaccines for the best pneumonia protection.   -All adults should have a flu vaccine yearly and a tetanus vaccine every 10 years.   -All adults age 48 and older should receive the shingles vaccines (series of two vaccines).       -All adults age 38-68 who are overweight should have a diabetes screening test once every three years.   -All adults born between [birth year] and 1963 should be screened once for Hepatitis C.  -Other screening tests and preventive services for persons with diabetes include: an eye exam to screen for diabetic retinopathy, a kidney function test, a foot exam, and stricter control over your cholesterol.   -Cardiovascular screening for adults with routine risk involves an electrocardiogram (ECG) at intervals determined by your doctor.   -Colorectal cancer screenings should be done for adults age 54-65 with no increased risk factors for colorectal cancer. There are a number of acceptable methods of screening for this type of cancer. Each test has its own benefits and drawbacks. Discuss with your doctor what is most appropriate for you during your annual wellness visit. The different tests include: colonoscopy (considered the best screening method), a fecal occult blood test, a fecal DNA test, and sigmoidoscopy.    -A bone mass density test is recommended when a woman turns 65 to screen for osteoporosis. This test is only recommended one time, as a screening. Some providers will use this same test as a disease monitoring tool if you already have osteoporosis. -Breast cancer screenings are recommended every other year for women of normal risk, age 54-69.  -Cervical cancer screenings for women over age 72 are only recommended with certain risk factors.      Here is a list of your current Health Maintenance items (your personalized list of preventive services) with a due date:  Health Maintenance Due   Topic Date Due    Pneumococcal Vaccine (1 of 1 - PPSV23) Never done    Cholesterol Test   02/15/2022

## 2022-02-23 LAB
ALBUMIN SERPL-MCNC: 3.7 G/DL (ref 3.5–5)
ALBUMIN/GLOB SERPL: 1.1 {RATIO} (ref 1.1–2.2)
ALP SERPL-CCNC: 134 U/L (ref 45–117)
ALT SERPL-CCNC: 23 U/L (ref 12–78)
ANION GAP SERPL CALC-SCNC: 7 MMOL/L (ref 5–15)
APPEARANCE UR: ABNORMAL
AST SERPL-CCNC: 24 U/L (ref 15–37)
BACTERIA URNS QL MICRO: ABNORMAL /HPF
BASOPHILS # BLD: 0.1 K/UL (ref 0–0.1)
BASOPHILS NFR BLD: 1 % (ref 0–1)
BILIRUB SERPL-MCNC: 0.3 MG/DL (ref 0.2–1)
BILIRUB UR QL: NEGATIVE
BNP SERPL-MCNC: 152 PG/ML
BUN SERPL-MCNC: 18 MG/DL (ref 6–20)
BUN/CREAT SERPL: 17 (ref 12–20)
CALCIUM SERPL-MCNC: 9.9 MG/DL (ref 8.5–10.1)
CHLORIDE SERPL-SCNC: 109 MMOL/L (ref 97–108)
CHOLEST SERPL-MCNC: 141 MG/DL
CO2 SERPL-SCNC: 27 MMOL/L (ref 21–32)
COLOR UR: ABNORMAL
CREAT SERPL-MCNC: 1.07 MG/DL (ref 0.55–1.02)
DIFFERENTIAL METHOD BLD: ABNORMAL
EOSINOPHIL # BLD: 0.2 K/UL (ref 0–0.4)
EOSINOPHIL NFR BLD: 3 % (ref 0–7)
EPITH CASTS URNS QL MICRO: ABNORMAL /LPF
ERYTHROCYTE [DISTWIDTH] IN BLOOD BY AUTOMATED COUNT: 14.7 % (ref 11.5–14.5)
EST. AVERAGE GLUCOSE BLD GHB EST-MCNC: 114 MG/DL
GLOBULIN SER CALC-MCNC: 3.5 G/DL (ref 2–4)
GLUCOSE SERPL-MCNC: 84 MG/DL (ref 65–100)
GLUCOSE UR STRIP.AUTO-MCNC: NEGATIVE MG/DL
HBA1C MFR BLD: 5.6 % (ref 4–5.6)
HCT VFR BLD AUTO: 36.4 % (ref 35–47)
HDLC SERPL-MCNC: 48 MG/DL
HDLC SERPL: 2.9 {RATIO} (ref 0–5)
HGB BLD-MCNC: 11.1 G/DL (ref 11.5–16)
HGB UR QL STRIP: NEGATIVE
IMM GRANULOCYTES # BLD AUTO: 0 K/UL (ref 0–0.04)
IMM GRANULOCYTES NFR BLD AUTO: 0 % (ref 0–0.5)
KETONES UR QL STRIP.AUTO: NEGATIVE MG/DL
LDLC SERPL CALC-MCNC: 79 MG/DL (ref 0–100)
LEUKOCYTE ESTERASE UR QL STRIP.AUTO: ABNORMAL
LYMPHOCYTES # BLD: 2 K/UL (ref 0.8–3.5)
LYMPHOCYTES NFR BLD: 26 % (ref 12–49)
MCH RBC QN AUTO: 26.2 PG (ref 26–34)
MCHC RBC AUTO-ENTMCNC: 30.5 G/DL (ref 30–36.5)
MCV RBC AUTO: 86.1 FL (ref 80–99)
MONOCYTES # BLD: 0.5 K/UL (ref 0–1)
MONOCYTES NFR BLD: 7 % (ref 5–13)
NEUTS SEG # BLD: 4.8 K/UL (ref 1.8–8)
NEUTS SEG NFR BLD: 63 % (ref 32–75)
NITRITE UR QL STRIP.AUTO: NEGATIVE
NRBC # BLD: 0 K/UL (ref 0–0.01)
NRBC BLD-RTO: 0 PER 100 WBC
PH UR STRIP: 6.5 [PH] (ref 5–8)
PLATELET # BLD AUTO: 286 K/UL (ref 150–400)
PMV BLD AUTO: 11.3 FL (ref 8.9–12.9)
POTASSIUM SERPL-SCNC: 3.9 MMOL/L (ref 3.5–5.1)
PROT SERPL-MCNC: 7.2 G/DL (ref 6.4–8.2)
PROT UR STRIP-MCNC: NEGATIVE MG/DL
RBC # BLD AUTO: 4.23 M/UL (ref 3.8–5.2)
RBC #/AREA URNS HPF: ABNORMAL /HPF (ref 0–5)
SODIUM SERPL-SCNC: 143 MMOL/L (ref 136–145)
SP GR UR REFRACTOMETRY: 1.01 (ref 1–1.03)
T4 FREE SERPL-MCNC: 0.9 NG/DL (ref 0.8–1.5)
TRIGL SERPL-MCNC: 70 MG/DL (ref ?–150)
TSH SERPL DL<=0.05 MIU/L-ACNC: 1.99 UIU/ML (ref 0.36–3.74)
UROBILINOGEN UR QL STRIP.AUTO: 0.2 EU/DL (ref 0.2–1)
VLDLC SERPL CALC-MCNC: 14 MG/DL
WBC # BLD AUTO: 7.6 K/UL (ref 3.6–11)
WBC URNS QL MICRO: ABNORMAL /HPF (ref 0–4)

## 2022-02-24 LAB
AMPHETAMINES UR QL SCN: NEGATIVE NG/ML
BARBITURATES UR QL SCN: NEGATIVE NG/ML
BENZODIAZ UR QL SCN: NEGATIVE NG/ML
BZE UR QL SCN: NEGATIVE NG/ML
CANNABINOIDS UR QL SCN: NEGATIVE NG/ML
CREAT UR-MCNC: 66 MG/DL (ref 20–300)
FENTANYL+NORFENTANYL UR QL SCN: NEGATIVE PG/ML
MEPERIDINE UR QL: NEGATIVE NG/ML
METHADONE UR QL SCN: NEGATIVE NG/ML
OPIATES UR QL SCN: NEGATIVE NG/ML
OXYCODONE+OXYMORPHONE UR QL SCN: NEGATIVE NG/ML
PCP UR QL: NEGATIVE NG/ML
PH UR: 6.9 [PH] (ref 4.5–8.9)
PLEASE NOTE:, 733157: NORMAL
PROPOXYPH UR QL SCN: NEGATIVE NG/ML
SP GR UR: 1.01
TRAMADOL UR QL SCN: NEGATIVE NG/ML

## 2022-02-25 LAB
ALBUMIN SERPL ELPH-MCNC: 3.9 G/DL (ref 2.9–4.4)
ALBUMIN/GLOB SERPL: 1.3 {RATIO} (ref 0.7–1.7)
ALPHA1 GLOB SERPL ELPH-MCNC: 0.2 G/DL (ref 0–0.4)
ALPHA2 GLOB SERPL ELPH-MCNC: 0.6 G/DL (ref 0.4–1)
B-GLOBULIN SERPL ELPH-MCNC: 1.1 G/DL (ref 0.7–1.3)
GAMMA GLOB SERPL ELPH-MCNC: 1.1 G/DL (ref 0.4–1.8)
GLOBULIN SER-MCNC: 3.1 G/DL (ref 2.2–3.9)
IGA SERPL-MCNC: 292 MG/DL (ref 64–422)
IGG SERPL-MCNC: 1278 MG/DL (ref 586–1602)
IGM SERPL-MCNC: 84 MG/DL (ref 26–217)
INTERPRETATION SERPL IEP-IMP: ABNORMAL
KAPPA LC FREE SER-MCNC: 28.7 MG/L (ref 3.3–19.4)
KAPPA LC FREE/LAMBDA FREE SER: 1.66 {RATIO} (ref 0.26–1.65)
LAMBDA LC FREE SERPL-MCNC: 17.3 MG/L (ref 5.7–26.3)
M PROTEIN SERPL ELPH-MCNC: ABNORMAL G/DL
PROT SERPL-MCNC: 7 G/DL (ref 6–8.5)

## 2022-03-09 ENCOUNTER — OFFICE VISIT (OUTPATIENT)
Dept: ORTHOPEDIC SURGERY | Age: 82
End: 2022-03-09
Payer: MEDICARE

## 2022-03-09 VITALS — HEIGHT: 62 IN | WEIGHT: 136 LBS | BODY MASS INDEX: 25.03 KG/M2

## 2022-03-09 DIAGNOSIS — M16.11 PRIMARY OSTEOARTHRITIS OF RIGHT HIP: Primary | ICD-10-CM

## 2022-03-09 DIAGNOSIS — M17.12 PRIMARY OSTEOARTHRITIS OF LEFT KNEE: ICD-10-CM

## 2022-03-09 DIAGNOSIS — M25.551 RIGHT HIP PAIN: ICD-10-CM

## 2022-03-09 DIAGNOSIS — Z96.651 STATUS POST RIGHT KNEE REPLACEMENT: ICD-10-CM

## 2022-03-09 PROCEDURE — G8427 DOCREV CUR MEDS BY ELIG CLIN: HCPCS | Performed by: PHYSICIAN ASSISTANT

## 2022-03-09 PROCEDURE — 99204 OFFICE O/P NEW MOD 45 MIN: CPT | Performed by: PHYSICIAN ASSISTANT

## 2022-03-09 PROCEDURE — G8432 DEP SCR NOT DOC, RNG: HCPCS | Performed by: PHYSICIAN ASSISTANT

## 2022-03-09 PROCEDURE — G8536 NO DOC ELDER MAL SCRN: HCPCS | Performed by: PHYSICIAN ASSISTANT

## 2022-03-09 PROCEDURE — G8756 NO BP MEASURE DOC: HCPCS | Performed by: PHYSICIAN ASSISTANT

## 2022-03-09 PROCEDURE — 1090F PRES/ABSN URINE INCON ASSESS: CPT | Performed by: PHYSICIAN ASSISTANT

## 2022-03-09 PROCEDURE — G8420 CALC BMI NORM PARAMETERS: HCPCS | Performed by: PHYSICIAN ASSISTANT

## 2022-03-09 PROCEDURE — 1101F PT FALLS ASSESS-DOCD LE1/YR: CPT | Performed by: PHYSICIAN ASSISTANT

## 2022-03-09 PROCEDURE — G8399 PT W/DXA RESULTS DOCUMENT: HCPCS | Performed by: PHYSICIAN ASSISTANT

## 2022-03-09 RX ORDER — DEXTROMETHORPHAN HYDROBROMIDE, GUAIFENESIN 5; 100 MG/5ML; MG/5ML
650 LIQUID ORAL
COMMUNITY
End: 2022-04-07

## 2022-03-09 NOTE — PROGRESS NOTES
Mainor Schumacher (: 1940) is a 80 y.o. female, patient, here for evaluation of the following chief complaint(s):  Hip Pain (right groin) and Knee Pain (right and left (RTK))       SUBJECTIVE/OBJECTIVE:  Mainor Schumacher presents today complaining of right hip pain and bilateral knee pain. Chief complaint is right anterior hip and groin pain, worsening over last few years. Wakening night pain is present. Groin pain radiates to the thigh. Difficulty with pulling weeds and doing yard work. Severe pain at start up. Walking distance is very limited. Difficulty with shoes and socks, getting dressed, getting in and out of a car and in and out of bed. S/p right TKA  by Dr. Tad Dotson. Unremarkable recovery. She did well for many years. Archana De Jesus a few years ago. Was seen by Dr. Tad Dotson 2021. Right knee has been sore, painful with activities. It seems that symptoms started around the time of her fall, but so did her right hip pain. Trusts the knee, no instability. Uses a cane as needed. PHYSICAL EXAM:  Vitals: Ht 5' 2\" (1.575 m)   Wt 136 lb (61.7 kg)   BMI 24.87 kg/m²   Body mass index is 24.87 kg/m². 80y.o. year old F in no acute distress. Ambulates with a short stride on the right,, mild limp, positive Stinchfield right hip. Cane for assistance. Painful passive ROM ER 20, IR neutral, . Significant pain at extremes. No TTP right greater trochanter. Right knee neutral alignment, well healed anterior scar. No effusion. No TTP. ROM 0-90/95. Mild flexion laxity. Left knee lacks a few degrees full extension with flexion to 105. Varus deformity. Bilaterally, Motor 5/5. No distal edema. Symmetrical palpable distal pulses. IMAGING:  Radiographs: XR Results (maximum last 2):   Results from Appointment encounter on 22    XR KNEE RT 3 V    Narrative  Three views (AP, lateral & sunrise) of the right knee were taken and reviewed today using digital radiography which reveal s/p TKA. Lucency / bone resorption at the anterior flange of the femoral component, no gross motion. Partial lucent line around the tibial component with area of lysis anterior to the tibial keel. Retained posterior osteophytes. Incidental finding left knee complete loss medial joint space. XR HIP RT W OR WO PELV 2-3 VWS    Narrative  3 views (standing AP pelvis, true AP and frog leg lateral) of the right hip were taken and reviewed today using digital radiography which reveal severe loss hip joint space, cystic change, osteophytes. Incidental finding DDD and lumbar spondylosis lower levels. ASSESSMENT/PLAN:  1. Primary osteoarthritis of right hip  2. Primary osteoarthritis of left knee  3. Status post right knee replacement  -     XR KNEE RT 3 V; Future  4. Right hip pain  -     XR HIP RT W OR WO PELV 2-3 VWS; Future    The xray and exam findings were discussed with the patient today. Severe right hip OA, possible loosening right TKA 10 years postop, & severe left knee OA. Right total knee is probably loose, but her chief complaint and biggest issue with function right now is her severe right hip osteoarthritis. She is miserable. She would like to move forward with hip replacement, which I think is the most reasonable next step. However, she understands after recovery she may also need revision right total knee replacement. We discussed continued conservative treatment measures vs right total hip replacement. Symptoms have progressed despite conservative treatment measures outlined above and she desires to proceed with right total hip replacement. Discussed risks, benefits, and alternatives in detail, as well as anticipated hospital stay and course of rehabilitation. She will see primary care physician prior to surgery. All questions answered. Right leg feels shorter than left. Plan for IV TXA, and ASA for DVT prophylaxis. Has an echo Monday.   Will let  Miles determine whether cardiology clearance is necessary. Return for surgery. Review Of Systems  ROS     Positive for: Musculoskeletal    Last edited by Bennett Prader, RN on 3/9/2022 10:37 AM. (History)         Patient denies any recent fever, chills, nausea, vomiting, chest pain, or shortness of breath. Allergies   Allergen Reactions    Codeine Itching       Current Outpatient Medications   Medication Sig    acetaminophen (Tylenol Arthritis Pain) 650 mg TbER Take 650 mg by mouth every eight (8) hours as needed for Pain.  traMADoL (ULTRAM) 50 mg tablet Take 1 Tablet by mouth every six (6) hours as needed for Pain for up to 30 days. Max Daily Amount: 200 mg.    amLODIPine (NORVASC) 5 mg tablet TAKE 1 TABLET EVERY DAY    losartan (COZAAR) 100 mg tablet TAKE 1 TABLET EVERY DAY    atorvastatin (LIPITOR) 40 mg tablet TAKE 1 TABLET EVERY DAY    hydrALAZINE (APRESOLINE) 25 mg tablet TAKE 1 TABLET TWICE DAILY    CHOLECALCIFEROL, VITAMIN D3, (VITAMIN D3 PO) Take  by mouth.  albuterol (PROVENTIL HFA, VENTOLIN HFA, PROAIR HFA) 90 mcg/actuation inhaler Take 2 Puffs by inhalation every four (4) hours as needed for Wheezing.  budesonide-formoterol (SYMBICORT) 160-4.5 mcg/actuation HFAA Take 2 Puffs by inhalation two (2) times a day. No current facility-administered medications for this visit.        Past Medical History:   Diagnosis Date    Advance care planning     documents pending    Anemia     Arthropathy     CKD (chronic kidney disease), stage III ([facility] [address] 09/08/2014    Yessica Cuadra md    DJD (degenerative joint disease) of knee 10/21/2021    Vicky Suresh    Family history of colon cancer     History of total right knee replacement (TKR) 2011    ghazal mirza md    Hypercholesterolemia     Hypertension     IGT (impaired glucose tolerance) 10/08/2018    Knee pain, left     Osteoarthritis of right hip 02/2021    Pes anserine bursitis 08/11/2020    S/P colonoscopy 11/30/2021 Snow Guy MD -repeat 3 yrs        Past Surgical History:   Procedure Laterality Date    COLONOSCOPY N/A 2018    COLONOSCOPY performed by Snow Guy [facility] at [facility] ENDOSCOPY    COLONOSCOPY N/A 2021    COLONOSCOPY   V performed by Snow Guy MD at [facility] ENDOSCOPY    HX ORTHOPAEDIC      bunionectomy       Family History   Problem Relation Age of Onset    Heart Disease Mother     Heart Disease Father     Cancer Sister         colon    Cancer Sister         lung    Heart Disease Brother         Social History     Socioeconomic History    Marital status: SINGLE     Spouse name: Not on file    Number of children: Not on file    Years of education: Not on file    Highest education level: Not on file   Occupational History    Not on file   Tobacco Use    Smoking status: Never Smoker    Smokeless tobacco: Never Used   Vaping Use    Vaping Use: Never used   Substance and Sexual Activity    Alcohol use: Yes     Alcohol/week: 0.8 standard drinks     Types: 1 Standard drinks or equivalent per week     Comment: ocassional    Drug use: No    Sexual activity: Not Currently   Other Topics Concern    Not on file   Social History Narrative    Habits:  Occasional alcohol use. No cigarette or drug abuse. Social History:  The patient is . She has no children. She lives alone. She completed high school. 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 She's a member of [employer]. Part time at [place]        Family History:  Father passed at 59 of heart disease. Mother  80 of heart disease. She also had diabetes. One brother, one sister alive and well. One brother  of seizure disorder, one brother  of heart disease. He had LVAD. One brother  of liver issues. One sister  of colon cancer and one sister  of lung cancer.      Social Determinants of Health Financial Resource Strain:     Difficulty of Paying Living Expenses: Not on file   Food Insecurity:     Worried About Running Out of Food in the Last Year: Not on file    Hailey of Food in the Last Year: Not on file   Transportation Needs:     Lack of Transportation (Medical): Not on file    Lack of Transportation (Non-Medical): Not on file   Physical Activity:     Days of Exercise per Week: Not on file    Minutes of Exercise per Session: Not on file   Stress:     Feeling of Stress : Not on file   Social Connections:     Frequency of Communication with Friends and Family: Not on file    Frequency of Social Gatherings with Friends and Family: Not on file    Attends Sabianist Services: Not on file    Active Member of [address] [place] [address] or Organizations: Not on file    Attends Club or Organization Meetings: Not on file    Marital Status: Not on file   Intimate Partner Violence:     Fear of Current or Ex-Partner: Not on file    Emotionally Abused: Not on file    Physically Abused: Not on file    Sexually Abused: Not on file   Housing Stability:     Unable to Pay for Housing in the Last Year: Not on file    Number of [place] in the Last Year: Not on file    Unstable Housing in the Last Year: Not on file       Orders Placed This Encounter    XR HIP RT [address] [facility]. 2-3 VWS     Standing Status:   Future     Number of Occurrences:   1     Standing Expiration Date:   3/10/2023    XR KNEE RT 3 V     Standing Status:   Future     Number of Occurrences:   1     Standing Expiration Date:   3/10/2023      Cristian Arce.  Carina Mcrae M.D.

## 2022-03-09 NOTE — LETTER
3/9/2022    Patient: Maynor Carrasquillo   YOB: 1940   Date of Visit: 3/9/2022     Satinder Bustamante MD  [id number] [address]  [facility] [address]  [place] [facility]    Dear Satinder Bustamante MD,      Thank you for referring Ms. Bartholomew Essex to [facility] for evaluation. My notes for this consultation are attached. If you have questions, please do not hesitate to call me. I look forward to following your patient along with you.       Sincerely,    Talita Mcdermott PA-C

## 2022-03-14 ENCOUNTER — HOSPITAL ENCOUNTER (OUTPATIENT)
Dept: NON INVASIVE DIAGNOSTICS | Age: 82
Discharge: HOME | End: 2022-03-14
Attending: INTERNAL MEDICINE
Payer: MEDICARE

## 2022-03-14 VITALS
WEIGHT: 134.48 LBS | BODY MASS INDEX: 24.75 KG/M2 | HEIGHT: 62 IN | SYSTOLIC BLOOD PRESSURE: 138 MMHG | DIASTOLIC BLOOD PRESSURE: 81 MMHG

## 2022-03-14 DIAGNOSIS — I10 PRIMARY HYPERTENSION: ICD-10-CM

## 2022-03-14 LAB
ECHO AV PEAK GRADIENT: 5 MMHG
ECHO AV PEAK VELOCITY: 1.1 M/S
ECHO AV VELOCITY RATIO: 1
ECHO EST RA PRESSURE: 3 MMHG
ECHO LA DIAMETER INDEX: 1.49 CM/M2
ECHO LA DIAMETER: 2.4 CM
ECHO LV E' LATERAL VELOCITY: 10 CM/S
ECHO LV E' SEPTAL VELOCITY: 10 CM/S
ECHO LV FRACTIONAL SHORTENING: 26 % (ref 28–44)
ECHO LV INTERNAL DIMENSION DIASTOLE INDEX: 2.11 CM/M2
ECHO LV INTERNAL DIMENSION DIASTOLIC: 3.4 CM (ref 3.9–5.3)
ECHO LV INTERNAL DIMENSION SYSTOLIC INDEX: 1.55 CM/M2
ECHO LV INTERNAL DIMENSION SYSTOLIC: 2.5 CM
ECHO LV IVSD: 1.5 CM (ref 0.6–0.9)
ECHO LV MASS 2D: 166.2 G (ref 67–162)
ECHO LV MASS INDEX 2D: 103.2 G/M2 (ref 43–95)
ECHO LV POSTERIOR WALL DIASTOLIC: 1.3 CM (ref 0.6–0.9)
ECHO LV RELATIVE WALL THICKNESS RATIO: 0.76
ECHO LVOT PEAK GRADIENT: 5 MMHG
ECHO LVOT PEAK VELOCITY: 1.1 M/S
ECHO MV E VELOCITY: 1.01 M/S
ECHO MV E/E' LATERAL: 10.1
ECHO MV E/E' RATIO (AVERAGED): 10.1
ECHO MV E/E' SEPTAL: 10.1
ECHO PV MAX VELOCITY: 0.7 M/S
ECHO PV PEAK GRADIENT: 2 MMHG
ECHO RIGHT VENTRICULAR SYSTOLIC PRESSURE (RVSP): 34 MMHG
ECHO RV TAPSE: 1.9 CM (ref 1.5–2)
ECHO TV REGURGITANT MAX VELOCITY: 2.79 M/S
ECHO TV REGURGITANT PEAK GRADIENT: 31 MMHG

## 2022-03-14 PROCEDURE — 93306 TTE W/DOPPLER COMPLETE: CPT

## 2022-03-18 PROBLEM — M16.11 OSTEOARTHRITIS OF RIGHT HIP: Status: ACTIVE | Noted: 2021-02-01

## 2022-03-19 PROBLEM — M17.9 DJD (DEGENERATIVE JOINT DISEASE) OF KNEE: Status: ACTIVE | Noted: 2021-10-21

## 2022-03-19 PROBLEM — Z13.5 GLAUCOMA SCREENING: Status: ACTIVE | Noted: 2017-03-15

## 2022-03-19 PROBLEM — M70.50 PES ANSERINE BURSITIS: Status: ACTIVE | Noted: 2020-08-11

## 2022-03-20 PROBLEM — R73.02 IGT (IMPAIRED GLUCOSE TOLERANCE): Status: ACTIVE | Noted: 2018-10-08

## 2022-03-20 PROBLEM — Z98.890 S/P COLONOSCOPY: Status: ACTIVE | Noted: 2021-11-30

## 2022-03-28 ENCOUNTER — HOSPITAL ENCOUNTER (OUTPATIENT)
Dept: PREADMISSION TESTING | Age: 82
Discharge: HOME | End: 2022-03-28
Payer: MEDICARE

## 2022-03-28 VITALS
WEIGHT: 132.72 LBS | RESPIRATION RATE: 12 BRPM | HEART RATE: 97 BPM | BODY MASS INDEX: 24.42 KG/M2 | HEIGHT: 62 IN | SYSTOLIC BLOOD PRESSURE: 163 MMHG | OXYGEN SATURATION: 98 % | DIASTOLIC BLOOD PRESSURE: 65 MMHG | TEMPERATURE: 97.8 F

## 2022-03-28 LAB
ABO + RH BLD: NORMAL
APPEARANCE UR: CLEAR
BACTERIA URNS QL MICRO: ABNORMAL /HPF
BILIRUB UR QL: NEGATIVE
BLOOD GROUP ANTIBODIES SERPL: NORMAL
COLOR UR: ABNORMAL
EPITH CASTS URNS QL MICRO: ABNORMAL /LPF
GLUCOSE UR STRIP.AUTO-MCNC: NEGATIVE MG/DL
HGB UR QL STRIP: NEGATIVE
HYALINE CASTS URNS QL MICRO: ABNORMAL /LPF (ref 0–5)
INR PPP: 1.1 (ref 0.9–1.1)
KETONES UR QL STRIP.AUTO: NEGATIVE MG/DL
LEUKOCYTE ESTERASE UR QL STRIP.AUTO: ABNORMAL
NITRITE UR QL STRIP.AUTO: NEGATIVE
PH UR STRIP: 6.5 [PH] (ref 5–8)
PROT UR STRIP-MCNC: NEGATIVE MG/DL
PROTHROMBIN TIME: 11.2 SEC (ref 9–11.1)
RBC #/AREA URNS HPF: ABNORMAL /HPF (ref 0–5)
SP GR UR REFRACTOMETRY: <1.005 (ref 1–1.03)
SPECIMEN EXP DATE BLD: NORMAL
UA: UC IF INDICATED,UAUC: ABNORMAL
UROBILINOGEN UR QL STRIP.AUTO: 0.2 EU/DL (ref 0.2–1)
WBC URNS QL MICRO: ABNORMAL /HPF (ref 0–4)

## 2022-03-28 PROCEDURE — 85610 PROTHROMBIN TIME: CPT

## 2022-03-28 PROCEDURE — 86900 BLOOD TYPING SEROLOGIC ABO: CPT

## 2022-03-28 PROCEDURE — 81001 URINALYSIS AUTO W/SCOPE: CPT

## 2022-03-28 RX ORDER — TRAMADOL HYDROCHLORIDE 50 MG/1
50 TABLET ORAL
COMMUNITY
End: 2022-04-07

## 2022-03-28 NOTE — PERIOP NOTES
Preoperative instructions reviewed with patient. Patient given two bottles of CHG soap. Instructions reviewed on use of CHG soap. Patient given SSI infection FAQS sheet, as well as a MRSA/MSSA treatment instruction sheet with an explanation to patient that they will be notified if treatment instructions need to be initiated. Patient was given the opportunity to ask questions on the information provided. COPY OF COVID VACCINATION CARD PLACED ON CHART. PATIENT DECIDED TO ONLINE JOINT REPLACEMENT CLASS INSTEAD OF ATTENDING IN-PERSON CLASS TODAY.

## 2022-03-28 NOTE — PERIOP NOTES
[address]  ORTHOPAEDIC    Surgery Date:   4/5/22    [facility] staff will call you between 4 PM- 8 PM the day before surgery with your arrival time. If your surgery is on a Monday, we will call you the preceding Friday. Please call [phone] after 8 PM if you did not receive your arrival time. 1. Please report to [facility] Patient Access/Admitting on the 1st floor. Bring your insurance card, photo identification, and any copayment (if applicable). 2. If you are going home the same day of your surgery, you must have a responsible adult to drive you home. You need to have a responsible adult to stay with you the first 24 hours after surgery and you should not drive a car for 24 hours following your surgery. 3. Do NOT eat any solid foods after midnight the night before surgery including candy, mints or gum. You may drink clear liquids from midnight until 1 hour prior to arrival time. You may drink up to 12 ounces at one time every 4 hours. 4. Do NOT drink alcohol or smoke 24 hours before surgery. STOP smoking for 14 days prior as it helps with breathing and healing after surgery. 5. If your arrival time is 3pm or later, you may eat a light breakfast before 8am (toast, bagel-no butter, black coffee, plain tea, fruit juice-no pulp) Please note special instructions, if applicable, below for medications. 6. If you are being admitted to the hospital,please leave personal belongings/luggage in your car until you have an assigned hospital room number. 7. Please wear comfortable clothes. Wear your glasses instead of contacts. We ask that all money, jewelry and valuables be left at home. Wear no make up, particularly mascara, the day of surgery. 8.  All body piercings, rings, and jewelry need to be removed and left at home. Please remove any nail polish or artificial nails from your fingernails. Please wear your hair loose or down.  Please no pony-tails, buns, or any metal hair accessories. If you shower the morning of surgery, please do not apply any lotions or powders afterwards. You may wear deodorant. Do not shave any body area within 24 hours of your surgery. 9. Please follow all instructions to avoid any potential surgical cancellation. 10. Should your physical condition change, (i.e. fever, cold, flu, etc.) please notify your surgeon as soon as possible. 11. It is important to be on time. If a situation occurs where you may be delayed, please call:  [phone] / [phone] on the day of surgery. 12. The Preadmission Testing staff can be reached at [phone]. 13. Special instructions: [address] DAY OF SURGERY    Current Outpatient Medications   Medication Sig    traMADoL (ULTRAM) 50 mg tablet Take 50 mg by mouth every six (6) hours as needed for Pain.  multivit-minerals/folic acid (VITAJOY ADULT MULTI PO) Take 1 Tablet by mouth daily.  acetaminophen (Tylenol Arthritis Pain) 650 mg TbER Take 650 mg by mouth every eight (8) hours as needed for Pain.  amLODIPine (NORVASC) 5 mg tablet TAKE 1 TABLET EVERY DAY (Patient taking differently: Take 5 mg by mouth daily. TAKE 1 TABLET EVERY DAY)    losartan (COZAAR) 100 mg tablet TAKE 1 TABLET EVERY DAY (Patient taking differently: Take 100 mg by mouth daily.)    atorvastatin (LIPITOR) 40 mg tablet TAKE 1 TABLET EVERY DAY (Patient taking differently: Take 40 mg by mouth nightly.)    hydrALAZINE (APRESOLINE) 25 mg tablet TAKE 1 TABLET TWICE DAILY (Patient taking differently: Take 25 mg by mouth two (2) times a day.)    CHOLECALCIFEROL, VITAMIN D3, (VITAMIN D3 PO) Take  by mouth daily. No current facility-administered medications for this encounter. 1. YOU MUST ONLY TAKE THESE MEDICATIONS THE MORNING OF SURGERY WITH A SIP OF WATER: HYDRALAZINE, AMLODIPINE  2. MEDICATIONS TO TAKE THE MORNING OF SURGERY ONLY IF NEEDED: TYLENOL.   YOU MAY ALSO TAKE TRAMADOL 4 HOURS PRIOR TO ARRIVAL DAY OF SURGERY  3. HOLD these prescription medications BEFORE Surgery: DO NOT TAKE LOSARTAN DAY OF SURGERY  4. Ask your surgeon/prescribing physician about when/if to STOP taking these medications: NONE  5. Stop any non-steroidal anti-inflammatory drugs (i.e. Ibuprofen, Naproxen, Advil, Aleve) 3 days before surgery. You may take Tylenol. STOP all vitamins and herbal supplements 1 week prior to  surgery. 6. If you are currently taking Plavix, Coumadin, or any other blood-thinning/anticoagulant medication contact your prescribing physician for instructions. Preventing Infections Before and After  Your Surgery    IMPORTANT INSTRUCTIONS    You play an important role in your health and preparation for surgery. To reduce the germs on your skin you will need to shower with CHG soap (Chorhexidine gluconate 4%) two times before surgery. CHG soap (Hibiclens, Hex-A-Clens or store brand)   CHG soap will be provided at your Preadmission Testing (PAT) appointment.  If you do not have a PAT appointment before surgery, you may arrange to  CHG soap from our office or purchase CHG soap at a pharmacy, grocery or department store.  You need to purchase TWO 4 ounce bottles to use for your 2 showers. Steps to follow:  1. Wash your hair with your normal shampoo and your body with regular soap and rinse well to remove shampoo and soap from your skin. 2. Wet a clean washcloth and turn off the shower. 3. Put CHG soap on washcloth and apply to your entire body from the neck down. Do not use on your head, face or private parts(genitals). Do not use CHG soap on open sores, wounds or areas of skin irritation. 4. Wash you body gently for 5 minutes. Do not wash your skin too hard. This soap does not create lather. Pay special attention to your underarms and from your belly button to your feet. 5. Turn the shower back on and rinse well to get CHG soap off your body. 6. Pat your skin dry with a clean, dry towel.  Do not apply lotions or moisturizer. 7. Put on clean clothes and sleep on fresh bed sheets and do not allow pets to sleep with you. Shower with CHG soap 2 times before your surgery   The evening before your surgery   The morning of your surgery      Tips to help prevent infections after your surgery:  1. Protect your surgical wound from germs:  ? Hand washing is the most important thing you and your caregivers can do to prevent infections. ? Keep your bandage clean and dry! ? Do not touch your surgical wound. 2. Use clean, freshly washed towels and washcloths every time you shower; do not share bath linens with others. 3. Until your surgical wound is healed, wear clothing and sleep on bed linens each day that are clean and freshly washed. 4. Do not allow pets to sleep in your bed with you or touch your surgical wound. 5. Do not smoke  smoking delays wound healing. This may be a good time to stop smoking. 6. If you have diabetes, it is important for you to manage your blood sugar levels properly before your surgery as well as after your surgery. Poorly managed blood sugar levels slow down wound healing and prevent you from healing completely. Prevention of Infection  Testing for Staphylococcus aureus on your skin before surgery    Staphylococcus aureus (staph) is a common bacteria that is found on the body. It normally does not cause infection on healthy skin. Before surgery, you will be tested to see if you have staph by swabbing the inside of your nose. When you have an incision with surgery, the goal is to protect that incision from infection. Removal of the staph bacteria before surgery can decrease the risk of a surgical site infection. If your nose swab is positive for staph you will be called. Your treatment will include 2 steps:   Prescription for Mupirocin ointment to be used in each nostril twice a day for 5 days.  Showering with Chlorhexidine (CHG) liquid soap for 5 days prior to surgery.     How to use Mupirocin ointment in your nose  1.  the prescription from your pharmacy. You will receive a large tube of ointment which will be big enough for all of your treatments. You will apply this ointment to each nostril 2 times a day for 5 days. 2. Wash your hands with  gel or soap and water for 20 seconds before using ointment. 3. Place a pea-sized amount of ointment on a cotton Q-tip. 4. Apply ointment just inside of each nostril with the Q-tip. Do not push Q-tip or ointment deep inside you nose. 5. Press your nostrils together and massage for a few seconds. 6. Wash your hands with  gel or soap and water after you are finished. 7. Do not get ointment near your eyes. If it gets into your eyes, rinse them with cool water. 8. If you need to use nasal spray, clean the tip of the bottle with alcohol before use and do not use both at the same time. 9. If you are scheduled for COVID testing during the 5 days, do NOT apply morning dose until after the COVID test has been performed. How to use Chlorhexidine (CHG) 4% liquid soap  1. Purchase an 8 ounce bottle of CHG liquid soap (Chlorhexidine 4%, Hibiclens, Hex-A-Clens or store brand) at a pharmacy or grocery store. 2. Wash your hair with your normal shampoo and your body with regular soap and rinse well to remove shampoo and soap from your skin. 3. Wet a clean washcloth and turn off the shower. 4. Put CHG soap on washcloth and apply to your entire body from the neck down. Do not use on your head, face or private parts(genitals). Do not use CHG soap on open sores, wounds or areas of skin irritation. 5. Wash your body gently for 5 minutes. Do not wash your skin too hard. This soap does not create lather. Pay special attention to your underarms and from your belly button to your feet. 6. Turn the shower back on and rinse well to get CHG soap off your body. 7. Pat your skin dry with a clean, dry towel.  Do not apply lotions or moisturizer. 8. Put on clean clothes and sleep on fresh bed sheets the night before surgery. Do not allow pets to sleep with you. Eating and Drinking Before Surgery     You may eat a regular dinner at the usual time on the day before your surgery.  Do NOT eat any solid foods after midnight unless your arrival time at the hospital is 3pm or later.  You may drink clear liquids only from 12 midnight until 1 hours prior to your arrival time at the hospital on the day of your surgery. Do NOT drink alcohol.  Clear liquids include:  o Water  o Fruit juices without pulp( i.e. apple juice)  o Carbonated beverages  o Black coffee (no cream/milk)  o Tea (no cream/milk)  o Gatorade   You may drink up to 12-16 ounces at one time every 4 hours between the hours of midnight and 1 hour before your arrival time at the hospital. Example- if your arrival time at the hospital is 6am, you may drink 12-16 ounces of clear liquids no later than 5am.   If your arrival time at the hospital is 3pm or later, you may eat a light breakfast before 8am.   A light breakfast includes:  o Toast or bagel (no butter)  o Black coffee (no cream/milk)  o Tea (no cream/milk)  o Fruit juices without pulp ( i.e. apple juice)  o Do NOT eat meat, eggs, vegetables or fruit   If you have any questions, please contact your surgeon's office. [facility]   Instructions for Pre-Surgery COVID-19 Testing     Across our ministry we have established standard guidelines to ensure the health and safety of our patients, residents and associates as we resume elective services for patients. All patients presenting for surgery are required to have a COVID-19 test result within 96 hours of their scheduled surgery. [facility] is providing this test free of charge to the patient.    Instructions for COVID-19 Testing:     Patients will receive a call from Pre-Admission Testing 4-5 days prior to surgery to schedule a date and time to come to the [facility] for their COVID-19 test   Patients are advised to self-quarantine after testing until their scheduled surgery   Once on site, patients will be registered and receive COVID test in their vehicle   If a patient is scheduled for normal Pre Admission Testing 96 hours from date of surgery, the patient will still have their COVID test done at the [address] [place] [address] located at [address] [place] [address] Positive results will be shared with the surgeon and anesthesiologist and may result in cancellation of the elective procedure    Testing Hours and Location:   Address:  [address] [place] [address] Rd Admission 11 [facility] in the Discharge Lot on [facility] (Map Attached)  [address] [facility], [address]   Hours: Monday- Friday 7a-3p    PAT Phone Number: [phone]            Patient Information Regarding COVID Restrictions    Patients are advised to self-quarantine after COVID testing up to the day of the scheduled procedure. Day of Procedure     Please park in the parking deck or any designated visitor parking lot.  Enter the facility through the Main Entrance of the hospital.   A temperature check and appropriate symptom/exposure screening will be done prior to entry to the facility.  On the day of surgery, please provide the cell phone number of the person who will be waiting for you to the Patient Access representative at the time of registration.  Please wear a mask on the day of your procedure.  We are now allowing one designated visitor per stay. Pediatric patients may have 2 designated visitors. This one person may come in with you on the day of your procedure.  No visitors under the age of 13.  The designated visitor must also wear a mask.    Once your procedure and the immediate recovery period is completed, a nurse in the recovery area will contact your designated visitor to inform them of your room number or to otherwise review other pertinent information regarding your care.  Social distancing practices are to be adhered to in waiting areas and the cafeteria. The patient was contacted in person. She verbalized understanding of all instructions does not  need reinforcement.

## 2022-03-29 LAB
BACTERIA SPEC CULT: NORMAL
BACTERIA SPEC CULT: NORMAL
SERVICE CMNT-IMP: NORMAL

## 2022-03-29 NOTE — PERIOP NOTES
PAT Nurse Practitioner   Pre-Operative Chart Review/Assessment:-ORTHOPEDIC                Patient Name:  Jake Fulton                                                           Age:   80 y.o.    :  1940     Today's Date:  3/29/2022     Date of PAT:   3/28/2022      Date of Surgery:    2022      Procedure(s):  Right Total Hip Arthroplasty     Surgeon:   Dr. Ashok Barrera                       PLAN:      1)  Medical Clearance:  Dr. Timur Longo      2)  Cardiac Clearance:  EKG, ECHO and METs reviewed. No further cardiac evaluation requested. 22 PCP EKG: normal sinus rhythm, PAC's noted. 3)  Diabetic Treatment Consult:  Not indicated. A1c-5.6      4)  Sleep Apnea evaluation:   Not indicated.  JOHN PAUL Score 2.       5) Treatment for MRSA/Staph Aureus:  Neg      6) Additional Concerns:  HTN, CKD stg 3(Cr-1.07 on PCP labs 22), RA, Anemia(hgb-11.1 on PCP labs 22)                Vital Signs:         Vitals:    22 0823   BP: (!) 163/65   Pulse: 97   Resp: 12   Temp: 97.8 °F (36.6 °C)   SpO2: 98%   Weight: 60.2 kg (132 lb 11.5 oz)   Height: 5' 2\" (1.575 m)            ____________________________________________  PAST MEDICAL HISTORY  Past Medical History:   Diagnosis Date    Advance care planning     documents pending    Anemia     Arthropathy     CKD (chronic kidney disease), stage III ([address] 2014    Yessica Cuadra md    DJD (degenerative joint disease) of knee 10/21/2021    Debarah Blocker    Family history of colon cancer     History of total right knee replacement (TKR)     ghazal mirza md    Hypercholesterolemia     Hypertension     IGT (impaired glucose tolerance) 10/08/2018    Knee pain, left     Osteoarthritis of right hip 2021    Pes anserine bursitis 2020    Rheumatoid arthritis ([address]     S/P colonoscopy 2021    Erasmo Downing MD -repeat 3 yrs      ____________________________________________  PAST SURGICAL HISTORY  Past Surgical History: Procedure Laterality Date    COLONOSCOPY N/A 11/29/2018    COLONOSCOPY performed by Mary Kate Machuca MD at [address] COLONOSCOPY N/A 11/30/2021    COLONOSCOPY   V performed by Mary Kate [facility]uca MD at [facility] ENDOSCOPY    HX BUNIONECTOMY      HX CARPAL TUNNEL RELEASE      HX CERVICAL FUSION  2018    HX KNEE REPLACEMENT Right 2011      ____________________________________________  HOME MEDICATIONS  Current Outpatient Medications   Medication Sig    traMADoL (ULTRAM) 50 mg tablet Take 50 mg by mouth every six (6) hours as needed for Pain.  multivit-minerals/folic acid (VITAJOY ADULT MULTI PO) Take 1 Tablet by mouth daily.  acetaminophen (Tylenol Arthritis Pain) 650 mg TbER Take 650 mg by mouth every eight (8) hours as needed for Pain.  amLODIPine (NORVASC) 5 mg tablet TAKE 1 TABLET EVERY DAY (Patient taking differently: Take 5 mg by mouth daily. TAKE 1 TABLET EVERY DAY)    losartan (COZAAR) 100 mg tablet TAKE 1 TABLET EVERY DAY (Patient taking differently: Take 100 mg by mouth daily.)    atorvastatin (LIPITOR) 40 mg tablet TAKE 1 TABLET EVERY DAY (Patient taking differently: Take 40 mg by mouth nightly.)    hydrALAZINE (APRESOLINE) 25 mg tablet TAKE 1 TABLET TWICE DAILY (Patient taking differently: Take 25 mg by mouth two (2) times a day.)    CHOLECALCIFEROL, VITAMIN D3, (VITAMIN D3 PO) Take  by mouth daily. No current facility-administered medications for this encounter.      ____________________________________________  ALLERGIES  Allergies   Allergen Reactions    Codeine Itching      ____________________________________________  SOCIAL HISTORY  Social History     Tobacco Use    Smoking status: Never Smoker    Smokeless tobacco: Never Used   Substance Use Topics    Alcohol use:  Yes     Alcohol/week: 0.8 standard drinks     Types: 1 Standard drinks or equivalent per week     Comment: ocassional      ____________________________________________   Internal Administration   First Dose COVID-19, Pfizer Purple top, DILUTE for use, 12+ yrs, 30mcg/0.3mL dose  12/31/2020   Second Dose COVID-19, Pfizer Purple top, DILUTE for use, 12+ yrs, 30mcg/0.3mL dose  01/21/2021       Labs:     Hospital Outpatient Visit on 03/28/2022   Component Date Value Ref Range Status    Crossmatch Expiration 03/28/2022 04/08/2022,2359   Final    ABO/Rh(D) 03/28/2022 O POSITIVE   Final    Antibody screen 03/28/2022 NEG   Final    INR 03/28/2022 1.1  0.9 - 1.1   Final    A single therapeutic range for Vit K antagonists may not be optimal for all indications - see June, 2008 issue of Chest, American College of Chest Physicians Evidence-Based Clinical Practice Guidelines, 8th Edition.  Prothrombin time 03/28/2022 11.2* 9.0 - 11.1 sec Final    Color 03/28/2022 YELLOW/STRAW    Final    Color Reference Range: Straw, Yellow or Dark Yellow    Appearance 03/28/2022 CLEAR  CLEAR   Final    Specific gravity 03/28/2022 <1.005  1.003 - 1.030 Final    pH (UA) 03/28/2022 6.5  5.0 - 8.0   Final    Protein 03/28/2022 Negative  NEG mg/dL Final    Glucose 03/28/2022 Negative  NEG mg/dL Final    Ketone 03/28/2022 Negative  NEG mg/dL Final    Bilirubin 03/28/2022 Negative  NEG   Final    Blood 03/28/2022 Negative  NEG   Final    Urobilinogen 03/28/2022 0.2  0.2 - 1.0 EU/dL Final    Nitrites 03/28/2022 Negative  NEG   Final    Leukocyte Esterase 03/28/2022 TRACE* NEG   Final    UA:UC IF INDICATED 03/28/2022 CULTURE NOT INDICATED BY UA RESULT    Final    WBC 03/28/2022 0-4  0 - 4 /hpf Final    RBC 03/28/2022 0-5  0 - 5 /hpf Final    Epithelial cells 03/28/2022 FEW  FEW /lpf Final    Epithelial cell category consists of squamous cells and /or transitional urothelial cells. Renal tubular cells, if present, are separately identified as such.     Bacteria 03/28/2022 2+* NEG /hpf Final    Hyaline cast 03/28/2022 0-2  0 - 5 /lpf Final    Special Requests: 03/28/2022 NO SPECIAL REQUESTS    Final    Culture result: 03/28/2022 MRSA NOT PRESENT    Final      Hemoglobin A1c 02/22/2022 5.6  4.0 - 5.6 % Final    Comment: NEW METHOD PLEASE NOTE NEW REFERENCE RANGE  (NOTE)  HbA1C Interpretive Ranges  <5.7              Normal  5.7 - 6.4         Consider Prediabetes  >6.5              Consider Diabetes      Est. average glucose 02/22/2022 114  mg/dL Final        Sodium 02/22/2022 143  136 - 145 mmol/L Final    Potassium 02/22/2022 3.9  3.5 - 5.1 mmol/L Final    Chloride 02/22/2022 109* 97 - 108 mmol/L Final    CO2 02/22/2022 27  21 - 32 mmol/L Final    Anion gap 02/22/2022 7  5 - 15 mmol/L Final    Glucose 02/22/2022 84  65 - 100 mg/dL Final    BUN 02/22/2022 18  6 - 20 MG/DL Final    Creatinine 02/22/2022 1.07* 0.55 - 1.02 MG/DL Final    BUN/Creatinine ratio 02/22/2022 17  12 - 20   Final    GFR est AA 02/22/2022 60* >60 ml/min/1.73m2 Final    GFR est non-AA 02/22/2022 49* >60 ml/min/1.73m2 Final    Comment: Estimated GFR is calculated using the IDMS-traceable Modification of Diet in  Renal Disease (MDRD) Study equation, reported for both  Americans  (GFRAA) and non- Americans (GFRNA), and normalized to 1.73m2 body  surface area. The physician must decide which value applies to the patient.  Calcium 02/22/2022 9.9  8.5 - 10.1 MG/DL Final    Bilirubin, total 02/22/2022 0.3  0.2 - 1.0 MG/DL Final    ALT (SGPT) 02/22/2022 23  12 - 78 U/L Final    AST (SGOT) 02/22/2022 24  15 - 37 U/L Final    Alk.  phosphatase 02/22/2022 134* 45 - 117 U/L Final    Protein, total 02/22/2022 7.2  6.4 - 8.2 g/dL Final    Albumin 02/22/2022 3.7  3.5 - 5.0 g/dL Final    Globulin 02/22/2022 3.5  2.0 - 4.0 g/dL Final    A-G Ratio 02/22/2022 1.1  1.1 - 2.2   Final    WBC 02/22/2022 7.6  3.6 - 11.0 K/uL Final    RBC 02/22/2022 4.23  3.80 - 5.20 M/uL Final    HGB 02/22/2022 11.1* 11.5 - 16.0 g/dL Final    HCT 02/22/2022 36.4  35.0 - 47.0 % Final    MCV 02/22/2022 86.1  80.0 - 99.0 FL Final    MCH 02/22/2022 26.2  26.0 - 34.0 PG Final    MCHC 02/22/2022 30.5  30.0 - 36.5 g/dL Final    RDW 02/22/2022 14.7* 11.5 - 14.5 % Final    PLATELET 89/63/8476 223  150 - 400 K/uL Final    MPV 02/22/2022 11.3  8.9 - 12.9 FL Final    NRBC 02/22/2022 0.0  0  WBC Final    ABSOLUTE NRBC 02/22/2022 0.00  0.00 - 0.01 K/uL Final    NEUTROPHILS 02/22/2022 63  32 - 75 % Final    LYMPHOCYTES 02/22/2022 26  12 - 49 % Final    MONOCYTES 02/22/2022 7  5 - 13 % Final    EOSINOPHILS 02/22/2022 3  0 - 7 % Final    BASOPHILS 02/22/2022 1  0 - 1 % Final    IMMATURE GRANULOCYTES 02/22/2022 0  0.0 - 0.5 % Final    ABS. NEUTROPHILS 02/22/2022 4.8  1.8 - 8.0 K/UL Final    ABS. LYMPHOCYTES 02/22/2022 2.0  0.8 - 3.5 K/UL Final    ABS. MONOCYTES 02/22/2022 0.5  0.0 - 1.0 K/UL Final    ABS. EOSINOPHILS 02/22/2022 0.2  0.0 - 0.4 K/UL Final    ABS. BASOPHILS 02/22/2022 0.1  0.0 - 0.1 K/UL Final    ABS. IMM. GRANS. 02/22/2022 0.0  0.00 - 0.04 K/UL Final    DF 02/22/2022 AUTOMATED    Final     03/14/22    ECHO ADULT COMPLETE 03/14/2022 3/14/2022    Interpretation Summary    Sinus tachycardia    Left Ventricle: Left ventricle size is normal. Findings consistent with mild concentric hypertrophy. Normal wall motion. Normal left ventricular systolic function with a visually estimated EF of 55 - 60%.   Mild to moderate tricuspid regurgitation.   Tricuspid Valve: Normal RVSP. RVSP is 34 mmHg.   Right Atrium: Right atrium is mildly dilated. Signed by: Eloy Jaramillo MD on 3/14/2022  7:43 PM        Skin:     Denies open wounds, cuts, sores, rashes or other areas of concern in PAT assessment.           Moises Baltazar NP

## 2022-03-29 NOTE — PERIOP NOTES
MSG SENT IN CC TO KATHERINE BOLDEN RN/DR. RODRIGUEZ'S OFFICE REGARDING 2+ BACTERIA IN PT'S URINE SAMPLE.

## 2022-03-31 RX ORDER — ACETAMINOPHEN 500 MG
1000 TABLET ORAL ONCE
Status: CANCELLED | OUTPATIENT
Start: 2022-03-31 | End: 2022-03-31

## 2022-04-01 ENCOUNTER — OFFICE VISIT (OUTPATIENT)
Dept: INTERNAL MEDICINE CLINIC | Age: 82
End: 2022-04-01
Payer: MEDICARE

## 2022-04-01 VITALS
TEMPERATURE: 98.1 F | SYSTOLIC BLOOD PRESSURE: 120 MMHG | HEART RATE: 114 BPM | BODY MASS INDEX: 24.16 KG/M2 | RESPIRATION RATE: 16 BRPM | DIASTOLIC BLOOD PRESSURE: 78 MMHG | WEIGHT: 131.3 LBS | OXYGEN SATURATION: 98 % | HEIGHT: 62 IN

## 2022-04-01 DIAGNOSIS — R73.02 IGT (IMPAIRED GLUCOSE TOLERANCE): ICD-10-CM

## 2022-04-01 DIAGNOSIS — I10 PRIMARY HYPERTENSION: ICD-10-CM

## 2022-04-01 DIAGNOSIS — M16.11 PRIMARY OSTEOARTHRITIS OF RIGHT HIP: ICD-10-CM

## 2022-04-01 DIAGNOSIS — R06.02 SOB (SHORTNESS OF BREATH): ICD-10-CM

## 2022-04-01 DIAGNOSIS — E78.2 MIXED HYPERLIPIDEMIA: ICD-10-CM

## 2022-04-01 DIAGNOSIS — Z01.818 PRE-OP EXAMINATION: Primary | ICD-10-CM

## 2022-04-01 DIAGNOSIS — N18.31 STAGE 3A CHRONIC KIDNEY DISEASE (HCC): ICD-10-CM

## 2022-04-01 PROCEDURE — G8752 SYS BP LESS 140: HCPCS | Performed by: INTERNAL MEDICINE

## 2022-04-01 PROCEDURE — 99215 OFFICE O/P EST HI 40 MIN: CPT | Performed by: INTERNAL MEDICINE

## 2022-04-01 PROCEDURE — G8399 PT W/DXA RESULTS DOCUMENT: HCPCS | Performed by: INTERNAL MEDICINE

## 2022-04-01 PROCEDURE — G8420 CALC BMI NORM PARAMETERS: HCPCS | Performed by: INTERNAL MEDICINE

## 2022-04-01 PROCEDURE — G8432 DEP SCR NOT DOC, RNG: HCPCS | Performed by: INTERNAL MEDICINE

## 2022-04-01 PROCEDURE — 1101F PT FALLS ASSESS-DOCD LE1/YR: CPT | Performed by: INTERNAL MEDICINE

## 2022-04-01 PROCEDURE — G8427 DOCREV CUR MEDS BY ELIG CLIN: HCPCS | Performed by: INTERNAL MEDICINE

## 2022-04-01 PROCEDURE — G8536 NO DOC ELDER MAL SCRN: HCPCS | Performed by: INTERNAL MEDICINE

## 2022-04-01 PROCEDURE — G8754 DIAS BP LESS 90: HCPCS | Performed by: INTERNAL MEDICINE

## 2022-04-01 PROCEDURE — 1090F PRES/ABSN URINE INCON ASSESS: CPT | Performed by: INTERNAL MEDICINE

## 2022-04-01 NOTE — PROGRESS NOTES
Rm    No chief complaint on file. Visit Vitals  /78 (BP 1 Location: Left upper arm, BP Patient Position: Sitting, BP Cuff Size: Adult)   Pulse (!) 114   Temp 98.1 °F (36.7 °C) (Oral)   Resp 16   Ht 5' 2\" (1.575 m)   Wt 131 lb 4.8 oz (59.6 kg)   SpO2 98%   BMI 24.02 kg/m²        1. Have you been to the ER, urgent care clinic since your last visit? Hospitalized since your last visit? No    2. Have you seen or consulted any other health care providers outside of the [address] [place] [address] since your last visit? Include any pap smears or colon screening. No     Health Maintenance Due   Topic Date Due    Pneumococcal 65+ years (1 of 1 - PPSV23) Never done        3 most recent PHQ Screens 2/22/2022   Little interest or pleasure in doing things Not at all   Feeling down, depressed, irritable, or hopeless Not at all   Total Score PHQ 2 0        Fall Risk Assessment, last 12 mths 2/22/2022   Able to walk? Yes   Fall in past 12 months? 0   Do you feel unsteady? 0   Are you worried about falling 0   Number of falls in past 12 months -   Fall with injury?  -       Learning Assessment 3/19/2018   PRIMARY LEARNER Patient   HIGHEST LEVEL OF EDUCATION - PRIMARY LEARNER  -   BARRIERS PRIMARY LEARNER NONE   PRIMARY LANGUAGE ENGLISH   LEARNER PREFERENCE PRIMARY DEMONSTRATION   ANSWERED BY self   RELATIONSHIP SELF

## 2022-04-01 NOTE — PROGRESS NOTES
SPORTS MEDICINE AND PRIMARY CARE  Derek Saunders MD, [address] [address]  Phone:  [phone]  Fax: [phone]       Chief Complaint   Patient presents with    Pre-op Exam   .      SUBJECTIVE:    Ronald Ji is a 80 y.o. female The patient returns today with known history of primary hypertension, impaired glucose tolerance, dyslipidemia, chronic kidney disease stage 3, osteoarthritis, and is seen for evaluation. She returns today for history and physical in clinic today for a preoperative medical clearance for [facility]. Darian Thompson MD for a right total hip replacement on Tuesday, April 5, at [facility].  The patient admits to being nervous today and her pain is primarily in her right hip. Her knees are somewhat bothersome. She can walk 13 flights of steps without difficulty. She has no shortness of breath, no chest pain. Other new complaints are denied. The patient is seen for evaluation. Current Outpatient Medications   Medication Sig Dispense Refill    traMADoL (ULTRAM) 50 mg tablet Take 50 mg by mouth every six (6) hours as needed for Pain.  multivit-minerals/folic acid (VITAJOY ADULT MULTI PO) Take 1 Tablet by mouth daily.  acetaminophen (Tylenol Arthritis Pain) 650 mg TbER Take 650 mg by mouth every eight (8) hours as needed for Pain.  amLODIPine (NORVASC) 5 mg tablet TAKE 1 TABLET EVERY DAY (Patient taking differently: Take 5 mg by mouth daily.  TAKE 1 TABLET EVERY DAY) 90 Tablet 3    losartan (COZAAR) 100 mg tablet TAKE 1 TABLET EVERY DAY (Patient taking differently: Take 100 mg by mouth daily.) 90 Tablet 3    atorvastatin (LIPITOR) 40 mg tablet TAKE 1 TABLET EVERY DAY (Patient taking differently: Take 40 mg by mouth nightly.) 90 Tab 4    hydrALAZINE (APRESOLINE) 25 mg tablet TAKE 1 TABLET TWICE DAILY (Patient taking differently: Take 25 mg by mouth two (2) times a day.) 180 Tab 3    CHOLECALCIFEROL, VITAMIN D3, (VITAMIN D3 PO) Take by mouth daily.        Past Medical History:   Diagnosis Date    Advance care planning     documents pending    Anemia     Arthropathy     CKD (chronic kidney disease), stage III ([facility] [address] 09/08/2014    Yessica Cuadra md    DJD (degenerative joint disease) of knee 10/21/2021    Pasha Henley    Family history of colon cancer     History of total right knee replacement (TKR) 2011    ghazal mirza md    Hypercholesterolemia     Hypertension     IGT (impaired glucose tolerance) 10/08/2018    Knee pain, left     Osteoarthritis of right hip 02/2021    Pes anserine bursitis 08/11/2020    Rheumatoid arthritis ([facility] [address]     S/P colonoscopy 11/30/2021    Yobany Posey MD -repeat 3 yrs     Past Surgical History:   Procedure Laterality Date    COLONOSCOPY N/A 11/29/2018    COLONOSCOPY performed by Yobany Posey MD at [address] COLONOSCOPY N/A 11/30/2021    COLONOSCOPY   V performed by Yobany Posey MD at [address] HX BUNIONECTOMY      HX CARPAL TUNNEL RELEASE      HX CERVICAL FUSION  2018    HX KNEE REPLACEMENT Right 2011     Allergies   Allergen Reactions    Codeine Itching         REVIEW OF SYSTEMS:  General: negative for - chills or fever  ENT: negative for - headaches, nasal congestion or tinnitus  Respiratory: negative for - cough, hemoptysis, shortness of breath or wheezing  Cardiovascular : negative for - chest pain, edema, palpitations or shortness of breath  Gastrointestinal: negative for - abdominal pain, blood in stools, heartburn or nausea/vomiting  Genito-Urinary: no dysuria, trouble voiding, or hematuria  Musculoskeletal: negative for - gait disturbance, joint pain, joint stiffness or joint swelling  Neurological: no TIA or stroke symptoms  Hematologic: no bruises, no bleeding, no swollen glands  Integument: no lumps, mole changes, nail changes or rash  Endocrine: no malaise/lethargy or unexpected weight changes      Social History     Socioeconomic History    Marital status: SINGLE   Tobacco Use    Smoking status: Never Smoker    Smokeless tobacco: Never Used   Vaping Use    Vaping Use: Never used   Substance and Sexual Activity    Alcohol use: Yes     Alcohol/week: 0.8 standard drinks     Types: 1 Standard drinks or equivalent per week     Comment: ocassional    Drug use: No    Sexual activity: Not Currently   Social History Narrative    Habits:  Occasional alcohol use. No cigarette or drug abuse. Social History:  The patient is . She has no children. She lives alone. She completed high school. 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 She's a member of [employer]. Part time at [employer]        Family History:  Father passed at 59 of heart disease. Mother  80 of heart disease. She also had diabetes. One brother, one sister alive and well. One brother  of seizure disorder, one brother  of heart disease. He had LVAD. One brother  of liver issues. One sister  of colon cancer and one sister  of lung cancer. Family History   Problem Relation Age of Onset    Heart Disease Mother     Heart Disease Father     Cancer Sister         colon    No Known Problems Brother     Cancer Sister         lung    Heart Disease Brother     No Known Problems Sister     Anesth Problems Neg Hx        OBJECTIVE:    Visit Vitals  /78 (BP 1 Location: Left upper arm, BP Patient Position: Sitting, BP Cuff Size: Adult)   Pulse (!) 114   Temp 98.1 °F (36.7 °C) (Oral)   Resp 16   Ht 5' 2\" (1.575 m)   Wt 131 lb 4.8 oz (59.6 kg)   SpO2 98%   BMI 24.02 kg/m²     CONSTITUTIONAL: well , well nourished, appears age appropriate  EYES: perrla, eom intact  ENMT:moist mucous membranes, pharynx clear  NECK: supple.  Thyroid normal  RESPIRATORY: Chest: clear bilaterally   CARDIOVASCULAR: Heart: regular rate and rhythm  GASTROINTESTINAL: Abdomen: soft, bowel sounds active  HEMATOLOGIC: no pathological lymph nodes palpated  MUSCULOSKELETAL: Extremities: no edema, pulse 1+   INTEGUMENT: No unusual rashes or suspicious skin lesions noted. Nails appear normal.  NEUROLOGIC: non-focal exam   MENTAL STATUS: alert and oriented, appropriate affect           ASSESSMENT:  1. Pre-op examination    2. Primary hypertension    3. Mixed hyperlipidemia    4. Stage 3a chronic kidney disease ([address]    5. IGT (impaired glucose tolerance)    6. Primary osteoarthritis of right hip    7. SOB (shortness of breath)       Dictation on: 04/01/2022 11:27 AM by: Daniela Laws       I have discussed the diagnosis with the patient and the intended plan as seen in the  Orders. The patient understands and agees with the plan. The patient has   received an after visit summary and questions were answered concerning  future plans  Patient labs and/or xrays were reviewed  Past records were reviewed. PLAN:  .  Orders Placed This Encounter    CBC WITH AUTOMATED DIFF    NT-PRO BNP    RENAL FUNCTION PANEL       Follow-up and Dispositions    · Return in about 4 months (around 8/1/2022). ATTENTION:   This medical record was transcribed using an electronic medical records system. Although proofread, it may and can contain electronic and spelling errors. Other human spelling and other errors may be present. Corrections may be executed at a later time. Please feel free to contact us for any clarifications as needed.

## 2022-04-02 LAB
ALBUMIN SERPL-MCNC: 4.2 G/DL (ref 3.5–5)
ANION GAP SERPL CALC-SCNC: 9 MMOL/L (ref 5–15)
BNP SERPL-MCNC: 91 PG/ML
BUN SERPL-MCNC: 16 MG/DL (ref 6–20)
BUN/CREAT SERPL: 12 (ref 12–20)
CALCIUM SERPL-MCNC: 9.9 MG/DL (ref 8.5–10.1)
CHLORIDE SERPL-SCNC: 106 MMOL/L (ref 97–108)
CO2 SERPL-SCNC: 25 MMOL/L (ref 21–32)
CREAT SERPL-MCNC: 1.36 MG/DL (ref 0.55–1.02)
GLUCOSE SERPL-MCNC: 96 MG/DL (ref 65–100)
PHOSPHATE SERPL-MCNC: 3.3 MG/DL (ref 2.6–4.7)
POTASSIUM SERPL-SCNC: 4.8 MMOL/L (ref 3.5–5.1)
SODIUM SERPL-SCNC: 140 MMOL/L (ref 136–145)

## 2022-04-02 NOTE — PROGRESS NOTES
Laboratory studies are  stable.   There was an error with the CBC and therefore at your convenience stop by for a nurse visit to repeat the CBC

## 2022-04-05 ENCOUNTER — ANESTHESIA EVENT (OUTPATIENT)
Dept: SURGERY | Age: 82
End: 2022-04-05
Payer: MEDICARE

## 2022-04-05 ENCOUNTER — APPOINTMENT (OUTPATIENT)
Dept: GENERAL RADIOLOGY | Age: 82
End: 2022-04-05
Attending: PHYSICIAN ASSISTANT
Payer: MEDICARE

## 2022-04-05 ENCOUNTER — HOSPITAL ENCOUNTER (OUTPATIENT)
Age: 82
Discharge: HOME HEALTH | End: 2022-04-07
Attending: ORTHOPAEDIC SURGERY | Admitting: ORTHOPAEDIC SURGERY
Payer: MEDICARE

## 2022-04-05 ENCOUNTER — ANESTHESIA (OUTPATIENT)
Dept: SURGERY | Age: 82
End: 2022-04-05
Payer: MEDICARE

## 2022-04-05 DIAGNOSIS — M16.11 PRIMARY LOCALIZED OSTEOARTHRITIS OF RIGHT HIP: Primary | ICD-10-CM

## 2022-04-05 LAB
ATRIAL RATE: 82 BPM
CALCULATED P AXIS, ECG09: 55 DEGREES
CALCULATED R AXIS, ECG10: 25 DEGREES
CALCULATED T AXIS, ECG11: 39 DEGREES
DIAGNOSIS, 93000: NORMAL
GLUCOSE BLD STRIP.AUTO-MCNC: 93 MG/DL (ref 65–117)
P-R INTERVAL, ECG05: 144 MS
Q-T INTERVAL, ECG07: 364 MS
QRS DURATION, ECG06: 72 MS
QTC CALCULATION (BEZET), ECG08: 425 MS
SERVICE CMNT-IMP: NORMAL
VENTRICULAR RATE, ECG03: 82 BPM

## 2022-04-05 PROCEDURE — 27130 TOTAL HIP ARTHROPLASTY: CPT | Performed by: ORTHOPAEDIC SURGERY

## 2022-04-05 PROCEDURE — 77030036660

## 2022-04-05 PROCEDURE — 77030005513 HC CATH URETH FOL11 MDII -B: Performed by: ORTHOPAEDIC SURGERY

## 2022-04-05 PROCEDURE — 74011000250 HC RX REV CODE- 250: Performed by: PHYSICIAN ASSISTANT

## 2022-04-05 PROCEDURE — 77030018723 HC ELCTRD BLD COVD -A: Performed by: ORTHOPAEDIC SURGERY

## 2022-04-05 PROCEDURE — 77030007866 HC KT SPN ANES BBMI -B: Performed by: ANESTHESIOLOGY

## 2022-04-05 PROCEDURE — 74011000250 HC RX REV CODE- 250: Performed by: NURSE ANESTHETIST, CERTIFIED REGISTERED

## 2022-04-05 PROCEDURE — 74011250637 HC RX REV CODE- 250/637

## 2022-04-05 PROCEDURE — 74011250636 HC RX REV CODE- 250/636: Performed by: NURSE ANESTHETIST, CERTIFIED REGISTERED

## 2022-04-05 PROCEDURE — 76010000172 HC OR TIME 2.5 TO 3 HR INTENSV-TIER 1: Performed by: ORTHOPAEDIC SURGERY

## 2022-04-05 PROCEDURE — 93005 ELECTROCARDIOGRAM TRACING: CPT

## 2022-04-05 PROCEDURE — 2709999900 HC NON-CHARGEABLE SUPPLY

## 2022-04-05 PROCEDURE — 74011000250 HC RX REV CODE- 250

## 2022-04-05 PROCEDURE — 77030006822 HC BLD SAW SAG BRSM -B: Performed by: ORTHOPAEDIC SURGERY

## 2022-04-05 PROCEDURE — 77030018547 HC SUT ETHBND1 J&J -B: Performed by: ORTHOPAEDIC SURGERY

## 2022-04-05 PROCEDURE — 97530 THERAPEUTIC ACTIVITIES: CPT

## 2022-04-05 PROCEDURE — 77030010507 HC ADH SKN DERMBND J&J -B: Performed by: ORTHOPAEDIC SURGERY

## 2022-04-05 PROCEDURE — 97161 PT EVAL LOW COMPLEX 20 MIN: CPT

## 2022-04-05 PROCEDURE — 76210000017 HC OR PH I REC 1.5 TO 2 HR: Performed by: ORTHOPAEDIC SURGERY

## 2022-04-05 PROCEDURE — 76060000036 HC ANESTHESIA 2.5 TO 3 HR: Performed by: ORTHOPAEDIC SURGERY

## 2022-04-05 PROCEDURE — 2709999900 HC NON-CHARGEABLE SUPPLY: Performed by: ORTHOPAEDIC SURGERY

## 2022-04-05 PROCEDURE — 74011000250 HC RX REV CODE- 250: Performed by: ANESTHESIOLOGY

## 2022-04-05 PROCEDURE — 77030003882 HC BIT DRL TWST BRSM -B: Performed by: ORTHOPAEDIC SURGERY

## 2022-04-05 PROCEDURE — 74011250636 HC RX REV CODE- 250/636: Performed by: PHYSICIAN ASSISTANT

## 2022-04-05 PROCEDURE — 77030033067 HC SUT PDO STRATFX SPIR J&J -B: Performed by: ORTHOPAEDIC SURGERY

## 2022-04-05 PROCEDURE — 51798 US URINE CAPACITY MEASURE: CPT

## 2022-04-05 PROCEDURE — 74011000250 HC RX REV CODE- 250: Performed by: ORTHOPAEDIC SURGERY

## 2022-04-05 PROCEDURE — 77030002933 HC SUT MCRYL J&J -A: Performed by: ORTHOPAEDIC SURGERY

## 2022-04-05 PROCEDURE — 82962 GLUCOSE BLOOD TEST: CPT

## 2022-04-05 PROCEDURE — 77030018074 HC RTVR SUT ARTH4 S&N -B: Performed by: ORTHOPAEDIC SURGERY

## 2022-04-05 PROCEDURE — 77030031139 HC SUT VCRL2 J&J -A: Performed by: ORTHOPAEDIC SURGERY

## 2022-04-05 PROCEDURE — 73501 X-RAY EXAM HIP UNI 1 VIEW: CPT

## 2022-04-05 PROCEDURE — C1776 JOINT DEVICE (IMPLANTABLE): HCPCS | Performed by: ORTHOPAEDIC SURGERY

## 2022-04-05 PROCEDURE — 74011250636 HC RX REV CODE- 250/636

## 2022-04-05 PROCEDURE — 74011250637 HC RX REV CODE- 250/637: Performed by: PHYSICIAN ASSISTANT

## 2022-04-05 PROCEDURE — 27130 TOTAL HIP ARTHROPLASTY: CPT | Performed by: PHYSICIAN ASSISTANT

## 2022-04-05 DEVICE — EMPOWR ACETABULAR SYSTEM, LINER, NEUTRAL, HXE+, 36E
Type: IMPLANTABLE DEVICE | Site: HIP | Status: FUNCTIONAL
Brand: DJO SURGICAL

## 2022-04-05 DEVICE — IMPL CAPPED HIP H2 TOTAL ADV OTHER HEAD DJO: Type: IMPLANTABLE DEVICE | Status: FUNCTIONAL

## 2022-04-05 DEVICE — EMPOWR ACETABULAR, BONE SCREW, 30MM
Type: IMPLANTABLE DEVICE | Site: HIP | Status: FUNCTIONAL
Brand: DJO SURGICAL

## 2022-04-05 DEVICE — TAPERFILL HIP STEM, STANDARD, SIZE 10
Type: IMPLANTABLE DEVICE | Site: HIP | Status: FUNCTIONAL
Brand: DJO SURGICAL

## 2022-04-05 DEVICE — EMPOWR ACET SYSTEM, CUP, HEMISPHERICAL, CLUSTER HOLE, 50MM
Type: IMPLANTABLE DEVICE | Site: HIP | Status: FUNCTIONAL
Brand: DJO SURGICAL

## 2022-04-05 DEVICE — HEAD, FEMORAL, CERAMIC, BILOX DELTA, 36MM NEUTRAL
Type: IMPLANTABLE DEVICE | Site: HIP | Status: FUNCTIONAL
Brand: DJO SURGICAL

## 2022-04-05 RX ORDER — AMLODIPINE BESYLATE 5 MG/1
5 TABLET ORAL DAILY
Status: DISCONTINUED | OUTPATIENT
Start: 2022-04-06 | End: 2022-04-07 | Stop reason: HOSPADM

## 2022-04-05 RX ORDER — ONDANSETRON 2 MG/ML
4 INJECTION INTRAMUSCULAR; INTRAVENOUS
Status: ACTIVE | OUTPATIENT
Start: 2022-04-05 | End: 2022-04-06

## 2022-04-05 RX ORDER — AMOXICILLIN 250 MG
1 CAPSULE ORAL
Qty: 60 TABLET | Refills: 0 | Status: SHIPPED | OUTPATIENT
Start: 2022-04-05 | End: 2022-05-12

## 2022-04-05 RX ORDER — OXYCODONE HYDROCHLORIDE 5 MG/1
5 TABLET ORAL
Status: DISCONTINUED | OUTPATIENT
Start: 2022-04-05 | End: 2022-04-07 | Stop reason: HOSPADM

## 2022-04-05 RX ORDER — SODIUM CHLORIDE 9 MG/ML
INJECTION, SOLUTION INTRAVENOUS
Status: DISCONTINUED | OUTPATIENT
Start: 2022-04-05 | End: 2022-04-05 | Stop reason: HOSPADM

## 2022-04-05 RX ORDER — HYDROXYZINE HYDROCHLORIDE 10 MG/1
10 TABLET, FILM COATED ORAL
Status: DISCONTINUED | OUTPATIENT
Start: 2022-04-05 | End: 2022-04-07 | Stop reason: HOSPADM

## 2022-04-05 RX ORDER — ACETAMINOPHEN 500 MG
1000 TABLET ORAL ONCE
Status: COMPLETED | OUTPATIENT
Start: 2022-04-05 | End: 2022-04-05

## 2022-04-05 RX ORDER — ACETAMINOPHEN 500 MG
500 TABLET ORAL
Status: DISCONTINUED | OUTPATIENT
Start: 2022-04-05 | End: 2022-04-07 | Stop reason: HOSPADM

## 2022-04-05 RX ORDER — SODIUM CHLORIDE 9 MG/ML
125 INJECTION, SOLUTION INTRAVENOUS CONTINUOUS
Status: DISPENSED | OUTPATIENT
Start: 2022-04-05 | End: 2022-04-06

## 2022-04-05 RX ORDER — ACETAMINOPHEN 500 MG
500 TABLET ORAL EVERY 4 HOURS
Qty: 100 TABLET | Refills: 0 | Status: SHIPPED | OUTPATIENT
Start: 2022-04-05 | End: 2022-09-19

## 2022-04-05 RX ORDER — ASPIRIN 81 MG/1
81 TABLET ORAL 2 TIMES DAILY
Qty: 60 TABLET | Refills: 0 | Status: SHIPPED | OUTPATIENT
Start: 2022-04-05 | End: 2022-05-12

## 2022-04-05 RX ORDER — ONDANSETRON 2 MG/ML
INJECTION INTRAMUSCULAR; INTRAVENOUS AS NEEDED
Status: DISCONTINUED | OUTPATIENT
Start: 2022-04-05 | End: 2022-04-05 | Stop reason: HOSPADM

## 2022-04-05 RX ORDER — FACIAL-BODY WIPES
10 EACH TOPICAL DAILY PRN
Status: DISCONTINUED | OUTPATIENT
Start: 2022-04-07 | End: 2022-04-07 | Stop reason: HOSPADM

## 2022-04-05 RX ORDER — HYDRALAZINE HYDROCHLORIDE 25 MG/1
25 TABLET, FILM COATED ORAL 2 TIMES DAILY
Status: DISCONTINUED | OUTPATIENT
Start: 2022-04-05 | End: 2022-04-07

## 2022-04-05 RX ORDER — OXYCODONE HYDROCHLORIDE 5 MG/1
2.5 TABLET ORAL
Status: DISCONTINUED | OUTPATIENT
Start: 2022-04-05 | End: 2022-04-07 | Stop reason: HOSPADM

## 2022-04-05 RX ORDER — METOPROLOL TARTRATE 5 MG/5ML
5 INJECTION INTRAVENOUS ONCE
Status: COMPLETED | OUTPATIENT
Start: 2022-04-05 | End: 2022-04-05

## 2022-04-05 RX ORDER — SODIUM CHLORIDE, SODIUM LACTATE, POTASSIUM CHLORIDE, CALCIUM CHLORIDE 600; 310; 30; 20 MG/100ML; MG/100ML; MG/100ML; MG/100ML
INJECTION, SOLUTION INTRAVENOUS
Status: DISCONTINUED | OUTPATIENT
Start: 2022-04-05 | End: 2022-04-05 | Stop reason: HOSPADM

## 2022-04-05 RX ORDER — AMOXICILLIN 250 MG
1 CAPSULE ORAL 2 TIMES DAILY
Status: DISCONTINUED | OUTPATIENT
Start: 2022-04-05 | End: 2022-04-07 | Stop reason: HOSPADM

## 2022-04-05 RX ORDER — OXYCODONE HYDROCHLORIDE 5 MG/1
2.5-5 TABLET ORAL
Qty: 42 TABLET | Refills: 0 | Status: SHIPPED | OUTPATIENT
Start: 2022-04-05 | End: 2022-04-12

## 2022-04-05 RX ORDER — SODIUM CHLORIDE 0.9 % (FLUSH) 0.9 %
5-40 SYRINGE (ML) INJECTION AS NEEDED
Status: DISCONTINUED | OUTPATIENT
Start: 2022-04-05 | End: 2022-04-07 | Stop reason: HOSPADM

## 2022-04-05 RX ORDER — HYDROMORPHONE HYDROCHLORIDE 1 MG/ML
0.5 INJECTION, SOLUTION INTRAMUSCULAR; INTRAVENOUS; SUBCUTANEOUS
Status: ACTIVE | OUTPATIENT
Start: 2022-04-05 | End: 2022-04-06

## 2022-04-05 RX ORDER — ASPIRIN 81 MG/1
81 TABLET ORAL 2 TIMES DAILY
Status: DISCONTINUED | OUTPATIENT
Start: 2022-04-05 | End: 2022-04-07 | Stop reason: HOSPADM

## 2022-04-05 RX ORDER — TRANEXAMIC ACID 100 MG/ML
INJECTION, SOLUTION INTRAVENOUS AS NEEDED
Status: DISCONTINUED | OUTPATIENT
Start: 2022-04-05 | End: 2022-04-05 | Stop reason: HOSPADM

## 2022-04-05 RX ORDER — BUPIVACAINE HYDROCHLORIDE 5 MG/ML
INJECTION, SOLUTION EPIDURAL; INTRACAUDAL
Status: COMPLETED | OUTPATIENT
Start: 2022-04-05 | End: 2022-04-05

## 2022-04-05 RX ORDER — POLYETHYLENE GLYCOL 3350 17 G/17G
17 POWDER, FOR SOLUTION ORAL DAILY
Status: DISCONTINUED | OUTPATIENT
Start: 2022-04-06 | End: 2022-04-07 | Stop reason: HOSPADM

## 2022-04-05 RX ORDER — NALOXONE HYDROCHLORIDE 0.4 MG/ML
0.4 INJECTION, SOLUTION INTRAMUSCULAR; INTRAVENOUS; SUBCUTANEOUS AS NEEDED
Status: DISCONTINUED | OUTPATIENT
Start: 2022-04-05 | End: 2022-04-07 | Stop reason: HOSPADM

## 2022-04-05 RX ORDER — PROPOFOL 10 MG/ML
INJECTION, EMULSION INTRAVENOUS
Status: DISCONTINUED | OUTPATIENT
Start: 2022-04-05 | End: 2022-04-05 | Stop reason: HOSPADM

## 2022-04-05 RX ORDER — SODIUM CHLORIDE 0.9 % (FLUSH) 0.9 %
5-40 SYRINGE (ML) INJECTION EVERY 8 HOURS
Status: DISCONTINUED | OUTPATIENT
Start: 2022-04-05 | End: 2022-04-07 | Stop reason: HOSPADM

## 2022-04-05 RX ORDER — LIDOCAINE HYDROCHLORIDE 20 MG/ML
INJECTION, SOLUTION EPIDURAL; INFILTRATION; INTRACAUDAL; PERINEURAL AS NEEDED
Status: DISCONTINUED | OUTPATIENT
Start: 2022-04-05 | End: 2022-04-05 | Stop reason: HOSPADM

## 2022-04-05 RX ORDER — ATORVASTATIN CALCIUM 40 MG/1
40 TABLET, FILM COATED ORAL
Status: DISCONTINUED | OUTPATIENT
Start: 2022-04-05 | End: 2022-04-07 | Stop reason: HOSPADM

## 2022-04-05 RX ORDER — LOSARTAN POTASSIUM 50 MG/1
100 TABLET ORAL DAILY
Status: DISCONTINUED | OUTPATIENT
Start: 2022-04-06 | End: 2022-04-07 | Stop reason: HOSPADM

## 2022-04-05 RX ORDER — PROPOFOL 10 MG/ML
INJECTION, EMULSION INTRAVENOUS AS NEEDED
Status: DISCONTINUED | OUTPATIENT
Start: 2022-04-05 | End: 2022-04-05 | Stop reason: HOSPADM

## 2022-04-05 RX ADMIN — OXYCODONE 5 MG: 5 TABLET ORAL at 16:12

## 2022-04-05 RX ADMIN — PROPOFOL 40 MG: 10 INJECTION, EMULSION INTRAVENOUS at 09:11

## 2022-04-05 RX ADMIN — CEFAZOLIN SODIUM 2 G: 1 INJECTION, POWDER, FOR SOLUTION INTRAMUSCULAR; INTRAVENOUS at 17:00

## 2022-04-05 RX ADMIN — WATER 2 G: 1 INJECTION INTRAMUSCULAR; INTRAVENOUS; SUBCUTANEOUS at 09:25

## 2022-04-05 RX ADMIN — SENNOSIDES AND DOCUSATE SODIUM 1 TABLET: 50; 8.6 TABLET ORAL at 17:00

## 2022-04-05 RX ADMIN — ACETAMINOPHEN 500 MG: 500 TABLET ORAL at 16:16

## 2022-04-05 RX ADMIN — ACETAMINOPHEN 1000 MG: 500 TABLET ORAL at 08:08

## 2022-04-05 RX ADMIN — SODIUM CHLORIDE: 900 INJECTION, SOLUTION INTRAVENOUS at 11:06

## 2022-04-05 RX ADMIN — PROPOFOL 40 MG: 10 INJECTION, EMULSION INTRAVENOUS at 09:05

## 2022-04-05 RX ADMIN — PHENYLEPHRINE HYDROCHLORIDE 40 MCG/MIN: 10 INJECTION INTRAVENOUS at 09:13

## 2022-04-05 RX ADMIN — SODIUM CHLORIDE 125 ML/HR: 9 INJECTION, SOLUTION INTRAVENOUS at 16:12

## 2022-04-05 RX ADMIN — ONDANSETRON HYDROCHLORIDE 4 MG: 2 INJECTION, SOLUTION INTRAMUSCULAR; INTRAVENOUS at 09:22

## 2022-04-05 RX ADMIN — SODIUM CHLORIDE 125 ML/HR: 9 INJECTION, SOLUTION INTRAVENOUS at 12:58

## 2022-04-05 RX ADMIN — ACETAMINOPHEN 500 MG: 500 TABLET ORAL at 19:31

## 2022-04-05 RX ADMIN — TRANEXAMIC ACID 1 G: 100 INJECTION, SOLUTION INTRAVENOUS at 09:30

## 2022-04-05 RX ADMIN — SODIUM CHLORIDE 125 ML/HR: 9 INJECTION, SOLUTION INTRAVENOUS at 17:01

## 2022-04-05 RX ADMIN — BUPIVACAINE HYDROCHLORIDE 10 MG: 5 INJECTION, SOLUTION EPIDURAL; INTRACAUDAL; PERINEURAL at 09:12

## 2022-04-05 RX ADMIN — PROPOFOL 50 MCG/KG/MIN: 10 INJECTION, EMULSION INTRAVENOUS at 09:11

## 2022-04-05 RX ADMIN — PROPOFOL 20 MG: 10 INJECTION, EMULSION INTRAVENOUS at 09:33

## 2022-04-05 RX ADMIN — HYDRALAZINE HYDROCHLORIDE 25 MG: 25 TABLET, FILM COATED ORAL at 17:00

## 2022-04-05 RX ADMIN — LIDOCAINE HYDROCHLORIDE 50 MG: 20 INJECTION, SOLUTION EPIDURAL; INFILTRATION; INTRACAUDAL; PERINEURAL at 09:05

## 2022-04-05 RX ADMIN — METOROPROLOL TARTRATE 5 MG: 5 INJECTION, SOLUTION INTRAVENOUS at 08:52

## 2022-04-05 RX ADMIN — SODIUM CHLORIDE, POTASSIUM CHLORIDE, SODIUM LACTATE AND CALCIUM CHLORIDE: 600; 310; 30; 20 INJECTION, SOLUTION INTRAVENOUS at 08:50

## 2022-04-05 RX ADMIN — ASPIRIN 81 MG: 81 TABLET, COATED ORAL at 17:00

## 2022-04-05 NOTE — BRIEF OP NOTE
Brief Postoperative Note    Patient: Mihir Hanson  YOB: 1940  MRN: [id number]    Date of Procedure: 4/5/2022     Pre-Op Diagnosis: OA RIGHT HIP    Post-Op Diagnosis: Same as preoperative diagnosis. Procedure(s):  RIGHT TOTAL HIP ARTHROPLASTY (SPINAL, BLOCK W/IV SEDATION)    Surgeon(s):  Aristeo Hurtado MD    Surgical Assistant: Physician Assistant: Snow Dominguez PA-C  Surg Asst-1: Elsi Calhoun    Anesthesia: Spinal     Estimated Blood Loss (mL): 546    Complications: None    Specimens: * No specimens in log *     Implants:   Implant Name Type Inv.  Item Serial No.  Lot No. LRB No. Used Action   CUP ACET CLUS HOLE E 50 MM W/ DOME HOLE PLUG P2 COAT EMPOWR - SN/A  CUP ACET CLUS HOLE E 50 MM W/ DOME HOLE PLUG P2 COAT EMPOWR N/A [facility] [id number] Right 1 Implanted   LINER ACET NEUT E 36X50 MM HIP HXE+ VIT E POLYETH EMPOWR - SN/A  LINER ACET NEUT E 36X50 MM HIP HXE+ VIT E POLYETH EMPOWR N/A [facility] SURGICAL_ [id number] Right 1 Implanted   LINER ACET NEUT E 36X50 MM HIP HXE+ VIT E POLYETH EMPOWR - SN/A  LINER ACET NEUT E 36X50 MM HIP HXE+ VIT E POLYETH EMPOWR N/A [facility] SURGICAL_ [id number] Right 1 Implanted   SCREW BNE 30 MM ACET EMPOWR - SN/A  SCREW BNE 30 MM ACET EMPOWR N/A [facility] SURGICAL_ [id number] Right 1 Implanted   HEAD FEM NEUT 36 MM HIP OFFSET FOR FMP SYS BIOLOX DELT CERM - SN/A  HEAD FEM NEUT 36 MM HIP OFFSET FOR FMP SYS BIOLOX DELT CERM N/A [facility] SURGICAL_ [id number] Right 1 Implanted       Drains: * No LDAs found *    Findings: oa right hip    Electronically Signed by Ana Joyner PA-C on 4/5/2022 at 11:00 AM

## 2022-04-05 NOTE — DISCHARGE SUMMARY
[address]     DISCHARGE SUMMARY     Name: Marisa Mccall       MR#: [id number]    : 1940  ADMIT DATE: 2022  DISCHARGE DATE: 22     ADMISSION DIAGNOSIS: Primary localized osteoarthritis of right hip [M16.11]     DISCHARGE DIAGNOSIS: OA RIGHT HIP     PROCEDURE PERFORMED: Procedure(s):  RIGHT TOTAL HIP ARTHROPLASTY (SPINAL, BLOCK W/IV SEDATION)     CONSULTATIONS:  Hospitalist.     HISTORY OF PRESENT ILLNESS: The patient is a 81-year-old female with progressive right hip and groin pain due to severe osteoarthritis of the right hip. Symptoms have progressed despite comprehensive conservative treatment. She presents for right total hip replacement. Risks, benefits, and alternatives of procedure were reviewed with her in detail and she desires to proceed.     HOSPITAL COURSE:  The patient underwent the aforementioned procedure on date of admission under spinal anesthesia with adductor canal block. There were no immediate postoperative complications. She was started on a multimodal pain regimen and DVT prophylaxis. POD 2, on call was paged re: rapid HR with no systemic symptoms. EKG and hospitalist consult ordered. Patient was seen by her PCP who added a beta blocker and determined she was safe for d/c. Of note, HR in PCP office prior to surgery was 114, and in preop she was 130, completely asymptomatic.      DISPOSITION: The patient made slow, steady progress with physical therapy and was appropriate for discharge to Home in stable condition on postoperative day 2. DISCHARGE MEDICATIONS:  Reinitiate preadmission medications. In addition, the patient will be on ASA for DVT prophylaxis and low dose oxycodone and Tylenol for pain. DISCHARGE INSTRUCTIONS:  Detailed printed instructions were provided to the patient. Follow up with Dr. Xenia Good in approximately 3 weeks. The patient will receive home health physical therapy in the meantime.     Signed by: Pushpa Kendall ILEN  4/7/2022

## 2022-04-05 NOTE — ANESTHESIA PROCEDURE NOTES
Spinal Block    Start time: 4/5/2022 9:05 AM  End time: 4/5/2022 9:14 AM  Performed by: Hodan Guerrero CRNA  Authorized by: Hodan Guerrero CRNA     Pre-procedure:   Indications: primary anesthetic  Preanesthetic Checklist: patient identified, risks and benefits discussed, anesthesia consent, site marked, patient being monitored and timeout performed    Timeout Time: 09:04 EDT          Spinal Block:   Patient Position:  Seated  Prep Region:  Lumbar  Prep: Betadine      Location:  L3-4  Technique:  Single shot    Local Dose (mL):  2    Needle:   Needle Type:  Pencil-tip  Needle Gauge:  24 G  Attempts:  1      Events: CSF confirmed, no blood with aspiration and no paresthesia        Assessment:  Insertion:  Uncomplicated  Patient tolerance:  Patient tolerated the procedure well with no immediate complications

## 2022-04-05 NOTE — PROGRESS NOTES
Medicare Outpatient Observation Notice (MOON) provided to patient/representative with verbal explanation of the notice. Time allotted for questions regarding the notice. Patient /representative provided a completed copy of the MOON notice. Copy placed on bedside chart. César Matthews, Care Management Assistant.

## 2022-04-05 NOTE — PROGRESS NOTES
1250: pt with PACs and SA on monitor. S/w , she is aware and performed EKG in pre op. BP is stable, okay for transfer out of pacu. 1330 :TRANSFER - OUT REPORT:    Verbal report given to Joy Glaser RN(name) on [employer]  being transferred to 570(unit) for routine post - op       Report consisted of patients Situation, Background, Assessment and   Recommendations(SBAR). Time Pre op antibiotic given:0925  Anesthesia Stop time: 3720  Rodriguez Present on Transfer to floor:no  Order for Rodriguez on Chart:no  Discharge Prescriptions with Chart:no    Information from the following report(s) SBAR, Kardex, OR Summary, Intake/Output and MAR was reviewed with the receiving nurse. Opportunity for questions and clarification was provided. Is the patient on 02? NO        Is the patient on a monitor? NO    Is the nurse transporting with the patient? NO    Surgical Waiting Area notified of patient's transfer from PACU? YES      The following personal items collected during your admission accompanied patient upon transfer:   Dental Appliance: Dental Appliances: Uppers,Lowers (dentures to pacu)  Vision:    Hearing Aid:    Jewelry: Jewelry: None  Clothing:    Other Valuables:  Other Valuables:  (cane in bag to pacu)  Valuables sent to safe:

## 2022-04-05 NOTE — ANESTHESIA POSTPROCEDURE EVALUATION
Post-Anesthesia Evaluation and Assessment    Patient: Nati Mills MRN: [id number]  SSN: [id number]    YOB: 1940  Age: 80 y.o. Sex: female      I have evaluated the patient and they are stable and ready for discharge from the PACU. Cardiovascular Function/Vital Signs  Visit Vitals  /70   Pulse 71   Temp 36.4 °C (97.6 °F)   Resp 14   Ht 5' 2\" (1.575 m)   Wt 60.2 kg (132 lb 11.5 oz)   SpO2 100%   BMI 24.27 kg/m²       Patient is status post Spinal anesthesia for Procedure(s):  RIGHT TOTAL HIP ARTHROPLASTY (SPINAL, BLOCK W/IV SEDATION). Nausea/Vomiting: None    Postoperative hydration reviewed and adequate. Pain:  Pain Scale 1: Numeric (0 - 10) (04/05/22 1136)  Pain Intensity 1: 0 (04/05/22 1136)   Managed    Neurological Status:   Neuro (WDL): Exceptions to WDL (04/05/22 1136)  Neuro  Neurologic State: Alert (04/05/22 1136)  LUE Motor Response: Purposeful (04/05/22 1136)  LLE Motor Response: Purposeful (04/05/22 1136)  RUE Motor Response: Purposeful (04/05/22 1136)  RLE Motor Response: Purposeful (04/05/22 1136)   At baseline    Mental Status, Level of Consciousness: Alert and  oriented to person, place, and time    Pulmonary Status:   O2 Device: Nasal cannula (04/05/22 1136)   Adequate oxygenation and airway patent    Complications related to anesthesia: None    Post-anesthesia assessment completed. No concerns    Signed By: Judson Driver MD     April 5, 2022              Procedure(s):  RIGHT TOTAL HIP ARTHROPLASTY (SPINAL, BLOCK W/IV SEDATION). spinal    <BSHSIANPOST>    INITIAL Post-op Vital signs:   Vitals Value Taken Time   /60 04/05/22 1215   Temp 36.4 °C (97.6 °F) 04/05/22 1136   Pulse 105 04/05/22 1222   Resp 18 04/05/22 1222   SpO2 98 % 04/05/22 1222   Vitals shown include unvalidated device data.

## 2022-04-05 NOTE — ANESTHESIA PREPROCEDURE EVALUATION
Relevant Problems   No relevant active problems       Anesthetic History   No history of anesthetic complications            Review of Systems / Medical History  Patient summary reviewed, nursing notes reviewed and pertinent labs reviewed    Pulmonary  Within defined limits                 Neuro/Psych   Within defined limits           Cardiovascular    Hypertension                Comments: Left Ventricle: Left ventricle size is normal. Findings consistent with mild concentric hypertrophy. Normal wall motion. Normal left ventricular systolic function with a visually estimated EF of 55 - 60%.    GI/Hepatic/Renal                Endo/Other        Arthritis     Other Findings              Physical Exam    Airway  Mallampati: II  TM Distance: > 6 cm  Neck ROM: normal range of motion   Mouth opening: Normal     Cardiovascular    Rhythm: regular           Dental  No notable dental hx       Pulmonary  Breath sounds clear to auscultation               Abdominal         Other Findings            Anesthetic Plan    ASA: 2  Anesthesia type: spinal          Induction: Intravenous  Anesthetic plan and risks discussed with: Patient

## 2022-04-05 NOTE — DISCHARGE INSTRUCTIONS
Post-op Discharge Instructions Following Total Joint Replacement  Megan Colmenares MD  [address]  [phone]  Juana Shukla See Dr. Naresh Catalan approximately 3-4 weeks from date of surgery. Call [phone] to make an appointment.  Call Letty Lynn RN if you have questions or concerns, [phone]. Activity   Use your walker for ambulation. Weight bearing as tolerated unless instructed otherwise by the physical therapist. Get up every hour you are awake and take a brief walk. Lengthen walking distance daily as your strength improves.  Continue using your walker until seen in the office for your first follow up visit.  Practice your exercises 3 times daily as instructed by the physical therapist. Santos Rocker for 20 minutes after exercising.  No driving until seen in the office for your first follow up visit. Incision Care   The light brown Aquacel surgical dressing is waterproof and is to remain on your incision for 7 days. On the 7th day, carefully lift the edge of the dressing to break the adhesive seal and gently peel it off.  If your Aquacel dressings comes loose or falls off before the 7th day, replace it with a dry sterile gauze dressing and change this dressing daily. Once there is no drainage on the bandage, you mean leave the incision open to air.  You may take a shower with the Aquacel dressing in place. After you remove the Aquacel dressing on day 7, you may continue to shower and get your incision wet in the shower. Do not submerge your incision under water in a bathtub, hot tub, swimming pool, etc. until after you have been evaluated at your first office visit. Medications   Blood Clot Prevention: Take medication as prescribed by your physician for 4 weeks postop.  Pain Management: Take pain medication as prescribed; wean yourself off of pain medication as your pain lessens. Take with food.  You make also take Tylenol every 4-6 hours as needed for pain.   Do not exceed 3 grams (3000mg) per day.  Place an ice bag on or around the incision for 20 minutes on / 20 minutes off as needed throughout the day and night, especially after exercising.  Stool Softener: You may want to take a stool softener (such as Senokot-S or Colace) to prevent constipation while taking pain medication. If constipation occurs, you may also use a laxative (such as Dulcolax tablets, Miralax, or a suppository). Diet   Resume usual diet at home. Drink plenty of fluids. Eat foods high in fiber and protein. Calcium and Vitamin D supplements recommended. Avoid alcoholic beverages. No smoking. When to call your Orthopaedic Surgeon: If you call after 5pm or on a weekend, the on call physician will return your call   Pain that is not relieved by pain medication, ice, and activity modification   Signs of infection (red incision, continuous drainage from the incision, malodorous drainage, persistent fever greater than 101 degrees Fahrenheit)   Signs of a blood clot in your leg (calf pain, tenderness, redness, and/or swelling of the lower leg)  ?   When to call your Primary Care Physician   Concerns about your medical conditions such as diabetes, high blood pressure, asthma, congestive heart failure   Call if blood sugars are elevated, if you have a persistent headache or dizziness, coughing or congestion, constipation or diarrhea, burning with urination, abnormal heart rate (fast or slow)  When to call 911 and go to the nearest Emergency Room   Acute onset of chest pain, shortness of breath, difficulty breathing

## 2022-04-05 NOTE — PROGRESS NOTES
Problem: Mobility Impaired (Adult and Pediatric)  Goal: *Acute Goals and Plan of Care (Insert Text)  Description: FUNCTIONAL STATUS PRIOR TO ADMISSION: Patient was independent and active without use of DME. Pt would occasional use a cane. Pt reported she was working up until Dec. 29, 2021    HOME SUPPORT PRIOR TO ADMISSION: The patient lived alone with family that lives locally to provide assistance as needed. Pt's only bathroom is upstairs (13 steps to access)    Physical Therapy Goals  Initiated 4/5/2022    1. Patient will move from supine to sit and sit to supine  in bed with modified independence within 4 days. 2. Patient will perform sit to stand with modified independence within 4 days. 3. Patient will ambulate with modified independence for 1500 feet with the least restrictive device within 4 days. 4. Patient will ascend/descend 12 stairs with single handrail(s) with minimal assistance/contact guard assist within 4 days. 5. Patient will perform total hip home exercise program per protocol with supervision/set-up within 4 days. Outcome: Progressing Towards Goal   PHYSICAL THERAPY EVALUATION  Patient: Toño Ndiaye (89 y.o. female)  Date: 4/5/2022  Primary Diagnosis: Primary localized osteoarthritis of right hip [M16.11]  Procedure(s) (LRB):  RIGHT TOTAL HIP ARTHROPLASTY (SPINAL, BLOCK W/IV SEDATION) (Right) Day of Surgery   Precautions:   Fall,WBAT      ASSESSMENT  Based on the objective data described below, the patient presents with decreased RLE ROM and strength, decreased functional mobility, impaired balance and gait, decreased functional mobility, impaired balance and gait, decreased tolerance to activity s/p admission on 4/5 R JEM, POD #0. Pt received supine in bed with sister-in-law at bedside. Pt+family member voiced they would prefer to discharge to rehab setting upon discharge as patient lives alone and 13 steps to access the only bathroom in the home (notified Jose De Jesus Sullivan NP).  Pt completed bed mobility min A, sit<>stand with RW with min A and side stepping along EOB. Pt assisted back to supine position with all needs met. Current Level of Function Impacting Discharge (mobility/balance): min A x 1 supine<>sit, sit<>stand with RW with min A, side stepping with min A x 1    Functional Outcome Measure: The patient scored Total Score: 22/28 on the Tinetti outcome measure which is indicative of moderate fall risk. Other factors to consider for discharge: pt lives alone, requesting to go to rehab     Patient will benefit from skilled therapy intervention to address the above noted impairments. PLAN :  Recommendations and Planned Interventions: bed mobility training, transfer training, gait training, therapeutic exercises, neuromuscular re-education, modalities, patient and family training/education, and therapeutic activities      Frequency/Duration: Patient will be followed by physical therapy:  twice daily to address goals. Recommendation for discharge: (in order for the patient to meet his/her long term goals)  To be determined: pt requesting to go to rehab    This discharge recommendation:  Has been made in collaboration with the attending provider and/or case management    IF patient discharges home will need the following DME: patient owns DME required for discharge         SUBJECTIVE:   Patient stated I was working up until Dec. 29 this past year.     OBJECTIVE DATA SUMMARY:   HISTORY:    Past Medical History:   Diagnosis Date    Advance care planning     documents pending    Anemia     Arthropathy     CKD (chronic kidney disease), stage III ([facility] [address] 09/08/2014    Yessica Cuadra md    DJD (degenerative joint disease) of knee 10/21/2021    Adan Rodriguez    Family history of colon cancer     History of total right knee replacement (TKR) 2011    ghazal mirza md    Hypercholesterolemia     Hypertension     IGT (impaired glucose tolerance) 10/08/2018    Knee pain, left     Osteoarthritis of right hip 02/2021    Pes anserine bursitis 08/11/2020    Rheumatoid arthritis ([facility] [address]     S/P colonoscopy 11/30/2021    Unruly Almeida MD -repeat 3 yrs     Past Surgical History:   Procedure Laterality Date    COLONOSCOPY N/A 11/29/2018    COLONOSCOPY performed by Unruly Almeida MD at [facility] ENDOSCOPY    COLONOSCOPY N/A 11/30/2021    COLONOSCOPY   V performed by Unruly Almeida MD at [facility] ENDOSCOPY    HX BUNIONECTOMY      HX CARPAL TUNNEL RELEASE      HX CERVICAL FUSION  2018    HX KNEE REPLACEMENT Right 2011       Personal factors and/or comorbidities impacting plan of care:     Home Situation  Home Environment: Private residence  # Steps to Enter: 1  Rails to Enter: No  Wheelchair Ramp: No  One/Two Story Residence: Two story  # of Interior Steps: 15  Interior Rails: Right  Lift Chair Available: No  Living Alone: Yes  Support Systems: Other Family Member(s)  Patient Expects to be Discharged to[de-identified] Home with home health  Current DME Used/Available at Home: [address] [place] [address]    EXAMINATION/PRESENTATION/DECISION MAKING:   Critical Behavior:  Neurologic State: Alert,Appropriate for age  Orientation Level: Appropriate for age,Oriented X4  Cognition: Appropriate decision making,Appropriate for age attention/concentration     Hearing:     Skin:    Edema:   Range Of Motion:  AROM: Generally decreased, functional                       Strength:    Strength: Generally decreased, functional                    Tone & Sensation:                  Sensation: Intact               Coordination:     Vision:      Functional Mobility:  Bed Mobility:     Supine to Sit: Minimum assistance  Sit to Supine: Minimum assistance  Scooting: Stand-by assistance  Transfers:  Sit to Stand: Minimum assistance  Stand to Sit: Minimum assistance                       Balance:   Sitting: Intact  Standing: Impaired; With support  Standing - Static: Good  Standing - Dynamic : Fair  Ambulation/Gait Training:  Distance (ft): 3 Feet (ft)  Assistive Device: Gait belt;Walker, rolling  Ambulation - Level of Assistance: Minimal assistance        Gait Abnormalities: Step to gait  Right Side Weight Bearing: As tolerated  Left Side Weight Bearing: Full  Base of Support: Narrowed  Stance: Right decreased  Speed/Veronica: Pace decreased (<100 feet/min); Shuffled  Step Length: Right shortened;Left shortened            Therapeutic Exercises: Ankle pumps, knee flexion    Functional Measure:  Tinetti test:    Sitting Balance: 1  Arises: 1  Attempts to Rise: 2  Immediate Standing Balance: 1  Standing Balance: 1  Nudged: 2  Eyes Closed: 1  Turn 360 Degrees - Continuous/Discontinuous: 1  Turn 360 Degrees - Steady/Unsteady: 1  Sitting Down: 1  Balance Score: 12 Balance total score  Indication of Gait: 1  R Step Length/Height: 1  L Step Length/Height: 1  R Foot Clearance: 1  L Foot Clearance: 1  Step Symmetry: 1  Step Continuity: 1  Path: 1  Trunk: 1  Walking Time: 1  Gait Score: 10 Gait total score  Total Score: 22/28 Overall total score         Tinetti Tool Score Risk of Falls  <19 = High Fall Risk  19-24 = Moderate Fall Risk  25-28 = Low Fall Risk  Tinetti ME. Performance-Oriented Assessment of Mobility Problems in Elderly Patients. [facility] 66; [id number].  (Scoring Description: PT Bulletin Feb. 10, 1993)    Older adults: Triston Godinez et al, 2009; n = 1000 [facility] elderly evaluated with ABC, ISMAEL, ADL, and IADL)  · Mean ISMAEL score for males aged 69-68 years = 26.21(3.40)  · Mean ISMAEL score for females age 69-68 years = 25.16(4.30)  · Mean ISMAEL score for males over 80 years = 23.29(6.02)  · Mean ISMAEL score for females over 80 years = 17.20(8.32)      Based on the above components, the patient evaluation is determined to be of the following complexity level: LOW     Pain Rating:  Pt reported minimal pain during weight bearing    Activity Tolerance:   Fair      After treatment patient left in no apparent distress:   Supine in bed, Call bell within reach, Caregiver / family present, and Side rails x 3    COMMUNICATION/EDUCATION:   The patients plan of care was discussed with: Registered nurse. Fall prevention education was provided and the patient/caregiver indicated understanding., Patient/family have participated as able in goal setting and plan of care. , and Patient/family agree to work toward stated goals and plan of care.     Thank you for this referral.  Star Morning, PT, DPT   Time Calculation: 17 mins

## 2022-04-06 LAB
ANION GAP SERPL CALC-SCNC: 6 MMOL/L (ref 5–15)
BUN SERPL-MCNC: 17 MG/DL (ref 6–20)
BUN/CREAT SERPL: 15 (ref 12–20)
CALCIUM SERPL-MCNC: 8.4 MG/DL (ref 8.5–10.1)
CHLORIDE SERPL-SCNC: 110 MMOL/L (ref 97–108)
CO2 SERPL-SCNC: 25 MMOL/L (ref 21–32)
CREAT SERPL-MCNC: 1.17 MG/DL (ref 0.55–1.02)
GLUCOSE SERPL-MCNC: 115 MG/DL (ref 65–100)
HGB BLD-MCNC: 8.7 G/DL (ref 11.5–16)
POTASSIUM SERPL-SCNC: 3.2 MMOL/L (ref 3.5–5.1)
SODIUM SERPL-SCNC: 141 MMOL/L (ref 136–145)

## 2022-04-06 PROCEDURE — 74011000250 HC RX REV CODE- 250: Performed by: PHYSICIAN ASSISTANT

## 2022-04-06 PROCEDURE — 97116 GAIT TRAINING THERAPY: CPT

## 2022-04-06 PROCEDURE — 80048 BASIC METABOLIC PNL TOTAL CA: CPT

## 2022-04-06 PROCEDURE — 97530 THERAPEUTIC ACTIVITIES: CPT

## 2022-04-06 PROCEDURE — 97165 OT EVAL LOW COMPLEX 30 MIN: CPT

## 2022-04-06 PROCEDURE — 85018 HEMOGLOBIN: CPT

## 2022-04-06 PROCEDURE — 74011250637 HC RX REV CODE- 250/637: Performed by: PHYSICIAN ASSISTANT

## 2022-04-06 PROCEDURE — 36415 COLL VENOUS BLD VENIPUNCTURE: CPT

## 2022-04-06 PROCEDURE — 97535 SELF CARE MNGMENT TRAINING: CPT

## 2022-04-06 PROCEDURE — 74011250636 HC RX REV CODE- 250/636: Performed by: PHYSICIAN ASSISTANT

## 2022-04-06 PROCEDURE — 94760 N-INVAS EAR/PLS OXIMETRY 1: CPT

## 2022-04-06 RX ADMIN — ACETAMINOPHEN 500 MG: 500 TABLET ORAL at 00:07

## 2022-04-06 RX ADMIN — SODIUM CHLORIDE 125 ML/HR: 9 INJECTION, SOLUTION INTRAVENOUS at 04:01

## 2022-04-06 RX ADMIN — ATORVASTATIN CALCIUM 40 MG: 40 TABLET, FILM COATED ORAL at 00:07

## 2022-04-06 RX ADMIN — ACETAMINOPHEN 500 MG: 500 TABLET ORAL at 22:46

## 2022-04-06 RX ADMIN — ACETAMINOPHEN 500 MG: 500 TABLET ORAL at 17:14

## 2022-04-06 RX ADMIN — OXYCODONE 5 MG: 5 TABLET ORAL at 09:50

## 2022-04-06 RX ADMIN — OXYCODONE 5 MG: 5 TABLET ORAL at 17:14

## 2022-04-06 RX ADMIN — CEFAZOLIN SODIUM 2 G: 1 INJECTION, POWDER, FOR SOLUTION INTRAMUSCULAR; INTRAVENOUS at 00:07

## 2022-04-06 RX ADMIN — SODIUM CHLORIDE, PRESERVATIVE FREE 10 ML: 5 INJECTION INTRAVENOUS at 17:16

## 2022-04-06 RX ADMIN — ASPIRIN 81 MG: 81 TABLET, COATED ORAL at 09:50

## 2022-04-06 RX ADMIN — OXYCODONE 5 MG: 5 TABLET ORAL at 12:38

## 2022-04-06 RX ADMIN — SODIUM CHLORIDE, PRESERVATIVE FREE 10 ML: 5 INJECTION INTRAVENOUS at 22:47

## 2022-04-06 RX ADMIN — ACETAMINOPHEN 500 MG: 500 TABLET ORAL at 09:50

## 2022-04-06 RX ADMIN — OXYCODONE 5 MG: 5 TABLET ORAL at 04:04

## 2022-04-06 RX ADMIN — HYDRALAZINE HYDROCHLORIDE 25 MG: 25 TABLET, FILM COATED ORAL at 17:13

## 2022-04-06 RX ADMIN — POLYETHYLENE GLYCOL 3350 17 G: 17 POWDER, FOR SOLUTION ORAL at 09:50

## 2022-04-06 RX ADMIN — AMLODIPINE BESYLATE 5 MG: 5 TABLET ORAL at 09:50

## 2022-04-06 RX ADMIN — HYDRALAZINE HYDROCHLORIDE 25 MG: 25 TABLET, FILM COATED ORAL at 09:50

## 2022-04-06 RX ADMIN — SENNOSIDES AND DOCUSATE SODIUM 1 TABLET: 50; 8.6 TABLET ORAL at 09:50

## 2022-04-06 RX ADMIN — ACETAMINOPHEN 500 MG: 500 TABLET ORAL at 07:04

## 2022-04-06 RX ADMIN — SENNOSIDES AND DOCUSATE SODIUM 1 TABLET: 50; 8.6 TABLET ORAL at 17:14

## 2022-04-06 RX ADMIN — ASPIRIN 81 MG: 81 TABLET, COATED ORAL at 17:14

## 2022-04-06 RX ADMIN — ATORVASTATIN CALCIUM 40 MG: 40 TABLET, FILM COATED ORAL at 22:47

## 2022-04-06 NOTE — PROGRESS NOTES
Orthopaedics Daily Progress Note                            Date of Surgery:  4/5/2022      Patient: Osmar Makcay   YOB: 1940  Age: 80 y.o. SUBJECTIVE:   1 Day Post-Op following RIGHT TOTAL HIP ARTHROPLASTY (SPINAL, BLOCK W/IV SEDATION). The patient's post operative pain is controlled. No CP/SOB. No N/V. The patient's mobility will be evaluated today during PT sessions. OBJECTIVE:     Vital Signs:      Visit Vitals  BP (!) 130/56 (BP 1 Location: Right upper arm, BP Patient Position: Lying)   Pulse (!) 110   Temp 98.4 °F (36.9 °C)   Resp 16   Ht 5' 2\" (1.575 m)   Wt 132 lb 11.5 oz (60.2 kg)   SpO2 97%   BMI 24.27 kg/m²       Physical Exam:  General: A&Ox3. The patient is cooperative, and in no acute distress. Respiratory: Respirations are unlabored. Surgical site(s): dressing clean, dry  Musculoskeletal: Calves are soft, supple, and non-tender upon palpation. Motor 5/5. Neurological:  Neurovascularly intact with good dorsi and plantar flexion. Pulses symmetrical.    Laboratory Values:             Recent Results (from the past 12 hour(s))   METABOLIC PANEL, BASIC    Collection Time: 04/06/22  4:17 AM   Result Value Ref Range    Sodium 141 136 - 145 mmol/L    Potassium 3.2 (L) 3.5 - 5.1 mmol/L    Chloride 110 (H) 97 - 108 mmol/L    CO2 25 21 - 32 mmol/L    Anion gap 6 5 - 15 mmol/L    Glucose 115 (H) 65 - 100 mg/dL    BUN 17 6 - 20 MG/DL    Creatinine 1.17 (H) 0.55 - 1.02 MG/DL    BUN/Creatinine ratio 15 12 - 20      GFR est AA 54 (L) >60 ml/min/1.73m2    GFR est non-AA 45 (L) >60 ml/min/1.73m2    Calcium 8.4 (L) 8.5 - 10.1 MG/DL   HEMOGLOBIN    Collection Time: 04/06/22  4:17 AM   Result Value Ref Range    HGB 8.7 (L) 11.5 - 16.0 g/dL         PLAN:     S/P RIGHT TOTAL HIP ARTHROPLASTY (SPINAL, BLOCK W/IV SEDATION) -Continue WBAT. -Mobilize and continue with PT/OT until discharged     Hemodynamics Hgb today is 8.7. Acute blood loss anemia as expected. Patient asymptomatic. Continue to monitor. Wound Monitor postop dressing; no postop dressing changes necessary. Reinforce PRN. Post Operative Pain Pain Control: stable, mild-to-moderate joint symptoms intermittently, reasonably well controlled by current meds. DVT Prophylaxis Continue with SCD'S, Ankle Pump Exercises. ASA 81mg BID     Discharge Disposition Discharge plan: Home pending PT clearance.        Signed By: Elsy Machado PA-C  April 6, 2022 8:39 AM

## 2022-04-06 NOTE — PROGRESS NOTES
Problem: Self Care Deficits Care Plan (Adult)  Goal: *Acute Goals and Plan of Care (Insert Text)  Description: FUNCTIONAL STATUS PRIOR TO ADMISSION: Patient was modified independent using a rolling walker for functional mobility. HOME SUPPORT: The patient lived alone with local family to provide PRN assistance. Occupational Therapy Goals  Initiated 4/6/2022  1. Patient will perform lower body ADLs with AE supervision/set-up within 4 day(s). 2.  Patient will perform upper body ADLs standing 5 mins without fatigue or LOB with supervision/set-up within 4 day(s). 3.  Patient will perform toilet transfer with supervision/set-up within 4 day(s). 4.  Patient will perform all aspects of toileting with supervision/set-up within 4 day(s). 5.  Patient will participate in upper extremity therapeutic exercise/activities with supervision/set-up for 10 minutes within 4 day(s). 6.  Patient will utilize energy conservation techniques during functional activities without cues within 4 day(s). Outcome: Not Met    OCCUPATIONAL THERAPY EVALUATION  Patient: Justin Levine (61 y.o. female)  Date: 4/6/2022  Primary Diagnosis: Primary localized osteoarthritis of right hip [M16.11]  Procedure(s) (LRB):  RIGHT TOTAL HIP ARTHROPLASTY (SPINAL, BLOCK W/IV SEDATION) (Right) 1 Day Post-Op   Precautions:  Fall,WBAT    ASSESSMENT  Based on the objective data described below, the patient presents with decreased distal LE ADL management, decreased strength/endurance, decreased mobility/balance and decreased activity tolerance, all of which limit pt's ability to complete self-care routine at level congruent with PLOF. Currently, pt is Independent for self-feeding, CGA for standing grooming and toileting, Mod A for LE bathing/dressing and S/U for UB dressing.   Pt noted with good SBA bed level mobility and CGA sit to stand and bed to chair, with good understanding verbalized regarding modified dressing and long-handled AE technique; however, pt will benefit from continued AE training. Pt benefits from skilled OT to address functional deficits during acute hospitalization, with reporting therapist believing pt would benefit from [facility] upon discharge, given fact she lives alone and has 13 interior stairs to her bedroom. Current Level of Function Impacting Discharge (ADLs/self-care): Mod A for LE ADL completion    Functional Outcome Measure: The patient scored 55/100 on the Barthel Index outcome measure. Other factors to consider for discharge: lives alone, upstairs bedroom/bathroom (13 interior stairs)     Patient will benefit from skilled therapy intervention to address the above noted impairments. PLAN :  Recommendations and Planned Interventions: self care training, functional mobility training, therapeutic exercise, balance training, therapeutic activities, endurance activities, and patient education    Frequency/Duration: Patient will be followed by occupational therapy 5 times a week to address goals. Recommendation for discharge: (in order for the patient to meet his/her long term goals)  Occupational therapy at least 2 days/week in the home     This discharge recommendation:  Has been made in collaboration with the attending provider and/or case management    IF patient discharges home will need the following DME: transfer bench- pt's sister in law states she will independently acquire       SUBJECTIVE:   Patient stated my bedroom is upstairs.     OBJECTIVE DATA SUMMARY:   HISTORY:   Past Medical History:   Diagnosis Date    Advance care planning     documents pending    Anemia     Arthropathy     CKD (chronic kidney disease), stage III ([facility] [address] 09/08/2014    Yessica Cuadra md    DJD (degenerative joint disease) of knee 10/21/2021    Carlos Swenson    Family history of colon cancer     History of total right knee replacement (TKR) 2011    ghazal mirza md    Hypercholesterolemia     Hypertension     IGT (impaired glucose tolerance) 10/08/2018    Knee pain, left     Osteoarthritis of right hip 02/2021    Pes anserine bursitis 08/11/2020    Rheumatoid arthritis ([address]     S/P colonoscopy 11/30/2021    Anton Chew MD -repeat 3 yrs     Past Surgical History:   Procedure Laterality Date    COLONOSCOPY N/A 11/29/2018    COLONOSCOPY performed by Anton Chew MD at [facility] ENDOSCOPY    COLONOSCOPY N/A 11/30/2021    COLONOSCOPY   V performed by Anton Chew MD at [facility] ENDOSCOPY    [address] CERVICAL FUSION  2018    HX KNEE REPLACEMENT Right 2011       Expanded or extensive additional review of patient history:     Home Situation  Home Environment: Private residence  # Steps to Enter: 1  Rails to Enter: No  Wheelchair Ramp: No  One/Two Story Residence: Two story  # of Interior Steps: 13  Interior Rails: Right  Lift Chair Available: No  Living Alone: Yes  Support Systems: Other Family Member(s)  Patient Expects to be Discharged to[de-identified] Home with home health  Current DME Used/Available at Home: Cane, quad,Walker, rolling  Tub or Shower Type: Tub/Shower combination    Hand dominance: Right    EXAMINATION OF PERFORMANCE DEFICITS:  Cognitive/Behavioral Status:  Neurologic State: Alert  Orientation Level: Oriented X4  Cognition: Appropriate decision making; Appropriate for age attention/concentration; Appropriate safety awareness  Perception: Appears intact  Perseveration: No perseveration noted  Safety/Judgement: Awareness of environment;Home safety; Insight into deficits    Hearing: Auditory  Auditory Impairment: None    Vision/Perceptual:                                Corrective Lenses: Glasses    Range of Motion:    AROM: Generally decreased, functional                         Strength:    Strength: Generally decreased, functional                Coordination:     Fine Motor Skills-Upper: Left Intact; Right Intact    Gross Motor Skills-Upper: Left Intact; Right Intact    Tone & Sensation: Sensation: Intact                      Balance:  Sitting: Intact  Standing: Impaired; With support  Standing - Static: Good;Constant support  Standing - Dynamic : Good;Fair;Constant support    Functional Mobility and Transfers for ADLs:  Bed Mobility:  Supine to Sit: Stand-by assistance  Scooting: Stand-by assistance    Transfers:  Sit to Stand: Contact guard assistance  Stand to Sit: Contact guard assistance  Bed to Chair: Contact guard assistance  Bathroom Mobility: Contact guard assistance    ADL Assessment:  Patient recalled and demonstrated avoiding extreme planes of movement with Right LE during ADLs and functional mobility with Min cues. Feeding: Independent    Oral Facial Hygiene/Grooming: Contact guard assistance    Bathing: Moderate assistance    Upper Body Dressing: Setup    Lower Body Dressing: Moderate assistance    Toileting: Contact guard assistance    ADL Intervention and task modifications:  Pt educated on safe transfer techniques, with specific emphasis on proper hand placement to push up from seated surface rather than attempt to pull self up, fully positioning self in-front of desired seated location, feeling chair on back of legs and reaching back with 1-2 UE to slowly lower self to seated position. Patient instructed and indicated understanding the benefits of maintaining activity tolerance, functional mobility, and independence with self care tasks during acute stay  to ensure safe return home and to baseline. Encouraged patient to increase frequency and duration OOB, be out of bed for all meals, perform daily ADLs (as approved by RN/MD regarding bathing etc), and performing functional mobility to/from bathroom. Cognitive Retraining  Safety/Judgement: Awareness of environment;Home safety; Insight into deficits    Bathing: Patient instructed when bathing to not submerge wound in water, stand to shower or sponge bathe, cover wound with plastic and tape to ensure no water reaches bandage/wound without cues. Patient indicated understanding. Dressing joint: Patient instructed and demonstrated to don/doff Right LE first/last Min cues. Patient instructed and demonstrated to don all clothing while sitting prior to standing, doff all clothing to knees while standing, then sit to doff clothing off from knees to feet in order to facilitate fall prevention, pain management, and energy conservation with Moderate Assistance. Home safety: Patient instructed on home modifications and safety (raise height of ADL objects, appropriate height of chair surfaces, recliner safety, change of floor surfaces, clear pathways) to increase independence and fall prevention. Patient indicated understanding. Standing: Patient instructed and demonstrated to walk up to sink/counter top/surfaces, step into walker to increase safety of joint and fall prevention with Contact guard assistance. Instructed to apply concept of hip contraindications to ADLs within the home (no extreme movements, square off while using objects, slide objects along surfaces). Patient instructed to increase amount of time standing, observe standing position during ADLs in order to increase even weight bearing through bilateral LEs in order to increase independence with ADLs. Goal to be reached 30 days post - op, per orthopedic surgeon or per PT. Patient indicated understanding. Tub transfer: Patient instructed regarding when it is safe to begin transfer into tub (complete stairs with PT, advance exercises with PT high enough to clear tub height). Patient instructed to use the same technique as used with stairs when entering and exiting tub (\"up with the non-surgical, down with the surgical leg\"). Patient indicated understanding.     Functional Measure:    Barthel Index:  Bathin  Bladder: 10  Bowels: 10  Groomin  Dressin  Feeding: 10  Mobility: 0  Stairs: 0  Toilet Use: 5  Transfer (Bed to Chair and Back): 10  Total: 55/100 The Barthel ADL Index: Guidelines  1. The index should be used as a record of what a patient does, not as a record of what a patient could do. 2. The main aim is to establish degree of independence from any help, physical or verbal, however minor and for whatever reason. 3. The need for supervision renders the patient not independent. 4. A patient's performance should be established using the best available evidence. Asking the patient, friends/relatives and nurses are the usual sources, but direct observation and common sense are also important. However direct testing is not needed. 5. Usually the patient's performance over the preceding 24-48 hours is important, but occasionally longer periods will be relevant. 6. Middle categories imply that the patient supplies over 50 per cent of the effort. 7. Use of aids to be independent is allowed. Score Interpretation (from [address] [address])    Independent   60-79 Minimally independent   40-59 Partially dependent   20-39 Very dependent   <20 Totally dependent     -Sebastian Suarez., Barthel, D.W. (1965). Functional evaluation: the Barthel Index. [facility] ([address] (1997). The Barthel activities of daily living index: self-reporting versus actual performance in the old (> or = 75 years). Journal [address] [address]), [address] [facility], ..., Governor [facility]., Freeman Arreola. (1999). Measuring the change in disability after inpatient rehabilitation; comparison of the responsiveness of the Barthel Index and Functional [place] Measure. Journal of Neurology, Neurosurgery, and Psychiatry, 66(4), 471-346. Duane Whitten, N.J.A, SMILEY Johnson, & Rick Portillo, MSafiaA. (2004) Assessment of post-stroke quality of life in cost-effectiveness studies: The usefulness of the Barthel Index and the EuroQoL-5D.  Quality of Life Research, 15, 780-78        Occupational Therapy Evaluation Charge Determination History Examination Decision-Making   LOW Complexity : Brief history review  HIGH Complexity : 5 or more performance deficits relating to physical, cognitive , or psychosocial skils that result in activity limitations and / or participation restrictions LOW Complexity : No comorbidities that affect functional and no verbal or physical assistance needed to complete eval tasks       Based on the above components, the patient evaluation is determined to be of the following complexity level: LOW   Pain Rating:  No c/o pain    Activity Tolerance:   Good, Fair, and requires rest breaks    After treatment patient left in no apparent distress:    Sitting in chair, Call bell within reach, and Caregiver / family present    COMMUNICATION/EDUCATION:   The patients plan of care was discussed with: Physical therapist, Registered nurse, and Case management. Home safety education was provided and the patient/caregiver indicated understanding., Patient/family have participated as able in goal setting and plan of care. , and Patient/family agree to work toward stated goals and plan of care. This patients plan of care is appropriate for delegation to [facility].     Thank you for this referral.  Santos Rodriguez OT  Time Calculation: 30 mins

## 2022-04-06 NOTE — PROGRESS NOTES
Occupational Therapy    Orders received, chart reviewed and patient evaluated by occupational therapy. Pending progression with skilled acute occupational therapy, recommend:  Occupational therapy at least 2 days/week in the home      Recommend with nursing ADLs with supervision/setup, OOB to chair 3x/day and toileting via functional mobility to and from bathroom contact guard assist and walker. Thank you for completing as able in order to maintain patient strength, endurance and independence. Full evaluation to follow.      Thank you,  Tu Conn, OT

## 2022-04-06 NOTE — PROGRESS NOTES
Problem: Mobility Impaired (Adult and Pediatric)  Goal: *Acute Goals and Plan of Care (Insert Text)  Description: FUNCTIONAL STATUS PRIOR TO ADMISSION: Patient was independent and active without use of DME. Pt would occasional use a cane. Pt reported she was working up until Dec. 29, 2021    HOME SUPPORT PRIOR TO ADMISSION: The patient lived alone with family that lives locally to provide assistance as needed. Pt's only bathroom is upstairs (13 steps to access)    Physical Therapy Goals  Initiated 4/5/2022    1. Patient will move from supine to sit and sit to supine  in bed with modified independence within 4 days. 2. Patient will perform sit to stand with modified independence within 4 days. 3. Patient will ambulate with modified independence for 1500 feet with the least restrictive device within 4 days. 4. Patient will ascend/descend 12 stairs with single handrail(s) with minimal assistance/contact guard assist within 4 days. 5. Patient will perform total hip home exercise program per protocol with supervision/set-up within 4 days. Outcome: Progressing Towards Goal   PHYSICAL THERAPY TREATMENT  Patient: Janina Verdin (9 y.o. female)  Date: 4/6/2022  Diagnosis: Primary localized osteoarthritis of right hip [M16.11] <principal problem not specified>  Procedure(s) (LRB):  RIGHT TOTAL HIP ARTHROPLASTY (SPINAL, BLOCK W/IV SEDATION) (Right) 1 Day Post-Op  Precautions: Fall,WBAT, avoid sudden and extreme movements of surgical hip  Chart, physical therapy assessment, plan of care and goals were reviewed. ASSESSMENT  Patient continues with skilled PT services and is slowly progressing towards goals. Pt received sitting in chair and agreeable to mobilize with therapy. Reviewed importance of avoiding sudden and extreme movements of surgical hip. Pt stood with minimal assistance and tolerated increased gait distance with slow, cristy with step to gait pattern.   Pt requested to return to bed after ambulation requiring minimal assistance to lift her RLE into bed. Pt completed supine post op hip exercises. Current Level of Function Impacting Discharge (mobility/balance): transfers with CGA, sit to supine with minimal assistance, ambulation with rolling walker x 75 feet with CGA    Other factors to consider for discharge: lives alone, fall risk, below her baseline          PLAN :  Patient continues to benefit from skilled intervention to address the above impairments. Continue treatment per established plan of care. to address goals. Recommendation for discharge: (in order for the patient to meet his/her long term goals)  Physical therapy at least 2 days/week in the home AND ensure assist and/or supervision for safety with mobility and ADLs    This discharge recommendation:  Has not yet been discussed the attending provider and/or case management    IF patient discharges home will need the following DME: patient owns DME required for discharge       SUBJECTIVE:   Patient stated I was working up until December.     OBJECTIVE DATA SUMMARY:   Critical Behavior:  Neurologic State: Alert  Orientation Level: Oriented X4  Cognition: Appropriate decision making,Appropriate for age attention/concentration,Appropriate safety awareness  Safety/Judgement: Awareness of environment,Home safety,Insight into deficits  Functional Mobility Training:  Bed Mobility:       Sit to Supine: Minimum assistance;Assist x1, required assistance to lift RLE into bed  Scooting: Supervision        Transfers:  Sit to Stand: Contact guard assistance;Assist x1  Stand to Sit: Contact guard assistance;Assist x1                    Balance:  Sitting: Intact  Standing: Impaired  Standing - Static: Constant support;Good  Standing - Dynamic : Constant support;Good  Ambulation/Gait Training:  Distance (ft): 75 Feet (ft)  Assistive Device: Walker, rolling;Gait belt  Ambulation - Level of Assistance: Contact guard assistance;Assist x1        Gait Abnormalities: Antalgic; Step to gait; Decreased step clearance  Right Side Weight Bearing: As tolerated        Stance: Right decreased  Speed/Veronica: Slow  Step Length: Right shortened;Left shortened                 Therapeutic Exercises:   SUPINE  EXERCISES   Sets   Reps   Active Active Assist   Passive Self ROM   Comments   Ankle Pumps 1 10 [x]                                           []                                           []                                           []                                              Quad Sets 1 5 [x]                                           []                                           []                                           []                                              Heel Slides 1 5 [x]                                           []                                           []                                           []                                              Hip Abduction 1 5 []                                           [x]                                           []                                           []                                              Hip External Rotation   []                                           []                                           []                                           []                                              Glut Sets 1 5 [x]                                           []                                           []                                           []                                                 []                                           []                                           []                                           []                                                 []                                           []                                           []                                           []                                                STANDING  EXERCISES   Sets   Reps Active Active Assist   Passive Self ROM   Comments   Heel Raises   []                                           []                                           []                                           []                                              Hip Abduction   []                                           []                                           []                                           []                                              Hip External Rotation   []                                           []                                           []                                           []                                              Hip Flexor Stretch   []                                           []                                           []                                           []                                              Mini squats   []                                           []                                           []                                           []                                              Hamstring Curl   []                                           []                                           []                                           []                                                Pain Rating:  Verbal: denies pain    Activity Tolerance:   Fair    After treatment patient left in no apparent distress:   Supine in bed and Call bell within reach    COMMUNICATION/COLLABORATION:   The patients plan of care was discussed with: Registered nurse.      Kavitha Castellon   Time Calculation: 25 mins

## 2022-04-06 NOTE — PROGRESS NOTES
Ortho NP Note    POD# 1  s/p RIGHT TOTAL HIP ARTHROPLASTY (SPINAL, BLOCK W/IV SEDATION)   Pt seen in room    Pt resting in bed. Reports she has been in contact with sister in law today who will be able to assist patient with ADLs/IADLs. Overall reports pain is well controlled using scheduled tylenol and oxycodone. Currently rating 2/10. VSS Afebrile. Visit Vitals  /64   Pulse (!) 106   Temp 98.9 °F (37.2 °C)   Resp 17   Ht 5' 2\" (1.575 m)   Wt 60.2 kg (132 lb 11.5 oz)   SpO2 98%   BMI 24.27 kg/m²       Voiding status: spontaneous void  Output (mL)  Urine Voided: 450 ml (04/06/22 0239)  Last Bowel Movement Date:  (PTA) (04/05/22 2028)      Labs    Lab Results   Component Value Date/Time    HGB 8.7 (L) 04/06/2022 04:17 AM      Lab Results   Component Value Date/Time    INR 1.1 03/28/2022 08:15 AM      Lab Results   Component Value Date/Time    Sodium 141 04/06/2022 04:17 AM    Potassium 3.2 (L) 04/06/2022 04:17 AM    Chloride 110 (H) 04/06/2022 04:17 AM    CO2 25 04/06/2022 04:17 AM    Glucose 115 (H) 04/06/2022 04:17 AM    BUN 17 04/06/2022 04:17 AM    Creatinine 1.17 (H) 04/06/2022 04:17 AM    Calcium 8.4 (L) 04/06/2022 04:17 AM     Recent Glucose Results:   Lab Results   Component Value Date/Time     (H) 04/06/2022 04:17 AM           Body mass index is 24.27 kg/m². : A BMI > 30 is classified as obesity and > 40 is classified as morbid obesity. Aquacel dressing to R hip c.d.i  Cryotherapy in place over incision  Calves soft and supple; No pain with passive stretch  Bilateral LEs warm, dry. 2+ DP pulses. Sensation and motor intact - PF/DF/EHL intact 5/5  SCDs for mechanical DVT proph while in bed     PLAN:  1) PT: BID WBAT  2) Anticoagulation:  Aspirin 81 mg PO BID for DVT Prophylaxis. Encouraged early mobilization, bed exercises, and SCD use. 3) Anemia - Hgb prior to admission: 11.1. EBL: 200. POD 1 Hgb is 8.7. No active bleeding, swelling as anticipated after surgery. Expected Acute blood loss post-op anemia  4) Pain - Multimodal approach including cryotherapy, scheduled Tylenol with  PRN oxycodone  5) Hx of HTN: Current BP: 108/64. Continue home amlodipine, hydralazine. Continue routine VS.    6) Readniess for discharge: sister in law able to assist at home and provide transport. Case management to arrange home health with ongoing therapy at home. Encouraged patient to reach out to additional family members for support over the coming week     [x] Vital Signs stable    [x] + Voiding    [x] Wound intact, drainage minimal    [x] Tolerating PO intake     [] Cleared by PT (OT if applicable)     [] Stair training completed (if applicable)    [] Independent / Contact Guard Assist (household distance)     [] Bed mobility     [] Car transfers     [] ADLs    [x] Adequate pain control on oral medication alone     Discharge to home with [facility] pending PT clearance.     Verónica Chirinos NP  Available via [employer]

## 2022-04-06 NOTE — OP NOTES
[address]   OPERATIVE REPORT    Name:  Oswaldo Alex  MR#:  [id number]  :  1940  ACCOUNT #:  [de-identified]  DATE OF SERVICE:  2022      PREOPERATIVE DIAGNOSIS:  Osteoarthritis, right hip. POSTOPERATIVE DIAGNOSIS:  Osteoarthritis, right hip. PROCEDURE PERFORMED:  Right total hip arthroplasty. SURGEON:  Lissett Samayoa MD    FIRST ASSISTANT:  Karey Bryan PA-C    ANESTHESIA:  Spinal with sedation. COMPLICATIONS:  None. SPECIMENS REMOVED:  None. IMPLANTS:  Components implanted, DonJoy 50-mm Empowr acetabular shell with 36-mm inside diameter neutral polyethylene liner one cancellous screw, size 10 standard offset TaperFill femoral stem with 36-mm +0 ceramic femoral head. ESTIMATED BLOOD LOSS:  200 mL. INDICATIONS:  The patient is a 74-year-old female with progressive right hip and groin pain due to severe osteoarthritis of the right hip. Symptoms have progressed despite comprehensive conservative treatment. She presents for right total hip replacement. Risks, benefits, and alternatives of procedure were reviewed with her in detail and she desires to proceed. PROCEDURE IN DETAIL:  The patient was taken to the operating room where Anesthesia Team placed a spinal.  Preoperative IV antibiotics were administered. She was turned to left lateral decubitus position on a Quiroz frame hip positioner. All bony prominences were well padded and an axillary roll was placed. Right hip and thigh were prepped and draped in the usual sterile fashion. Through a lateral hip incision, I performed a posterior approach to the right hip. Short rotators and posterior capsule were reflected posteriorly. Leg lengths were checked on the table for comparison with trial reduction later during the procedure. The hip was dislocated and femoral neck osteotomy was made. Acetabular retractors were placed and labrum was excised.   The majority of the labrum was calcified and had to be removed with an osteotome. The acetabulum was reamed with hemispherical reamers up to 49 mm before impacting a 50-mm Empowr acetabular shell. Intrinsic stability was satisfactory that was augmented with one cancellous screw. 36 neutral trial liner was placed. Femur was prepared with TaperFill broaches up to size 10 before achieving rotational stability. Calcar was planed. Based on native anatomy, hip was reduced with a standard offset neck trial.  36-mm +0 head trial produced satisfactory leg length and soft tissue tension. Hip was stable to full extension, external rotation with no internal impingement, stable to straight hyperflexion and with the hip flexed 90 degrees in neutral abduction, there was greater than 70-80 degrees of internal rotation. Trials were removed. The wound was copiously irrigated by pulse lavage before the real components were implanted. Same leg length and stability were achieved. Periarticular soft tissues were injected with solution containing 0.5% ropivacaine with epinephrine as well as clonidine and Toradol. Posterior capsule was repaired to the inner aspect of posterior greater trochanter through drill holes using #2 Ethibond sutures. Deep fascia was closed with a combination of heavy Vicryl sutures and a running #2 Stratafix suture. Skin and subcutaneous layers were closed in layered fashion with Vicryl and a running Monocryl subcuticular stitch. Wound was dressed with Dermabond and Aquacel occlusive dressing. The patient's bladder was decompressed with straight catheterization before she was transported to the postanesthesia care unit in stable condition. All counts were correct at the end of the procedure. The physician assistant was critical throughout the case to assist with positioning, retraction, and closure. There were no other available residents, fellows, or surgical assistants available to assist during this procedure.         Trevin Gomez MD [email or web address]  D:  04/05/2022 19:59  T:  04/06/2022 0:07  JOB #:  [id number]  CC:

## 2022-04-06 NOTE — PROGRESS NOTES
Problem: Mobility Impaired (Adult and Pediatric)  Goal: *Acute Goals and Plan of Care (Insert Text)  Description: FUNCTIONAL STATUS PRIOR TO ADMISSION: Patient was independent and active without use of DME. Pt would occasional use a cane. Pt reported she was working up until Dec. 29, 2021    HOME SUPPORT PRIOR TO ADMISSION: The patient lived alone with family that lives locally to provide assistance as needed. Pt's only bathroom is upstairs (13 steps to access)    Physical Therapy Goals  Initiated 4/5/2022    1. Patient will move from supine to sit and sit to supine  in bed with modified independence within 4 days. 2. Patient will perform sit to stand with modified independence within 4 days. 3. Patient will ambulate with modified independence for 1500 feet with the least restrictive device within 4 days. 4. Patient will ascend/descend 12 stairs with single handrail(s) with minimal assistance/contact guard assist within 4 days. 5. Patient will perform total hip home exercise program per protocol with supervision/set-up within 4 days. 4/6/2022 1714 by Sumaya Fulton  Outcome: Progressing Towards Goal   PHYSICAL THERAPY TREATMENT  Patient: Kristyn Olsen (44 y.o. female)  Date: 4/6/2022  Diagnosis: Primary localized osteoarthritis of right hip [M16.11] <principal problem not specified>  Procedure(s) (LRB):  RIGHT TOTAL HIP ARTHROPLASTY (SPINAL, BLOCK W/IV SEDATION) (Right) 1 Day Post-Op  Precautions: Fall,WBAT  Chart, physical therapy assessment, plan of care and goals were reviewed. ASSESSMENT  Patient continues with skilled PT services and is slowly progressing towards goals. Pt received in supine with RLE IR. Pt completed post op hip exercises with good tolerance with cueing to maintain neutral position and avoid IR. She is doing well with transfers with minimal assistance and tolerated ambulation for a short distance with a step to gait pattern with occasional reminders for upright posture.  Pt denies R hip pain and describes as \"pressure\". Pt remained up in chair at end of visit. Pt updated this writer that she plans to stay at a friend's house and she will have assistance for as long as needed. Current Level of Function Impacting Discharge (mobility/balance): bed mobility with minimal assistance, transfers with CGA, ambulation with rolling walker x 80 feet with CGA    Other factors to consider for discharge: lives alone, below her baseline, now reports she will be staying with a friend as long as needed upon discharge         PLAN :  Patient continues to benefit from skilled intervention to address the above impairments. Continue treatment per established plan of care. to address goals. Recommendation for discharge: (in order for the patient to meet his/her long term goals)  Physical therapy at least 2 days/week in the home AND ensure assist and/or supervision for safety with mobility and ADLs    This discharge recommendation:  Has not yet been discussed the attending provider and/or case management    IF patient discharges home will need the following DME: patient owns DME required for discharge       SUBJECTIVE:   Patient stated I have to go through some pain to get better.     OBJECTIVE DATA SUMMARY:   Critical Behavior:  Neurologic State: Alert  Orientation Level: Oriented X4  Cognition: Appropriate decision making,Appropriate for age attention/concentration,Appropriate safety awareness  Safety/Judgement: Awareness of environment,Home safety,Insight into deficits  Functional Mobility Training:  Bed Mobility:     Supine to Sit: Stand-by assistance  Sit to Supine: Minimum assistance;Assist x1  Scooting: Supervision        Transfers:  Sit to Stand: Contact guard assistance;Assist x1  Stand to Sit: Contact guard assistance;Assist x1                          Balance:  Sitting: Intact  Standing: Impaired  Standing - Static: Constant support;Good  Standing - Dynamic : Constant support;Good  Ambulation/Gait Training:  Distance (ft): 80 Feet (ft)  Assistive Device: Walker, rolling;Gait belt  Ambulation - Level of Assistance: Contact guard assistance;Assist x1        Gait Abnormalities: Antalgic; Step to gait; Decreased step clearance, flexed posture, corrects with cueing   Right Side Weight Bearing: As tolerated        Stance: Right decreased  Speed/Veronica: Slow  Step Length: Right shortened;Left shortened            Therapeutic Exercises:   SUPINE  EXERCISES   Sets   Reps   Active Active Assist   Passive Self ROM   Comments   Ankle Pumps   []                                           []                                           []                                           []                                              Quad Sets 1 10 []                                           []                                           []                                           []                                              Heel Slides 1 5 []                                           []                                           []                                           []                                              Hip Abduction 1 5 []                                           []                                           []                                           []                                              Hip External Rotation   []                                           []                                           []                                           []                                              Glut Sets 1 10 []                                           []                                           []                                           []                                                 []                                           []                                           []                                           []                                                 [] []                                           []                                           []                                                STANDING  EXERCISES   Sets   Reps   Active Active Assist   Passive Self ROM   Comments   Heel Raises   []                                           []                                           []                                           []                                              Hip Abduction   []                                           []                                           []                                           []                                              Hip External Rotation   []                                           []                                           []                                           []                                              Hip Flexor Stretch   []                                           []                                           []                                           []                                              Mini squats   []                                           []                                           []                                           []                                              Hamstring Curl   []                                           []                                           []                                           []                                                Pain Rating:  Verbal: denies pain, reports \"pressure\"    Activity Tolerance:   Fair    After treatment patient left in no apparent distress:   Sitting in chair and Call bell within reach    COMMUNICATION/COLLABORATION:   The patients plan of care was discussed with: Registered nurse.      Kavitha Saunders   Time Calculation: 18 mins

## 2022-04-07 VITALS
HEART RATE: 130 BPM | TEMPERATURE: 99.3 F | WEIGHT: 132.72 LBS | BODY MASS INDEX: 24.42 KG/M2 | SYSTOLIC BLOOD PRESSURE: 123 MMHG | OXYGEN SATURATION: 97 % | DIASTOLIC BLOOD PRESSURE: 67 MMHG | RESPIRATION RATE: 16 BRPM | HEIGHT: 62 IN

## 2022-04-07 PROBLEM — R00.0 SINUS TACHYCARDIA: Status: ACTIVE | Noted: 2022-04-07

## 2022-04-07 LAB
ANION GAP SERPL CALC-SCNC: 5 MMOL/L (ref 5–15)
ATRIAL RATE: 101 BPM
BASOPHILS # BLD: 0.1 K/UL (ref 0–0.1)
BASOPHILS NFR BLD: 1 % (ref 0–1)
BUN SERPL-MCNC: 11 MG/DL (ref 6–20)
BUN/CREAT SERPL: 13 (ref 12–20)
CALCIUM SERPL-MCNC: 8.7 MG/DL (ref 8.5–10.1)
CALCULATED P AXIS, ECG09: 65 DEGREES
CALCULATED R AXIS, ECG10: 25 DEGREES
CALCULATED T AXIS, ECG11: 20 DEGREES
CHLORIDE SERPL-SCNC: 110 MMOL/L (ref 97–108)
CO2 SERPL-SCNC: 26 MMOL/L (ref 21–32)
CREAT SERPL-MCNC: 0.83 MG/DL (ref 0.55–1.02)
D DIMER PPP FEU-MCNC: 1.7 MG/L FEU (ref 0–0.65)
DIAGNOSIS, 93000: NORMAL
DIFFERENTIAL METHOD BLD: ABNORMAL
EOSINOPHIL # BLD: 0.2 K/UL (ref 0–0.4)
EOSINOPHIL NFR BLD: 2 % (ref 0–7)
ERYTHROCYTE [DISTWIDTH] IN BLOOD BY AUTOMATED COUNT: 14.6 % (ref 11.5–14.5)
GLUCOSE SERPL-MCNC: 97 MG/DL (ref 65–100)
HCT VFR BLD AUTO: 26.7 % (ref 35–47)
HCT VFR BLD AUTO: 27.3 % (ref 35–47)
HGB BLD-MCNC: 8.2 G/DL (ref 11.5–16)
HGB BLD-MCNC: 8.5 G/DL (ref 11.5–16)
IMM GRANULOCYTES # BLD AUTO: 0.1 K/UL (ref 0–0.04)
IMM GRANULOCYTES NFR BLD AUTO: 1 % (ref 0–0.5)
LYMPHOCYTES # BLD: 1.9 K/UL (ref 0.8–3.5)
LYMPHOCYTES NFR BLD: 18 % (ref 12–49)
MAGNESIUM SERPL-MCNC: 1.8 MG/DL (ref 1.6–2.4)
MCH RBC QN AUTO: 25.9 PG (ref 26–34)
MCHC RBC AUTO-ENTMCNC: 30.7 G/DL (ref 30–36.5)
MCV RBC AUTO: 84.2 FL (ref 80–99)
MONOCYTES # BLD: 1.4 K/UL (ref 0–1)
MONOCYTES NFR BLD: 13 % (ref 5–13)
NEUTS SEG # BLD: 7.3 K/UL (ref 1.8–8)
NEUTS SEG NFR BLD: 65 % (ref 32–75)
NRBC # BLD: 0 K/UL (ref 0–0.01)
NRBC BLD-RTO: 0 PER 100 WBC
P-R INTERVAL, ECG05: 146 MS
PLATELET # BLD AUTO: 202 K/UL (ref 150–400)
PMV BLD AUTO: 10.4 FL (ref 8.9–12.9)
POTASSIUM SERPL-SCNC: 3.1 MMOL/L (ref 3.5–5.1)
Q-T INTERVAL, ECG07: 320 MS
QRS DURATION, ECG06: 78 MS
QTC CALCULATION (BEZET), ECG08: 414 MS
RBC # BLD AUTO: 3.17 M/UL (ref 3.8–5.2)
SODIUM SERPL-SCNC: 141 MMOL/L (ref 136–145)
TSH SERPL DL<=0.05 MIU/L-ACNC: 1.34 UIU/ML (ref 0.36–3.74)
VENTRICULAR RATE, ECG03: 101 BPM
WBC # BLD AUTO: 10.9 K/UL (ref 3.6–11)

## 2022-04-07 PROCEDURE — 99232 SBSQ HOSP IP/OBS MODERATE 35: CPT | Performed by: INTERNAL MEDICINE

## 2022-04-07 PROCEDURE — 97530 THERAPEUTIC ACTIVITIES: CPT

## 2022-04-07 PROCEDURE — 74011250636 HC RX REV CODE- 250/636: Performed by: FAMILY MEDICINE

## 2022-04-07 PROCEDURE — 74011250637 HC RX REV CODE- 250/637: Performed by: INTERNAL MEDICINE

## 2022-04-07 PROCEDURE — 84443 ASSAY THYROID STIM HORMONE: CPT

## 2022-04-07 PROCEDURE — 74011000250 HC RX REV CODE- 250: Performed by: PHYSICIAN ASSISTANT

## 2022-04-07 PROCEDURE — 83735 ASSAY OF MAGNESIUM: CPT

## 2022-04-07 PROCEDURE — 94760 N-INVAS EAR/PLS OXIMETRY 1: CPT

## 2022-04-07 PROCEDURE — 74011250637 HC RX REV CODE- 250/637: Performed by: PHYSICIAN ASSISTANT

## 2022-04-07 PROCEDURE — 85018 HEMOGLOBIN: CPT

## 2022-04-07 PROCEDURE — 93005 ELECTROCARDIOGRAM TRACING: CPT

## 2022-04-07 PROCEDURE — 85379 FIBRIN DEGRADATION QUANT: CPT

## 2022-04-07 PROCEDURE — 36415 COLL VENOUS BLD VENIPUNCTURE: CPT

## 2022-04-07 PROCEDURE — 97535 SELF CARE MNGMENT TRAINING: CPT

## 2022-04-07 PROCEDURE — 80048 BASIC METABOLIC PNL TOTAL CA: CPT

## 2022-04-07 PROCEDURE — 85025 COMPLETE CBC W/AUTO DIFF WBC: CPT

## 2022-04-07 PROCEDURE — 97116 GAIT TRAINING THERAPY: CPT

## 2022-04-07 RX ORDER — METOPROLOL TARTRATE 25 MG/1
25 TABLET, FILM COATED ORAL EVERY 12 HOURS
Status: DISCONTINUED | OUTPATIENT
Start: 2022-04-07 | End: 2022-04-07

## 2022-04-07 RX ORDER — POTASSIUM CHLORIDE 750 MG/1
40 TABLET, FILM COATED, EXTENDED RELEASE ORAL 2 TIMES DAILY
Status: DISCONTINUED | OUTPATIENT
Start: 2022-04-07 | End: 2022-04-07 | Stop reason: HOSPADM

## 2022-04-07 RX ORDER — METOPROLOL TARTRATE 25 MG/1
25 TABLET, FILM COATED ORAL 2 TIMES DAILY
Status: DISCONTINUED | OUTPATIENT
Start: 2022-04-07 | End: 2022-04-07 | Stop reason: HOSPADM

## 2022-04-07 RX ORDER — METOPROLOL TARTRATE 25 MG/1
25 TABLET, FILM COATED ORAL EVERY 12 HOURS
Qty: 60 TABLET | Refills: 0 | Status: SHIPPED | OUTPATIENT
Start: 2022-04-07 | End: 2022-05-07

## 2022-04-07 RX ORDER — SODIUM CHLORIDE 9 MG/ML
75 INJECTION, SOLUTION INTRAVENOUS CONTINUOUS
Status: DISCONTINUED | OUTPATIENT
Start: 2022-04-07 | End: 2022-04-07 | Stop reason: HOSPADM

## 2022-04-07 RX ADMIN — ACETAMINOPHEN 500 MG: 500 TABLET ORAL at 01:58

## 2022-04-07 RX ADMIN — ACETAMINOPHEN 500 MG: 500 TABLET ORAL at 16:33

## 2022-04-07 RX ADMIN — OXYCODONE 5 MG: 5 TABLET ORAL at 14:29

## 2022-04-07 RX ADMIN — ACETAMINOPHEN 500 MG: 500 TABLET ORAL at 09:37

## 2022-04-07 RX ADMIN — SODIUM CHLORIDE 75 ML/HR: 9 INJECTION, SOLUTION INTRAVENOUS at 06:47

## 2022-04-07 RX ADMIN — LOSARTAN POTASSIUM 100 MG: 50 TABLET, FILM COATED ORAL at 09:36

## 2022-04-07 RX ADMIN — SODIUM CHLORIDE, PRESERVATIVE FREE 10 ML: 5 INJECTION INTRAVENOUS at 06:47

## 2022-04-07 RX ADMIN — POTASSIUM CHLORIDE 40 MEQ: 750 TABLET, EXTENDED RELEASE ORAL at 09:36

## 2022-04-07 RX ADMIN — METOPROLOL TARTRATE 25 MG: 25 TABLET, FILM COATED ORAL at 09:37

## 2022-04-07 RX ADMIN — POLYETHYLENE GLYCOL 3350 17 G: 17 POWDER, FOR SOLUTION ORAL at 09:37

## 2022-04-07 RX ADMIN — ACETAMINOPHEN 500 MG: 500 TABLET ORAL at 06:47

## 2022-04-07 RX ADMIN — SENNOSIDES AND DOCUSATE SODIUM 1 TABLET: 50; 8.6 TABLET ORAL at 09:37

## 2022-04-07 RX ADMIN — ASPIRIN 81 MG: 81 TABLET, COATED ORAL at 09:36

## 2022-04-07 RX ADMIN — METOPROLOL TARTRATE 25 MG: 25 TABLET, FILM COATED ORAL at 17:04

## 2022-04-07 NOTE — PROGRESS NOTES
Problem: Self Care Deficits Care Plan (Adult)  Goal: *Acute Goals and Plan of Care (Insert Text)  Description: FUNCTIONAL STATUS PRIOR TO ADMISSION: Patient was modified independent using a rolling walker for functional mobility. HOME SUPPORT: The patient lived alone with local family to provide PRN assistance. Occupational Therapy Goals  Initiated 4/6/2022  1. Patient will perform lower body ADLs with AE supervision/set-up within 4 day(s). 2.  Patient will perform upper body ADLs standing 5 mins without fatigue or LOB with supervision/set-up within 4 day(s). 3.  Patient will perform toilet transfer with supervision/set-up within 4 day(s). 4.  Patient will perform all aspects of toileting with supervision/set-up within 4 day(s). 5.  Patient will participate in upper extremity therapeutic exercise/activities with supervision/set-up for 10 minutes within 4 day(s). 6.  Patient will utilize energy conservation techniques during functional activities without cues within 4 day(s). Outcome: Progressing Towards Goal    OCCUPATIONAL THERAPY TREATMENT  Patient: Refugio Shaw (19 y.o. female)  Date: 4/7/2022  Diagnosis: Primary localized osteoarthritis of right hip [M16.11] Primary localized osteoarthritis of right hip  Procedure(s) (LRB):  RIGHT TOTAL HIP ARTHROPLASTY (SPINAL, BLOCK W/IV SEDATION) (Right) 2 Days Post-Op  Precautions: Fall,WBAT  Chart, occupational therapy assessment, plan of care, and goals were reviewed. ASSESSMENT  Patient continues with skilled OT services and is progressing towards goals. Pt noted with progressive LE dressing, donning pants at EOB with Min A and completing dynamic mobility/balance at [id number]. Pt reports D/C plan has changed and she will be staying with her sister in law in single story home. NP aware and following pt's HR peaking at 136 during transfer training.   Pt continues to benefit from skilled OT during acute hospitalization, with reporting therapist believing pt would benefit from [facility] and increase ADL/IADL supervision upon discharge. Current Level of Function Impacting Discharge (ADLs): Min A    Other factors to consider for discharge: HR         PLAN :  Patient continues to benefit from skilled intervention to address the above impairments. Continue treatment per established plan of care to address goals. Recommend with staff: OOB meals, Active ADL engagement    Recommend next OT session: POC progression    Recommendation for discharge: (in order for the patient to meet his/her long term goals)  Occupational therapy at least 2 days/week in the home AND ensure assist and/or supervision for safety with ADLs/IADLs    This discharge recommendation:  Has been made in collaboration with the attending provider and/or case management    IF patient discharges home will need the following DME: patient owns DME required for discharge       SUBJECTIVE:   Patient stated i'm going to go stay with my sister in law now, it's a one level home.     OBJECTIVE DATA SUMMARY:   Cognitive/Behavioral Status:  Neurologic State: Alert  Orientation Level: Oriented X4  Cognition: Follows commands  Perception: Appears intact  Perseveration: No perseveration noted  Safety/Judgement: Awareness of environment    Functional Mobility and Transfers for ADLs:  Bed Mobility:  Supine to Sit: Minimum assistance  Sit to Supine:  (remained OOB in chair)    Transfers:  Sit to Stand: Contact guard assistance;Assist x1  Functional Transfers  Bathroom Mobility: Contact guard assistance    Pt educated on safe transfer techniques, with specific emphasis on proper hand placement to push up from seated surface rather than attempt to pull self up, fully positioning self in-front of desired seated location, feeling chair on back of legs and reaching back with 1-2 UE to slowly lower self to seated position       Balance:  Sitting: Intact  Standing: Impaired; With support  Standing - Static: Constant support;Good  Standing - Dynamic : Constant support;Good    ADL Intervention:  Lower Body Dressing Assistance  Pants With Elastic Waist: Minimum assistance  Leg Crossed Method Used: No  Position Performed: Seated edge of bed  Cues: Verbal cues provided;Physical assistance (for AE technique)  Adaptive Equipment Used: Reacher;Walker    Cognitive Retraining  Safety/Judgement: Awareness of environment    Pain:  No c/o pain    Activity Tolerance:   Good, Fair, and requires rest breaks    After treatment patient left in no apparent distress:   Sitting in chair and Call bell within reach    COMMUNICATION/COLLABORATION:   The patients plan of care was discussed with: Physical therapy assistant, Registered nurse, Case management, and NP .      Nancy Murcia OT  Time Calculation: 36 mins

## 2022-04-07 NOTE — PROGRESS NOTES
Problem: Patient Education: Go to Patient Education Activity  Goal: Patient/Family Education  Outcome: Progressing Towards Goal     Problem: Hip Replacement: Post-Op Day 2  Goal: Activity/Safety  Outcome: Progressing Towards Goal  Note: Fall precautions  Goal: Medications  Outcome: Progressing Towards Goal  Goal: Respiratory  Outcome: Progressing Towards Goal  Note: Tolerating incentive spirometry  Goal: Psychosocial  Outcome: Progressing Towards Goal  Goal: *Hemodynamically stable  Outcome: Progressing Towards Goal  Note: Junctional tachy treated with metoprolol. Cardiology following. Tele in place     Problem: Falls - Risk of  Goal: *Absence of Falls  Description: Document [place] Fall Risk and appropriate interventions in the flowsheet.   Outcome: Progressing Towards Goal  Note: Fall Risk Interventions:  Mobility Interventions: Patient to call before getting OOB,PT Consult for mobility concerns,OT consult for ADLs,Communicate number of staff needed for ambulation/transfer,Bed/chair exit alarm         Medication Interventions: Patient to call before getting OOB,Teach patient to arise slowly    Elimination Interventions: Call light in reach,Patient to call for help with toileting needs,Stay With Me (per policy)

## 2022-04-07 NOTE — PROGRESS NOTES
Problem: Mobility Impaired (Adult and Pediatric)  Goal: *Acute Goals and Plan of Care (Insert Text)  Description: FUNCTIONAL STATUS PRIOR TO ADMISSION: Patient was independent and active without use of DME. Pt would occasional use a cane. Pt reported she was working up until Dec. 29, 2021    HOME SUPPORT PRIOR TO ADMISSION: The patient lived alone with family that lives locally to provide assistance as needed. Pt's only bathroom is upstairs (13 steps to access)    Physical Therapy Goals  Initiated 4/5/2022    1. Patient will move from supine to sit and sit to supine  in bed with modified independence within 4 days. 2. Patient will perform sit to stand with modified independence within 4 days. 3. Patient will ambulate with modified independence for 1500 feet with the least restrictive device within 4 days. 4. Patient will ascend/descend 12 stairs with single handrail(s) with minimal assistance/contact guard assist within 4 days. 5. Patient will perform total hip home exercise program per protocol with supervision/set-up within 4 days. Outcome: Progressing Towards Goal   PHYSICAL THERAPY TREATMENT  Patient: Khris Pretty (81 y.o. female)  Date: 4/7/2022  Diagnosis: Primary localized osteoarthritis of right hip [M16.11] Primary localized osteoarthritis of right hip  Procedure(s) (LRB):  RIGHT TOTAL HIP ARTHROPLASTY (SPINAL, BLOCK W/IV SEDATION) (Right) 2 Days Post-Op  Precautions: Fall,WBAT  Chart, physical therapy assessment, plan of care and goals were reviewed. ASSESSMENT  Patient continues with skilled PT services and is progressing towards goals. She is received sitting EOB w/ OT donning pants. She continues to present w/ tachycardia: HR 91 bpm prior to therapy;  bpm post stair training. Pt asymptomatic and NP aware. Pt reports she was told that elevated HR potentially d/t BP meds.  She did clear 4 steps using both rails w/ CGA and Verbal/tactile cues x1 for correct technique w/ last step when ascending. Pt reports she is now d/c-ing to her sister in-law's home (1 story). Pt remained OOB in chair w/ all needs in reach. RN and NP aware of above. Current Level of Function Impacting Discharge (mobility/balance): CGA-Min Ax1    Other factors to consider for discharge: elevated HR. Lives alone; mobility below PLOF. To stay at sister's house once d/c'd. PLAN :  Patient continues to benefit from skilled intervention to address the above impairments. Continue treatment per established plan of care. to address goals. Recommendation for discharge: (in order for the patient to meet his/her long term goals)  Physical therapy at least 2 days/week in the home     This discharge recommendation:  Has been made in collaboration with the attending provider and/or case management    IF patient discharges home will need the following DME: patient owns DME required for discharge       SUBJECTIVE:   Patient reported that she is now going to stay at her sister's house which is a 1 story home. OBJECTIVE DATA SUMMARY:   Critical Behavior:  Neurologic State: Alert  Orientation Level: Oriented X4  Cognition: Follows commands  Safety/Judgement: Awareness of environment,Home safety,Insight into deficits  Functional Mobility Training:  Bed Mobility:     Supine to Sit:  (pt sitting EOB)  Sit to Supine:  (remained OOB in chair)           Transfers:  Sit to Stand: Contact guard assistance;Assist x1  Stand to Sit: Contact guard assistance;Assist x1                             Balance:  Sitting: Intact  Standing: Impaired; With support  Standing - Static: Constant support;Good  Standing - Dynamic : Constant support;Good  Ambulation/Gait Training:  Distance (ft): 10 Feet (ft)  Assistive Device: Gait belt;Walker, rolling  Ambulation - Level of Assistance: Contact guard assistance;Assist x1        Gait Abnormalities: Antalgic;Decreased step clearance; Step to gait  Right Side Weight Bearing: As tolerated  Left Side Weight Bearing: Full  Base of Support: Narrowed  Stance: Right decreased  Speed/Veronica: Slow  Step Length: Left shortened;Right shortened        Interventions: Safety awareness training; Tactile cues; Verbal cues (verbal cues for technique )    Stairs:  Number of Stairs Trained: 4  Stairs - Level of Assistance: Contact guard assistance;Assist X1   Rail Use: Both    Pain Rating:  No verbal c/o pain. Pt premedicated prior to therapy. Activity Tolerance:   Fair and HR remains tachycardic. (HR 94 bpm (monitor) at rest prior to therapy; 136 bpm post stair training)    After treatment patient left in no apparent distress:   Sitting in chair and Call bell within reach    COMMUNICATION/COLLABORATION:   The patients plan of care was discussed with: Occupational therapist and Registered nurse.      Delmy Galvez,PTA   Time Calculation: 37 mins

## 2022-04-07 NOTE — PROGRESS NOTES
[phone]  Dr. Radhames Brody notified of patient's cardiac rhythm going from tachycardia to junctional tachycardia per CMU. No new orders at this time.

## 2022-04-07 NOTE — PROGRESS NOTES
Problem: Patient Education: Go to Patient Education Activity  Goal: Patient/Family Education  Outcome: Progressing Towards Goal     Problem: Patient Education: Go to Patient Education Activity  Goal: Patient/Family Education  Outcome: Progressing Towards Goal     Problem: Patient Education: Go to Patient Education Activity  Goal: Patient/Family Education  Outcome: Progressing Towards Goal     Problem: Hip Replacement: Day of Surgery/Unit  Goal: Off Pathway (Use only if patient is Off Pathway)  Outcome: Progressing Towards Goal  Goal: Nutrition/Diet  Outcome: Progressing Towards Goal  Goal: Medications  Outcome: Progressing Towards Goal  Goal: Respiratory  Outcome: Progressing Towards Goal  Goal: Treatments/Interventions/Procedures  Outcome: Progressing Towards Goal     Problem: Hip Replacement: Post Op Day 1  Goal: Off Pathway (Use only if patient is Off Pathway)  Outcome: Progressing Towards Goal  Goal: Activity/Safety  Outcome: Progressing Towards Goal  Goal: Diagnostic Test/Procedures  Outcome: Progressing Towards Goal  Goal: Nutrition/Diet  Outcome: Progressing Towards Goal  Goal: Medications  Outcome: Progressing Towards Goal

## 2022-04-07 NOTE — PROGRESS NOTES
Physical Therapy  4/7/2022    Chart reviewed. Pt currently w/ elevate HR in 130'2 at rest. NSG aware. Will defer and follow up at later time today as able and appropriate.      Delmy Means, PTA

## 2022-04-07 NOTE — PROGRESS NOTES
Dr. Melanie Jackson notified for patient's elevated and fluctuating heart rate. Patient is currently asymptomatic, denying c/o chest pain, palpitations, or any other discomforts. New orders for H&H, EKG and Hospitalist consult. Will continue to monitor.

## 2022-04-07 NOTE — PROGRESS NOTES
Orthopaedics Daily Progress Note                            Date of Surgery:  4/5/2022      Patient: Justin Levine   YOB: 1940  Age: 80 y.o. SUBJECTIVE:   2 Days Post-Op following RIGHT TOTAL HIP ARTHROPLASTY (SPINAL, BLOCK W/IV SEDATION). The patient's post operative pain is controlled. No CP/SOB. No N/V. The patient's mobility will be evaluated today during PT sessions. AM session deferred. Overnight events noted. HR controlled with added BB. OBJECTIVE:     Vital Signs:      Visit Vitals  /72 (BP 1 Location: Right upper arm, BP Patient Position: At rest)   Pulse 92   Temp 98.5 °F (36.9 °C)   Resp 16   Ht 5' 2\" (1.575 m)   Wt 132 lb 11.5 oz (60.2 kg)   SpO2 95%   BMI 24.27 kg/m²       Physical Exam:  General: A&Ox3. The patient is cooperative, and in no acute distress. Respiratory: Respirations are unlabored. Surgical site(s): dressing clean, dry  Musculoskeletal: Calves are soft, supple, and non-tender upon palpation. Motor 5/5. Neurological:  Neurovascularly intact with good dorsi and plantar flexion.     Pulses symmetrical.    Laboratory Values:             Recent Results (from the past 12 hour(s))   HGB & HCT    Collection Time: 04/07/22 12:15 AM   Result Value Ref Range    HGB 8.5 (L) 11.5 - 16.0 g/dL    HCT 27.3 (L) 35.0 - 47.0 %   EKG, 12 LEAD, INITIAL    Collection Time: 04/07/22 12:48 AM   Result Value Ref Range    Ventricular Rate 101 BPM    Atrial Rate 101 BPM    P-R Interval 146 ms    QRS Duration 78 ms    Q-T Interval 320 ms    QTC Calculation (Bezet) 414 ms    Calculated P Axis 65 degrees    Calculated R Axis 25 degrees    Calculated T Axis 20 degrees    Diagnosis       Sinus tachycardia  Possible Left atrial enlargement  Nonspecific ST and T wave abnormality  When compared with ECG of 05-APR-2022 08:48,  premature atrial complexes are no longer present  Nonspecific T wave abnormality now evident in Anterolateral leads     METABOLIC PANEL, BASIC Collection Time: 04/07/22  6:59 AM   Result Value Ref Range    Sodium 141 136 - 145 mmol/L    Potassium 3.1 (L) 3.5 - 5.1 mmol/L    Chloride 110 (H) 97 - 108 mmol/L    CO2 26 21 - 32 mmol/L    Anion gap 5 5 - 15 mmol/L    Glucose 97 65 - 100 mg/dL    BUN 11 6 - 20 MG/DL    Creatinine 0.83 0.55 - 1.02 MG/DL    BUN/Creatinine ratio 13 12 - 20      GFR est AA >60 >60 ml/min/1.73m2    GFR est non-AA >60 >60 ml/min/1.73m2    Calcium 8.7 8.5 - 10.1 MG/DL   MAGNESIUM    Collection Time: 04/07/22  6:59 AM   Result Value Ref Range    Magnesium 1.8 1.6 - 2.4 mg/dL   TSH 3RD GENERATION    Collection Time: 04/07/22  6:59 AM   Result Value Ref Range    TSH 1.34 0.36 - 3.74 uIU/mL   D DIMER    Collection Time: 04/07/22  6:59 AM   Result Value Ref Range    D-dimer 1.70 (H) 0.00 - 0.65 mg/L FEU   CBC WITH AUTOMATED DIFF    Collection Time: 04/07/22  6:59 AM   Result Value Ref Range    WBC 10.9 3.6 - 11.0 K/uL    RBC 3.17 (L) 3.80 - 5.20 M/uL    HGB 8.2 (L) 11.5 - 16.0 g/dL    HCT 26.7 (L) 35.0 - 47.0 %    MCV 84.2 80.0 - 99.0 FL    MCH 25.9 (L) 26.0 - 34.0 PG    MCHC 30.7 30.0 - 36.5 g/dL    RDW 14.6 (H) 11.5 - 14.5 %    PLATELET 447 137 - 477 K/uL    MPV 10.4 8.9 - 12.9 FL    NRBC 0.0 0  WBC    ABSOLUTE NRBC 0.00 0.00 - 0.01 K/uL    NEUTROPHILS 65 32 - 75 %    LYMPHOCYTES 18 12 - 49 %    MONOCYTES 13 5 - 13 %    EOSINOPHILS 2 0 - 7 %    BASOPHILS 1 0 - 1 %    IMMATURE GRANULOCYTES 1 (H) 0.0 - 0.5 %    ABS. NEUTROPHILS 7.3 1.8 - 8.0 K/UL    ABS. LYMPHOCYTES 1.9 0.8 - 3.5 K/UL    ABS. MONOCYTES 1.4 (H) 0.0 - 1.0 K/UL    ABS. EOSINOPHILS 0.2 0.0 - 0.4 K/UL    ABS. BASOPHILS 0.1 0.0 - 0.1 K/UL    ABS. IMM. GRANS. 0.1 (H) 0.00 - 0.04 K/UL    DF AUTOMATED           PLAN:     S/P RIGHT TOTAL HIP ARTHROPLASTY (SPINAL, BLOCK W/IV SEDATION) -Continue WBAT. -Mobilize and continue with PT/OT until discharged     Hemodynamics Hgb today is 8.5. Acute blood loss anemia as expected. Patient asymptomatic. Continue to monitor.      Wound Monitor postop dressing; no postop dressing changes necessary. Reinforce PRN. Post Operative Pain Pain Control: stable, mild-to-moderate joint symptoms intermittently, reasonably well controlled by current meds. DVT Prophylaxis Continue with SCD'S, Ankle Pump Exercises. ASA 81mg BID     Discharge Disposition Discharge plan: Home with HHPT pending PT clearance today.        Signed By: Harriett Wells PA-C  April 7, 2022 11:04 AM

## 2022-04-07 NOTE — PROGRESS NOTES
Ortho NP Note    POD# 2  s/p RIGHT TOTAL HIP ARTHROPLASTY (SPINAL, BLOCK W/IV SEDATION)   Pt seen with sister in law at bedside    Pt resting in bed. Overall feels pain is well controlled, primarily using scheduled tylenol. Reports that overnight she did experience palpitations, which she has had in the past.  Denies the feeling at present. No complaint of CP, SOB. Noted tachycardia overnight, otherwise vss. Afebrile. HR per monitor presently 80s-90s. Visit Vitals  /72 (BP 1 Location: Right upper arm, BP Patient Position: At rest)   Pulse (!) 130   Temp 98.5 °F (36.9 °C)   Resp 16   Ht 5' 2\" (1.575 m)   Wt 60.2 kg (132 lb 11.5 oz)   SpO2 95%   BMI 24.27 kg/m²       Voiding status: Spontaneous void  Output (mL)  Urine Voided: 350 ml (04/06/22 2330)  Emesis: 0 mL (04/06/22 2330)  Stool: 0 ml (04/06/22 2330)  Last Bowel Movement Date: 04/05/22 (04/06/22 2030)      Labs    Lab Results   Component Value Date/Time    HGB 8.2 (L) 04/07/2022 06:59 AM      Lab Results   Component Value Date/Time    INR 1.1 03/28/2022 08:15 AM      Lab Results   Component Value Date/Time    Sodium 141 04/07/2022 06:59 AM    Potassium 3.1 (L) 04/07/2022 06:59 AM    Chloride 110 (H) 04/07/2022 06:59 AM    CO2 26 04/07/2022 06:59 AM    Glucose 97 04/07/2022 06:59 AM    BUN 11 04/07/2022 06:59 AM    Creatinine 0.83 04/07/2022 06:59 AM    Calcium 8.7 04/07/2022 06:59 AM     Recent Glucose Results:   Lab Results   Component Value Date/Time    GLU 97 04/07/2022 06:59 AM           Body mass index is 24.27 kg/m². : A BMI > 30 is classified as obesity and > 40 is classified as morbid obesity. Aquacel dressing to R hip c.d.i, expected swelling along incision  Cryotherapy in place over incision  Calves soft and supple; No pain with passive stretch  Bilateral LEs warm, dry. 2+ DP pulses.     Sensation and motor intact - PF/DF/EHL intact 5/5  Foot Pumps for mechanical DVT proph while in bed     PLAN:  1) PT: BID WBAT  2) Anticoagulation:  Aspirin 81 mg PO BID for DVT Prophylaxis. Encouraged early mobilization, bed exercises, and SCD use. 3) Pain - Multimodal approach including cryotherapy, scheduled Tylenol with  PRN oxycodone   4) Tachycardia: Hospitalist consult ordered overnight--appreciate input. EKG showing NSR. NS at 75 ml/hr. Lopressor 25 mg BID started this morning--plan to continue at discharge, holding hydralazine PO. 5) Post operative anemia: Hgb prior to admission: 11.1. EBL: 200. Hgb today: 8.2--stable in post op setting. No active bleeding, swelling as anticipated after surgery. Expected Acute blood loss post-op anemia, continue to monitor  6) Hypokalemia: K: 3.1. Replacement with KCl PO today. 7) Readniess for discharge:     [] Vital Signs stable    [] Hgb stable    [] + Voiding    [] Wound intact, drainage minimal    [] Tolerating PO intake     [] Cleared by PT (OT if applicable)     [] Stair training completed (if applicable)    [] Independent / Contact Guard Assist (household distance)     [] Bed mobility     [] Car transfers     [] ADLs    [] Adequate pain control on oral medication alone     Discharge to home with sister in law's help pending PT clearance. Beny Rashid NP  Available via [employer]    Addendum: [id number]: cleared therapy. Patient remains asymptomatic, vs stable but tachycardic. Confirmed with Dr. Valerie Dunn okay to discharge today with plans for follow up. Rx for metoprolol sent.   Patient of medication changes--d/c hydralazine and amlodipine; start metoprolol BID

## 2022-04-07 NOTE — PROGRESS NOTES
Hospital Progress Note    NAME:  Joaquin Reyna   :   1940   MRN:  [id number]     Date/Time:  2022 7:49 AM    Plan:   1. D/c hydralazine  2. Begin metoprolol   3. Medical status stable for discharge later today /ortho  Risk of Deterioration: Low  []           Moderate  []           High  []                 Assessment:   Principal Problem:    Primary localized osteoarthritis of right hip (2022)     Right total hip arthroplasty    Active Problems:    HTN (hypertension) (2014)     Control adequate     Hold norvasc this am     Add lopressor     D/c hydralazine      Anemia (10/6/2014)     Post  Op     sable      Sinus tachycardia (2022)     Multifactorial - post op pain,anemia, hydralazine etc      Consultant notes:81 y.o. female with pmhx CkD, DJD, dyslipidemia, HTN, and RA Patient is currently admitted to the orthopedic surgery service and is postop day 2 status post right total hip arthroplasty   We are asked to see the patient in consult to assist in managing tachycardia.       Review of patient's chart did not reveal any intraoperative or postoperative complications. At this time the patient has no complaints  She states that she had asymptomatic tachycardia in the past, and EKG was done that was unremarkable. She reports that she has f/u with a cardiologist in th past, and had an uremarkable     Subjective:     Feeling better and wants to go home  11 Point Review of Systems:   Negative except no cp/sob    []            Unable to obtain ROS due to:       []            mental status change []            sedated []            intubated     Social History     Tobacco Use    Smoking status: Never Smoker    Smokeless tobacco: Never Used   Substance Use Topics    Alcohol use:  Yes     Alcohol/week: 0.8 standard drinks     Types: 1 Standard drinks or equivalent per week     Comment: ocassional     Medications reviewed:  Current Facility-Administered Medications   Medication Dose Route Frequency  0.9% sodium chloride infusion  75 mL/hr IntraVENous CONTINUOUS    metoprolol tartrate (LOPRESSOR) tablet 25 mg  25 mg Oral Q12H    potassium chloride SR (KLOR-CON 10) tablet 40 mEq  40 mEq Oral BID    [Held by provider] amLODIPine (NORVASC) tablet 5 mg  5 mg Oral DAILY    atorvastatin (LIPITOR) tablet 40 mg  40 mg Oral QHS    losartan (COZAAR) tablet 100 mg  100 mg Oral DAILY    sodium chloride (NS) flush 5-40 mL  5-40 mL IntraVENous Q8H    sodium chloride (NS) flush 5-40 mL  5-40 mL IntraVENous PRN    acetaminophen (TYLENOL) tablet 500 mg  500 mg Oral Q4HWA    oxyCODONE IR (ROXICODONE) tablet 2.5 mg  2.5 mg Oral Q3H PRN    oxyCODONE IR (ROXICODONE) tablet 5 mg  5 mg Oral Q3H PRN    naloxone (NARCAN) injection 0.4 mg  0.4 mg IntraVENous PRN    hydrOXYzine HCL (ATARAX) tablet 10 mg  10 mg Oral Q8H PRN    senna-docusate (PERICOLACE) 8.6-50 mg per tablet 1 Tablet  1 Tablet Oral BID    polyethylene glycol (MIRALAX) packet 17 g  17 g Oral DAILY    bisacodyL (DULCOLAX) suppository 10 mg  10 mg Rectal DAILY PRN    aspirin delayed-release tablet 81 mg  81 mg Oral BID        Objective:   Vitals:  Visit Vitals  BP (!) 133/57 (BP 1 Location: Left upper arm, BP Patient Position: At rest)   Pulse (!) 101   Temp 98.7 °F (37.1 °C)   Resp 16   Ht 5' 2\" (1.575 m)   Wt 132 lb 11.5 oz (60.2 kg)   SpO2 95%   BMI 24.27 kg/m²     Temp (24hrs), Av.2 °F (37.3 °C), Min:98.7 °F (37.1 °C), Max:99.6 °F (37.6 °C)      O2 Device: None (Room air)    Last 24hr Input/Output:    Intake/Output Summary (Last 24 hours) at 2022 0757  Last data filed at 2022 2330  Gross per 24 hour   Intake --   Output 750 ml   Net -750 ml        PHYSICAL EXAM:  General:    Alert, cooperative, no distress, appears stated age. Head:   Normocephalic, without obvious abnormality, atraumatic. Eyes:   Conjunctivae/corneas clear. PERRLA  Nose:  Nares normal. No drainage or sinus tenderness.   Throat:    Lips, mucosa, and tongue normal.  No Thrush  Neck:  Supple, symmetrical,  no adenopathy, thyroid: non tender    no carotid bruit and no JVD. Back:    Symmetric,  No CVA tenderness. Lungs:   Clear to auscultation bilaterally. No Wheezing or Rhonchi. No rales. Chest wall:  No tenderness or deformity. No Accessory muscle use. Heart:   Regular rate and rhythm,  no murmur, rub or gallop. Abdomen:   Soft, non-tender. Not distended. Bowel sounds normal. No masses  Extremities: Extremities normal, atraumatic, No cyanosis. No edema. No clubbing  Skin:     Texture, turgor normal. No rashes or lesions. Not Jaundiced  Lymph nodes: Cervical, supraclavicular normal.  Psych:  Good insight. Not depressed. Not anxious or agitated. Neurologic: Normal strength, Alert and oriented X 3. Lab Data Reviewed:    Recent Labs     04/07/22  0015 04/06/22 0417   HGB 8.5* 8.7*   HCT 27.3*  --      Recent Labs     04/07/22  0659 04/06/22 0417    141   K 3.1* 3.2*   * 110*   CO2 26 25   GLU 97 115*   BUN 11 17   CREA 0.83 1.17*   CA 8.7 8.4*   MG 1.8  --      Lab Results   Component Value Date/Time    Glucose (POC) 93 04/05/2022 08:05 AM     No results for input(s): PH, PCO2, PO2, HCO3, FIO2 in the last 72 hours. No results for input(s): INR, INREXT in the last 72 hours.   ___________________________________________________  ___________________________________________________    Attending Physician: Saulo Moraes MD

## 2022-04-07 NOTE — PROGRESS NOTES
Occupational Therapy    Chart reviewed in prep for skilled OT treatment; however, pt with elevated resting HR; NP aware and following, with OT to defer and follow up later this date.     Thank you,  Ana Quintana, OT

## 2022-04-07 NOTE — PROGRESS NOTES
RUR- Observation     Transitions of Care- Home with Home Health PT and OT. [facility] accepted. Referrals sent to [facility], [facility], and [facility] [place] via [facility]- denied and pending with [facility]. Family will transport home. The patient has necessary DME for discharge. Follow up with Ortho. The patient will discharge with home health pending- CM will follow up in the am. Sid Hermosillo NP is aware. Patient will be going to her sister in laws house- Kenneth Acunais [phone] and cell [phone] and [address]. Transition of Care Plan:     The Plan for Transition of Care is related to the following treatment goals: home health    The Patient and/or patient representative was provided with a choice of provider and agrees  with the discharge plan. Yes [x] No []    A Freedom of choice list was provided with basic dialogue that supports the patient's individualized plan of care/goals and shares the quality data associated with the providers. Care Management Interventions  PCP Verified by CM:  Yes  Palliative Care Criteria Met (RRAT>21 & CHF Dx)?: No  Mode of Transport at Discharge: Self  Transition of Care Consult (CM Consult): [address]: No  Reason Outside [place]: Physician referred to specific agency,Patient already serviced by other home care/hospice agency  MyChart Signup: No  Discharge Durable Medical Equipment: No  Health Maintenance Reviewed: Yes  Physical Therapy Consult: Yes  Occupational Therapy Consult: Yes  Speech Therapy Consult: No  Support Systems: Other (Comment)  Confirm Follow Up Transport: Family  The Plan for Transition of Care is Related to the Following Treatment Goals : home with [place] PT and OT- will stay with her sister in law  The Patient and/or Patient Representative was Provided with a Choice of Provider and Agrees with the Discharge Plan?: Yes  Name of the Patient Representative Who was Provided with a Choice of Provider and Agrees with the Discharge Plan: the patient  Freedom of Choice List was Provided with Basic Dialogue that Supports the Patient's Individualized Plan of Care/Goals, Treatment Preferences and Shares the Quality Data Associated with the Providers?: Yes  [place] Resource Information Provided?: No  Discharge Location  Patient Expects to be Discharged to[de-identified] Home with home health       Reason for Admission:  Spine surgery                     RUR Score:     Observation                 Plan for utilizing home health:     Sent to multiple agencies    PCP: First and Last name:  Lucretia Putnam MD     Name of Practice:    Are you a current patient: Yes/No: yes   Approximate date of last visit: early March   Can you participate in a virtual visit with your PCP: yes                    Current Advanced Directive/Advance Care Plan: Full Code      Healthcare Decision Maker: Amauri Correa [facility]- [phone]  Click here to complete [employer] including selection of the Healthcare Decision Maker Relationship (ie \"Primary\")                             Transition of Care Plan:     Home with [address] PT and OT. The patient has a RW, and her family plans to purchase a 3 in 1 commode. CM confirmed demographics, PCP, and Pharmacy- [facility] [address]. The family will transport home.      NATALIE Ellison

## 2022-04-07 NOTE — PROGRESS NOTES
Spiritual Care Partner Volunteer visited patient in [address] [facility] on 4.7. 22.     Documented by Farhana Bonner, Staff , on 4.7.22  Please call JOSE ALFREDO ([phone]) to page  if needed

## 2022-04-07 NOTE — PROGRESS NOTES
CM attempted to speak with the patient via room phone x3, continues to ring. Call placed to patient's mobile and vm left. Call placed to both emergency contacts/sisters one was disconnected and one vm was left. CM to continue to follow. 12:06 PM  CM called patient's room phone and it continued to ring. Call placed to patient's mobile and a vm was left.    1:23 PM  CM called patient's room phone and it continued to ring. Call placed to patient's mobile and a vm was left.       Jax Jensen, NORBERTO

## 2022-04-07 NOTE — PROGRESS NOTES
[id number]  Patient is with elevated heart rate between the mid 120s to the 130s, fluctuating. The patient is asymptomatic, as she denies c/o chest pain, palpations or any other discomforts.   Dr. Bulmaro Thomas notified of the hospitalist consult per Dr. Nav Kingsley.

## 2022-04-07 NOTE — CONSULTS
[address] [facility] Adult  Hospitalist Group    Hospitalist Consult  Primary Care Provider: Kathya Angel MD  Consult requested by: Dr. Ryan Han    Subjective:     Janina Verdin is a 80 y.o. female with pmhx CkD, DJD, dyslipidemia, HTN, and RA Patient is currently admitted to the orthopedic surgery service and is postop day 2 status post right total hip arthroplasty   We are asked to see the patient in consult to assist in managing tachycardia. Review of patient's chart did not reveal any intraoperative or postoperative complications. At this time the patient has no complaints  She states that she had asymptomatic tachycardia in the past, and EKG was done that was unremarkable. She reports that she has f/u with a cardiologist in th past, and had an uremarkable w/u. She denies any headaches or dizziness. Denies any cough, chest pain, shortness of breath. Denies fever or chills. Denies nausea, vomiting, diarrhea, constipation. She states that she is progressing with post-op rehab, and ready to go home. She denies pain, or fever. She has no hx thryoid disease, and reports no ETOH or drug use. She endorses a poor ,yet improving appeitis. Review of Systems:    A comprehensive review of systems was negative except for that written in the History of Present Illness.      Past Medical History:   Diagnosis Date    Advance care planning     documents pending    Anemia     Arthropathy     CKD (chronic kidney disease), stage III ([address] 09/08/2014    Yessica Cuadra md    DJD (degenerative joint disease) of knee 10/21/2021    Martín Kalbecca    Family history of colon cancer     History of total right knee replacement (TKR) 2011    ghazal mirza md    Hypercholesterolemia     Hypertension     IGT (impaired glucose tolerance) 10/08/2018    Knee pain, left     Osteoarthritis of right hip 02/2021    Pes anserine bursitis 08/11/2020    Rheumatoid arthritis [address]     S/P colonoscopy 11/30/2021 Mary Kate Machuca MD -repeat 3 yrs      Past Surgical History:   Procedure Laterality Date    COLONOSCOPY N/A 11/29/2018    COLONOSCOPY performed by Mary Kate Machuca MD at [address] COLONOSCOPY N/A 11/30/2021    COLONOSCOPY   V performed by [facility] Kate [facility]uca MD at [facility] ENDOSCOPY    HX BUNIONECTOMY      HX CARPAL [address]      HX CERVICAL FUSION  2018    HX KNEE REPLACEMENT Right 2011     Prior to Admission medications    Medication Sig Start Date End Date Taking? Authorizing Provider   aspirin delayed-release 81 mg tablet Take 1 Tablet by mouth two (2) times a day. Indications: blood clot prevention 4/5/22  Yes Tracee Ren PA-C   oxyCODONE IR (ROXICODONE) 5 mg immediate release tablet Take 0.5-1 Tablets by mouth every four (4) hours as needed for Pain for up to 7 days. Max Daily Amount: 30 mg. 4/5/22 4/12/22 Yes Tracee Ren PA-C   senna-docusate (PERICOLACE) 8.6-50 mg per tablet Take 1 Tablet by mouth two (2) times daily as needed for Constipation. 4/5/22  Yes Lino Ren PA-C   acetaminophen (TYLENOL) 500 mg tablet Take 1 Tablet by mouth every four (4) hours. 4/5/22  Yes Tracee Ren PA-C   traMADoL (ULTRAM) 50 mg tablet Take 50 mg by mouth every six (6) hours as needed for Pain. Yes Provider, Historical   amLODIPine (NORVASC) 5 mg tablet TAKE 1 TABLET EVERY DAY  Patient taking differently: Take 5 mg by mouth daily. TAKE 1 TABLET EVERY DAY 2/3/22  Yes Ugo Morrison MD   losartan (COZAAR) 100 mg tablet TAKE 1 TABLET EVERY DAY  Patient taking differently: Take 100 mg by mouth daily. 8/12/21  Yes Ugo Morrison MD   atorvastatin (LIPITOR) 40 mg tablet TAKE 1 TABLET EVERY DAY  Patient taking differently: Take 40 mg by mouth nightly. 12/17/20  Yes Ugo Morrison MD   hydrALAZINE (APRESOLINE) 25 mg tablet TAKE 1 TABLET TWICE DAILY  Patient taking differently: Take 25 mg by mouth two (2) times a day.  11/26/20  Yes Mann Rawls MD   multivit-minerals/folic acid (VITAJOY ADULT MULTI PO) Take 1 Tablet by mouth daily. Provider, Historical   acetaminophen (Tylenol Arthritis Pain) 650 mg TbER Take 650 mg by mouth every eight (8) hours as needed for Pain. Provider, Historical   CHOLECALCIFEROL, VITAMIN D3, (VITAMIN D3 PO) Take  by mouth daily. Provider, Historical     Allergies   Allergen Reactions    Codeine Itching      Family History   Problem Relation Age of Onset    Heart Disease Mother     Heart Disease Father     Cancer Sister         colon    No Known Problems Brother     Cancer Sister         lung    Heart Disease Brother     No Known Problems Sister     Anesth Problems Neg Hx         SOCIAL HISTORY:  Patient resides at Home. Patient ambulates with . Smoking history: never  Alcohol history: none    Objective:       Physical Exam:   Visit Vitals  BP (!) 133/57 (BP 1 Location: Left upper arm, BP Patient Position: At rest)   Pulse (!) 124   Temp 98.7 °F (37.1 °C)   Resp 16   Ht 5' 2\" (1.575 m)   Wt 60.2 kg (132 lb 11.5 oz)   SpO2 95%   BMI 24.27 kg/m²     General:  Alert, cooperative, no distress, appears stated age. Head:  Normocephalic, without obvious abnormality, atraumatic. Eyes:  Conjunctivae/corneas clear. Throat: Lips, mucosa, and tongue normal. Teeth and gums normal.   Neck: Supple, symmetrical, trachea midline, no adenopathy, thyroid: no enlargement/tenderness/nodules, no carotid bruit and no JVD. Back:   Symmetric, no curvature. ROM normal. No CVA tenderness. Lungs:   Clear to auscultation bilaterally. Chest wall:  No tenderness or deformity. Heart:  Regular rate and rhythm, S1, S2 normal, no murmur, click, rub or gallop. Abdomen:   Soft, non-tender. Bowel sounds normal. No masses,  No organomegaly. Extremities: Extremities normal, atraumatic, no cyanosis or edema.    Pulses: 2+ radial pulses   Skin: Skin color, texture, turgor normal. No rashes or lesions. ECG:  Sinus tachycardia  Data Review: All diagnostic labs and studies have been reviewed. Assessment:   Stephen Adam is a 80 y.o. female who presents with R hip OA and is s/p total R hip arthroplasty We are asked to see the patient in consult to assist in managing tachycardia. Active Problems:    Primary localized osteoarthritis of right hip (4/5/2022)        Plan:     #Tachycardia  -pt with asymptomatic tachycardia to the 130's  -EKG reviewed, and sinus tachycardia at 101   -ordered remote telemetry  -underlying cause of sinus tachycardia likely post op anemia that is stable, and mild dehydration so will start some gentle IVF as her appetite returns  -as for other etiologies that have been ruled out. She has not been hypotensive or hypoxic after reviewing vitals.   Her TSH 2/2022 was normal.  She denies pain, and no hx drug or ETOH use  -will also get CBC, BMP, Mg2+, d-dimer, and TSH, and follow results  -if w/u negative, and she still has this tachycardia, she can be referred out to her outpatient cardiologist as this has been a chroni      Signed By: Harjinder Mars MD     Date of Service:  4/7/2022

## 2022-04-08 ENCOUNTER — HOME HEALTH ADMISSION (OUTPATIENT)
Dept: HOME HEALTH SERVICES | Facility: HOME HEALTH | Age: 82
End: 2022-04-08
Payer: MEDICARE

## 2022-04-09 ENCOUNTER — HOME CARE VISIT (OUTPATIENT)
Dept: SCHEDULING | Facility: HOME HEALTH | Age: 82
End: 2022-04-09
Payer: MEDICARE

## 2022-04-09 VITALS
HEART RATE: 84 BPM | RESPIRATION RATE: 16 BRPM | OXYGEN SATURATION: 98 % | SYSTOLIC BLOOD PRESSURE: 120 MMHG | TEMPERATURE: 100.7 F | DIASTOLIC BLOOD PRESSURE: 74 MMHG

## 2022-04-09 VITALS
BODY MASS INDEX: 23.96 KG/M2 | HEART RATE: 112 BPM | OXYGEN SATURATION: 98 % | TEMPERATURE: 100.7 F | WEIGHT: 131 LBS | SYSTOLIC BLOOD PRESSURE: 128 MMHG | DIASTOLIC BLOOD PRESSURE: 74 MMHG | RESPIRATION RATE: 18 BRPM

## 2022-04-09 PROCEDURE — 400013 HH SOC

## 2022-04-09 PROCEDURE — G0299 HHS/HOSPICE OF RN EA 15 MIN: HCPCS

## 2022-04-09 PROCEDURE — 3331090001 HH PPS REVENUE CREDIT

## 2022-04-09 PROCEDURE — G0151 HHCP-SERV OF PT,EA 15 MIN: HCPCS

## 2022-04-09 PROCEDURE — 400018 HH-NO PAY CLAIM PROCEDURE

## 2022-04-09 PROCEDURE — 3331090002 HH PPS REVENUE DEBIT

## 2022-04-10 PROCEDURE — 3331090002 HH PPS REVENUE DEBIT

## 2022-04-10 PROCEDURE — 3331090001 HH PPS REVENUE CREDIT

## 2022-04-11 ENCOUNTER — HOME CARE VISIT (OUTPATIENT)
Dept: SCHEDULING | Facility: HOME HEALTH | Age: 82
End: 2022-04-11
Payer: MEDICARE

## 2022-04-11 DIAGNOSIS — R00.0 SINUS TACHYCARDIA: Primary | ICD-10-CM

## 2022-04-11 PROCEDURE — G0151 HHCP-SERV OF PT,EA 15 MIN: HCPCS

## 2022-04-11 PROCEDURE — 3331090001 HH PPS REVENUE CREDIT

## 2022-04-11 PROCEDURE — 3331090002 HH PPS REVENUE DEBIT

## 2022-04-12 ENCOUNTER — HOME CARE VISIT (OUTPATIENT)
Dept: SCHEDULING | Facility: HOME HEALTH | Age: 82
End: 2022-04-12
Payer: MEDICARE

## 2022-04-12 VITALS
HEART RATE: 107 BPM | OXYGEN SATURATION: 96 % | TEMPERATURE: 97.6 F | DIASTOLIC BLOOD PRESSURE: 62 MMHG | SYSTOLIC BLOOD PRESSURE: 122 MMHG | RESPIRATION RATE: 18 BRPM

## 2022-04-12 VITALS
HEART RATE: 80 BPM | SYSTOLIC BLOOD PRESSURE: 116 MMHG | RESPIRATION RATE: 18 BRPM | TEMPERATURE: 98.2 F | DIASTOLIC BLOOD PRESSURE: 70 MMHG | OXYGEN SATURATION: 99 %

## 2022-04-12 PROCEDURE — G0299 HHS/HOSPICE OF RN EA 15 MIN: HCPCS

## 2022-04-12 PROCEDURE — 3331090001 HH PPS REVENUE CREDIT

## 2022-04-12 PROCEDURE — 3331090002 HH PPS REVENUE DEBIT

## 2022-04-13 PROCEDURE — 3331090001 HH PPS REVENUE CREDIT

## 2022-04-13 PROCEDURE — 3331090002 HH PPS REVENUE DEBIT

## 2022-04-14 ENCOUNTER — HOME CARE VISIT (OUTPATIENT)
Dept: HOME HEALTH SERVICES | Facility: HOME HEALTH | Age: 82
End: 2022-04-14
Payer: MEDICARE

## 2022-04-14 ENCOUNTER — HOME CARE VISIT (OUTPATIENT)
Dept: SCHEDULING | Facility: HOME HEALTH | Age: 82
End: 2022-04-14
Payer: MEDICARE

## 2022-04-14 VITALS
HEART RATE: 132 BPM | DIASTOLIC BLOOD PRESSURE: 66 MMHG | SYSTOLIC BLOOD PRESSURE: 133 MMHG | OXYGEN SATURATION: 98 % | RESPIRATION RATE: 18 BRPM | TEMPERATURE: 98.1 F

## 2022-04-14 PROCEDURE — G0151 HHCP-SERV OF PT,EA 15 MIN: HCPCS

## 2022-04-14 PROCEDURE — G0152 HHCP-SERV OF OT,EA 15 MIN: HCPCS

## 2022-04-14 PROCEDURE — 3331090001 HH PPS REVENUE CREDIT

## 2022-04-14 PROCEDURE — 3331090002 HH PPS REVENUE DEBIT

## 2022-04-15 VITALS
TEMPERATURE: 97.6 F | RESPIRATION RATE: 18 BRPM | DIASTOLIC BLOOD PRESSURE: 68 MMHG | HEART RATE: 122 BPM | SYSTOLIC BLOOD PRESSURE: 130 MMHG | OXYGEN SATURATION: 98 %

## 2022-04-15 PROCEDURE — 3331090001 HH PPS REVENUE CREDIT

## 2022-04-15 PROCEDURE — 3331090002 HH PPS REVENUE DEBIT

## 2022-04-16 PROCEDURE — 3331090001 HH PPS REVENUE CREDIT

## 2022-04-16 PROCEDURE — 3331090002 HH PPS REVENUE DEBIT

## 2022-04-17 PROCEDURE — 3331090001 HH PPS REVENUE CREDIT

## 2022-04-17 PROCEDURE — 3331090002 HH PPS REVENUE DEBIT

## 2022-04-18 ENCOUNTER — HOME CARE VISIT (OUTPATIENT)
Dept: SCHEDULING | Facility: HOME HEALTH | Age: 82
End: 2022-04-18
Payer: MEDICARE

## 2022-04-18 VITALS
SYSTOLIC BLOOD PRESSURE: 122 MMHG | RESPIRATION RATE: 18 BRPM | HEART RATE: 108 BPM | OXYGEN SATURATION: 99 % | DIASTOLIC BLOOD PRESSURE: 78 MMHG | TEMPERATURE: 97.6 F

## 2022-04-18 PROCEDURE — G0151 HHCP-SERV OF PT,EA 15 MIN: HCPCS

## 2022-04-18 PROCEDURE — 3331090001 HH PPS REVENUE CREDIT

## 2022-04-18 PROCEDURE — 3331090002 HH PPS REVENUE DEBIT

## 2022-04-19 ENCOUNTER — HOME CARE VISIT (OUTPATIENT)
Dept: SCHEDULING | Facility: HOME HEALTH | Age: 82
End: 2022-04-19
Payer: MEDICARE

## 2022-04-19 VITALS
HEART RATE: 70 BPM | SYSTOLIC BLOOD PRESSURE: 122 MMHG | RESPIRATION RATE: 18 BRPM | DIASTOLIC BLOOD PRESSURE: 65 MMHG | TEMPERATURE: 98.1 F | OXYGEN SATURATION: 98 %

## 2022-04-19 PROCEDURE — 3331090001 HH PPS REVENUE CREDIT

## 2022-04-19 PROCEDURE — G0152 HHCP-SERV OF OT,EA 15 MIN: HCPCS

## 2022-04-19 PROCEDURE — 3331090002 HH PPS REVENUE DEBIT

## 2022-04-20 PROCEDURE — 3331090001 HH PPS REVENUE CREDIT

## 2022-04-20 PROCEDURE — 3331090002 HH PPS REVENUE DEBIT

## 2022-04-21 ENCOUNTER — HOME CARE VISIT (OUTPATIENT)
Dept: SCHEDULING | Facility: HOME HEALTH | Age: 82
End: 2022-04-21
Payer: MEDICARE

## 2022-04-21 PROCEDURE — 3331090001 HH PPS REVENUE CREDIT

## 2022-04-21 PROCEDURE — G0151 HHCP-SERV OF PT,EA 15 MIN: HCPCS

## 2022-04-21 PROCEDURE — G0152 HHCP-SERV OF OT,EA 15 MIN: HCPCS

## 2022-04-21 PROCEDURE — 3331090002 HH PPS REVENUE DEBIT

## 2022-04-22 VITALS
OXYGEN SATURATION: 98 % | SYSTOLIC BLOOD PRESSURE: 120 MMHG | RESPIRATION RATE: 18 BRPM | TEMPERATURE: 97.6 F | DIASTOLIC BLOOD PRESSURE: 72 MMHG | HEART RATE: 108 BPM

## 2022-04-22 PROCEDURE — 3331090001 HH PPS REVENUE CREDIT

## 2022-04-22 PROCEDURE — 3331090002 HH PPS REVENUE DEBIT

## 2022-04-23 PROCEDURE — 3331090001 HH PPS REVENUE CREDIT

## 2022-04-23 PROCEDURE — 3331090002 HH PPS REVENUE DEBIT

## 2022-04-24 VITALS
OXYGEN SATURATION: 98 % | RESPIRATION RATE: 18 BRPM | TEMPERATURE: 98.1 F | HEART RATE: 77 BPM | SYSTOLIC BLOOD PRESSURE: 124 MMHG | DIASTOLIC BLOOD PRESSURE: 60 MMHG

## 2022-04-24 PROCEDURE — 3331090001 HH PPS REVENUE CREDIT

## 2022-04-24 PROCEDURE — 3331090002 HH PPS REVENUE DEBIT

## 2022-04-25 ENCOUNTER — HOME CARE VISIT (OUTPATIENT)
Dept: SCHEDULING | Facility: HOME HEALTH | Age: 82
End: 2022-04-25
Payer: MEDICARE

## 2022-04-25 PROCEDURE — 3331090001 HH PPS REVENUE CREDIT

## 2022-04-25 PROCEDURE — G0151 HHCP-SERV OF PT,EA 15 MIN: HCPCS

## 2022-04-25 PROCEDURE — 3331090002 HH PPS REVENUE DEBIT

## 2022-04-26 ENCOUNTER — HOME CARE VISIT (OUTPATIENT)
Dept: SCHEDULING | Facility: HOME HEALTH | Age: 82
End: 2022-04-26
Payer: MEDICARE

## 2022-04-26 VITALS
SYSTOLIC BLOOD PRESSURE: 120 MMHG | DIASTOLIC BLOOD PRESSURE: 70 MMHG | TEMPERATURE: 97.6 F | RESPIRATION RATE: 18 BRPM | OXYGEN SATURATION: 99 % | HEART RATE: 88 BPM

## 2022-04-26 PROCEDURE — G0152 HHCP-SERV OF OT,EA 15 MIN: HCPCS

## 2022-04-26 PROCEDURE — 3331090001 HH PPS REVENUE CREDIT

## 2022-04-26 PROCEDURE — 3331090002 HH PPS REVENUE DEBIT

## 2022-04-27 ENCOUNTER — HOME CARE VISIT (OUTPATIENT)
Dept: SCHEDULING | Facility: HOME HEALTH | Age: 82
End: 2022-04-27
Payer: MEDICARE

## 2022-04-27 PROCEDURE — G0152 HHCP-SERV OF OT,EA 15 MIN: HCPCS

## 2022-04-27 PROCEDURE — 3331090001 HH PPS REVENUE CREDIT

## 2022-04-27 PROCEDURE — 3331090002 HH PPS REVENUE DEBIT

## 2022-04-28 ENCOUNTER — OFFICE VISIT (OUTPATIENT)
Dept: ORTHOPEDIC SURGERY | Age: 82
End: 2022-04-28
Payer: MEDICARE

## 2022-04-28 VITALS — HEIGHT: 62 IN | WEIGHT: 133 LBS | BODY MASS INDEX: 24.48 KG/M2

## 2022-04-28 DIAGNOSIS — Z09 SURGERY FOLLOW-UP: ICD-10-CM

## 2022-04-28 DIAGNOSIS — Z96.641 STATUS POST RIGHT HIP REPLACEMENT: Primary | ICD-10-CM

## 2022-04-28 PROCEDURE — 3331090002 HH PPS REVENUE DEBIT

## 2022-04-28 PROCEDURE — 3331090001 HH PPS REVENUE CREDIT

## 2022-04-28 PROCEDURE — 99024 POSTOP FOLLOW-UP VISIT: CPT | Performed by: PHYSICIAN ASSISTANT

## 2022-04-28 NOTE — PROGRESS NOTES
Omer Vargas (: 1940) is a 80 y.o. female, patient, here for evaluation of the following chief complaint(s):  Surgical Follow-up ([facility] 9 #1 F/U)       SUBJECTIVE/OBJECTIVE:  Omer Vargas presents today for first postop visit status post right JEM on 22. She has been working with home therapy. Weaned off of all medication other than an occasional Tylenol. Still taking the metoprolol she was put on while in the hospital and has a cardiology follow-up coming up. PHYSICAL EXAM:  Vitals: Ht 5' 2\" (1.575 m)   Wt 133 lb (60.3 kg)   BMI 24.33 kg/m²   Body mass index is 24.33 kg/m². 80y.o. year old F in no acute distress. Ambulates with a slight limp at start up, cane in the contralateral hand. Right hip incision well-healing. Eschar remains along the length of the incision. No surrounding warmth, erythema, drainage. No pain with forward flexion of the right hip. Motor 5/5. No distal edema. IMAGING:  Radiographs: XR Results (most recent):  Results from Appointment encounter on 22    XR HIP RT W OR WO PELV 2-3 VWS    Narrative  3 views of the right hip today including AP pelvis, AP and frog lateral using digital radiography demonstrate satisfactory position and alignment of cementless total hip components. No changes compared to immediate postoperative x-rays. ASSESSMENT/PLAN:  1. Status post right hip replacement  -     REFERRAL TO PHYSICAL THERAPY  -     XR HIP RT W OR WO PELV 2-3 VWS; Future  2. Surgery follow-up    First postop visit status post right total hip arthroplasty on 2022. Outpatient physical therapy prescription was provided, but patient believes she will stick to home exercises only. We discussed bathing restrictions, avoiding the bathtub for now. She will follow-up in 4 weeks for clinical recheck, sooner if needed. She can stop the aspirin at this time. She was instructed to continue the metoprolol until her cardiology follow-up.    Return in about 4 weeks (around 5/26/2022) for POV #2 with Dr. Hope Mccollum. Review Of Systems  ROS     Positive for: Skin, Musculoskeletal    Last edited by Rosa Elena Mcknight RN on 4/28/2022  2:11 PM. (History)         Patient denies any recent fever, chills, nausea, vomiting, chest pain, or shortness of breath. Allergies   Allergen Reactions    Codeine Itching       Current Outpatient Medications   Medication Sig    metoprolol tartrate (LOPRESSOR) 25 mg tablet Take 1 Tablet by mouth every twelve (12) hours for 30 days.  aspirin delayed-release 81 mg tablet Take 1 Tablet by mouth two (2) times a day. Indications: blood clot prevention    acetaminophen (TYLENOL) 500 mg tablet Take 1 Tablet by mouth every four (4) hours.  multivit-minerals/folic acid (VITAJOY ADULT MULTI PO) Take 1 Tablet by mouth daily.  losartan (COZAAR) 100 mg tablet TAKE 1 TABLET EVERY DAY (Patient taking differently: Take 100 mg by mouth daily.)    atorvastatin (LIPITOR) 40 mg tablet TAKE 1 TABLET EVERY DAY (Patient taking differently: Take 40 mg by mouth nightly.)    CHOLECALCIFEROL, VITAMIN D3, (VITAMIN D3 PO) Take 1 Tablet by mouth daily.  senna-docusate (PERICOLACE) 8.6-50 mg per tablet Take 1 Tablet by mouth two (2) times daily as needed for Constipation. (Patient not taking: Reported on 4/28/2022)     No current facility-administered medications for this visit.        Past Medical History:   Diagnosis Date    Advance care planning     documents pending    Anemia     Arthropathy     CKD (chronic kidney disease), stage III ([facility] [address] 09/08/2014    Yessica Cuadra md    DJD (degenerative joint disease) of knee 10/21/2021    Dinorah Ziegler    Family history of colon cancer     History of total right knee replacement (TKR) 2011    ghazal mirza md    Hypercholesterolemia     Hypertension     IGT (impaired glucose tolerance) 10/08/2018    Knee pain, left     Osteoarthritis of right hip 02/2021    Pes anserine bursitis 08/11/2020    Rheumatoid arthritis ([address]     S/P colonoscopy 2021    Anton Chew MD -repeat 3 yrs        Past Surgical History:   Procedure Laterality Date    COLONOSCOPY N/A 2018    COLONOSCOPY performed by Anton Chew MD at [address] COLONOSCOPY N/A 2021    COLONOSCOPY   V performed by Anton Chew MD at [address] [place] [address] ENDOSCOPY    HX BUNIONECTOMY      HX CARPAL TUNNEL RELEASE      HX CERVICAL FUSION  2018    HX KNEE REPLACEMENT Right 2011       Family History   Problem Relation Age of Onset    Heart Disease Mother     Heart Disease Father     Cancer Sister         colon    No Known Problems Brother     Cancer Sister         lung    Heart Disease Brother     No Known Problems Sister     Anesth Problems Neg Hx         Social History     Socioeconomic History    Marital status: SINGLE     Spouse name: Not on file    Number of children: Not on file    Years of education: Not on file    Highest education level: Not on file   Occupational History    Not on file   Tobacco Use    Smoking status: Never Smoker    Smokeless tobacco: Never Used   Vaping Use    Vaping Use: Never used   Substance and Sexual Activity    Alcohol use: Yes     Alcohol/week: 0.8 standard drinks     Types: 1 Standard drinks or equivalent per week     Comment: ocassional    Drug use: No    Sexual activity: Not Currently   Other Topics Concern    Not on file   Social History Narrative    Habits:  Occasional alcohol use. No cigarette or drug abuse. Social History:  The patient is . She has no children. She lives alone. She completed high school. 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 She's a member of [employer] Part time at [address] [employer] [employer]        Family History:  Father passed at 59 of heart disease. Mother  80 of heart disease. She also had diabetes. One brother, one sister alive and well.   One brother  of seizure disorder, one brother  of heart disease. He had LVAD. One brother  of liver issues. One sister  of colon cancer and one sister  of lung cancer. Social Determinants of Health     Financial Resource Strain:     Difficulty of Paying Living Expenses: Not on file   Food Insecurity:     Worried About Running Out of Food in the Last Year: Not on file    Hailey of Food in the Last Year: Not on file   Transportation Needs:     Lack of Transportation (Medical): Not on file    Lack of Transportation (Non-Medical): Not on file   Physical Activity:     Days of Exercise per Week: Not on file    Minutes of Exercise per Session: Not on file   Stress:     Feeling of Stress : Not on file   Social Connections:     Frequency of Communication with Friends and Family: Not on file    Frequency of Social Gatherings with Friends and Family: Not on file    Attends Yarsanism Services: Not on file    Active Member of [address] [place] [address] or Organizations: Not on file    Attends Club or Organization Meetings: Not on file    Marital Status: Not on file   Intimate Partner Violence:     Fear of Current or Ex-Partner: Not on file    Emotionally Abused: Not on file    Physically Abused: Not on file    Sexually Abused: Not on file   Housing Stability:     Unable to Pay for Housing in the Last Year: Not on file    Number of [place] in the Last Year: Not on file    Unstable Housing in the Last Year: Not on file         Orders Placed This Encounter    XR HIP RT [address] 2-3 VWS     Standing Status:   Future     Number of Occurrences:   1     Standing Expiration Date:   2023    REFERRAL TO PHYSICAL THERAPY     Referral Priority:   Routine     Referral Type:   PT/OT/ST     Referral Reason:   Specialty Services Required     Number of Visits Requested:   [address] Naresh Catalan M.D. was available for immediate consultation as the supervising physician.   An electronic signature was used to authenticate this note.   -- Pushpa Kendall PA-C

## 2022-04-28 NOTE — LETTER
4/28/2022    Patient: Keara Coe   YOB: 1940   Date of Visit: 4/28/2022     Tammy Logan MD  [facility]  [address] [address],[address]  [address]    Dear Tammy Logan MD,      Thank you for referring Ms. Gareth Gonsales to [facility] for evaluation. My notes for this consultation are attached. If you have questions, please do not hesitate to call me. I look forward to following your patient along with you.       Sincerely,    Kristen Rebollar PA-C

## 2022-04-29 ENCOUNTER — HOME CARE VISIT (OUTPATIENT)
Dept: SCHEDULING | Facility: HOME HEALTH | Age: 82
End: 2022-04-29
Payer: MEDICARE

## 2022-04-29 PROCEDURE — G0151 HHCP-SERV OF PT,EA 15 MIN: HCPCS

## 2022-04-29 PROCEDURE — 3331090002 HH PPS REVENUE DEBIT

## 2022-04-29 PROCEDURE — 3331090001 HH PPS REVENUE CREDIT

## 2022-04-30 VITALS
HEART RATE: 88 BPM | SYSTOLIC BLOOD PRESSURE: 124 MMHG | DIASTOLIC BLOOD PRESSURE: 76 MMHG | TEMPERATURE: 97.6 F | OXYGEN SATURATION: 99 % | RESPIRATION RATE: 18 BRPM

## 2022-04-30 PROCEDURE — 3331090001 HH PPS REVENUE CREDIT

## 2022-04-30 PROCEDURE — 3331090002 HH PPS REVENUE DEBIT

## 2022-05-01 PROBLEM — Z01.818 PRE-OP EXAMINATION: Status: RESOLVED | Noted: 2022-04-01 | Resolved: 2022-05-01

## 2022-05-01 PROCEDURE — 3331090002 HH PPS REVENUE DEBIT

## 2022-05-01 PROCEDURE — 3331090001 HH PPS REVENUE CREDIT

## 2022-05-02 VITALS
RESPIRATION RATE: 18 BRPM | SYSTOLIC BLOOD PRESSURE: 126 MMHG | TEMPERATURE: 98.1 F | RESPIRATION RATE: 18 BRPM | DIASTOLIC BLOOD PRESSURE: 70 MMHG | OXYGEN SATURATION: 98 % | HEART RATE: 77 BPM | OXYGEN SATURATION: 98 % | DIASTOLIC BLOOD PRESSURE: 62 MMHG | TEMPERATURE: 98.1 F | SYSTOLIC BLOOD PRESSURE: 122 MMHG | HEART RATE: 70 BPM

## 2022-05-12 ENCOUNTER — OFFICE VISIT (OUTPATIENT)
Dept: INTERNAL MEDICINE CLINIC | Age: 82
End: 2022-05-12
Payer: MEDICARE

## 2022-05-12 VITALS
RESPIRATION RATE: 18 BRPM | HEART RATE: 72 BPM | BODY MASS INDEX: 23.34 KG/M2 | TEMPERATURE: 97.9 F | HEIGHT: 62 IN | OXYGEN SATURATION: 99 % | DIASTOLIC BLOOD PRESSURE: 77 MMHG | SYSTOLIC BLOOD PRESSURE: 129 MMHG | WEIGHT: 126.8 LBS

## 2022-05-12 DIAGNOSIS — R73.02 IGT (IMPAIRED GLUCOSE TOLERANCE): ICD-10-CM

## 2022-05-12 DIAGNOSIS — E78.2 MIXED HYPERLIPIDEMIA: ICD-10-CM

## 2022-05-12 DIAGNOSIS — Z96.651 HISTORY OF TOTAL RIGHT KNEE REPLACEMENT (TKR): ICD-10-CM

## 2022-05-12 DIAGNOSIS — N18.31 ANEMIA DUE TO STAGE 3A CHRONIC KIDNEY DISEASE (HCC): ICD-10-CM

## 2022-05-12 DIAGNOSIS — I10 PRIMARY HYPERTENSION: Primary | ICD-10-CM

## 2022-05-12 DIAGNOSIS — D63.1 ANEMIA DUE TO STAGE 3A CHRONIC KIDNEY DISEASE (HCC): ICD-10-CM

## 2022-05-12 DIAGNOSIS — R00.0 SINUS TACHYCARDIA: ICD-10-CM

## 2022-05-12 PROCEDURE — 93000 ELECTROCARDIOGRAM COMPLETE: CPT | Performed by: INTERNAL MEDICINE

## 2022-05-12 PROCEDURE — G8752 SYS BP LESS 140: HCPCS | Performed by: INTERNAL MEDICINE

## 2022-05-12 PROCEDURE — 36415 COLL VENOUS BLD VENIPUNCTURE: CPT | Performed by: INTERNAL MEDICINE

## 2022-05-12 PROCEDURE — G8536 NO DOC ELDER MAL SCRN: HCPCS | Performed by: INTERNAL MEDICINE

## 2022-05-12 PROCEDURE — G8399 PT W/DXA RESULTS DOCUMENT: HCPCS | Performed by: INTERNAL MEDICINE

## 2022-05-12 PROCEDURE — G8420 CALC BMI NORM PARAMETERS: HCPCS | Performed by: INTERNAL MEDICINE

## 2022-05-12 PROCEDURE — G8510 SCR DEP NEG, NO PLAN REQD: HCPCS | Performed by: INTERNAL MEDICINE

## 2022-05-12 PROCEDURE — G8754 DIAS BP LESS 90: HCPCS | Performed by: INTERNAL MEDICINE

## 2022-05-12 PROCEDURE — 1101F PT FALLS ASSESS-DOCD LE1/YR: CPT | Performed by: INTERNAL MEDICINE

## 2022-05-12 PROCEDURE — 99214 OFFICE O/P EST MOD 30 MIN: CPT | Performed by: INTERNAL MEDICINE

## 2022-05-12 PROCEDURE — G8427 DOCREV CUR MEDS BY ELIG CLIN: HCPCS | Performed by: INTERNAL MEDICINE

## 2022-05-12 PROCEDURE — 1090F PRES/ABSN URINE INCON ASSESS: CPT | Performed by: INTERNAL MEDICINE

## 2022-05-12 RX ORDER — BIOTIN 1000 MCG
TABLET,CHEWABLE ORAL
COMMUNITY
End: 2022-05-12

## 2022-05-12 RX ORDER — METOPROLOL SUCCINATE 25 MG/1
25 TABLET, EXTENDED RELEASE ORAL
Qty: 90 TABLET | Refills: 3 | Status: SHIPPED | OUTPATIENT
Start: 2022-05-12 | End: 2022-09-29

## 2022-05-12 RX ORDER — AMLODIPINE BESYLATE 5 MG/1
5 TABLET ORAL DAILY
COMMUNITY

## 2022-05-12 RX ORDER — TRAMADOL HYDROCHLORIDE 50 MG/1
50 TABLET ORAL
COMMUNITY
End: 2022-09-19

## 2022-05-12 RX ORDER — HYDRALAZINE HYDROCHLORIDE 25 MG/1
25 TABLET, FILM COATED ORAL 2 TIMES DAILY
Qty: 180 TABLET | Refills: 3 | Status: SHIPPED | OUTPATIENT
Start: 2022-05-12 | End: 2022-05-12

## 2022-05-12 RX ORDER — HYDRALAZINE HYDROCHLORIDE 25 MG/1
25 TABLET, FILM COATED ORAL 4 TIMES DAILY
COMMUNITY
End: 2022-05-12

## 2022-05-12 NOTE — PROGRESS NOTES
Rm    Chief Complaint   Patient presents with    Medication Evaluation        Visit Vitals  /77 (BP 1 Location: Left upper arm, BP Patient Position: Sitting, BP Cuff Size: Adult)   Pulse 72   Temp 97.9 °F (36.6 °C) (Oral)   Resp 18   Ht 5' 2\" (1.575 m)   Wt 126 lb 12.8 oz (57.5 kg)   SpO2 99%   BMI 23.19 kg/m²        1. Have you been to the ER, urgent care clinic since your last visit? Hospitalized since your last visit? No    2. Have you seen or consulted any other health care providers outside of the [address] [place] [address] since your last visit? Include any pap smears or colon screening. No     Health Maintenance Due   Topic Date Due    Pneumococcal 65+ years (1 - PCV) Never done        3 most recent PHQ Screens 5/12/2022   Little interest or pleasure in doing things Several days   Feeling down, depressed, irritable, or hopeless Several days   Total Score PHQ 2 2        Fall Risk Assessment, last 12 mths 5/12/2022   Able to walk? Yes   Fall in past 12 months? 0   Do you feel unsteady? 0   Are you worried about falling 0   Number of falls in past 12 months -   Fall with injury?  -       Learning Assessment 3/19/2018   PRIMARY LEARNER Patient   HIGHEST LEVEL OF EDUCATION - PRIMARY LEARNER  -   BARRIERS PRIMARY LEARNER NONE   PRIMARY LANGUAGE ENGLISH   LEARNER PREFERENCE PRIMARY DEMONSTRATION   ANSWERED BY self   RELATIONSHIP SELF

## 2022-05-12 NOTE — PROGRESS NOTES
SPORTS MEDICINE AND PRIMARY CARE  Chapin Barnes MD, [address] [address]  Phone:  [phone]  Fax: [phone]       Chief Complaint   Patient presents with    Medication Evaluation   . SUBJECTIVE:    Yair Sierra is a 80 y.o. female Since we last saw her in the office, she was hospitalized at [facility] and during that admission Anne Yu MD on 22 performed a right total hip arthroplasty for osteoarthritis of the right hip. She went to her sister's house for rehab. She was subsequently seen by Madeline Morgan MD for tachycardia, mild to moderate tricuspid valve regurgitation, dyslipidemia, primary hypertension, family history of congestive heart failure. Patient just received some bad news. Her niece's father . He was 39years old. He drove himself to the hospital yesterday and  this morning. She does not use the cane in the house, but when she is out she uses the cane. Other new complaints denied. She is out of Lopressor 25 mg b.i.d. and has not had any issues that she knows of. Dr. Darlene Billy did not make a comment regarding the beta blocker. Patient is seen for evaluation. Current Outpatient Medications   Medication Sig Dispense Refill    amLODIPine (NORVASC) 5 mg tablet Take 5 mg by mouth daily.  traMADoL (ULTRAM) 50 mg tablet Take 50 mg by mouth every six (6) hours as needed for Pain.  metoprolol succinate (TOPROL-XL) 25 mg XL tablet Take 1 Tablet by mouth nightly. 90 Tablet 3    acetaminophen (TYLENOL) 500 mg tablet Take 1 Tablet by mouth every four (4) hours. 100 Tablet 0    multivit-minerals/folic acid (VITAJOY ADULT MULTI PO) Take 1 Tablet by mouth daily.       losartan (COZAAR) 100 mg tablet TAKE 1 TABLET EVERY DAY (Patient taking differently: Take 100 mg by mouth daily.) 90 Tablet 3    atorvastatin (LIPITOR) 40 mg tablet TAKE 1 TABLET EVERY DAY (Patient taking differently: Take 40 mg by mouth nightly.) 90 Tab 4  CHOLECALCIFEROL, VITAMIN D3, (VITAMIN D3 PO) Take 1 Tablet by mouth daily.        Past Medical History:   Diagnosis Date    Advance care planning     documents pending    Anemia     Arthropathy     CKD (chronic kidney disease), stage III ([facility] [address] 09/08/2014    Yessica Cuadra md    DJD (degenerative joint disease) of knee 10/21/2021    Tierney Ee    Family history of colon cancer     History of total right knee replacement (TKR) 2011    ghazal mirza md    Hypercholesterolemia     Hypertension     IGT (impaired glucose tolerance) 10/08/2018    Knee pain, left     Osteoarthritis of right hip 02/2021    Pes anserine bursitis 08/11/2020    Rheumatoid arthritis ([facility] [address]     S/P colonoscopy 11/30/2021    Jamie Saunders MD -repeat 3 yrs     Past Surgical History:   Procedure Laterality Date    COLONOSCOPY N/A 11/29/2018    COLONOSCOPY performed by Jamie Saunders MD [address] COLONOSCOPY N/A 11/30/2021    COLONOSCOPY   V performed by Jamie Saunders MD at [address] HX BUNIONECTOMY      HX CARPAL TUNNEL RELEASE      HX CERVICAL FUSION  2018    HX KNEE REPLACEMENT Right 2011     Allergies   Allergen Reactions    Codeine Itching         REVIEW OF SYSTEMS:  General: negative for - chills or fever  ENT: negative for - headaches, nasal congestion or tinnitus  Respiratory: negative for - cough, hemoptysis, shortness of breath or wheezing  Cardiovascular : negative for - chest pain, edema, palpitations or shortness of breath  Gastrointestinal: negative for - abdominal pain, blood in stools, heartburn or nausea/vomiting  Genito-Urinary: no dysuria, trouble voiding, or hematuria  Musculoskeletal: negative for - gait disturbance, joint pain, joint stiffness or joint swelling  Neurological: no TIA or stroke symptoms  Hematologic: no bruises, no bleeding, no swollen glands  Integument: no lumps, mole changes, nail changes or rash  Endocrine: no malaise/lethargy or unexpected weight changes      Social History     Socioeconomic History    Marital status: SINGLE   Tobacco Use    Smoking status: Never Smoker    Smokeless tobacco: Never Used   Vaping Use    Vaping Use: Never used   Substance and Sexual Activity    Alcohol use: Yes     Alcohol/week: 0.8 standard drinks     Types: 1 Standard drinks or equivalent per week     Comment: ocassional    Drug use: No    Sexual activity: Not Currently   Social History Narrative    Habits:  Occasional alcohol use. No cigarette or drug abuse. Social History:  The patient is . She has no children. She lives alone. She completed high school. 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 She's a member of [employer]. Part time at [employer]        Family History:  Father passed at 59 of heart disease. Mother  80 of heart disease. She also had diabetes. One brother, one sister alive and well. One brother  of seizure disorder, one brother  of heart disease. He had LVAD. One brother  of liver issues. One sister  of colon cancer and one sister  of lung cancer. Family History   Problem Relation Age of Onset    Heart Disease Mother     Heart Disease Father     Cancer Sister         colon    No Known Problems Brother     Cancer Sister         lung    Heart Disease Brother     No Known Problems Sister     Anesth Problems Neg Hx        OBJECTIVE:    Visit Vitals  /77 (BP 1 Location: Left upper arm, BP Patient Position: Sitting, BP Cuff Size: Adult)   Pulse 72   Temp 97.9 °F (36.6 °C) (Oral)   Resp 18   Ht 5' 2\" (1.575 m)   Wt 126 lb 12.8 oz (57.5 kg)   SpO2 99%   BMI 23.19 kg/m²     CONSTITUTIONAL: well , well nourished, appears age appropriate  EYES: perrla, eom intact  ENMT:moist mucous membranes, pharynx clear  NECK: supple.  Thyroid normal  RESPIRATORY: Chest: clear bilaterally   CARDIOVASCULAR: Heart: regular rate and rhythm  GASTROINTESTINAL: Abdomen: soft, bowel sounds active  HEMATOLOGIC: no pathological lymph nodes palpated  MUSCULOSKELETAL: Extremities: no edema, pulse 1+   INTEGUMENT: No unusual rashes or suspicious skin lesions noted. Nails appear normal.  NEUROLOGIC: non-focal exam   MENTAL STATUS: alert and oriented, appropriate affect           ASSESSMENT:  1. Primary hypertension    2. Mixed hyperlipidemia    3. Anemia due to stage 3a chronic kidney disease ([address]    4. History of total right knee replacement (TKR)    5. Sinus tachycardia    6. IGT (impaired glucose tolerance)      Blood pressure control is excellent. No titration of medication is needed. She is not on Lopressor. Known history of dyslipidemia, which is controlled with Atorvastatin. She has anemia, which was multifactorial and most likely related to chronic kidney disease, as well as the surgical procedure. She had a TKR uneventfully. There is a history of sinus tachycardia, which is the reason we placed her on Lopressor, but on auscultation today she has an irregular irregular rhythm and we will do an EKG to see if this is sinus tach, PACs or if she is in atrial fibrillation. She has impaired glucose tolerance, which has been stable. She will be back to see me in two to three months, sooner if needed. EKG revealed a PVC, frequent PACs. We will stop the Hydralazine and place her back on Metoprolol at 25 mg at bedtime. This should quiet down the PACs and keep the pressure in a normotensive range. She agrees with the plan. I have discussed the diagnosis with the patient and the intended plan as seen in the  Orders. The patient understands and agees with the plan. The patient has   received an after visit summary and questions were answered concerning  future plans  Patient labs and/or xrays were reviewed  Past records were reviewed.     PLAN:  .  Orders Placed This Encounter    CBC WITH AUTOMATED DIFF    RENAL FUNCTION PANEL    AMB POC EKG ROUTINE W/ 12 LEADS, INTER & REP    DISCONTD: biotin 1,000 mcg chew    amLODIPine (NORVASC) 5 mg tablet    DISCONTD: hydrALAZINE (APRESOLINE) 25 mg tablet    traMADoL (ULTRAM) 50 mg tablet    DISCONTD: hydrALAZINE (APRESOLINE) 25 mg tablet    metoprolol succinate (TOPROL-XL) 25 mg XL tablet       Follow-up and Dispositions    · Return in about 4 weeks (around 6/9/2022). ATTENTION:   This medical record was transcribed using an electronic medical records system. Although proofread, it may and can contain electronic and spelling errors. Other human spelling and other errors may be present. Corrections may be executed at a later time. Please feel free to contact us for any clarifications as needed.

## 2022-05-26 ENCOUNTER — OFFICE VISIT (OUTPATIENT)
Dept: ORTHOPEDIC SURGERY | Age: 82
End: 2022-05-26
Payer: MEDICARE

## 2022-05-26 VITALS — WEIGHT: 126 LBS | BODY MASS INDEX: 23.19 KG/M2 | HEIGHT: 62 IN

## 2022-05-26 DIAGNOSIS — Z09 SURGERY FOLLOW-UP: ICD-10-CM

## 2022-05-26 DIAGNOSIS — Z96.641 STATUS POST RIGHT HIP REPLACEMENT: Primary | ICD-10-CM

## 2022-05-26 PROCEDURE — 99024 POSTOP FOLLOW-UP VISIT: CPT | Performed by: ORTHOPAEDIC SURGERY

## 2022-05-26 NOTE — LETTER
5/31/2022    Patient: Jennifer Carballo   YOB: 1940   Date of Visit: 5/26/2022     Earl Cortés MD  [address]  [address] [facility]  [address]  [facility]    Dear Earl Cortés MD,      Thank you for referring Ms. Melba Tobar to [facility] for evaluation. My notes for this consultation are attached. If you have questions, please do not hesitate to call me. I look forward to following your patient along with you.       Sincerely,    Renny Genao MD

## 2022-05-31 NOTE — PROGRESS NOTES
Manpreet Velazquez (: 1940) is a 80 y.o. female, patient, here for evaluation of the following chief complaint(s):  Surgical Follow-up ([facility] 9 #2 F/U)       SUBJECTIVE/OBJECTIVE:  Manpreet Velazquez presents today for routine follow-up approximately 7 weeks out from right primary total hip replacement. Overall she is very happy with her progress. Continues using an assistive device for confidence. Relates significant improvement in overall mobility. PHYSICAL EXAM:  Vitals: Ht 5' 2\" (1.575 m)   Wt 126 lb (57.2 kg)   BMI 23.05 kg/m²   Body mass index is 23.05 kg/m². 80y.o. year old F, no distress. Ambulates with mild Trendelenburg pattern on the right side. Right lateral hip incision is well-healed. Trace residual local edema. No pain with passive right hip rotation. No pain with resisted flexion. No distal edema. No calf tenderness. IMAGING:  Radiographs: No new images today. ASSESSMENT/PLAN:  1. Status post right hip replacement  2. Surgery follow-up    The xray and exam findings were discussed with the patient today. As factory progress. Continue home exercise program.  Wean all assistive devices as tolerated. Return in 10 months for routine 1 year postop x-ray of right hip, sooner if needed. No follow-ups on file. Review Of Systems  ROS     Positive for: Musculoskeletal    Last edited by Cristino Saul RN on 2022  4:32 PM. (History)         Patient denies any recent fever, chills, nausea, vomiting, chest pain, or shortness of breath. Allergies   Allergen Reactions    Codeine Itching       Current Outpatient Medications   Medication Sig    amLODIPine (NORVASC) 5 mg tablet Take 5 mg by mouth daily.  traMADoL (ULTRAM) 50 mg tablet Take 50 mg by mouth every six (6) hours as needed for Pain.  metoprolol succinate (TOPROL-XL) 25 mg XL tablet Take 1 Tablet by mouth nightly.     acetaminophen (TYLENOL) 500 mg tablet Take 1 Tablet by mouth every four (4) hours.    multivit-minerals/folic acid (VITAJOY ADULT MULTI PO) Take 1 Tablet by mouth daily.  losartan (COZAAR) 100 mg tablet TAKE 1 TABLET EVERY DAY (Patient taking differently: Take 100 mg by mouth daily.)    atorvastatin (LIPITOR) 40 mg tablet TAKE 1 TABLET EVERY DAY (Patient taking differently: Take 40 mg by mouth nightly.)    CHOLECALCIFEROL, VITAMIN D3, (VITAMIN D3 PO) Take 1 Tablet by mouth daily. No current facility-administered medications for this visit.        Past Medical History:   Diagnosis Date    Advance care planning     documents pending    Anemia     Arthropathy     CKD (chronic kidney disease), stage III ([facility] 09/08/2014    Yessica Cuadra md    DJD (degenerative joint disease) of knee 10/21/2021    Lindsay Kim    Family history of colon cancer     History of total right knee replacement (TKR) 2011    ghazla mirza md    Hypercholesterolemia     Hypertension     IGT (impaired glucose tolerance) 10/08/2018    Knee pain, left     Osteoarthritis of right hip 02/2021    Pes anserine bursitis 08/11/2020    Rheumatoid arthritis ([facility]     S/P colonoscopy 11/30/2021    Alexandra Armijo MD -repeat 3 yrs        Past Surgical History:   Procedure Laterality Date    COLONOSCOPY N/A 11/29/2018    COLONOSCOPY performed by Alexandra Armijo MD at [address] COLONOSCOPY N/A 11/30/2021    COLONOSCOPY   V performed by Alexandra [facility]ijo MD at [facility] ENDOSCOPY    HX BUNIONECTOMY      HX CARPAL TUNNEL RELEASE      HX CERVICAL FUSION  2018    HX KNEE REPLACEMENT Right 2011       Family History   Problem Relation Age of Onset    Heart Disease Mother     Heart Disease Father     Cancer Sister         colon    No Known Problems Brother     Cancer Sister         lung    Heart Disease Brother     No Known Problems Sister     Anesth Problems Neg Hx         Social History     Socioeconomic History    Marital status: SINGLE     Spouse name: Not on file    Number of children: Not on file    Years of education: Not on file    Highest education level: Not on file   Occupational History    Not on file   Tobacco Use    Smoking status: Never Smoker    Smokeless tobacco: Never Used   Vaping Use    Vaping Use: Never used   Substance and Sexual Activity    Alcohol use: Yes     Alcohol/week: 0.8 standard drinks     Types: 1 Standard drinks or equivalent per week     Comment: ocassional    Drug use: No    Sexual activity: Not Currently   Other Topics Concern    Not on file   Social History Narrative    Habits:  Occasional alcohol use. No cigarette or drug abuse. Social History:  The patient is . She has no children. She lives alone. She completed high school. 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 Part time at [employer]        Family History:  Father passed at 59 of heart disease. Mother  80 of heart disease. She also had diabetes. One brother, one sister alive and well. One brother  of seizure disorder, one brother  of heart disease. He had LVAD. One brother  of liver issues. One sister  of colon cancer and one sister  of lung cancer. Social Determinants of Health     Financial Resource Strain:     Difficulty of Paying Living Expenses: Not on file   Food Insecurity:     Worried About Running Out of Food in the Last Year: Not on file    Hailey of Food in the Last Year: Not on file   Transportation Needs:     Lack of Transportation (Medical): Not on file    Lack of Transportation (Non-Medical):  Not on file   Physical Activity:     Days of Exercise per Week: Not on file    Minutes of Exercise per Session: Not on file   Stress:     Feeling of Stress : Not on file   Social Connections:     Frequency of Communication with Friends and Family: Not on file    Frequency of Social Gatherings with Friends and Family: Not on file    Attends [employer] Services: Not on file    Active Member of Clubs or Organizations: Not on file    Attends Club or Organization Meetings: Not on file    Marital Status: Not on file   Intimate Partner Violence:     Fear of Current or Ex-Partner: Not on file    Emotionally Abused: Not on file    Physically Abused: Not on file    Sexually Abused: Not on file   Housing Stability:     Unable to Pay for Housing in the Last Year: Not on file    Number of [place] in the Last Year: Not on file    Unstable Housing in the Last Year: Not on file       No orders of the defined types were placed in this encounter. An electronic signature was used to authenticate this note.   -- Rufus Owens MD

## 2022-06-23 ENCOUNTER — OFFICE VISIT (OUTPATIENT)
Dept: INTERNAL MEDICINE CLINIC | Age: 82
End: 2022-06-23
Payer: MEDICARE

## 2022-06-23 VITALS
OXYGEN SATURATION: 100 % | HEIGHT: 62 IN | TEMPERATURE: 97.8 F | HEART RATE: 74 BPM | WEIGHT: 125.1 LBS | RESPIRATION RATE: 20 BRPM | BODY MASS INDEX: 23.02 KG/M2 | SYSTOLIC BLOOD PRESSURE: 137 MMHG | DIASTOLIC BLOOD PRESSURE: 74 MMHG

## 2022-06-23 DIAGNOSIS — I10 PRIMARY HYPERTENSION: Primary | ICD-10-CM

## 2022-06-23 DIAGNOSIS — Z96.651 HISTORY OF TOTAL RIGHT KNEE REPLACEMENT (TKR): ICD-10-CM

## 2022-06-23 DIAGNOSIS — E78.2 MIXED HYPERLIPIDEMIA: ICD-10-CM

## 2022-06-23 DIAGNOSIS — R73.02 IGT (IMPAIRED GLUCOSE TOLERANCE): ICD-10-CM

## 2022-06-23 DIAGNOSIS — N18.31 STAGE 3A CHRONIC KIDNEY DISEASE (HCC): ICD-10-CM

## 2022-06-23 DIAGNOSIS — M65.331 TRIGGER FINGER, RIGHT MIDDLE FINGER: ICD-10-CM

## 2022-06-23 PROBLEM — Z96.641 H/O TOTAL HIP ARTHROPLASTY, RIGHT: Status: ACTIVE | Noted: 2022-04-05

## 2022-06-23 LAB
ALBUMIN SERPL-MCNC: 4.1 G/DL (ref 3.5–5)
ANION GAP SERPL CALC-SCNC: 6 MMOL/L (ref 5–15)
BASOPHILS # BLD: 0.1 K/UL (ref 0–0.1)
BASOPHILS NFR BLD: 1 % (ref 0–1)
BUN SERPL-MCNC: 14 MG/DL (ref 6–20)
BUN/CREAT SERPL: 13 (ref 12–20)
CALCIUM SERPL-MCNC: 9.9 MG/DL (ref 8.5–10.1)
CHLORIDE SERPL-SCNC: 107 MMOL/L (ref 97–108)
CO2 SERPL-SCNC: 30 MMOL/L (ref 21–32)
CREAT SERPL-MCNC: 1.06 MG/DL (ref 0.55–1.02)
DIFFERENTIAL METHOD BLD: ABNORMAL
EOSINOPHIL # BLD: 0.1 K/UL (ref 0–0.4)
EOSINOPHIL NFR BLD: 2 % (ref 0–7)
ERYTHROCYTE [DISTWIDTH] IN BLOOD BY AUTOMATED COUNT: 15.8 % (ref 11.5–14.5)
GLUCOSE SERPL-MCNC: 97 MG/DL (ref 65–100)
HCT VFR BLD AUTO: 37.7 % (ref 35–47)
HGB BLD-MCNC: 11.5 G/DL (ref 11.5–16)
IMM GRANULOCYTES # BLD AUTO: 0 K/UL (ref 0–0.04)
IMM GRANULOCYTES NFR BLD AUTO: 0 % (ref 0–0.5)
LYMPHOCYTES # BLD: 2.1 K/UL (ref 0.8–3.5)
LYMPHOCYTES NFR BLD: 26 % (ref 12–49)
MCH RBC QN AUTO: 25.9 PG (ref 26–34)
MCHC RBC AUTO-ENTMCNC: 30.5 G/DL (ref 30–36.5)
MCV RBC AUTO: 84.9 FL (ref 80–99)
MONOCYTES # BLD: 0.5 K/UL (ref 0–1)
MONOCYTES NFR BLD: 6 % (ref 5–13)
NEUTS SEG # BLD: 5.2 K/UL (ref 1.8–8)
NEUTS SEG NFR BLD: 65 % (ref 32–75)
NRBC # BLD: 0 K/UL (ref 0–0.01)
NRBC BLD-RTO: 0 PER 100 WBC
PHOSPHATE SERPL-MCNC: 3.3 MG/DL (ref 2.6–4.7)
PLATELET # BLD AUTO: 328 K/UL (ref 150–400)
PMV BLD AUTO: 10.3 FL (ref 8.9–12.9)
POTASSIUM SERPL-SCNC: 3.3 MMOL/L (ref 3.5–5.1)
RBC # BLD AUTO: 4.44 M/UL (ref 3.8–5.2)
SODIUM SERPL-SCNC: 143 MMOL/L (ref 136–145)
WBC # BLD AUTO: 8 K/UL (ref 3.6–11)

## 2022-06-23 PROCEDURE — 1123F ACP DISCUSS/DSCN MKR DOCD: CPT | Performed by: INTERNAL MEDICINE

## 2022-06-23 PROCEDURE — G8420 CALC BMI NORM PARAMETERS: HCPCS | Performed by: INTERNAL MEDICINE

## 2022-06-23 PROCEDURE — G8427 DOCREV CUR MEDS BY ELIG CLIN: HCPCS | Performed by: INTERNAL MEDICINE

## 2022-06-23 PROCEDURE — G8754 DIAS BP LESS 90: HCPCS | Performed by: INTERNAL MEDICINE

## 2022-06-23 PROCEDURE — G8399 PT W/DXA RESULTS DOCUMENT: HCPCS | Performed by: INTERNAL MEDICINE

## 2022-06-23 PROCEDURE — 1090F PRES/ABSN URINE INCON ASSESS: CPT | Performed by: INTERNAL MEDICINE

## 2022-06-23 PROCEDURE — G8536 NO DOC ELDER MAL SCRN: HCPCS | Performed by: INTERNAL MEDICINE

## 2022-06-23 PROCEDURE — 1101F PT FALLS ASSESS-DOCD LE1/YR: CPT | Performed by: INTERNAL MEDICINE

## 2022-06-23 PROCEDURE — 99214 OFFICE O/P EST MOD 30 MIN: CPT | Performed by: INTERNAL MEDICINE

## 2022-06-23 PROCEDURE — G8752 SYS BP LESS 140: HCPCS | Performed by: INTERNAL MEDICINE

## 2022-06-23 PROCEDURE — G8432 DEP SCR NOT DOC, RNG: HCPCS | Performed by: INTERNAL MEDICINE

## 2022-06-23 NOTE — PROGRESS NOTES
Tamie Veras is a 80 y.o. female    Chief Complaint   Patient presents with    Hypertension     1. Have you been to the ER, urgent care clinic since your last visit? Hospitalized since your last visit? No   2. Have you seen or consulted any other health care providers outside of the [address] [place] [address] since your last visit? Include any pap smears or colon screening.   No

## 2022-06-23 NOTE — PROGRESS NOTES
SPORTS MEDICINE AND PRIMARY CARE  Bre Cohen MD, [address] [address]  Phone:  [phone]  Fax: [phone]       Chief Complaint   Patient presents with    Hypertension   . SUBJECTIVE:    Aniyah Alvarado is a 80 y.o. female Patient returns today after a total hip arthroplasty on the right on 04/05/22 by Dr. Uday Samuel. She also has a history of chronic kidney disease stage 3 that is improved with the last lab, hypertension, dyslipidemia, rheumatoid arthritis, and is seen for evaluation. Patient returns today with a note from Kate Dow and a note from Carlita Hurtado MD regarding her right total knee replacement. The plastic inserts in the TKR wear down sooner than expected, Dr. Aniyah Marie noted, and a similar statement was made by Kate Dow. Patient comes in today complaining of knee pain, particularly on the right, that has gotten worse since her surgery on the hip. But then she is also having pain in the left knee where she did not have any surgery. She is having triggering of her right middle finger. Patient is seen for evaluation. Current Outpatient Medications   Medication Sig Dispense Refill    amLODIPine (NORVASC) 5 mg tablet Take 5 mg by mouth daily.  traMADoL (ULTRAM) 50 mg tablet Take 50 mg by mouth every six (6) hours as needed for Pain.  metoprolol succinate (TOPROL-XL) 25 mg XL tablet Take 1 Tablet by mouth nightly. 90 Tablet 3    acetaminophen (TYLENOL) 500 mg tablet Take 1 Tablet by mouth every four (4) hours. 100 Tablet 0    multivit-minerals/folic acid (VITAJOY ADULT MULTI PO) Take 1 Tablet by mouth daily.       losartan (COZAAR) 100 mg tablet TAKE 1 TABLET EVERY DAY (Patient taking differently: Take 100 mg by mouth daily.) 90 Tablet 3    atorvastatin (LIPITOR) 40 mg tablet TAKE 1 TABLET EVERY DAY (Patient taking differently: Take 40 mg by mouth nightly.) 90 Tab 4    CHOLECALCIFEROL, VITAMIN D3, (VITAMIN D3 PO) Take 1 Tablet by mouth daily.        Past Medical History:   Diagnosis Date    Advance care planning     documents pending    Anemia     Arthropathy     CKD (chronic kidney disease), stage III ([facility] 09/08/2014    Yessica Cuadra md    DJD (degenerative joint disease) of knee 10/21/2021    Ugo Roberts    Family history of colon cancer     H/O total hip arthroplasty, right 04/05/2022    Cecelia Kehr, MD    History of total right knee replacement (TKR) 2011    ghazal mirza md    Hypercholesterolemia     Hypertension     IGT (impaired glucose tolerance) 10/08/2018    Knee pain, left     Osteoarthritis of right hip 02/2021    Pes anserine bursitis 08/11/2020    Rheumatoid arthritis ([facility]     S/P colonoscopy 11/30/2021    Edin Grissom MD -repeat 3 yrs    Trigger finger, right middle finger 06/23/2022     Past Surgical History:   Procedure Laterality Date    COLONOSCOPY N/A 11/29/2018    COLONOSCOPY performed by Edin Grissom MD at [address] COLONOSCOPY N/A 11/30/2021    COLONOSCOPY   V performed by Edin Grissom MD at [address] HX BUNIONECTOMY      HX CARPAL TUNNEL RELEASE      HX CERVICAL FUSION  2018    HX KNEE REPLACEMENT Right 2011     Allergies   Allergen Reactions    Codeine Itching         REVIEW OF SYSTEMS:  General: negative for - chills or fever  ENT: negative for - headaches, nasal congestion or tinnitus  Respiratory: negative for - cough, hemoptysis, shortness of breath or wheezing  Cardiovascular : negative for - chest pain, edema, palpitations or shortness of breath  Gastrointestinal: negative for - abdominal pain, blood in stools, heartburn or nausea/vomiting  Genito-Urinary: no dysuria, trouble voiding, or hematuria  Musculoskeletal: negative for - gait disturbance, joint pain, joint stiffness or joint swelling  Neurological: no TIA or stroke symptoms  Hematologic: no bruises, no bleeding, no swollen glands  Integument: no lumps, mole changes, nail changes or rash  Endocrine: no malaise/lethargy or unexpected weight changes      Social History     Socioeconomic History    Marital status: SINGLE   Tobacco Use    Smoking status: Never Smoker    Smokeless tobacco: Never Used   Vaping Use    Vaping Use: Never used   Substance and Sexual Activity    Alcohol use: Yes     Alcohol/week: 0.8 standard drinks     Types: 1 Standard drinks or equivalent per week     Comment: ocassional    Drug use: No    Sexual activity: Not Currently   Social History Narrative    Habits:  Occasional alcohol use. No cigarette or drug abuse. Social History:  The patient is . She has no children. She lives alone. She completed high school. 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 Part time at [employer]        Family History:  Father passed at 59 of heart disease. Mother  80 of heart disease. She also had diabetes. One brother, one sister alive and well. One brother  of seizure disorder, one brother  of heart disease. He had LVAD. One brother  of liver issues. One sister  of colon cancer and one sister  of lung cancer. Family History   Problem Relation Age of Onset    Heart Disease Mother     Heart Disease Father     Cancer Sister         colon    No Known Problems Brother     Cancer Sister         lung    Heart Disease Brother     No Known Problems Sister     Anesth Problems Neg Hx        OBJECTIVE:    Visit Vitals  /74 (BP 1 Location: Left upper arm, BP Patient Position: Sitting)   Pulse 74   Temp 97.8 °F (36.6 °C) (Oral)   Resp 20   Ht 5' 2\" (1.575 m)   Wt 125 lb 1.6 oz (56.7 kg)   SpO2 100%   BMI 22.88 kg/m²     CONSTITUTIONAL: well , well nourished, appears age appropriate  EYES: perrla, eom intact  ENMT:moist mucous membranes, pharynx clear  NECK: supple.  Thyroid normal  RESPIRATORY: Chest: clear bilaterally   CARDIOVASCULAR: Heart: regular rate and rhythm  GASTROINTESTINAL: Abdomen: soft, bowel sounds active  HEMATOLOGIC: no pathological lymph nodes palpated  MUSCULOSKELETAL: Extremities: no edema, pulse 1+   INTEGUMENT: No unusual rashes or suspicious skin lesions noted. Nails appear normal.  NEUROLOGIC: non-focal exam   MENTAL STATUS: alert and oriented, appropriate affect           ASSESSMENT:  1. Primary hypertension    2. Mixed hyperlipidemia    3. Stage 3a chronic kidney disease ([address]    4. IGT (impaired glucose tolerance)    5. Trigger finger, right middle finger    6. History of total right knee replacement (TKR)      Blood pressure control is at goal, no titration is needed. She has dyslipidemia, but her LDL is at goal.    Chronic kidney disease stage 3A, last lab check, however, kidneys had reverted to normal.  We encouraged her to drink fluids, particularly water, and we will check her renal status today. I expect it to be normal.    She has impaired glucose tolerance and we will check hemoglobin A1c. She has triggering of her right middle finger, when she sees Dr. Deysi Max, we will ask him to address it or refer her to a hand surgeon. She has had a total hip replacement on the right, which is now asymptomatic. However, the right total knee replacement is bothersome and it could be the wearing of the component as discussed above. She will ask Dr. Deysi Max about that and he will give her recommendations. She will be back to see me in three months, sooner if she has any problems. I have discussed the diagnosis with the patient and the intended plan as seen in the  Orders. The patient understands and agees with the plan. The patient has   received an after visit summary and questions were answered concerning  future plans  Patient labs and/or xrays were reviewed  Past records were reviewed.     PLAN:  .  Orders Placed This Encounter    CBC WITH AUTOMATED DIFF    RENAL FUNCTION PANEL  REFERRAL TO ORTHOPEDIC SURGERY       Follow-up and Dispositions    · Return in about 3 months (around 9/23/2022). ATTENTION:   This medical record was transcribed using an electronic medical records system. Although proofread, it may and can contain electronic and spelling errors. Other human spelling and other errors may be present. Corrections may be executed at a later time. Please feel free to contact us for any clarifications as needed.

## 2022-06-24 NOTE — PROGRESS NOTES
Labs are good but potassium is slightly low which you can make up by increasing your intake of potassium rich foods for example bananas and orange juice

## 2022-07-19 ENCOUNTER — TRANSCRIBE ORDER (OUTPATIENT)
Dept: SCHEDULING | Age: 82
End: 2022-07-19

## 2022-07-19 DIAGNOSIS — Z12.31 SCREENING MAMMOGRAM FOR HIGH-RISK PATIENT: Primary | ICD-10-CM

## 2022-07-19 DIAGNOSIS — Z96.651 HISTORY OF TOTAL KNEE REPLACEMENT, RIGHT: Primary | ICD-10-CM

## 2022-07-19 DIAGNOSIS — T84.84XA PAIN DUE TO TOTAL RIGHT KNEE REPLACEMENT, INITIAL ENCOUNTER (HCC): ICD-10-CM

## 2022-07-19 DIAGNOSIS — Z96.651 PAIN DUE TO TOTAL RIGHT KNEE REPLACEMENT, INITIAL ENCOUNTER (HCC): ICD-10-CM

## 2022-07-28 ENCOUNTER — HOSPITAL ENCOUNTER (OUTPATIENT)
Dept: NUCLEAR MEDICINE | Age: 82
Discharge: HOME | End: 2022-07-28
Attending: ORTHOPAEDIC SURGERY
Payer: MEDICARE

## 2022-07-28 DIAGNOSIS — Z96.651 PAIN DUE TO TOTAL RIGHT KNEE REPLACEMENT, INITIAL ENCOUNTER (HCC): ICD-10-CM

## 2022-07-28 DIAGNOSIS — Z96.651 HISTORY OF TOTAL KNEE REPLACEMENT, RIGHT: ICD-10-CM

## 2022-07-28 DIAGNOSIS — T84.84XA PAIN DUE TO TOTAL RIGHT KNEE REPLACEMENT, INITIAL ENCOUNTER (HCC): ICD-10-CM

## 2022-07-28 PROCEDURE — 78300 BONE IMAGING LIMITED AREA: CPT

## 2022-08-08 DIAGNOSIS — E78.2 MIXED HYPERLIPIDEMIA: ICD-10-CM

## 2022-08-08 RX ORDER — ATORVASTATIN CALCIUM 40 MG/1
40 TABLET, FILM COATED ORAL
Qty: 90 TABLET | Refills: 3 | Status: SHIPPED | OUTPATIENT
Start: 2022-08-08

## 2022-08-15 ENCOUNTER — HOSPITAL ENCOUNTER (OUTPATIENT)
Dept: MAMMOGRAPHY | Age: 82
Discharge: HOME | End: 2022-08-15
Attending: INTERNAL MEDICINE
Payer: MEDICARE

## 2022-08-15 DIAGNOSIS — Z12.31 SCREENING MAMMOGRAM FOR HIGH-RISK PATIENT: ICD-10-CM

## 2022-08-15 PROCEDURE — 77063 BREAST TOMOSYNTHESIS BI: CPT

## 2022-09-12 ENCOUNTER — OFFICE VISIT (OUTPATIENT)
Dept: INTERNAL MEDICINE CLINIC | Age: 82
End: 2022-09-12
Payer: MEDICARE

## 2022-09-12 VITALS
DIASTOLIC BLOOD PRESSURE: 69 MMHG | OXYGEN SATURATION: 100 % | SYSTOLIC BLOOD PRESSURE: 117 MMHG | WEIGHT: 127.1 LBS | TEMPERATURE: 98.6 F | HEIGHT: 62 IN | BODY MASS INDEX: 23.39 KG/M2 | RESPIRATION RATE: 20 BRPM | HEART RATE: 82 BPM

## 2022-09-12 DIAGNOSIS — I10 PRIMARY HYPERTENSION: Primary | ICD-10-CM

## 2022-09-12 DIAGNOSIS — R73.02 IGT (IMPAIRED GLUCOSE TOLERANCE): ICD-10-CM

## 2022-09-12 DIAGNOSIS — D63.1 ANEMIA DUE TO STAGE 3A CHRONIC KIDNEY DISEASE (HCC): ICD-10-CM

## 2022-09-12 DIAGNOSIS — E78.2 MIXED HYPERLIPIDEMIA: ICD-10-CM

## 2022-09-12 DIAGNOSIS — N18.31 ANEMIA DUE TO STAGE 3A CHRONIC KIDNEY DISEASE (HCC): ICD-10-CM

## 2022-09-12 DIAGNOSIS — Z96.651 HISTORY OF TOTAL RIGHT KNEE REPLACEMENT (TKR): ICD-10-CM

## 2022-09-12 DIAGNOSIS — N18.31 STAGE 3A CHRONIC KIDNEY DISEASE (HCC): ICD-10-CM

## 2022-09-12 PROCEDURE — G8752 SYS BP LESS 140: HCPCS | Performed by: INTERNAL MEDICINE

## 2022-09-12 PROCEDURE — 99214 OFFICE O/P EST MOD 30 MIN: CPT | Performed by: INTERNAL MEDICINE

## 2022-09-12 PROCEDURE — 1101F PT FALLS ASSESS-DOCD LE1/YR: CPT | Performed by: INTERNAL MEDICINE

## 2022-09-12 PROCEDURE — G8510 SCR DEP NEG, NO PLAN REQD: HCPCS | Performed by: INTERNAL MEDICINE

## 2022-09-12 PROCEDURE — G8399 PT W/DXA RESULTS DOCUMENT: HCPCS | Performed by: INTERNAL MEDICINE

## 2022-09-12 PROCEDURE — G8420 CALC BMI NORM PARAMETERS: HCPCS | Performed by: INTERNAL MEDICINE

## 2022-09-12 PROCEDURE — 1123F ACP DISCUSS/DSCN MKR DOCD: CPT | Performed by: INTERNAL MEDICINE

## 2022-09-12 PROCEDURE — G8427 DOCREV CUR MEDS BY ELIG CLIN: HCPCS | Performed by: INTERNAL MEDICINE

## 2022-09-12 PROCEDURE — G8536 NO DOC ELDER MAL SCRN: HCPCS | Performed by: INTERNAL MEDICINE

## 2022-09-12 PROCEDURE — G8754 DIAS BP LESS 90: HCPCS | Performed by: INTERNAL MEDICINE

## 2022-09-12 PROCEDURE — 1090F PRES/ABSN URINE INCON ASSESS: CPT | Performed by: INTERNAL MEDICINE

## 2022-09-12 NOTE — PROGRESS NOTES
SPORTS MEDICINE AND PRIMARY CARE  Dwain Calderon MD, [address] [address]  Phone:  [phone]  Fax: [phone]       Chief Complaint   Patient presents with    Pre-op Exam   .      SUBJECTIVE:    Christian Angulo is a 80 y.o. female Patient returns today with a known history of primary hypertension, dyslipidemia, anemia, CKD stage 3A, IGT, history of total right knee replacement and is seen for evaluation. Comes in today planning to have revision of her TKR by Jessica Saldaña MD.  Apparently the components were made by [facility]. She plans to have surgery the 26th of this month and comes in for a preoperative evaluation. She just feels tired, she states. Patient is seen for evaluation. She denies chest pain. She can go up a flight of steps without shortness of breath. She has no chest pain, no shortness of breath. Patient denies other specific complaints and is seen for evaluation. Current Outpatient Medications   Medication Sig Dispense Refill    atorvastatin (LIPITOR) 40 mg tablet Take 1 Tablet by mouth nightly. 90 Tablet 3    amLODIPine (NORVASC) 5 mg tablet Take 5 mg by mouth daily. traMADoL (ULTRAM) 50 mg tablet Take 50 mg by mouth every six (6) hours as needed for Pain.      metoprolol succinate (TOPROL-XL) 25 mg XL tablet Take 1 Tablet by mouth nightly. 90 Tablet 3    acetaminophen (TYLENOL) 500 mg tablet Take 1 Tablet by mouth every four (4) hours. 100 Tablet 0    multivit-minerals/folic acid (VITAJOY ADULT MULTI PO) Take 1 Tablet by mouth daily. losartan (COZAAR) 100 mg tablet TAKE 1 TABLET EVERY DAY (Patient taking differently: Take 100 mg by mouth daily.) 90 Tablet 3    CHOLECALCIFEROL, VITAMIN D3, (VITAMIN D3 PO) Take 1 Tablet by mouth daily.        Past Medical History:   Diagnosis Date    Advance care planning     documents pending    Anemia     Arthropathy     CKD (chronic kidney disease), stage III ([email or web address] 09/08/2014    md DIVYA Szymanski (degenerative joint disease) of knee 10/21/2021    Chloe Nguyen    Family history of colon cancer     H/O total hip arthroplasty, right 04/05/2022    Jt Sims MD    History of total right knee replacement (TKR) 2011    ghazal mirza md    Hypercholesterolemia     Hypertension     IGT (impaired glucose tolerance) 10/08/2018    Knee pain, left     Osteoarthritis of right hip 02/2021    Pes anserine bursitis 08/11/2020    Rheumatoid arthritis ([address]     S/P colonoscopy 11/30/2021    Marifer Mosley MD -repeat 3 yrs    Trigger finger, right middle finger 06/23/2022     Past Surgical History:   Procedure Laterality Date    COLONOSCOPY N/A 11/29/2018    COLONOSCOPY performed by Marifer Mosley MD at [facility] ENDOSCOPY    COLONOSCOPY N/A 11/30/2021    COLONOSCOPY   V performed by Marifer Mosley MD at [facility] ENDOSCOPY    HX BUNIONECTOMY      HX [address]      HX CERVICAL FUSION  2018    HX KNEE REPLACEMENT Right 2011     Allergies   Allergen Reactions    Codeine Itching         REVIEW OF SYSTEMS:  General: negative for - chills or fever  ENT: negative for - headaches, nasal congestion or tinnitus  Respiratory: negative for - cough, hemoptysis, shortness of breath or wheezing  Cardiovascular : negative for - chest pain, edema, palpitations or shortness of breath  Gastrointestinal: negative for - abdominal pain, blood in stools, heartburn or nausea/vomiting  Genito-Urinary: no dysuria, trouble voiding, or hematuria  Musculoskeletal: negative for - gait disturbance, joint pain, joint stiffness or joint swelling  Neurological: no TIA or stroke symptoms  Hematologic: no bruises, no bleeding, no swollen glands  Integument: no lumps, mole changes, nail changes or rash  Endocrine: no malaise/lethargy or unexpected weight changes      Social History     Socioeconomic History    Marital status: SINGLE   Tobacco Use    Smoking status: Never    Smokeless tobacco: Never   Vaping Use    Vaping Use: Never used Substance and Sexual Activity    Alcohol use: Yes     Alcohol/week: 0.8 standard drinks     Types: 1 Standard drinks or equivalent per week     Comment: ocassional    Drug use: No    Sexual activity: Not Currently   Social History Narrative    Habits:  Occasional alcohol use. No cigarette or drug abuse. Social History:  The patient is . She has no children. She lives alone. She completed high school. 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 She's a member of [employer]. Part time at [employer]        Family History:  Father passed at 59 of heart disease. Mother  80 of heart disease. She also had diabetes. One brother, one sister alive and well. One brother  of seizure disorder, one brother  of heart disease. He had LVAD. One brother  of liver issues. One sister  of colon cancer and one sister  of lung cancer. Family History   Problem Relation Age of Onset    Heart Disease Mother     Heart Disease Father     Cancer Sister         colon    No Known Problems Brother     Cancer Sister         lung    Heart Disease Brother     No Known Problems Sister     Anesth Problems Neg Hx        OBJECTIVE:    Visit Vitals  /69 (BP 1 Location: Left upper arm, BP Patient Position: Sitting)   Pulse 82   Temp 98.6 °F (37 °C) (Oral)   Resp 20   Ht 5' 2\" (1.575 m)   Wt 127 lb 1.6 oz (57.7 kg)   SpO2 100%   BMI 23.25 kg/m²     CONSTITUTIONAL: well , well nourished, appears age appropriate  EYES: perrla, eom intact  ENMT:moist mucous membranes, pharynx clear  NECK: supple.  Thyroid normal  RESPIRATORY: Chest: clear bilaterally   CARDIOVASCULAR: Heart: regular rate and rhythm  GASTROINTESTINAL: Abdomen: soft, bowel sounds active  HEMATOLOGIC: no pathological lymph nodes palpated  MUSCULOSKELETAL: Extremities: no edema, pulse 1+   INTEGUMENT: No unusual rashes or suspicious skin lesions noted. Nails appear normal.  NEUROLOGIC: non-focal exam   MENTAL STATUS: alert and oriented, appropriate affect           ASSESSMENT:  1. Primary hypertension    2. Mixed hyperlipidemia    3. Anemia due to stage 3a chronic kidney disease (HCC)    4. Stage 3a chronic kidney disease ([address]    5. IGT (impaired glucose tolerance)    6. History of total right knee replacement (TKR)      This is a preoperative evaluation. Patient is at low cardiovascular risk. In April she had a total hip replacement that was uneventful. We will do appropriate lab studies and these will be available for Dr. Karina Clark. She has a history of dyslipidemia with cholesterol in February that was at goal.    There is a history of anemia, with her last CBC normal in June. We will repeat that today. There is a history of CKD, which has reverted to normal renal function, with creatinine of 1.06 and GFR of greater than 60. Potassium was low and we encouraged her to increase potassium intake of foods and we will check potassium today. She has IGT and we will check hemoglobin A1c. She is doing well from the hip replacement. She will have a revision of the TKR as noted above. She will be back to see me in about three months, sooner if any problems. I have discussed the diagnosis with the patient and the intended plan as seen in the  Orders. The patient understands and agees with the plan. The patient has   received an after visit summary and questions were answered concerning  future plans  Patient labs and/or xrays were reviewed  Past records were reviewed. PLAN:  .  Orders Placed This Encounter    RENAL FUNCTION PANEL    CBC WITH AUTOMATED DIFF    URINALYSIS W/ REFLEX CULTURE    HEMOGLOBIN A1C WITH EAG       Follow-up and Dispositions    Return in about 3 months (around 12/12/2022). ATTENTION:   This medical record was transcribed using an electronic medical records system.   Although proofread, it may and can contain electronic and spelling errors. Other human spelling and other errors may be present. Corrections may be executed at a later time. Please feel free to contact us for any clarifications as needed.

## 2022-09-12 NOTE — PROGRESS NOTES
Priyanka Drew is a 80 y.o. female    Chief Complaint   Patient presents with    Pre-op Exam     1. Have you been to the ER, urgent care clinic since your last visit? Hospitalized since your last visit? No    2. Have you seen or consulted any other health care providers outside of the [address] [place] [address] since your last visit? Include any pap smears or colon screening.  No

## 2022-09-13 LAB
ALBUMIN SERPL-MCNC: 4.1 G/DL (ref 3.5–5)
ANION GAP SERPL CALC-SCNC: 7 MMOL/L (ref 5–15)
APPEARANCE UR: ABNORMAL
BACTERIA SPEC CULT: NORMAL
BACTERIA URNS QL MICRO: ABNORMAL /HPF
BASOPHILS # BLD: 0.1 K/UL (ref 0–0.1)
BASOPHILS NFR BLD: 1 % (ref 0–1)
BILIRUB UR QL: NEGATIVE
BUN SERPL-MCNC: 21 MG/DL (ref 6–20)
BUN/CREAT SERPL: 14 (ref 12–20)
CALCIUM SERPL-MCNC: 10.1 MG/DL (ref 8.5–10.1)
CC UR VC: NORMAL
CHLORIDE SERPL-SCNC: 109 MMOL/L (ref 97–108)
CO2 SERPL-SCNC: 26 MMOL/L (ref 21–32)
COLOR UR: ABNORMAL
CREAT SERPL-MCNC: 1.48 MG/DL (ref 0.55–1.02)
DIFFERENTIAL METHOD BLD: ABNORMAL
EOSINOPHIL # BLD: 0.2 K/UL (ref 0–0.4)
EOSINOPHIL NFR BLD: 2 % (ref 0–7)
EPITH CASTS URNS QL MICRO: ABNORMAL /LPF
ERYTHROCYTE [DISTWIDTH] IN BLOOD BY AUTOMATED COUNT: 16 % (ref 11.5–14.5)
EST. AVERAGE GLUCOSE BLD GHB EST-MCNC: 123 MG/DL
GLUCOSE SERPL-MCNC: 87 MG/DL (ref 65–100)
GLUCOSE UR STRIP.AUTO-MCNC: NEGATIVE MG/DL
HBA1C MFR BLD: 5.9 % (ref 4–5.6)
HCT VFR BLD AUTO: 37.9 % (ref 35–47)
HGB BLD-MCNC: 11.3 G/DL (ref 11.5–16)
HGB UR QL STRIP: NEGATIVE
HYALINE CASTS URNS QL MICRO: ABNORMAL /LPF (ref 0–5)
IMM GRANULOCYTES # BLD AUTO: 0 K/UL (ref 0–0.04)
IMM GRANULOCYTES NFR BLD AUTO: 0 % (ref 0–0.5)
KETONES UR QL STRIP.AUTO: NEGATIVE MG/DL
LEUKOCYTE ESTERASE UR QL STRIP.AUTO: ABNORMAL
LYMPHOCYTES # BLD: 2.9 K/UL (ref 0.8–3.5)
LYMPHOCYTES NFR BLD: 28 % (ref 12–49)
MCH RBC QN AUTO: 25.5 PG (ref 26–34)
MCHC RBC AUTO-ENTMCNC: 29.8 G/DL (ref 30–36.5)
MCV RBC AUTO: 85.6 FL (ref 80–99)
MONOCYTES # BLD: 0.9 K/UL (ref 0–1)
MONOCYTES NFR BLD: 9 % (ref 5–13)
NEUTS SEG # BLD: 6.3 K/UL (ref 1.8–8)
NEUTS SEG NFR BLD: 60 % (ref 32–75)
NITRITE UR QL STRIP.AUTO: NEGATIVE
NRBC # BLD: 0 K/UL (ref 0–0.01)
NRBC BLD-RTO: 0 PER 100 WBC
PH UR STRIP: 5.5 [PH] (ref 5–8)
PHOSPHATE SERPL-MCNC: 3.6 MG/DL (ref 2.6–4.7)
PLATELET # BLD AUTO: 309 K/UL (ref 150–400)
PMV BLD AUTO: 9.8 FL (ref 8.9–12.9)
POTASSIUM SERPL-SCNC: 4.5 MMOL/L (ref 3.5–5.1)
PROT UR STRIP-MCNC: NEGATIVE MG/DL
RBC # BLD AUTO: 4.43 M/UL (ref 3.8–5.2)
RBC #/AREA URNS HPF: ABNORMAL /HPF (ref 0–5)
SERVICE CMNT-IMP: NORMAL
SODIUM SERPL-SCNC: 142 MMOL/L (ref 136–145)
SP GR UR REFRACTOMETRY: 1.01 (ref 1–1.03)
UA: UC IF INDICATED,UAUC: ABNORMAL
UROBILINOGEN UR QL STRIP.AUTO: 0.2 EU/DL (ref 0.2–1)
WBC # BLD AUTO: 10.3 K/UL (ref 3.6–11)
WBC URNS QL MICRO: ABNORMAL /HPF (ref 0–4)

## 2022-09-19 ENCOUNTER — HOSPITAL ENCOUNTER (OUTPATIENT)
Dept: PREADMISSION TESTING | Age: 82
Discharge: HOME | End: 2022-09-19
Attending: ORTHOPAEDIC SURGERY
Payer: MEDICARE

## 2022-09-19 VITALS
DIASTOLIC BLOOD PRESSURE: 91 MMHG | WEIGHT: 129.41 LBS | HEIGHT: 61 IN | OXYGEN SATURATION: 99 % | RESPIRATION RATE: 16 BRPM | TEMPERATURE: 98 F | BODY MASS INDEX: 24.43 KG/M2 | SYSTOLIC BLOOD PRESSURE: 131 MMHG | HEART RATE: 78 BPM

## 2022-09-19 LAB
ABO + RH BLD: NORMAL
ALBUMIN SERPL-MCNC: 3.8 G/DL (ref 3.5–5)
ALBUMIN/GLOB SERPL: 1 {RATIO} (ref 1.1–2.2)
ALP SERPL-CCNC: 137 U/L (ref 45–117)
ALT SERPL-CCNC: 20 U/L (ref 12–78)
ANION GAP SERPL CALC-SCNC: 7 MMOL/L (ref 5–15)
AST SERPL-CCNC: 24 U/L (ref 15–37)
ATRIAL RATE: 68 BPM
BILIRUB SERPL-MCNC: 1.2 MG/DL (ref 0.2–1)
BLOOD GROUP ANTIBODIES SERPL: NORMAL
BUN SERPL-MCNC: 19 MG/DL (ref 6–20)
BUN/CREAT SERPL: 16 (ref 12–20)
CALCIUM SERPL-MCNC: 9.9 MG/DL (ref 8.5–10.1)
CALCULATED P AXIS, ECG09: 74 DEGREES
CALCULATED R AXIS, ECG10: 7 DEGREES
CALCULATED T AXIS, ECG11: 45 DEGREES
CHLORIDE SERPL-SCNC: 107 MMOL/L (ref 97–108)
CO2 SERPL-SCNC: 30 MMOL/L (ref 21–32)
CREAT SERPL-MCNC: 1.18 MG/DL (ref 0.55–1.02)
DIAGNOSIS, 93000: NORMAL
GLOBULIN SER CALC-MCNC: 4 G/DL (ref 2–4)
GLUCOSE SERPL-MCNC: 99 MG/DL (ref 65–100)
INR PPP: 1 (ref 0.9–1.1)
P-R INTERVAL, ECG05: 152 MS
POTASSIUM SERPL-SCNC: 4.2 MMOL/L (ref 3.5–5.1)
PROT SERPL-MCNC: 7.8 G/DL (ref 6.4–8.2)
PROTHROMBIN TIME: 10.8 SEC (ref 9–11.1)
Q-T INTERVAL, ECG07: 404 MS
QRS DURATION, ECG06: 72 MS
QTC CALCULATION (BEZET), ECG08: 429 MS
SODIUM SERPL-SCNC: 144 MMOL/L (ref 136–145)
SPECIMEN EXP DATE BLD: NORMAL
VENTRICULAR RATE, ECG03: 68 BPM

## 2022-09-19 PROCEDURE — 85610 PROTHROMBIN TIME: CPT

## 2022-09-19 PROCEDURE — 36415 COLL VENOUS BLD VENIPUNCTURE: CPT

## 2022-09-19 PROCEDURE — 86900 BLOOD TYPING SEROLOGIC ABO: CPT

## 2022-09-19 PROCEDURE — 93005 ELECTROCARDIOGRAM TRACING: CPT

## 2022-09-19 PROCEDURE — 80053 COMPREHEN METABOLIC PANEL: CPT

## 2022-09-19 RX ORDER — CHOLECALCIFEROL (VITAMIN D3) 125 MCG
1 CAPSULE ORAL DAILY
COMMUNITY

## 2022-09-19 NOTE — PROGRESS NOTES
Patient attended the Joint Replacement Education Class at [facility]. The content of the class was presented using a power point presentation specific for patients undergoing hip and knee replacement surgery. The \[facility]\"" Joint Replacement Education Handbook was given to the patient. Preparing for surgery, day of surgery routine and expectations, discharge process and help at home expectations, nutrition,medications, infection control, pain management, DVT prevention, ice therapy and safety were reviewed in class. Opportunity for questions provided, patient verbalized understanding of instructions.

## 2022-09-19 NOTE — PERIOP NOTES

## 2022-09-19 NOTE — PERIOP NOTES
[facility]  Joint/Spine Preoperative Instructions        Surgery Date 9/26/22          Time of Arrival to be called at [phone]    1. On the day of your surgery, please report to the Surgical Services Registration Desk and sign in at your designated time. The Surgery Center is located to the right of the Emergency Room. 2. You must have someone with you to drive you home. You should not drive a car for 24 hours following surgery. Please make arrangements for a friend or family member to stay with you for the first 24 hours after your surgery. 3. No food after midnight. Medications morning of surgery should be taken with a sip of water. Please follow pre-surgery drink instructions that were given at your Pre Admission Testing appointment. 4. We recommend you do not drink any alcoholic beverages for 24 hours before and after your surgery. 5. Contact your surgeons office for instructions on the following medications: non-steroidal anti-inflammatory drugs (i.e. Advil, Aleve), vitamins, and supplements. (Some surgeons will want you to stop these medications prior to surgery and others may allow you to take them)  **If you are currently taking Plavix, Coumadin, Aspirin and/or other blood-thinning agents, contact your surgeon for instructions. ** Your surgeon will partner with the physician prescribing these medications to determine if it is safe to stop or if you need to continue taking. Please do not stop taking these medications without instructions from your surgeon    6. Wear comfortable clothes. Wear glasses instead of contacts. Do not bring any money or jewelry. Please bring picture ID, insurance card, and any prearranged co-payment or hospital payment. Do not wear make-up, particularly mascara the morning of your surgery. Do not wear nail polish, particularly if you are having foot /hand surgery.   Wear your hair loose or down, no ponytails, buns, eric pins or clips. All body piercings must be removed. Please shower with antibacterial soap for three consecutive days before and on the morning of surgery, but do not apply any lotions, powders or deodorants after the shower on the day of surgery. Please use a fresh towels after each shower. Please sleep in clean clothes and change bed linens the night before surgery. Please do not shave for 48 hours prior to surgery. Shaving of the face is acceptable. 7. You should understand that if you do not follow these instructions your surgery may be cancelled. If your physical condition changes (I.e. fever, cold or flu) please contact your surgeon as soon as possible. 8. It is important that you be on time. If a situation occurs where you may be late, please call [phone] (OR Holding Area). 9. If you have any questions and or problems, please call [phone] (Pre-admission Testing). 10. Your surgery time may be subject to change. You will receive a phone call the evening prior if your time changes. 11.  If having outpatient surgery, you must have someone to drive you here, stay with you during the duration of your stay, and to drive you home at time of discharge. 12. The following link is for the educational video for patients and/or families. http://[facility]/. com/locations/huptdcrit-slcbsxd-gcvotju/[place]/[email or web address]/educational-materials    13  Due to current COVID restrictions, only two adults may accompany you the day of the procedure. We have limited seating available. If our waiting room is at capacity, your ride may be asked to remain in their vehicle. No children are allowed in the waiting room    Special Instructions:       TAKE ALL MEDICATIONS THE DAY OF SURGERY EXCEPT: Vitamin D3, Multivitamin, Biotin. May take all other medications with a sip of water. I understand a pre-operative phone call will be made to verify my surgery time.   In the event that I am not available, I give permission for a message to be left on my answering service and/or with another person?   yes         ___________________      __________   _________    (Signature of Patient)             (Witness)                (Date and Time)

## 2022-09-19 NOTE — PERIOP NOTES

## 2022-09-19 NOTE — PERIOP NOTES
Hibiclens/Chlorhexidine    Preventing Infections Before and After - Your Surgery    IMPORTANT INSTRUCTIONS    Please read and follow these instructions carefully. If you are unable to comply with the below instructions your procedure will be cancelled. Every Night for Three (3) nights before your surgery:  Shower with an antibacterial soap, such as Dial, or the soap provided at your preassessment appointment. A shower is better than a bath for cleaning your skin. If needed, ask someone to help you reach all areas of your body. Dont forget to clean your belly button with every shower. The night before your surgery: If you lose your Hibiclens/chlorhexidine please contact surgery center or you can purchase it at a local pharmacy  On the night before your surgery, shower with an antibacterial soap, such as Dial, or the soap provided at your preassessment appointment. With one packet of Hibiclens/Chlorhexidine in hand, turn water off. Apply Hibiclens antiseptic skin cleanser with a clean, freshly washed washcloth. Gently apply to your body from chin to toes (except the genital area) and especially the area(s) where your incision(s) will be. Leave Hibiclens/Chlorhexidine on your skin for at least 20 seconds. CAUTION: If needed, Hibiclens/chlorhexidine may be used to clean the folds of skin of the legs (such as in the area of the groin) and on your buttocks and hips. However, do not use Hibiclens/Chlorhexidine above the neck or in the genital area (your bottom) or put inside any area of your body. Turn the water back on and rinse. Dry gently with a clean, freshly washed towel. After your shower, do not use any powder, deodorant, perfumes or lotion. Use clean, freshly washed towels and washcloths every time you shower. Wear clean, freshly washed pajamas to bed the night before surgery. Sleep on clean, freshly washed sheets. Do not allow pets to sleep in your bed with you.         The Morning of your surgery:  Shower again thoroughly with an antibacterial soap, such as Dial or the soap provided at your preassessment appointment. If needed, ask someone for help to reach all areas of your body. Dont forget to clean your belly button! Rinse. Dry gently with a clean, freshly washed towel. After your shower, do not use any powder, deodorant, perfumes or lotion prior to surgery. Put on clean, freshly washed clothing. Tips to help prevent infections after your surgery:  Protect your surgical wound from germs:  Hand washing is the most important thing you and your caregivers can do to prevent infections. Keep your bandage clean and dry! Do not touch your surgical wound. Use clean, freshly washed towels and washcloths every time you shower; do not share bath linens with others. Until your surgical wound is healed, wear clothing and sleep on bed linens each day that are clean and freshly washed. Do not allow pets to sleep in your bed with you or touch your surgical wound. Do not smoke - smoking delays wound healing. This may be a good time to stop smoking. If you have diabetes, it is important for you to manage your blood sugar levels properly before your surgery as well as after your surgery. Poorly managed blood sugar levels slow down wound healing and prevent you from healing completely. If you lose your Hibiclens/chlorhexidine, please call the [facility], or it is available for purchase at your pharmacy.                ___________________      ___________________      ________________  (Signature of Patient)          (Witness)                   (Date and Time)

## 2022-09-20 LAB
BACTERIA SPEC CULT: NORMAL
BACTERIA SPEC CULT: NORMAL
SERVICE CMNT-IMP: NORMAL

## 2022-09-20 NOTE — ADVANCED PRACTICE NURSE
PAT Nurse Practitioner   Pre-Operative Chart Review/Assessment:-ORTHOPEDIC/NEUROSURGICAL SPINE                Patient Name:  Priyanka Drew                                                           Age:   80 y.o.    :  1940     Today's Date:  2022     Date of PAT:   22     Date of Surgery:    2022      Procedure(s):  Right  Total  Knee Arthroplasty Revision      Surgeon:   Lilly Iglesias     Medical Clearance:  Dr. Gonzalez Hidden:      1)  Cardiac Clearance:  Not requested (NP cardiac evaluation w/ Dr. Jayshree Rousseau 5/3/22 for tachycardia)       2)  Program for Diabetes Health Consult:  Not indicated-A1C 5.9      3)  Sleep Apnea evaluation:   STOP BANG Score 2;  Snoring-denies, Apnea-denies               4) Treatment for MRSA/Staph Aureus:  Negative      5) Additional Concerns:  IGT, RA, CKD stage 3A, anemia                 Vital Signs:         Visit Vitals  BP (!) 131/91 (BP 1 Location: Right upper arm, BP Patient Position: At rest;Sitting)   Pulse 78   Temp 98 °F (36.7 °C)   Resp 16   Ht 5' 1.25\" (1.556 m)   Wt 58.7 kg (129 lb 6.6 oz)   SpO2 99%   BMI 24.25 kg/m²                        ____________________________________________  PAST MEDICAL HISTORY  Past Medical History:   Diagnosis Date    Advance care planning     documents pending    Anemia     Arthropathy     Chronic kidney disease     CKD (chronic kidney disease), stage III ([address] 2014    Cale Fierro    DJD (degenerative joint disease) of knee 10/21/2021    Nikky Storey    Family history of colon cancer     H/O total hip arthroplasty, right 2022    Nicki Amezcua MD    History of total right knee replacement (TKR)     ghazal mirza md    Hypercholesterolemia     Hypertension     IGT (impaired glucose tolerance) 10/08/2018    Knee pain, left     Osteoarthritis of right hip 2021    Pes anserine bursitis 2020    Rheumatoid arthritis ([address]     S/P colonoscopy 2021    Moshe Camargo MD -repeat 3 yrs    Trigger finger, right middle finger 06/23/2022      ____________________________________________  PAST SURGICAL HISTORY  Past Surgical History:   Procedure Laterality Date    COLONOSCOPY N/A 11/29/2018    COLONOSCOPY performed by Tonya Nunes MD at [facility] ENDOSCOPY    COLONOSCOPY N/A 11/30/2021    COLONOSCOPY   V performed by Tonya Nunes MD at [facility] ENDOSCOPY    HX BUNIONECTOMY      HX CARPAL TUNNEL RELEASE      HX CERVICAL FUSION  2018    HX KNEE REPLACEMENT Right 2011      ____________________________________________  HOME MEDICATIONS    Current Outpatient Medications   Medication Sig    cholecalciferol, vitamin D3, (Vitamin D3) 50 mcg (2,000 unit) tab Take 1 Tablet by mouth daily. BIOTIN PO Take 2 Gum by mouth daily. Takes 2 gummies daily    atorvastatin (LIPITOR) 40 mg tablet Take 1 Tablet by mouth nightly. amLODIPine (NORVASC) 5 mg tablet Take 5 mg by mouth daily. metoprolol succinate (TOPROL-XL) 25 mg XL tablet Take 1 Tablet by mouth nightly. multivit-minerals/folic acid (VITAJOY ADULT MULTI PO) Take 1 Tablet by mouth daily. losartan (COZAAR) 100 mg tablet TAKE 1 TABLET EVERY DAY (Patient taking differently: Take 100 mg by mouth daily.)     No current facility-administered medications for this encounter.      ____________________________________________  ALLERGIES  Allergies   Allergen Reactions    Codeine Itching      ____________________________________________  SOCIAL HISTORY  Social History     Tobacco Use    Smoking status: Never    Smokeless tobacco: Never   Substance Use Topics    Alcohol use:  Yes     Alcohol/week: 1.0 standard drink     Types: 1 Standard drinks or equivalent per week      ____________________________________________  COVID VACCINATION STATUS:      Internal Administration   First Dose COVID-19, PFIZER PURPLE top, DILUTE for use, (age 15 y+), IM, 30mcg/0.3mL  12/31/2020   Second Dose COVID-19, PFIZER PURPLE top, DILUTE for use, (age 15 y+), IM, 30mcg/0.3mL  01/21/2021      Last COVID Lab SARS-CoV-2 ( )   Date Value   11/26/2021 Not detected                      Labs:     Hospital Outpatient Visit on 09/19/2022   Component Date Value Ref Range Status    Sodium 09/19/2022 144  136 - 145 mmol/L Final    Potassium 09/19/2022 4.2  3.5 - 5.1 mmol/L Final    Chloride 09/19/2022 107  97 - 108 mmol/L Final    CO2 09/19/2022 30  21 - 32 mmol/L Final    Anion gap 09/19/2022 7  5 - 15 mmol/L Final    Glucose 09/19/2022 99  65 - 100 mg/dL Final    BUN 09/19/2022 19  6 - 20 MG/DL Final    Creatinine 09/19/2022 1.18 (A)  0.55 - 1.02 MG/DL Final    BUN/Creatinine ratio 09/19/2022 16  12 - 20   Final    GFR est AA 09/19/2022 54 (A)  >60 ml/min/1.73m2 Final    GFR est non-AA 09/19/2022 44 (A)  >60 ml/min/1.73m2 Final    Estimated GFR is calculated using the IDMS-traceable Modification of Diet in Renal Disease (MDRD) Study equation, reported for both  Americans (GFRAA) and non- Americans (GFRNA), and normalized to 1.73m2 body surface area. The physician must decide which value applies to the patient. Calcium 09/19/2022 9.9  8.5 - 10.1 MG/DL Final    Bilirubin, total 09/19/2022 1.2 (A)  0.2 - 1.0 MG/DL Final    ALT (SGPT) 09/19/2022 20  12 - 78 U/L Final    AST (SGOT) 09/19/2022 24  15 - 37 U/L Final    Alk. phosphatase 09/19/2022 137 (A)  45 - 117 U/L Final    Protein, total 09/19/2022 7.8  6.4 - 8.2 g/dL Final    Albumin 09/19/2022 3.8  3.5 - 5.0 g/dL Final    Globulin 09/19/2022 4.0  2.0 - 4.0 g/dL Final    A-G Ratio 09/19/2022 1.0 (A)  1.1 - 2.2   Final    INR 09/19/2022 1.0  0.9 - 1.1   Final    A single therapeutic range for Vit K antagonists may not be optimal for all indications - see June, 2008 issue of Chest, American College of Chest Physicians Evidence-Based Clinical Practice Guidelines, 8th Edition.     Prothrombin time 09/19/2022 10.8  9.0 - 11.1 sec Final    Ventricular Rate 09/19/2022 68  BPM Final    Atrial Rate 09/19/2022 68  BPM Final    P-R Interval 09/19/2022 152  ms Final    QRS Duration 09/19/2022 72  ms Final    Q-T Interval 09/19/2022 404  ms Final    QTC Calculation (Bezet) 09/19/2022 429  ms Final    Calculated P Axis 09/19/2022 74  degrees Final    Calculated R Axis 09/19/2022 7  degrees Final    Calculated T Axis 09/19/2022 45  degrees Final    Diagnosis 09/19/2022    Final                    Value:Normal sinus rhythm with sinus arrhythmia  Possible Left atrial enlargement  Nonspecific T wave abnormality  When compared with ECG of 07-APR-2022 00:48,  Vent. rate has decreased BY  33 BPM  Confirmed by Darnell Ac, P.V. ([id number]) on 9/19/2022 2:57:28 PM      Special Requests: 09/19/2022 NO SPECIAL REQUESTS    Final    Culture result: 09/19/2022 MRSA NOT PRESENT    Final    Culture result: 09/19/2022     Final                    Value:Screening of patient nares for MRSA is for surveillance purposes and, if positive, to facilitate isolation considerations in high risk settings. It is not intended for automatic decolonization interventions per se as regimens are not sufficiently effective to warrant routine use.       Crossmatch Expiration 09/19/2022 09/29/2022,2359   Final    ABO/Rh(D) 09/19/2022 O POSITIVE   Final    Antibody screen 09/19/2022 NEG   Final   Office Visit on 09/12/2022   Component Date Value Ref Range Status    Hemoglobin A1c 09/12/2022 5.9 (A)  4.0 - 5.6 % Final    Comment: NEW METHOD  PLEASE NOTE NEW REFERENCE RANGE  (NOTE)  HbA1C Interpretive Ranges  <5.7              Normal  5.7 - 6.4         Consider Prediabetes  >6.5              Consider Diabetes      Est. average glucose 09/12/2022 123  mg/dL Final    Color 09/12/2022 YELLOW/STRAW    Final    Color Reference Range: Straw, Yellow or Dark Yellow    Appearance 09/12/2022 TURBID (A)  CLEAR   Final    Specific gravity 09/12/2022 1.012  1.003 - 1.030   Final    pH (UA) 09/12/2022 5.5  5.0 - 8.0   Final    Protein 09/12/2022 Negative  Negative mg/dL Final    Glucose 09/12/2022 Negative Negative mg/dL Final    Ketone 09/12/2022 Negative  Negative mg/dL Final    Bilirubin 09/12/2022 Negative  Negative   Final    Blood 09/12/2022 Negative  Negative   Final    Urobilinogen 09/12/2022 0.2  0.2 - 1.0 EU/dL Final    Nitrites 09/12/2022 Negative  Negative   Final    Leukocyte Esterase 09/12/2022 SMALL (A)  Negative   Final    UA:UC IF INDICATED 09/12/2022 URINE CULTURE ORDERED (A)  CULTURE NOT INDICATED BY UA RESULT   Final    WBC 09/12/2022 20-50  0 - 4 /hpf Final    RBC 09/12/2022 0-5  0 - 5 /hpf Final    Epithelial cells 09/12/2022 MODERATE (A)  FEW /lpf Final    Epithelial cell category consists of squamous cells and /or transitional urothelial cells. Renal tubular cells, if present, are separately identified as such. Bacteria 09/12/2022 4+ (A)  Negative /hpf Final    Hyaline cast 09/12/2022 2-5  0 - 5 /lpf Final    WBC 09/12/2022 10.3  3.6 - 11.0 K/uL Final    RBC 09/12/2022 4.43  3.80 - 5.20 M/uL Final    HGB 09/12/2022 11.3 (A)  11.5 - 16.0 g/dL Final    HCT 09/12/2022 37.9  35.0 - 47.0 % Final    MCV 09/12/2022 85.6  80.0 - 99.0 FL Final    MCH 09/12/2022 25.5 (A)  26.0 - 34.0 PG Final    MCHC 09/12/2022 29.8 (A)  30.0 - 36.5 g/dL Final    RDW 09/12/2022 16.0 (A)  11.5 - 14.5 % Final    PLATELET 29/70/2365 375  150 - 400 K/uL Final    MPV 09/12/2022 9.8  8.9 - 12.9 FL Final    NRBC 09/12/2022 0.0  0  WBC Final    ABSOLUTE NRBC 09/12/2022 0.00  0.00 - 0.01 K/uL Final    NEUTROPHILS 09/12/2022 60  32 - 75 % Final    LYMPHOCYTES 09/12/2022 28  12 - 49 % Final    MONOCYTES 09/12/2022 9  5 - 13 % Final    EOSINOPHILS 09/12/2022 2  0 - 7 % Final    BASOPHILS 09/12/2022 1  0 - 1 % Final    IMMATURE GRANULOCYTES 09/12/2022 0  0.0 - 0.5 % Final    ABS. NEUTROPHILS 09/12/2022 6.3  1.8 - 8.0 K/UL Final    ABS. LYMPHOCYTES 09/12/2022 2.9  0.8 - 3.5 K/UL Final    ABS. MONOCYTES 09/12/2022 0.9  0.0 - 1.0 K/UL Final    ABS. EOSINOPHILS 09/12/2022 0.2  0.0 - 0.4 K/UL Final    ABS.  BASOPHILS 09/12/2022 0.1  0.0 - 0.1 K/UL Final    ABS. IMM. GRANS. 09/12/2022 0.0  0.00 - 0.04 K/UL Final    DF 09/12/2022 AUTOMATED    Final    Sodium 09/12/2022 142  136 - 145 mmol/L Final    Potassium 09/12/2022 4.5  3.5 - 5.1 mmol/L Final    Chloride 09/12/2022 109 (A)  97 - 108 mmol/L Final    CO2 09/12/2022 26  21 - 32 mmol/L Final    Anion gap 09/12/2022 7  5 - 15 mmol/L Final    Glucose 09/12/2022 87  65 - 100 mg/dL Final    BUN 09/12/2022 21 (A)  6 - 20 MG/DL Final    Creatinine 09/12/2022 1.48 (A)  0.55 - 1.02 MG/DL Final    BUN/Creatinine ratio 09/12/2022 14  12 - 20   Final    GFR est AA 09/12/2022 41 (A)  >60 ml/min/1.73m2 Final    GFR est non-AA 09/12/2022 34 (A)  >60 ml/min/1.73m2 Final    Estimated GFR is calculated using the IDMS-traceable Modification of Diet in Renal Disease (MDRD) Study equation, reported for both  Americans (GFRAA) and non- Americans (GFRNA), and normalized to 1.73m2 body surface area. The physician must decide which value applies to the patient. Calcium 09/12/2022 10.1  8.5 - 10.1 MG/DL Final    Phosphorus 09/12/2022 3.6  2.6 - 4.7 MG/DL Final    Albumin 09/12/2022 4.1  3.5 - 5.0 g/dL Final    Special Requests: 09/12/2022     Final                    Value:NO SPECIAL REQUESTS  Reflexed from [id number]      Graff Count 09/12/2022     Final                    Value:[id number]  COLONIES/mL      Culture result: 09/12/2022 MIXED UROGENITAL WANDA ISOLATED    Final        XR Results (most recent):    Results from Appointment encounter on 04/28/22    XR HIP RT W OR WO PELV 2-3 VWS    Narrative  3 views of the right hip today including AP pelvis, AP and frog lateral using digital radiography demonstrate satisfactory position and alignment of cementless total hip components. No changes compared to immediate postoperative x-rays. Skin:   Denies open wounds, cuts, sores, rashes or other areas of concern in PAT assessment.         Yvette Rasmussen NP

## 2022-09-23 NOTE — H&P
Deep Jauregui MD - Adult Reconstruction and Total Joint Replacement     Orthopaedic History and Physical        NAME: Michelle Armstrong       :  1940       MRN:  [id number]      Subjective:   Patient ID: Mike Vitale is a 80 y.o. female. Chief Complaint: Pain and Follow-up of the Right Knee    Mike Vitale is a pleasant 80 y.o. female for evaluation of each knee. history of right total knee in . Her right knee is bothering her substantially and she thinks is a worsening problem. She would rate her pain as 7/10 at rest. Had a right hip replacement done since we saw her last with Merna Chavez which is doing well. her left knee is limiting her as well. she tries to stay active and lives independently, walks daily when she feels up to it and enjoys gardening.  She received a letter about the recent [facility] recall      Patient Active Problem List    Diagnosis Date Noted    Trigger finger, right middle finger 2022    Sinus tachycardia 2022    Primary localized osteoarthritis of right hip 2022    H/O total hip arthroplasty, right 2022    S/P colonoscopy 2021    DJD (degenerative joint disease) of knee 10/21/2021    Osteoarthritis of right hip 2021    Pes anserine bursitis 2020    Knee pain, left     IGT (impaired glucose tolerance) 10/08/2018    CKD (chronic kidney disease), stage III ([facility] [address]     Family history of colon cancer     Glaucoma screening 03/15/2017    ACP (advance care planning) 2016    Anemia 10/06/2014    HTN (hypertension) 2014    Hyperlipidemia 2014    History of total right knee replacement (TKR)      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    Rahul Sorensen    Family history of colon cancer     H/O total hip arthroplasty, right 04/05/2022    Ramin Duvall MD    History of total right knee replacement (TKR) 2011    ghazal mirza md    Hypercholesterolemia     Hypertension     IGT (impaired glucose tolerance) 10/08/2018    Knee pain, left     Osteoarthritis of right hip 02/2021    Pes anserine bursitis 08/11/2020    Rheumatoid arthritis ([address]     S/P colonoscopy 11/30/2021    Zakia Rojas MD -repeat 3 yrs    Trigger finger, right middle finger 06/23/2022      Past Surgical History:   Procedure Laterality Date    COLONOSCOPY N/A 11/29/2018    COLONOSCOPY performed by Zakia Rojas MD at [facility] ENDOSCOPY    COLONOSCOPY N/A 11/30/2021    COLONOSCOPY   V performed by Zakia Rojas MD at [facility] ENDOSCOPY    HX BUNIONECTOMY      HX [address]  2018    HX KNEE REPLACEMENT Right 2011      Prior to Admission medications    Medication Sig Start Date End Date Taking? Authorizing Provider   cholecalciferol, vitamin D3, (Vitamin D3) 50 mcg (2,000 unit) tab Take 1 Tablet by mouth daily. Provider, Historical   BIOTIN PO Take 2 Gum by mouth daily. Takes 2 gummies daily    Provider, Historical   atorvastatin (LIPITOR) 40 mg tablet Take 1 Tablet by mouth nightly. 8/8/22   Monserrat Aquino MD   amLODIPine (NORVASC) 5 mg tablet Take 5 mg by mouth daily. Provider, Historical   metoprolol succinate (TOPROL-XL) 25 mg XL tablet Take 1 Tablet by mouth nightly. 5/12/22   Roger Back MD   multivit-minerals/folic acid (VITAJOY ADULT MULTI PO) Take 1 Tablet by mouth daily. Provider, Historical   losartan (COZAAR) 100 mg tablet TAKE 1 TABLET EVERY DAY  Patient taking differently: Take 100 mg by mouth daily. 8/12/21   Monserrat Aquino MD     Allergies   Allergen Reactions    Codeine Itching      Social History     Tobacco Use    Smoking status: Never    Smokeless tobacco: Never   Substance Use Topics    Alcohol use:  Yes     Alcohol/week: 1.0 standard drink     Types: 1 Standard drinks or equivalent per week      Family History   Problem Relation Age of Onset    Heart Disease Mother     Heart Disease Father     Cancer Sister         colon    Cancer Sister         lung    No Known Problems Sister     No Known Problems Brother     Liver Disease Brother     Heart Disease Brother     Anesth Problems Neg Hx         REVIEW OF SYSTEMS: A comprehensive review of systems was negative except for that written in the HPI. Objective:   Constitutional:CAPHE@ appears stated age. Pt is cooperative and is in no acute distress. Well nourished. Well developed. Body habitus is normal. Body mass index is 22.86 kg/m². Eyes: Sclera are nonicteric. Respiratory: No labored breathing. Cardiovascular: No marked edema. Well perfused extremities bilaterally. Skin: No marked skin ulcers. No lymphedema or skin abnormalities. Neurological: No marked sensory loss noted. Grossly neurovascularly intact. Both lower extremities are intact to distal sensory and motor function. Psychiatric: Alert and oriented x3. MUSCULOSKELETAL:   Gait is antalgic on each knee, she has over 10 degrees of varus of her left knee, her right knee has a mild flexion contracture and varus thrust on ambulation. Trace effusion of her right knee with tenderness medially and laterally. Radiographs:   Order: XR KNEE 4+ VW RIGHT - Indication: History of total right knee   replacement    X-ray knee right 4+ views ([id number])    Result Date: 7/15/2022  Standing. Views: AP, Lat, [place], [place]. Impression: Right knee with significant polyethylene wear and osteolysis at the medial proximal tibia, she also has some lucency on the lateral view of her femoral component. Contralateral knee with end-stage DJD and significant varus alignment     Assessment:     ICD-10-CM   1. Pain due to total right knee replacement, initial encounter T84.84XA   Z96.651   2. History of total right knee replacement Z96.651   3.  Localized osteoarthritis of left knee M17.12     There is no problem list on file for this patient. Plan:   Her poly was between 3to 11years old and was subject to the recent recall. Given her level of pain I recommended further workup. We will order a bone scan and peripheral labs. We discussed revising this knee which she would like to do after we get the results of her scan. She is interested in her surgical options for each knee in order to maintain her independence. We discussed the risk versus benefit of this treatment plan. All questions were answered to their satisfaction.  Follow up after imaging    Orders Placed This Encounter   Procedures   X-ray knee right 4+ views ([id number])   Nuclear Medicine bone scan limited ([id number])   CBC and differential   C-reactive protein   Sedimentation rate, automated     Jaymie Tavares PA-C    Supervising physician: MANUELA Carney

## 2022-09-26 ENCOUNTER — APPOINTMENT (OUTPATIENT)
Dept: GENERAL RADIOLOGY | Age: 82
DRG: 467 | End: 2022-09-26
Attending: ORTHOPAEDIC SURGERY
Payer: MEDICARE

## 2022-09-26 ENCOUNTER — ANESTHESIA EVENT (OUTPATIENT)
Dept: SURGERY | Age: 82
DRG: 467 | End: 2022-09-26
Payer: MEDICARE

## 2022-09-26 ENCOUNTER — HOSPITAL ENCOUNTER (INPATIENT)
Age: 82
LOS: 2 days | Discharge: HOME HEALTH | DRG: 467 | End: 2022-09-29
Attending: ORTHOPAEDIC SURGERY | Admitting: ORTHOPAEDIC SURGERY
Payer: MEDICARE

## 2022-09-26 ENCOUNTER — ANESTHESIA (OUTPATIENT)
Dept: SURGERY | Age: 82
DRG: 467 | End: 2022-09-26
Payer: MEDICARE

## 2022-09-26 DIAGNOSIS — Z96.651 STATUS POST TOTAL RIGHT KNEE REPLACEMENT: Primary | ICD-10-CM

## 2022-09-26 PROCEDURE — G0378 HOSPITAL OBSERVATION PER HR: HCPCS

## 2022-09-26 PROCEDURE — 77030020143 HC AIRWY LARYN INTUB CGAS -A: Performed by: ANESTHESIOLOGY

## 2022-09-26 PROCEDURE — 74011250637 HC RX REV CODE- 250/637: Performed by: ORTHOPAEDIC SURGERY

## 2022-09-26 PROCEDURE — 77030041690 HC SYS PINNING KN JNJ -D: Performed by: ORTHOPAEDIC SURGERY

## 2022-09-26 PROCEDURE — 74011250636 HC RX REV CODE- 250/636: Performed by: ANESTHESIOLOGY

## 2022-09-26 PROCEDURE — 74011000250 HC RX REV CODE- 250: Performed by: PHYSICIAN ASSISTANT

## 2022-09-26 PROCEDURE — 74011250636 HC RX REV CODE- 250/636: Performed by: NURSE ANESTHETIST, CERTIFIED REGISTERED

## 2022-09-26 PROCEDURE — 76060000034 HC ANESTHESIA 1.5 TO 2 HR: Performed by: ORTHOPAEDIC SURGERY

## 2022-09-26 PROCEDURE — 76000 FLUOROSCOPY <1 HR PHYS/QHP: CPT

## 2022-09-26 PROCEDURE — C1776 JOINT DEVICE (IMPLANTABLE): HCPCS | Performed by: ORTHOPAEDIC SURGERY

## 2022-09-26 PROCEDURE — C1713 ANCHOR/SCREW BN/BN,TIS/BN: HCPCS | Performed by: ORTHOPAEDIC SURGERY

## 2022-09-26 PROCEDURE — 2709999900 HC NON-CHARGEABLE SUPPLY: Performed by: ORTHOPAEDIC SURGERY

## 2022-09-26 PROCEDURE — 77030006835 HC BLD SAW SAG STRY -B: Performed by: ORTHOPAEDIC SURGERY

## 2022-09-26 PROCEDURE — 74011250636 HC RX REV CODE- 250/636: Performed by: ORTHOPAEDIC SURGERY

## 2022-09-26 PROCEDURE — 74011000250 HC RX REV CODE- 250: Performed by: ANESTHESIOLOGY

## 2022-09-26 PROCEDURE — 74011000250 HC RX REV CODE- 250: Performed by: ORTHOPAEDIC SURGERY

## 2022-09-26 PROCEDURE — 76010000162 HC OR TIME 1.5 TO 2 HR INTENSV-TIER 1: Performed by: ORTHOPAEDIC SURGERY

## 2022-09-26 PROCEDURE — 77030018673: Performed by: ORTHOPAEDIC SURGERY

## 2022-09-26 PROCEDURE — 77030003601 HC NDL NRV BLK BBMI -A: Performed by: ANESTHESIOLOGY

## 2022-09-26 PROCEDURE — 76210000001 HC OR PH I REC 2.5 TO 3 HR: Performed by: ORTHOPAEDIC SURGERY

## 2022-09-26 PROCEDURE — 64450 NJX AA&/STRD OTHER PN/BRANCH: CPT | Performed by: ANESTHESIOLOGY

## 2022-09-26 PROCEDURE — 0SRT0J9 REPLACEMENT OF RIGHT KNEE JOINT, FEMORAL SURFACE WITH SYNTHETIC SUBSTITUTE, CEMENTED, OPEN APPROACH: ICD-10-PCS | Performed by: ORTHOPAEDIC SURGERY

## 2022-09-26 PROCEDURE — 97530 THERAPEUTIC ACTIVITIES: CPT | Performed by: PHYSICAL THERAPIST

## 2022-09-26 PROCEDURE — 74011250637 HC RX REV CODE- 250/637: Performed by: PHYSICIAN ASSISTANT

## 2022-09-26 PROCEDURE — 77030019707 HC TBNG LAVG CRBJT KINM -C: Performed by: ORTHOPAEDIC SURGERY

## 2022-09-26 PROCEDURE — 77030033067 HC SUT PDO STRATFX SPIR J&J -B: Performed by: ORTHOPAEDIC SURGERY

## 2022-09-26 PROCEDURE — 74011250636 HC RX REV CODE- 250/636: Performed by: PHYSICIAN ASSISTANT

## 2022-09-26 PROCEDURE — 0SPT0JZ REMOVAL OF SYNTHETIC SUBSTITUTE FROM RIGHT KNEE JOINT, FEMORAL SURFACE, OPEN APPROACH: ICD-10-PCS | Performed by: ORTHOPAEDIC SURGERY

## 2022-09-26 PROCEDURE — 77030010785: Performed by: ORTHOPAEDIC SURGERY

## 2022-09-26 PROCEDURE — 77030035236 HC SUT PDS STRATFX BARB J&J -B: Performed by: ORTHOPAEDIC SURGERY

## 2022-09-26 PROCEDURE — 77030000032 HC CUF TRNQT ZIMM -B: Performed by: ORTHOPAEDIC SURGERY

## 2022-09-26 PROCEDURE — 97161 PT EVAL LOW COMPLEX 20 MIN: CPT | Performed by: PHYSICAL THERAPIST

## 2022-09-26 PROCEDURE — 73560 X-RAY EXAM OF KNEE 1 OR 2: CPT

## 2022-09-26 DEVICE — ATTUNE KNEE SYSTEM REVISION POSTERIOR FEMORAL AUGMENT 4MM CEMENTED SIZE 5
Type: IMPLANTABLE DEVICE | Site: KNEE | Status: FUNCTIONAL
Brand: ATTUNE

## 2022-09-26 DEVICE — ATTUNE KNEE SYSTEM REVISION DISTAL FEMORAL AUGMENT 8MM CEMENTED SIZE 5
Type: IMPLANTABLE DEVICE | Site: KNEE | Status: FUNCTIONAL
Brand: ATTUNE

## 2022-09-26 DEVICE — ATTUNE KNEE SYSTEM REVISION PRESSFIT STEM 16X110MM
Type: IMPLANTABLE DEVICE | Site: KNEE | Status: FUNCTIONAL
Brand: ATTUNE

## 2022-09-26 DEVICE — ATTUNE KNEE SYSTEM REVISION PRESSFIT STEM 12X110MM
Type: IMPLANTABLE DEVICE | Site: KNEE | Status: FUNCTIONAL
Brand: ATTUNE

## 2022-09-26 DEVICE — ATTUNE KNEE SYSTEM REVISION ROTATING PLATFORM TIBIAL BASE CEMENTED SIZE 3
Type: IMPLANTABLE DEVICE | Site: KNEE | Status: FUNCTIONAL
Brand: ATTUNE

## 2022-09-26 DEVICE — CEMENT BNE SIMPLEX W/O GENT -- PK/10 ONLY: Type: IMPLANTABLE DEVICE | Site: KNEE | Status: FUNCTIONAL

## 2022-09-26 DEVICE — ATTUNE KNEE SYSTEM REVISION CRS ROTATING PLATFORM INSERT AOX 14MM SIZE 5
Type: IMPLANTABLE DEVICE | Site: KNEE | Status: FUNCTIONAL
Brand: ATTUNE

## 2022-09-26 DEVICE — CEMENT BNE SIMPLEX W/GENT -- 10/PK: Type: IMPLANTABLE DEVICE | Site: KNEE | Status: FUNCTIONAL

## 2022-09-26 DEVICE — ATTUNE KNEE SYSTEM REVISION DISTAL FEMORAL AUGMENT 12MM CEMENTED SIZE 5
Type: IMPLANTABLE DEVICE | Site: KNEE | Status: FUNCTIONAL
Brand: ATTUNE

## 2022-09-26 DEVICE — ATTUNE KNEE SYSTEM REVISION TIBIAL SLEEVE POROCOAT FULLY COATED 29MM
Type: IMPLANTABLE DEVICE | Site: KNEE | Status: FUNCTIONAL
Brand: ATTUNE

## 2022-09-26 DEVICE — ATTUNE KNEE SYSTEM REVISION POSTERIOR FEMORAL AUGMENT 8MM CEMENTED SIZE 5
Type: IMPLANTABLE DEVICE | Site: KNEE | Status: FUNCTIONAL
Brand: ATTUNE

## 2022-09-26 DEVICE — ATTUNE KNEE SYSTEM REVISION CRS FEMORAL CEMENTED RIGHT SIZE 5
Type: IMPLANTABLE DEVICE | Site: KNEE | Status: FUNCTIONAL
Brand: ATTUNE

## 2022-09-26 RX ORDER — LIDOCAINE HYDROCHLORIDE 20 MG/ML
INJECTION, SOLUTION EPIDURAL; INFILTRATION; INTRACAUDAL; PERINEURAL AS NEEDED
Status: DISCONTINUED | OUTPATIENT
Start: 2022-09-26 | End: 2022-09-26 | Stop reason: HOSPADM

## 2022-09-26 RX ORDER — FAMOTIDINE 20 MG/1
20 TABLET, FILM COATED ORAL 2 TIMES DAILY
Qty: 60 TABLET | Refills: 0 | Status: SHIPPED | OUTPATIENT
Start: 2022-09-26 | End: 2022-09-29

## 2022-09-26 RX ORDER — SODIUM CHLORIDE 0.9 % (FLUSH) 0.9 %
5-40 SYRINGE (ML) INJECTION AS NEEDED
Status: DISCONTINUED | OUTPATIENT
Start: 2022-09-26 | End: 2022-09-26 | Stop reason: HOSPADM

## 2022-09-26 RX ORDER — PROPOFOL 10 MG/ML
INJECTION, EMULSION INTRAVENOUS AS NEEDED
Status: DISCONTINUED | OUTPATIENT
Start: 2022-09-26 | End: 2022-09-26 | Stop reason: HOSPADM

## 2022-09-26 RX ORDER — METOPROLOL SUCCINATE 25 MG/1
25 TABLET, EXTENDED RELEASE ORAL
Status: DISCONTINUED | OUTPATIENT
Start: 2022-09-26 | End: 2022-09-29

## 2022-09-26 RX ORDER — POLYETHYLENE GLYCOL 3350 17 G/17G
17 POWDER, FOR SOLUTION ORAL
Qty: 15 PACKET | Refills: 0 | Status: SHIPPED | OUTPATIENT
Start: 2022-09-26 | End: 2022-10-11

## 2022-09-26 RX ORDER — FAMOTIDINE 20 MG/1
20 TABLET, FILM COATED ORAL 2 TIMES DAILY
Status: DISCONTINUED | OUTPATIENT
Start: 2022-09-26 | End: 2022-09-27

## 2022-09-26 RX ORDER — FACIAL-BODY WIPES
10 EACH TOPICAL DAILY PRN
Status: DISCONTINUED | OUTPATIENT
Start: 2022-09-28 | End: 2022-09-29 | Stop reason: HOSPADM

## 2022-09-26 RX ORDER — SODIUM CHLORIDE, SODIUM LACTATE, POTASSIUM CHLORIDE, CALCIUM CHLORIDE 600; 310; 30; 20 MG/100ML; MG/100ML; MG/100ML; MG/100ML
25 INJECTION, SOLUTION INTRAVENOUS CONTINUOUS
Status: DISCONTINUED | OUTPATIENT
Start: 2022-09-26 | End: 2022-09-26 | Stop reason: HOSPADM

## 2022-09-26 RX ORDER — ASPIRIN 81 MG/1
81 TABLET ORAL 2 TIMES DAILY
Qty: 60 TABLET | Refills: 0 | Status: SHIPPED | OUTPATIENT
Start: 2022-09-26 | End: 2022-10-26

## 2022-09-26 RX ORDER — DIPHENHYDRAMINE HYDROCHLORIDE 50 MG/ML
12.5 INJECTION, SOLUTION INTRAMUSCULAR; INTRAVENOUS AS NEEDED
Status: DISCONTINUED | OUTPATIENT
Start: 2022-09-26 | End: 2022-09-26 | Stop reason: HOSPADM

## 2022-09-26 RX ORDER — HYDROMORPHONE HYDROCHLORIDE 1 MG/ML
.2-.5 INJECTION, SOLUTION INTRAMUSCULAR; INTRAVENOUS; SUBCUTANEOUS
Status: DISCONTINUED | OUTPATIENT
Start: 2022-09-26 | End: 2022-09-26 | Stop reason: HOSPADM

## 2022-09-26 RX ORDER — ATORVASTATIN CALCIUM 40 MG/1
40 TABLET, FILM COATED ORAL
Status: DISCONTINUED | OUTPATIENT
Start: 2022-09-26 | End: 2022-09-29 | Stop reason: HOSPADM

## 2022-09-26 RX ORDER — MIDAZOLAM HYDROCHLORIDE 1 MG/ML
INJECTION, SOLUTION INTRAMUSCULAR; INTRAVENOUS AS NEEDED
Status: DISCONTINUED | OUTPATIENT
Start: 2022-09-26 | End: 2022-09-26 | Stop reason: HOSPADM

## 2022-09-26 RX ORDER — SODIUM CHLORIDE 0.9 % (FLUSH) 0.9 %
5-40 SYRINGE (ML) INJECTION EVERY 8 HOURS
Status: DISCONTINUED | OUTPATIENT
Start: 2022-09-26 | End: 2022-09-26 | Stop reason: HOSPADM

## 2022-09-26 RX ORDER — TRAMADOL HYDROCHLORIDE 50 MG/1
50-100 TABLET ORAL
Status: DISCONTINUED | OUTPATIENT
Start: 2022-09-26 | End: 2022-09-29 | Stop reason: HOSPADM

## 2022-09-26 RX ORDER — LIDOCAINE HYDROCHLORIDE 10 MG/ML
0.1 INJECTION, SOLUTION EPIDURAL; INFILTRATION; INTRACAUDAL; PERINEURAL AS NEEDED
Status: DISCONTINUED | OUTPATIENT
Start: 2022-09-26 | End: 2022-09-26 | Stop reason: HOSPADM

## 2022-09-26 RX ORDER — TRAMADOL HYDROCHLORIDE 50 MG/1
50-100 TABLET ORAL
Qty: 28 TABLET | Refills: 0 | Status: SHIPPED | OUTPATIENT
Start: 2022-09-26 | End: 2022-10-03

## 2022-09-26 RX ORDER — FENTANYL CITRATE 50 UG/ML
25 INJECTION, SOLUTION INTRAMUSCULAR; INTRAVENOUS
Status: DISCONTINUED | OUTPATIENT
Start: 2022-09-26 | End: 2022-09-26 | Stop reason: HOSPADM

## 2022-09-26 RX ORDER — PREGABALIN 75 MG/1
75 CAPSULE ORAL ONCE
Status: COMPLETED | OUTPATIENT
Start: 2022-09-26 | End: 2022-09-26

## 2022-09-26 RX ORDER — CELECOXIB 200 MG/1
200 CAPSULE ORAL DAILY
Status: DISCONTINUED | OUTPATIENT
Start: 2022-09-27 | End: 2022-09-29 | Stop reason: HOSPADM

## 2022-09-26 RX ORDER — ROPIVACAINE HYDROCHLORIDE 5 MG/ML
INJECTION, SOLUTION EPIDURAL; INFILTRATION; PERINEURAL AS NEEDED
Status: DISCONTINUED | OUTPATIENT
Start: 2022-09-26 | End: 2022-09-26 | Stop reason: HOSPADM

## 2022-09-26 RX ORDER — ONDANSETRON 2 MG/ML
4 INJECTION INTRAMUSCULAR; INTRAVENOUS
Status: ACTIVE | OUTPATIENT
Start: 2022-09-26 | End: 2022-09-27

## 2022-09-26 RX ORDER — ONDANSETRON 4 MG/1
4 TABLET, ORALLY DISINTEGRATING ORAL
Status: DISCONTINUED | OUTPATIENT
Start: 2022-09-28 | End: 2022-09-29 | Stop reason: HOSPADM

## 2022-09-26 RX ORDER — DIPHENHYDRAMINE HCL 12.5MG/5ML
12.5 LIQUID (ML) ORAL
Status: DISCONTINUED | OUTPATIENT
Start: 2022-09-26 | End: 2022-09-29 | Stop reason: HOSPADM

## 2022-09-26 RX ORDER — ASPIRIN 81 MG/1
81 TABLET ORAL 2 TIMES DAILY
Status: DISCONTINUED | OUTPATIENT
Start: 2022-09-26 | End: 2022-09-29 | Stop reason: HOSPADM

## 2022-09-26 RX ORDER — ACETAMINOPHEN 325 MG/1
650 TABLET ORAL EVERY 6 HOURS
Status: DISCONTINUED | OUTPATIENT
Start: 2022-09-26 | End: 2022-09-29 | Stop reason: HOSPADM

## 2022-09-26 RX ORDER — DEXAMETHASONE SODIUM PHOSPHATE 4 MG/ML
INJECTION, SOLUTION INTRA-ARTICULAR; INTRALESIONAL; INTRAMUSCULAR; INTRAVENOUS; SOFT TISSUE AS NEEDED
Status: DISCONTINUED | OUTPATIENT
Start: 2022-09-26 | End: 2022-09-26 | Stop reason: HOSPADM

## 2022-09-26 RX ORDER — CELECOXIB 200 MG/1
400 CAPSULE ORAL ONCE
Status: COMPLETED | OUTPATIENT
Start: 2022-09-26 | End: 2022-09-26

## 2022-09-26 RX ORDER — TRANEXAMIC ACID 100 MG/ML
INJECTION, SOLUTION INTRAVENOUS AS NEEDED
Status: DISCONTINUED | OUTPATIENT
Start: 2022-09-26 | End: 2022-09-26 | Stop reason: HOSPADM

## 2022-09-26 RX ORDER — FENTANYL CITRATE 50 UG/ML
INJECTION, SOLUTION INTRAMUSCULAR; INTRAVENOUS AS NEEDED
Status: DISCONTINUED | OUTPATIENT
Start: 2022-09-26 | End: 2022-09-26 | Stop reason: HOSPADM

## 2022-09-26 RX ORDER — NALOXONE HYDROCHLORIDE 0.4 MG/ML
0.4 INJECTION, SOLUTION INTRAMUSCULAR; INTRAVENOUS; SUBCUTANEOUS AS NEEDED
Status: DISCONTINUED | OUTPATIENT
Start: 2022-09-26 | End: 2022-09-29 | Stop reason: HOSPADM

## 2022-09-26 RX ORDER — DEXAMETHASONE SODIUM PHOSPHATE 4 MG/ML
10 INJECTION, SOLUTION INTRA-ARTICULAR; INTRALESIONAL; INTRAMUSCULAR; INTRAVENOUS; SOFT TISSUE ONCE
Status: COMPLETED | OUTPATIENT
Start: 2022-09-27 | End: 2022-09-27

## 2022-09-26 RX ORDER — POLYETHYLENE GLYCOL 3350 17 G/17G
17 POWDER, FOR SOLUTION ORAL DAILY
Status: DISCONTINUED | OUTPATIENT
Start: 2022-09-27 | End: 2022-09-29 | Stop reason: HOSPADM

## 2022-09-26 RX ORDER — MORPHINE SULFATE 2 MG/ML
2 INJECTION, SOLUTION INTRAMUSCULAR; INTRAVENOUS
Status: DISCONTINUED | OUTPATIENT
Start: 2022-09-26 | End: 2022-09-26 | Stop reason: HOSPADM

## 2022-09-26 RX ORDER — SODIUM CHLORIDE 0.9 % (FLUSH) 0.9 %
5-40 SYRINGE (ML) INJECTION EVERY 8 HOURS
Status: DISCONTINUED | OUTPATIENT
Start: 2022-09-26 | End: 2022-09-29 | Stop reason: HOSPADM

## 2022-09-26 RX ORDER — AMOXICILLIN 250 MG
1 CAPSULE ORAL 2 TIMES DAILY
Status: DISCONTINUED | OUTPATIENT
Start: 2022-09-26 | End: 2022-09-29 | Stop reason: HOSPADM

## 2022-09-26 RX ORDER — HYDROMORPHONE HYDROCHLORIDE 1 MG/ML
0.5 INJECTION, SOLUTION INTRAMUSCULAR; INTRAVENOUS; SUBCUTANEOUS
Status: ACTIVE | OUTPATIENT
Start: 2022-09-26 | End: 2022-09-27

## 2022-09-26 RX ORDER — EPHEDRINE SULFATE/0.9% NACL/PF 50 MG/5 ML
SYRINGE (ML) INTRAVENOUS AS NEEDED
Status: DISCONTINUED | OUTPATIENT
Start: 2022-09-26 | End: 2022-09-26 | Stop reason: HOSPADM

## 2022-09-26 RX ORDER — SODIUM CHLORIDE 9 MG/ML
125 INJECTION, SOLUTION INTRAVENOUS CONTINUOUS
Status: DISCONTINUED | OUTPATIENT
Start: 2022-09-26 | End: 2022-09-27

## 2022-09-26 RX ORDER — SODIUM CHLORIDE 0.9 % (FLUSH) 0.9 %
5-40 SYRINGE (ML) INJECTION AS NEEDED
Status: DISCONTINUED | OUTPATIENT
Start: 2022-09-26 | End: 2022-09-29 | Stop reason: HOSPADM

## 2022-09-26 RX ORDER — ACETAMINOPHEN 500 MG
1000 TABLET ORAL ONCE
Status: COMPLETED | OUTPATIENT
Start: 2022-09-26 | End: 2022-09-26

## 2022-09-26 RX ORDER — AMOXICILLIN 250 MG
1 CAPSULE ORAL DAILY
Qty: 30 TABLET | Refills: 0 | Status: SHIPPED | OUTPATIENT
Start: 2022-09-26

## 2022-09-26 RX ORDER — ONDANSETRON 2 MG/ML
4 INJECTION INTRAMUSCULAR; INTRAVENOUS AS NEEDED
Status: DISCONTINUED | OUTPATIENT
Start: 2022-09-26 | End: 2022-09-26 | Stop reason: HOSPADM

## 2022-09-26 RX ORDER — POVIDONE-IODINE 10 %
SOLUTION, NON-ORAL TOPICAL AS NEEDED
Status: DISCONTINUED | OUTPATIENT
Start: 2022-09-26 | End: 2022-09-26 | Stop reason: HOSPADM

## 2022-09-26 RX ORDER — ONDANSETRON 2 MG/ML
INJECTION INTRAMUSCULAR; INTRAVENOUS AS NEEDED
Status: DISCONTINUED | OUTPATIENT
Start: 2022-09-26 | End: 2022-09-26 | Stop reason: HOSPADM

## 2022-09-26 RX ADMIN — SODIUM CHLORIDE, POTASSIUM CHLORIDE, SODIUM LACTATE AND CALCIUM CHLORIDE: 600; 310; 30; 20 INJECTION, SOLUTION INTRAVENOUS at 10:42

## 2022-09-26 RX ADMIN — FAMOTIDINE 20 MG: 20 TABLET, FILM COATED ORAL at 18:10

## 2022-09-26 RX ADMIN — CEFAZOLIN 2 G: 1 INJECTION, POWDER, FOR SOLUTION INTRAMUSCULAR; INTRAVENOUS at 21:48

## 2022-09-26 RX ADMIN — WATER 2 G: 1 INJECTION INTRAMUSCULAR; INTRAVENOUS; SUBCUTANEOUS at 10:52

## 2022-09-26 RX ADMIN — PROPOFOL 40 MG: 10 INJECTION, EMULSION INTRAVENOUS at 10:47

## 2022-09-26 RX ADMIN — SODIUM CHLORIDE, PRESERVATIVE FREE 10 ML: 5 INJECTION INTRAVENOUS at 13:02

## 2022-09-26 RX ADMIN — FENTANYL CITRATE 25 MCG: 50 INJECTION, SOLUTION INTRAMUSCULAR; INTRAVENOUS at 10:57

## 2022-09-26 RX ADMIN — METOPROLOL SUCCINATE 25 MG: 25 TABLET, EXTENDED RELEASE ORAL at 21:48

## 2022-09-26 RX ADMIN — Medication 1000 MG: at 10:05

## 2022-09-26 RX ADMIN — PREGABALIN 75 MG: 75 CAPSULE ORAL at 10:05

## 2022-09-26 RX ADMIN — ONDANSETRON HYDROCHLORIDE 4 MG: 2 INJECTION, SOLUTION INTRAMUSCULAR; INTRAVENOUS at 10:34

## 2022-09-26 RX ADMIN — ASPIRIN 81 MG: 81 TABLET, COATED ORAL at 18:10

## 2022-09-26 RX ADMIN — SODIUM CHLORIDE, PRESERVATIVE FREE 10 ML: 5 INJECTION INTRAVENOUS at 18:34

## 2022-09-26 RX ADMIN — CELECOXIB 400 MG: 200 CAPSULE ORAL at 10:05

## 2022-09-26 RX ADMIN — FENTANYL CITRATE 25 MCG: 50 INJECTION, SOLUTION INTRAMUSCULAR; INTRAVENOUS at 12:13

## 2022-09-26 RX ADMIN — ROPIVACAINE HYDROCHLORIDE 30 ML: 5 INJECTION, SOLUTION EPIDURAL; INFILTRATION; PERINEURAL at 10:25

## 2022-09-26 RX ADMIN — PROPOFOL 120 MG: 10 INJECTION, EMULSION INTRAVENOUS at 10:42

## 2022-09-26 RX ADMIN — SODIUM CHLORIDE, POTASSIUM CHLORIDE, SODIUM LACTATE AND CALCIUM CHLORIDE 25 ML/HR: 600; 310; 30; 20 INJECTION, SOLUTION INTRAVENOUS at 10:04

## 2022-09-26 RX ADMIN — FENTANYL CITRATE 25 MCG: 50 INJECTION, SOLUTION INTRAMUSCULAR; INTRAVENOUS at 11:12

## 2022-09-26 RX ADMIN — SENNOSIDES AND DOCUSATE SODIUM 1 TABLET: 50; 8.6 TABLET ORAL at 18:10

## 2022-09-26 RX ADMIN — SODIUM CHLORIDE, POTASSIUM CHLORIDE, SODIUM LACTATE AND CALCIUM CHLORIDE: 600; 310; 30; 20 INJECTION, SOLUTION INTRAVENOUS at 11:56

## 2022-09-26 RX ADMIN — ACETAMINOPHEN 650 MG: 325 TABLET ORAL at 23:13

## 2022-09-26 RX ADMIN — ATORVASTATIN CALCIUM 40 MG: 40 TABLET, FILM COATED ORAL at 21:49

## 2022-09-26 RX ADMIN — MIDAZOLAM HYDROCHLORIDE 2 MG: 1 INJECTION, SOLUTION INTRAMUSCULAR; INTRAVENOUS at 10:24

## 2022-09-26 RX ADMIN — SODIUM CHLORIDE 125 ML/HR: 9 INJECTION, SOLUTION INTRAVENOUS at 13:26

## 2022-09-26 RX ADMIN — Medication 3 AMPULE: at 10:05

## 2022-09-26 RX ADMIN — TRANEXAMIC ACID 1 G: 100 INJECTION, SOLUTION INTRAVENOUS at 10:52

## 2022-09-26 RX ADMIN — ACETAMINOPHEN 650 MG: 325 TABLET ORAL at 18:32

## 2022-09-26 RX ADMIN — FENTANYL CITRATE 25 MCG: 50 INJECTION, SOLUTION INTRAMUSCULAR; INTRAVENOUS at 11:03

## 2022-09-26 RX ADMIN — Medication 20 MG: at 11:28

## 2022-09-26 RX ADMIN — DEXAMETHASONE SODIUM PHOSPHATE 8 MG: 4 INJECTION, SOLUTION INTRAMUSCULAR; INTRAVENOUS at 10:49

## 2022-09-26 RX ADMIN — SODIUM CHLORIDE, PRESERVATIVE FREE 10 ML: 5 INJECTION INTRAVENOUS at 21:56

## 2022-09-26 RX ADMIN — LIDOCAINE HYDROCHLORIDE 100 MG: 20 INJECTION, SOLUTION EPIDURAL; INFILTRATION; INTRACAUDAL; PERINEURAL at 10:42

## 2022-09-26 NOTE — ANESTHESIA PROCEDURE NOTES
Peripheral Block    Start time: 9/26/2022 10:23 AM  End time: 9/26/2022 10:28 AM  Performed by: Fabby Beckwith MD  Authorized by: Fabby Beckwith MD       Pre-procedure: Indications: at surgeon's request and post-op pain management    Preanesthetic Checklist: patient identified, risks and benefits discussed, site marked, timeout performed, anesthesia consent given and patient being monitored    Timeout Time: 10:23 EDT      Block Type:   Block Type:   Adductor canal  Laterality:  Right  Monitoring:  Standard ASA monitoring, continuous pulse ox, frequent vital sign checks, heart rate, responsive to questions and oxygen  Injection Technique:  Single shot  Procedures: ultrasound guided    Patient Position: supine  Prep: chlorhexidine    Location:  Mid thigh  Needle Type:  Stimuplex  Needle Gauge:  21 G  Needle Localization:  Anatomical landmarks and ultrasound guidance  Med Admin Time: 9/26/2022 10:28 AM    Assessment:  Number of attempts:  1  Injection Assessment:  Incremental injection every 5 mL, local visualized surrounding nerve on ultrasound, negative aspiration for blood, no paresthesia, no intravascular symptoms and ultrasound image on chart  Patient tolerance:  Patient tolerated the procedure well with no immediate complications

## 2022-09-26 NOTE — PROGRESS NOTES
End of Shift Note    Bedside shift change report given to *** (oncoming nurse) by Franck Wright RN (offgoing nurse). Report included the following information SBAR, Kardex, Intake/Output, MAR, Recent Results, and Med Rec Status    Shift worked:  Day     Shift summary and any significant changes:     Vital signs stable     Concerns for physician to address:  None     Zone phone for oncoming shift:          Activity:  Activity Level: Up with Assistance  Number times ambulated in hallways past shift: 0  Number of times OOB to chair past shift: 0    Cardiac:   Cardiac Monitoring: No      Cardiac Rhythm: Sinus Rhythm    Access:  Current line(s): PIV     Genitourinary:   Urinary status: voiding    Respiratory:   O2 Device: None (Room air)  Chronic home O2 use?: NO  Incentive spirometer at bedside: YES       GI:     Current diet:  ADULT DIET Regular  Passing flatus: YES  Tolerating current diet: YES       Pain Management:   Patient states pain is manageable on current regimen: YES    Skin:  Basilio Score: 19  Interventions: float heels and increase time out of bed    Patient Safety:  Fall Score:  Total Score: 3  Interventions: bed/chair alarm, assistive device (walker, cane, etc), gripper socks, and pt to call before getting OOB  High Fall Risk: Yes    Length of Stay:  Expected LOS: - - -  Actual LOS: 0      Franck Wright RN

## 2022-09-26 NOTE — BRIEF OP NOTE
Brief Postoperative Note    Patient: Toyin Lamar  YOB: 1940  MRN: [id number]    Date of Procedure: 9/26/2022     Pre-Op Diagnosis: HISTORY OF RIGHT TOTAL KNEE ARTHROPLASTY    Post-Op Diagnosis: Loosening and osteolysis Right total knee      Procedure(s):  RIGHT TOTAL KNEE ARTHROPLASTY REVISION    Surgeon(s):  Chance Schrader MD    Surgical Assistant: Physician Assistant: MANUELA Lara  Surg Asst-1: Ralph Rein    Anesthesia: General     Estimated Blood Loss (mL): Minimal    Complications: None    Specimens: * No specimens in log *     Implants:   Implant Name Type Inv.  Item Serial No.  Lot No. LRB No. Used Action   CEMENT BNE SIMPLEX W/O GENT -- PK/10 ONLY - SNA  CEMENT BNE SIMPLEX W/O GENT -- PK/10 ONLY NA [employer] [facility] [id number] Right 1 Implanted   CEMENT BNE SIMPLEX W/GENT -- 10/PK - SNA  CEMENT BNE SIMPLEX W/GENT -- 10/PK NA [employer] [facility] [id number] Right 2 Implanted   STEM FEM L110MM [id number] KNEE TI ALLY CRSS SLT PRESSFIT REV - SNA  STEM FEM L110MM [id number] KNEE TI ALLY CRSS SLT PRESSFIT REV NA [facility] [employer] [facility] [id number] Right 1 Implanted   AUGMENT FEM SZ 5 THK8MM POST KNEE CO CHROM MOLYBDENUM STEPHEN - SNA  AUGMENT FEM SZ 5 THK8MM POST KNEE CO CHROM MOLYBDENUM STEPHEN NA [facility] [employer] [facility] [id number] Right 1 Implanted   AUGMENT FEM SZ 5 [id number] DST KNEE CO CHROM MOLYBDENUM STEPHEN - SNA  AUGMENT FEM SZ 5 [id number] DST KNEE CO CHROM MOLYBDENUM STEPHEN NA [facility] [employer] [facility] [id number] Right 1 Implanted   AUGMENT FEM SZ 5 THK8MM DST KNEE CO CHROM [employer]UM [employer] - SNA  AUGMENT FEM SZ 5 THK8MM DST KNEE CO CHROM MOLYBDENUM STEPHEN NA [facility] [employer] [facility] [id number] Right 1 Implanted   COMPONENT FEM SZ 5 R KNEE CO CHROM MOLYBDENUM STEPHEN W/ ASYM - SNA  COMPONENT FEM SZ 5 R KNEE CO CHROM MOLYBDENUM STEPHEN W/ ASYM NA [facility] [employer] [facility] [id number] Right 1 Implanted   STEM FEM L110MM [id number] KNEE TI ALLY CRSS SLT PRESSFIT REV - SNA  STEM FEM L110MM [id number] KNEE TI ALLY CRSS SLT PRESSFIT REV NA [facility] [place] [id number] Right 1 Implanted   AUGMENT FEM SZ 5 THK4MM POST KNEE CO CHROM MOLYBDENUM STEPHEN - SNA  AUGMENT FEM SZ 5 THK4MM POST KNEE CO CHROM MOLYBDENUM STEPHEN NA [facility] [place] [id number] Right 1 Implanted   BASEPLATE TIB SZ 3 KNEE CO CHROM MOLYBDENUM TI ALLY ROT - SNA  BASEPLATE TIB SZ 3 KNEE CO CHROM MOLYBDENUM TI ALLY ROT NA [facility] [place] [id number] Right 1 Implanted   SLEEVE FEM 29MM LATERAL/MEDIAL FULL COAT REV PORCOAT ATTUNE - SNA  SLEEVE FEM 29MM LATERAL/MEDIAL FULL COAT REV PORCOAT ATTUNE NA [facility] [place] [id number] Right 1 Implanted   INSERT TIB SZ 5 [id number] ROT PLATFRM KNEE UHMWPE ANTIOXIDANT - SNA  INSERT TIB SZ 5 [id number] ROT PLATFRM KNEE UHMWPE ANTIOXIDANT NA [facility] [place] [id number] Right 1 Implanted       Drains: * No LDAs found *    Findings: extensive osteolysis    Electronically Signed by Kaylynn Ray MD on 9/26/2022 at 12:01 PM

## 2022-09-26 NOTE — PROGRESS NOTES
Problem: Falls - Risk of  Goal: *Absence of Falls  Description: Document Vashti Light Fall Risk and appropriate interventions in the flowsheet.   Outcome: Progressing Towards Goal  Note: Fall Risk Interventions:  Mobility Interventions: Communicate number of staff needed for ambulation/transfer, Patient to call before getting OOB, Bed/chair exit alarm         Medication Interventions: Bed/chair exit alarm, Patient to call before getting OOB, Teach patient to arise slowly    Elimination Interventions: Call light in reach, Patient to call for help with toileting needs              Problem: Patient Education: Go to Patient Education Activity  Goal: Patient/Family Education  Outcome: Progressing Towards Goal     Problem: Patient Education: Go to Patient Education Activity  Goal: Patient/Family Education  Outcome: Progressing Towards Goal

## 2022-09-26 NOTE — H&P
Date of Surgery Update:  Marco Antonio Vargas was seen and examined on the day of surgery prior to the procedure by the surgical team.    There were no significant clinical changes since the completion of the History and Physical.    Exam today prior to surgery showed no acute cardiac findings, no respiratory difficulty, and no abdominal complaints or pain.        Signed By: Amber Hernández PA-C    September 26, 2022 9:27 AM

## 2022-09-26 NOTE — OP NOTES
[address]  OPERATIVE REPORT    Name:  Angeles Cote  MR#:  [id number]  :  1940  ACCOUNT #:  [de-identified]  DATE OF SERVICE:  2022    PREOPERATIVE DIAGNOSIS:  Right total knee arthroplasty loosening. 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 PROCEDURE PERFORMED:  Revision total knee replacement. SURGEON:  Bel Peña MD    ASSISTANT:  MANUELA Martines    ANESTHESIA:  General plus block. COMPLICATIONS:  None. SPECIMENS REMOVED:  None. Implants:   Implant Name Type Inv.  Item Serial No.  Lot No. LRB No. Used Action   CEMENT BNE SIMPLEX W/O GENT -- PK/10 ONLY - SNA   CEMENT BNE SIMPLEX W/O GENT -- PK/10 ONLY NA [employer] [facility] [id number] Right 1 Implanted   CEMENT BNE SIMPLEX W/GENT -- 10/PK - SNA   CEMENT BNE SIMPLEX W/GENT -- 10/PK NA [employer] [facility] [id number] Right 2 Implanted   STEM FEM L110MM [id number] KNEE TI ALLY CRSS SLT PRESSFIT REV - SNA   STEM FEM L110MM [id number] KNEE TI ALLY CRSS SLT PRESSFIT REV NA [facility] [employer] [facility] [id number] Right 1 Implanted   AUGMENT FEM SZ 5 THK8MM POST KNEE CO CHROM MOLYBDENUM STEPHEN - SNA   AUGMENT FEM SZ 5 THK8MM POST KNEE CO CHROM MOLYBDENUM STEPHEN NA [facility] [id number] Right 1 Implanted   AUGMENT FEM SZ 5 [id number] DST KNEE CO CHROM MOLYBDENUM STEPHEN - SNA   AUGMENT FEM SZ 5 [id number] DST KNEE CO CHROM MOLYBDENUM STEPHEN NA [employer] [facility] [id number] Right 1 Implanted   AUGMENT FEM SZ 5 THK8MM DST KNEE CO CHROM MOLYBDENUM STEPHEN - SNA   AUGMENT FEM SZ 5 THK8MM DST KNEE CO CHROM MOLYBDENUM STEPHEN NA [employer] [facility] [id number] Right 1 Implanted   COMPONENT FEM SZ 5 R KNEE CO CHROM MOLYBDENUM STEPHEN W/ ASYM - SNA   COMPONENT FEM SZ 5 R KNEE CO CHROM MOLYBDENUM STEPHEN W/ ASYM NA [facility] [employer] [facility] [id number] Right 1 Implanted   STEM FEM L110MM [id number] KNEE TI ALLY CRSS SLT PRESSFIT REV - SNA   STEM FEM L110MM [id number] KNEE TI ALLY CRSS SLT PRESSFIT REV NA [facility] [employer]_ [id number] Right 1 Implanted   AUGMENT FEM SZ 5 THK4MM POST KNEE CO CHROM MOLYBDENUM STEPHEN - SNA   AUGMENT FEM SZ 5 THK4MM POST KNEE CO CHROM MOLYBDENUM STEPHEN NA [facility] [employer]_ [id number] Right 1 Implanted   BASEPLATE TIB SZ 3 KNEE CO CHROM MOLYBDENUM TI ALLY ROT - SNA   BASEPLATE TIB SZ 3 KNEE CO CHROM MOLYBDENUM TI ALLY ROT NA [facility] [employer]_ [id number] Right 1 Implanted   SLEEVE FEM 29MM LATERAL/MEDIAL FULL COAT REV PORCOAT ATTUNE - SNA   SLEEVE FEM 29MM LATERAL/MEDIAL FULL COAT REV PORCOAT ATTUNE NA [facility] [employer] [facility] [id number] Right 1 Implanted   INSERT TIB SZ 5 [id number] ROT PLATFRM KNEE [employer] ANTIOXIDANT - SNA   INSERT TIB SZ 5 [id number] ROT PLATFRM KNEE UHM[employer] ANTIOXIDANT NA [facility] [employer] [facility] [id number] Right 1 Implanted           ESTIMATED BLOOD LOSS:  Minimal.    DRAINS:  None. CONDITION:  Stable to PACU. INDICATION:  This is a 24-year-old female who had previously undergone a right total knee replacement. She began having pain. Workup was consistent with loosening. We discussed treatment options and recommended revision. She understood no guarantees could be given about the outcome. DESCRIPTION OF PROCEDURE:  After being identified in the preoperative holding area and having her operative site marked, the patient was brought back to the operating room and placed supine on standard OR table. General anesthesia was induced without difficulty. A block of the abductor canal had been performed in preoperative holding. A thigh tourniquet was applied to the right lower extremity, it was prepped and draped in usual fashion using sterile technique. Appropriate time-out was performed. The limb was exsanguinated and the tourniquet inflated. A midline incision was made utilizing her previous incision. Full-thickness skin flaps were developed.   A medial parapatellar approach was then utilized. We debrided abundant scar tissue. The polyethylene insert was removed from the tibial tray and was noted to have minimal wear. The proximal tibia and distal femur were noted to have extensive osteolysis around the implants. We used a micro oscillating saw and tamp to remove the tibial tray as well as femoral component. We began by addressing the tibia and sequentially reamed for a tibial stem. We then prepped for the smallest press-fit tibial sleeve. We sized the tibia to a size-3 and assembled a trial implant in situ. We then turned our attention to the femur. We sequentially reamed here for a press-fit stem. The femur was sized to a size-5. We placed a trial and used the cut through slots for our augments. We then made the box cut and assembled a trial which was inserted in place. We then trialed and had good restoration of alignment and balance. We selected these as our final implants. Further debridement of any soft tissue in the bony surfaces was performed. The final implants were assembled on the back table. They were then cemented into place. We trialed once again and selected a 14-mm stabilized rotating platform insert. The knee was reduced, taken through range of motion and found to be stable with reasonable tracking of the patella. The patella was left with its original component. We thoroughly lavaged with Betadine irrigation and then closed in routine fashion in layers. Sterile compressive dressings were applied. The patient was awakened, moved to the stretcher and taken to the recovery room in satisfactory condition. At the conclusion of procedure, all counts were correct. There were no immediate complications.         MD ASHLEIGH [email or web address]  D:  09/26/2022 12:08  T:  09/26/2022 14:15  JOB #:  [id number]

## 2022-09-26 NOTE — PERIOP NOTES
TRANSFER - OUT REPORT:    Verbal report given to Amirah RN(name) on [employer]  being transferred to [facility](unit) for routine progression of care       Report consisted of patients Situation, Background, Assessment and   Recommendations(SBAR). Information from the following report(s) OR Summary, Intake/Output, and MAR was reviewed with the receiving nurse. Opportunity for questions and clarification was provided.       Patient transported with:   [employer]

## 2022-09-26 NOTE — ANESTHESIA POSTPROCEDURE EVALUATION
Procedure(s):  RIGHT TOTAL KNEE ARTHROPLASTY REVISION. general    Anesthesia Post Evaluation        Patient location during evaluation: PACU  Note status: Adequate. Level of consciousness: responsive to verbal stimuli and sleepy but conscious  Pain management: satisfactory to patient  Airway patency: patent  Anesthetic complications: no  Cardiovascular status: acceptable  Respiratory status: acceptable  Hydration status: acceptable  Comments: +Post-Anesthesia Evaluation and Assessment    Patient: Edouard Liang MRN: [id number]  SSN: [id number]   YOB: 1940  Age: 80 y.o. Sex: female      Cardiovascular Function/Vital Signs    BP (!) 105/54 (BP 1 Location: Right upper arm, BP Patient Position: At rest)   Pulse 66   Temp 36.6 °C (97.9 °F)   Resp 16   Ht 5' 1.25\" (1.556 m)   Wt 54.7 kg (120 lb 9.5 oz)   SpO2 99%   BMI 22.60 kg/m²     Patient is status post Procedure(s):  RIGHT TOTAL KNEE ARTHROPLASTY REVISION. Nausea/Vomiting: Controlled. Postoperative hydration reviewed and adequate. Pain:  Pain Scale 1: Numeric (0 - 10) (09/26/22 1445)  Pain Intensity 1: 0 (09/26/22 1445)   Managed. Neurological Status:   Neuro (WDL): Exceptions to WDL (09/26/22 1445)   At baseline. Mental Status and Level of Consciousness: Arousable. Pulmonary Status:   O2 Device: None (Room air) (09/26/22 1445)   Adequate oxygenation and airway patent. Complications related to anesthesia: None    Post-anesthesia assessment completed. No concerns. Signed By: Rosalina Cheadle, MD    9/26/2022  Post anesthesia nausea and vomiting:  controlled      INITIAL Post-op Vital signs:   Vitals Value Taken Time   /54 09/26/22 1445   Temp 36.6 °C (97.9 °F) 09/26/22 1445   Pulse 73 09/26/22 1456   Resp 15 09/26/22 1456   SpO2 93 % 09/26/22 1456   Vitals shown include unvalidated device data.

## 2022-09-26 NOTE — PROGRESS NOTES
Problem: Mobility Impaired (Adult and Pediatric)  Goal: *Acute Goals and Plan of Care (Insert Text)  Description: FUNCTIONAL STATUS PRIOR TO ADMISSION: Patient was independent and active without use of DME. Patient with recent R THR in 4/22    HOME SUPPORT PRIOR TO ADMISSION: The patient lived alone with extended family to provide assistance. Physical Therapy Goals  Initiated 9/26/2022    1. Patient will move from supine to sit and sit to supine , scoot up and down, and roll side to side in bed with modified independence within 4 days. 2. Patient will perform sit to stand with modified independence within 4 days. 3. Patient will ambulate with modified independence for 250 feet with the least restrictive device within 4 days. 4. Patient will ascend/descend 13 stairs with 1 handrail(s) with modified independence within 4 days. 5. Patient will perform home exercise program per protocol with independence within 4 days. 6. Patient will demonstrate AROM 0-90 degrees in operative joint within 4 days. Outcome: Not Met   PHYSICAL THERAPY EVALUATION  Patient: Toyin Lamar (89 y.o. female)  Date: 9/26/2022  Primary Diagnosis: S/P revision of total knee, right [Z96.651]  Procedure(s) (LRB):  RIGHT TOTAL KNEE ARTHROPLASTY REVISION (Right) Day of Surgery   Precautions: WBAT       ASSESSMENT  Based on the objective data described below, the patient presents with impaired functional mobility, balance and activity tolerance following s/p R total knee revision today. OOB mobility limited today secondary to orthostatic hypotension. Patient requiring min A for bed mobility and transfers. She was able to take a few steps to head of bed using RW but gait is unsteady. Patient returned to supine and BP slowly improved-nursing notified.   Patient Vitals for the past 4 hrs:   Temp Pulse Resp BP SpO2   09/26/22 1553 -- 71 -- (!) 105/56 96 %-supine   09/26/22 1551 -- 70 -- (!) 81/44 --supine   09/26/22 1548 -- -- -- (!) 71/42 --standing   09/26/22 1544 -- 77 -- (!) 107/52 95 %-sitting                                                                                                                                        Patient reports independence at baseline. She lives alone with good family. Anticipate good support once BP stabilizes. Recommend HHPT with 24 hour supervision initially once home. Current Level of Function Impacting Discharge (mobility/balance): min A    Functional Outcome Measure: The patient scored 4/20 on the Elderly Mobility scale outcome measure which is indicative of 80% functional impairment. Other factors to consider for discharge: lives alone     Patient will benefit from skilled therapy intervention to address the above noted impairments. PLAN :  Recommendations and Planned Interventions: bed mobility training, transfer training, gait training, therapeutic exercises, patient and family training/education, and therapeutic activities      Frequency/Duration: Patient will be followed by physical therapy:  twice daily to address goals. Recommendation for discharge: (in order for the patient to meet his/her long term goals)  Physical therapy at least 2 days/week in the home AND ensure assist and/or supervision for safety with OOB mobility    This discharge recommendation:  Has not yet been discussed the attending provider and/or case management    IF patient discharges home will need the following DME: patient owns DME required for discharge         SUBJECTIVE:   Patient stated I feel a lot of pressure in my knee.     OBJECTIVE DATA SUMMARY:   HISTORY:    Past Medical History:   Diagnosis Date    Advance care planning     documents pending    Anemia     Arthropathy     Chronic kidney disease     CKD (chronic kidney disease), stage III ([facility] [address] 09/08/2014    md DIVYA Szymanski (degenerative joint disease) of knee 10/21/2021    Isa Hawk    Family history of colon cancer     H/O total hip arthroplasty, right 04/05/2022    Samantha Garcia MD    History of total right knee replacement (TKR) 2011    ghazal mirza md    Hypercholesterolemia     Hypertension     IGT (impaired glucose tolerance) 10/08/2018    Knee pain, left     Osteoarthritis of right hip 02/2021    Pes anserine bursitis 08/11/2020    Rheumatoid arthritis ([facility] [address]     S/P colonoscopy 11/30/2021    Joey Briggs MD -repeat 3 yrs    Trigger finger, right middle finger 06/23/2022     Past Surgical History:   Procedure Laterality Date    COLONOSCOPY N/A 11/29/2018    COLONOSCOPY performed by Joey Briggs MD at [facility] ENDOSCOPY    COLONOSCOPY N/A 11/30/2021    COLONOSCOPY   V performed by Joey Briggs MD at [facility] ENDOSCOPY    HX BUNIONECTOMY      HX CARPAL TUNNEL RELEASE      HX CERVICAL FUSION  2018    HX HIP ARTHROSCOPY Right     HX KNEE REPLACEMENT Right 2011         Home Situation  Home Environment: Private residence  # Steps to Enter: 2 (one step to porch and then treshold step into the house)  Rails to [employer]: No  One/Two Story Residence: Two story  # of Interior Steps: 15  Interior Rails: Right  Living Alone: Yes  Support Systems: Other Family Member(s) (no family is able to stay with her they can only check on her)  Patient Expects to be Discharged to[de-identified] Home with home health  Current DME Used/Available at Home: Cane, straight, Grab bars, Shower chair, Walker, rolling  Tub or Shower Type: Tub/Shower combination    EXAMINATION/PRESENTATION/DECISION MAKING:   Critical Behavior:  Neurologic State: Drowsy, Eyes open spontaneously, Sleeping  Orientation Level: Oriented to person, Oriented to place, Oriented to situation  Cognition: Follows commands     Hearing:   Auditory  Auditory Impairment: None  Hearing Aids/Status: Does not own    Range Of Motion:  AROM: Generally decreased, functional                       Strength:    Strength: Generally decreased, functional                    Tone & Sensation:   Tone: Normal Sensation: Intact               Coordination:  Coordination: Within functional limits  Vision:      Functional Mobility:  Bed Mobility:  Rolling: Modified independent  Supine to Sit: Minimum assistance (to assist R LE)  Sit to Supine: Minimum assistance (to assist R LE)  Scooting: Modified independent  Transfers:  Sit to Stand: Minimum assistance;Contact guard assistance;Assist x2  Stand to Sit: Minimum assistance                       Balance:   Sitting: Intact  Standing: Impaired  Standing - Static: Good;Constant support  Standing - Dynamic : Fair;Constant support  Ambulation/Gait Training:          Right Side Weight Bearing: As tolerated         Patient side stepped a few steps to head of bed- patient is unsteady with widened base of support and decreased ability to advance RLE          Functional Measure:    Elder Mobility Scale    4/20         Scores under 10 - generally these patients are dependent in mobility maneuvers; require help with  basic ADL, such as transfers, toileting and dressing. Scores between 10 - 13 - generally these patients are borderline in terms of safe mobility and  independence in ADL i.e. they require some help with some mobility maneuvers. Scores over 14 - Generally these patients are able to perform mobility maneuvers alone and safely  and are independent in basic ADL. After treatment patient left in no apparent distress:   Supine in bed, Call bell within reach, and Bed / chair alarm activated    COMMUNICATION/EDUCATION:   The patients plan of care was discussed with: Registered nurse, Rehabilitation technician, and NP . Fall prevention education was provided and the patient/caregiver indicated understanding., Patient/family have participated as able in goal setting and plan of care. , and Patient/family agree to work toward stated goals and plan of care.     Thank you for this referral.  Dusty Sarmiento, PT   Time Calculation: 28 mins

## 2022-09-26 NOTE — PERIOP NOTES
Handoff Report from Operating Room to PACU    Report received from CLARENCE Carvalho CRNA and Amauri Henry RN regarding Eleazar Pinto. Surgeon(s):  Aylin Gonzalez MD  And Procedure(s) (LRB):  RIGHT TOTAL KNEE ARTHROPLASTY REVISION (Right)  confirmed   with allergies and dressings discussed. Anesthesia type, drugs, patient history, complications, estimated blood loss, vital signs, intake and output, and last pain medication, lines, and temperature were reviewed.

## 2022-09-26 NOTE — ANESTHESIA PREPROCEDURE EVALUATION
Relevant Problems   No relevant active problems       Anesthetic History   No history of anesthetic complications            Review of Systems / Medical History  Patient summary reviewed, nursing notes reviewed and pertinent labs reviewed    Pulmonary  Within defined limits                 Neuro/Psych   Within defined limits           Cardiovascular    Hypertension  Valvular problems/murmurs: tricuspid insufficiency            Exercise tolerance: >4 METS  Comments: Mild to mod TR(TI)   GI/Hepatic/Renal         Renal disease: CRI       Endo/Other        Arthritis     Other Findings            Physical Exam    Airway  Mallampati: II  TM Distance: 4 - 6 cm  Neck ROM: normal range of motion   Mouth opening: Normal     Cardiovascular  Regular rate and rhythm,  S1 and S2 normal,  no murmur, click, rub, or gallop             Dental    Dentition: Edentulous     Pulmonary  Breath sounds clear to auscultation               Abdominal  GI exam deferred       Other Findings            Anesthetic Plan    ASA: 2  Anesthesia type: general      Post-op pain plan if not by surgeon: peripheral nerve block single      Anesthetic plan and risks discussed with: Patient

## 2022-09-26 NOTE — DISCHARGE INSTRUCTIONS
Discharge Instructions: How to [address] [facility]  Surgery: Total Knee Replacement  Surgeon:   [unfilled]  Surgery Date:  9/26/2022    To relieve pain:  Use ice/gel packs. Put the ice pack directly over the wound, or anywhere you are hurting or swollen. To control pain and swelling, keep ice on regularly, especially after physical activity. The packs should stay cold for 3-4 hours. When it is not cold anymore, rotate with the packs in the freezer. Elevate your leg. This will also keep swelling down. Rest for at least 20 minutes between activity or exercises. To keep track of your pain medications, write down what you take and when you take it. The last dose of pain medication you got in the hospital was:       Medication    Dose    Date & Time          Choose your medications based on the pain scale below: To keep your pain under control, take Tylenol every 6 hours for 14 days - even if you feel like you dont need it. For mild to moderate pain (4-6 on pain scale), take one pain pill every 3-4 hours as needed. For severe pain (7-10 on pain scale), take two pain pills every 3-4 hours as needed. To prevent nausea, take your pain medications with food. Pain Scale                As your pain lessens:    Slowly start taking less pain medication. You may do this by waiting longer between doses or by taking smaller doses. Stop using the pain medications as soon as you no longer need it, usually in 2-3 weeks. Aspirin  To prevent blood clots, you will need to take Aspirin 81 mg twice a day for 30 days. To prevent stomach upset or bleeding:  Do not take non-steroidal anti-inflammatory medications (Ibuprofen, Advil, Motrin, Naproxen, etc.)   Take Pepcid 20 mg twice a day, or a similar home medication, while you are taking a blood thinner.                 You will have some swelling, warmth, and bruising around the knee and up and down the leg after surgery. This will may get worse in the first few days you are home and will slowly get better over the next few weeks. PRIMASEAL DRESSING INSTRUCTIONS          Leave this covering alone. Do not peel it off. Do not apply oils or lotions over it. You may shower with this dressing but do not soak it. Gently pat your wound dry when you get out of the shower. DO NOTs:  Do not rub your surgical wound  Do not put lotion or oils on your wound. Do not take a tub bath or go swimming until your doctor says it is ok. You may shower with this dressing over your wound. After showering pat the dressing dry. If you have staples a home health nurse will remove them in about 10 days. To increase and promote healing:  Stop Smoking (or at least cut back on       Smoking). Eat a well-balanced diet (high in protein       and vitamin C). If you have a poor appetite, drink Ensure, Glucerna, or         [employer] Instant Breakfast for the next 30 days. If you are diabetic, you should control your blood         sugars to prevent infection and help your wound         to heal.                    f you have a poor appetite, drink Ensure, Glucerna, or [employer] Instant Breakfast for the next 30 days. If you are diabetic, you should control your blood                                                sugars to prevent infection and help your wound                                                to heal.     Prevent Infection    Wash your hands. This is the most important thing you or your caregiver can do. Wash your hands with soap and water (or use the hand  we gave you) before you touch any wounds. 2. Shower. Use the antibacterial soap we gave you when you take a shower. Shower with this soap until your wounds are healed. 3.  Use clean sheets.     Put freshly cleaned sheets on your bed after surgery. To keep the surgery site clean, do not allow pets to sleep with you while your wound is still healing. To prevent constipation, stay active and drink plenty of fluid. While using pain medications, you should also take stool softeners and laxatives, such as Pericolace       and Miralax. If you are having too many bowel       Movements, then you may need       to stop taking the laxatives. You should have a bowel movement 3-4 days        after surgery and then at least every other day while       on pain medication. To improve your recovery, you must be active! Use your walker and take short walks (in your home)         about every 2 hours during the day. Try to increase how far you walk each day. You can put as much weight on your leg as you can tolerate while walking. To avoid getting a stiff knee, work on getting your knee bent and straight as soon as possible. NO DRIVING until your surgeon tells you it is ok. You can return to work when cleared by a physician. Please call your physician immediately if you have:    Constant bleeding from your wound. Increasing redness or swelling around your wound (Some warmth, bruising and swelling is normal). Change in wound drainage (increase in amount, color, or bad smell). Change in mental status (unusual behavior  Temperature over 101.5 degrees Fahrenheit     Pain or redness in the calf (back of your lower leg - see picture)    Increased swelling of the thigh, ankle, calf, or foot. Emergency: CALL 911 if you have:    Shortness of breath    Chest pain when you cough or taking a deep breath    Please call your surgeons office at [phone]  for a follow up appointment.   _  You should call as soon as you get home or the next day after discharge. Ask to make an appointment in 2 weeks.                     If you have questions or concerns during normal business hours, you may reach Dr. Alaina Christy Team at [phone].

## 2022-09-26 NOTE — PROGRESS NOTES
Ortho / Neurosurgery NP Note    POD# 0  s/p RIGHT TOTAL KNEE ARTHROPLASTY REVISION   Pt seen with nurse in room, just arrived from PACU    Pt resting in bed. No complaints. Mild pain, aware to ask for PRN medication. VSS Afebrile. Voiding status: + void  Output (mL)  Urine Voided: 400 ml (09/26/22 1430)  Unmeasurable Output  Urine Occurrence(s): 1 (09/26/22 0930)      Labs  Lab Results   Component Value Date/Time    HGB 11.3 (L) 09/12/2022 02:10 PM      Lab Results   Component Value Date/Time    INR 1.0 09/19/2022 10:28 AM        Recent Glucose Results: No results found for: GLU, GLUPOC, GLUCPOC      Body mass index is 22.6 kg/m². : A BMI > 30 is classified as obesity and > 40 is classified as morbid obesity. Ace wrap with underlying silver occlusive dressing mostly c.d.I  Small spot of  brb on distal end of dressing. Cryotherapy in place over incision  Calves soft and supple; No pain with passive stretch  Sensation and motor intact  SCDs for mechanical DVT proph while in bed     PLAN:  1) PT BID  2) Aspirin 81 mg PO BID for DVT Prophylaxis   3) GI Prophylaxis - Pepcid  4) Readniess for discharge:     [x] Vital Signs stable    [x] Hgb stable    [x] + Voiding    [x] Wound intact, drainage minimal    [x] Tolerating PO intake     [] Cleared by PT (OT if applicable)     [] Stair training completed (if applicable)    [] Independent / Contact Guard Assist (household distance)     [] Bed mobility     [] Car transfers     [] ADLs    [x] Adequate pain control on oral medication alone     Plan home with family when clears PT.     Kevin Torres NP  DNP, ACNP-BC, ONP-C

## 2022-09-27 ENCOUNTER — HOME HEALTH ADMISSION (OUTPATIENT)
Dept: HOME HEALTH SERVICES | Facility: HOME HEALTH | Age: 82
End: 2022-09-27
Payer: MEDICARE

## 2022-09-27 PROBLEM — T84.032A LOOSENING OF PROSTHESIS OF RIGHT TOTAL KNEE REPLACEMENT (HCC): Status: ACTIVE | Noted: 2022-09-27

## 2022-09-27 LAB
ANION GAP SERPL CALC-SCNC: 7 MMOL/L (ref 5–15)
BUN SERPL-MCNC: 18 MG/DL (ref 6–20)
BUN/CREAT SERPL: 12 (ref 12–20)
CALCIUM SERPL-MCNC: 9 MG/DL (ref 8.5–10.1)
CHLORIDE SERPL-SCNC: 107 MMOL/L (ref 97–108)
CO2 SERPL-SCNC: 26 MMOL/L (ref 21–32)
CREAT SERPL-MCNC: 1.49 MG/DL (ref 0.55–1.02)
GLUCOSE SERPL-MCNC: 141 MG/DL (ref 65–100)
HGB BLD-MCNC: 8.9 G/DL (ref 11.5–16)
POTASSIUM SERPL-SCNC: 4.3 MMOL/L (ref 3.5–5.1)
SODIUM SERPL-SCNC: 140 MMOL/L (ref 136–145)

## 2022-09-27 PROCEDURE — 85018 HEMOGLOBIN: CPT

## 2022-09-27 PROCEDURE — 74011250636 HC RX REV CODE- 250/636: Performed by: NURSE PRACTITIONER

## 2022-09-27 PROCEDURE — 97116 GAIT TRAINING THERAPY: CPT

## 2022-09-27 PROCEDURE — G0378 HOSPITAL OBSERVATION PER HR: HCPCS

## 2022-09-27 PROCEDURE — 94760 N-INVAS EAR/PLS OXIMETRY 1: CPT

## 2022-09-27 PROCEDURE — 80048 BASIC METABOLIC PNL TOTAL CA: CPT

## 2022-09-27 PROCEDURE — 93005 ELECTROCARDIOGRAM TRACING: CPT

## 2022-09-27 PROCEDURE — 36415 COLL VENOUS BLD VENIPUNCTURE: CPT

## 2022-09-27 PROCEDURE — 65270000029 HC RM PRIVATE

## 2022-09-27 PROCEDURE — 74011000250 HC RX REV CODE- 250: Performed by: PHYSICIAN ASSISTANT

## 2022-09-27 PROCEDURE — 74011250636 HC RX REV CODE- 250/636: Performed by: PHYSICIAN ASSISTANT

## 2022-09-27 PROCEDURE — 74011250637 HC RX REV CODE- 250/637: Performed by: PHYSICIAN ASSISTANT

## 2022-09-27 RX ORDER — SODIUM CHLORIDE 9 MG/ML
125 INJECTION, SOLUTION INTRAVENOUS CONTINUOUS
Status: DISPENSED | OUTPATIENT
Start: 2022-09-27 | End: 2022-09-28

## 2022-09-27 RX ORDER — FAMOTIDINE 20 MG/1
20 TABLET, FILM COATED ORAL DAILY
Status: DISCONTINUED | OUTPATIENT
Start: 2022-09-28 | End: 2022-09-29 | Stop reason: HOSPADM

## 2022-09-27 RX ADMIN — FAMOTIDINE 20 MG: 20 TABLET, FILM COATED ORAL at 09:03

## 2022-09-27 RX ADMIN — ATORVASTATIN CALCIUM 40 MG: 40 TABLET, FILM COATED ORAL at 22:28

## 2022-09-27 RX ADMIN — ASPIRIN 81 MG: 81 TABLET, COATED ORAL at 18:19

## 2022-09-27 RX ADMIN — SENNOSIDES AND DOCUSATE SODIUM 1 TABLET: 50; 8.6 TABLET ORAL at 18:20

## 2022-09-27 RX ADMIN — DEXAMETHASONE SODIUM PHOSPHATE 10 MG: 4 INJECTION, SOLUTION INTRAMUSCULAR; INTRAVENOUS at 09:02

## 2022-09-27 RX ADMIN — ACETAMINOPHEN 650 MG: 325 TABLET ORAL at 18:20

## 2022-09-27 RX ADMIN — SODIUM CHLORIDE 125 ML/HR: 9 INJECTION, SOLUTION INTRAVENOUS at 18:21

## 2022-09-27 RX ADMIN — ACETAMINOPHEN 650 MG: 325 TABLET ORAL at 09:03

## 2022-09-27 RX ADMIN — SODIUM CHLORIDE 125 ML/HR: 9 INJECTION, SOLUTION INTRAVENOUS at 12:14

## 2022-09-27 RX ADMIN — CEFAZOLIN 2 G: 1 INJECTION, POWDER, FOR SOLUTION INTRAMUSCULAR; INTRAVENOUS at 09:02

## 2022-09-27 RX ADMIN — ACETAMINOPHEN 650 MG: 325 TABLET ORAL at 22:27

## 2022-09-27 RX ADMIN — ASPIRIN 81 MG: 81 TABLET, COATED ORAL at 09:03

## 2022-09-27 RX ADMIN — SODIUM CHLORIDE, PRESERVATIVE FREE 10 ML: 5 INJECTION INTRAVENOUS at 22:28

## 2022-09-27 RX ADMIN — SODIUM CHLORIDE, PRESERVATIVE FREE 10 ML: 5 INJECTION INTRAVENOUS at 05:47

## 2022-09-27 RX ADMIN — CELECOXIB 200 MG: 200 CAPSULE ORAL at 09:03

## 2022-09-27 RX ADMIN — SODIUM CHLORIDE, PRESERVATIVE FREE 10 ML: 5 INJECTION INTRAVENOUS at 12:15

## 2022-09-27 RX ADMIN — SENNOSIDES AND DOCUSATE SODIUM 1 TABLET: 50; 8.6 TABLET ORAL at 09:03

## 2022-09-27 RX ADMIN — METOPROLOL SUCCINATE 25 MG: 25 TABLET, EXTENDED RELEASE ORAL at 22:28

## 2022-09-27 RX ADMIN — SODIUM CHLORIDE 125 ML/HR: 9 INJECTION, SOLUTION INTRAVENOUS at 08:58

## 2022-09-27 NOTE — PROGRESS NOTES
Problem: Falls - Risk of  Goal: *Absence of Falls  Description: Document Brice Cea Fall Risk and appropriate interventions in the flowsheet.   Outcome: Progressing Towards Goal  Note: Fall Risk Interventions:  Mobility Interventions: Communicate number of staff needed for ambulation/transfer         Medication Interventions: Bed/chair exit alarm    Elimination Interventions: Call light in reach              Problem: Patient Education: Go to Patient Education Activity  Goal: Patient/Family Education  Outcome: Progressing Towards Goal     Problem: Patient Education: Go to Patient Education Activity  Goal: Patient/Family Education  Outcome: Progressing Towards Goal     Problem: Pain  Goal: *Control of Pain  Outcome: Progressing Towards Goal     Problem: Patient Education: Go to Patient Education Activity  Goal: Patient/Family Education  Outcome: Progressing Towards Goal

## 2022-09-27 NOTE — PROGRESS NOTES
Physical Therapy note    Discussed with Nurse Practitioner prior to therapy services. Pt tachy with HR elevated to 130's while at rest with possible cardiology consult. Continue to recommend HHPT pending medical stability. Will defer and continue to follow.     Valeriy Reed, PTA

## 2022-09-27 NOTE — PROGRESS NOTES
End of Shift Note    Bedside shift change report given to Paul MEDRANO RN (oncoming nurse) by Yazmin Horner RN (offgoing nurse). Report included the following information SBAR, Kardex, MAR, and Recent Results    Shift worked:  7p-7a     Shift summary and any significant changes:          Concerns for physician to address:       Zone phone for oncoming shift:   [phone]       Activity:  Activity Level: Up with Assistance  Number times ambulated in hallways past shift: 0  Number of times OOB to chair past shift: 0    Cardiac:   Cardiac Monitoring: No      Cardiac Rhythm: Sinus Rhythm    Access:  Current line(s): PIV     Genitourinary:   Urinary status: voiding    Respiratory:   O2 Device: None (Room air)  Chronic home O2 use?: NO  Incentive spirometer at bedside: YES       GI:     Current diet:  ADULT DIET Regular  Passing flatus: YES  Tolerating current diet: YES       Pain Management:   Patient states pain is manageable on current regimen: YES    Skin:  Basilio Score: 19  Interventions: increase time out of bed    Patient Safety:  Fall Score:  Total Score: 3  Interventions: assistive device (walker, cane, etc), gripper socks, pt to call before getting OOB, and stay with me (per policy)  High Fall Risk: Yes    Length of Stay:  Expected LOS: - - -  Actual LOS: 0      Parveen Basilio RN

## 2022-09-27 NOTE — CONSULTS
EP/ ARRHYTHMIA CONSULT requested secondary to atrial tachycardia    Patient ID:  Patient: Bradford Fountain  MRN: [id number]  Age: 80 y.o.  : 1940    Date of  Admission: 2022  8:35 AM   PCP:  Neva Mantilla MD    Assessment:   Paroxysmal ectopic atrial tachycardia, probably focal mechanism. Has frequent PAC's as well. NO atrial fibrillation has been seen. Hypertension. Hyperlipidemia. CKD stage 3. Rheumatoid arthritis. S/p R knee replacement revision this admission. Plan:     She has mild palpitations, but no other symptoms. HR's are transiently high but not high enough to worry about a hemodynamic cost.  I'd continue the metoprolol without change. Will check back tomorrow. Anticipate conservative management with OP F/U in 6-12 months in the office. [x]       High complexity decision making was performed in this patient    Bradford Fountain is a 80 y.o. female with a history of an irregular rhythm and tachycardia. She has shown paroxysmal atrial tachycardia on tele. Describes mild palpitations, but no chest pain, dyspnea, dizziness, syncope, dyspnea. No orthopnea, PND, or edema. No TIA or stroke symptoms.       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    Ivory Parker    Family history of colon cancer     H/O total hip arthroplasty, right 2022    Sienna Lala MD    History of total right knee replacement (TKR)     ghazal mirza md    Hypercholesterolemia     Hypertension     IGT (impaired glucose tolerance) 10/08/2018    Knee pain, left     Osteoarthritis of right hip 2021    Pes anserine bursitis 2020    Rheumatoid arthritis ([facility] [address]     S/P colonoscopy 2021    Clint Mcgee MD -repeat 3 yrs Trigger finger, right middle finger 06/23/2022        Past Surgical History:   Procedure Laterality Date    COLONOSCOPY N/A 11/29/2018    COLONOSCOPY performed by Joyce Bravo MD at [facility] ENDOSCOPY    COLONOSCOPY N/A 11/30/2021    COLONOSCOPY   V performed by Joyce Bravo MD at [facility] ENDOSCOPY    HX BUNIONECTOMY      HX CARPAL TUNNEL RELEASE      HX CERVICAL FUSION  2018    HX HIP ARTHROSCOPY Right     HX KNEE REPLACEMENT Right 2011       Social History     Tobacco Use    Smoking status: Never    Smokeless tobacco: Never   Substance Use Topics    Alcohol use:  Yes     Alcohol/week: 1.0 standard drink     Types: 1 Standard drinks or equivalent per week        Family History   Problem Relation Age of Onset    Heart Disease Mother     Heart Disease Father     Cancer Sister         colon    Cancer Sister         lung    No Known Problems Sister     No Known Problems Brother     Liver Disease Brother     Heart Disease Brother     Anesth Problems Neg Hx         Allergies   Allergen Reactions    Codeine Itching          Current Facility-Administered Medications   Medication Dose Route Frequency    0.9% sodium chloride infusion  125 mL/hr IntraVENous CONTINUOUS    [START ON 9/28/2022] famotidine (PEPCID) tablet 20 mg  20 mg Oral DAILY    metoprolol succinate (TOPROL-XL) XL tablet 25 mg  25 mg Oral QHS    atorvastatin (LIPITOR) tablet 40 mg  40 mg Oral QHS    sodium chloride (NS) flush 5-40 mL  5-40 mL IntraVENous Q8H    sodium chloride (NS) flush 5-40 mL  5-40 mL IntraVENous PRN    acetaminophen (TYLENOL) tablet 650 mg  650 mg Oral Q6H    naloxone (NARCAN) injection 0.4 mg  0.4 mg IntraVENous PRN    [START ON 9/28/2022] ondansetron (ZOFRAN ODT) tablet 4 mg  4 mg Oral Q6H PRN    diphenhydrAMINE (BENADRYL) 12.5 mg/5 mL oral liquid 12.5 mg  12.5 mg Oral Q6H PRN    senna-docusate (PERICOLACE) 8.6-50 mg per tablet 1 Tablet  1 Tablet Oral BID    polyethylene glycol (MIRALAX) packet 17 g  17 g Oral DAILY    [START ON 2022] bisacodyL (DULCOLAX) suppository 10 mg  10 mg Rectal DAILY PRN    aspirin delayed-release tablet 81 mg  81 mg Oral BID    traMADoL (ULTRAM) tablet  mg   mg Oral Q6H PRN    celecoxib (CELEBREX) capsule 200 mg  200 mg Oral DAILY       Review of Symptoms:  See HPI as well.   General: negative for fever, chills, sweats, weakness, weight loss   Eyes: negative for blurred vision, eye pain, loss of vision, diplopia   Ear Nose and Throat: negative for rhinorrhea, pharyngitis, otalgia, tinnitus, speech or swallowing difficulties   Respiratory: negative for SOB, cough, sputum production, wheezing, QUESADA, pleuritic pain   Cardiology: negative for chest pain, orthopnea, PND, edema, syncope   Gastrointestinal: negative for abdominal pain, N/V, dysphagia, change in bowel habits, bleeding   Genitourinary: negative for frequency, urgency, dysuria, hematuria, incontinence   Muskuloskeletal : POSITIVE for joint pain  Hematology: negative for easy bruising, bleeding, lymphadenopathy   Dermatological: negative for rash, ulceration, mole change, new lesion   Endocrine: negative for hot flashes or polydipsia   Neurological: negative for headache, dizziness, confusion, focal weakness, paresthesia, memory loss, gait disturbance   Psychological: negative for anxiety, depression, agitation       Objective:      Physical Exam:  Temp (24hrs), Av.7 °F (36.5 °C), Min:97.1 °F (36.2 °C), Max:98 °F (36.7 °C)    Patient Vitals for the past 8 hrs:   Pulse   22 1538 (!) 104   22 1100 88   22 1023 (!) 110   22 1015 97   22 1012 79    Patient Vitals for the past 8 hrs:   Resp   22 1538 18   22 1100 18    Patient Vitals for the past 8 hrs:   BP   22 1538 113/60   22 1100 136/75   22 1023 127/60   22 1015 (!) 117/55   22 1012 (!) 145/72        Intake/Output Summary (Last 24 hours) at 2022 1748  Last data filed at 2022 1711  Gross per 24 hour   Intake 420 ml   Output 2400 ml   Net -1980 ml       Nondiaphoretic, not in acute distress. No scleral icterus, mucous membranes moist, conjuctivae pink, no xanthelasma. Unlabored, clear to auscultation bilaterally, symmetric air movement. Regular rate and rhythm, no murmur, pericardial rub, knock, or gallop. No JVD or peripheral edema. Palpable radial pulses bilaterally. Abdomen, soft, nontender, nondistended. Extremities without cyanosis or clubbing. Muscle tone and bulk normal.  The right KNEE has a vertical bandage atop it. Skin warm and dry. No rashes or ulcers. Neuro grossly nonfocal.  No tremor. Awake and appropriate. CARDIOGRAPHICS and STUDIES, I reviewed:    Telemetry:  SR with PAC's, paroxysmal atrial tachycardia. ECHO 3/2022:   Left Ventricle Left ventricle size is normal. Findings consistent with mild concentric hypertrophy. Normal wall motion. Normal left ventricular systolic function with a visually estimated EF of 55 - 60%. Indeterminate diastolic function. Left Atrium Left atrium size is normal.   Right Ventricle Right ventricle size is normal. Normal wall thickness. Normal systolic function. Right Atrium Right atrium is mildly dilated. Aortic Valve Not well visualized. No transvalvular regurgitation. No stenosis. Mitral Valve Valve structure is normal. No transvalvular regurgitation. No stenosis noted. Tricuspid Valve Valve structure is normal. Mild to moderate transvalvular regurgitation. No stenosis noted. Normal RVSP. RVSP is 34 mmHg. Pulmonic Valve The pulmonic valve was not well visualized. Aorta Normal sized sinus of Valsalva. IVC/Hepatic Veins IVC diameter is less than or equal to 21 mm and decreases greater than 50% during inspiration; therefore the estimated right atrial pressure is normal (~3 mmHg). Pericardium No pericardial effusion. Labs:  No results for input(s): CPK, CKMB, CKNDX, TROIQ in the last 72 hours.     No lab exists for component: CPKMB  Lab Results   Component Value Date/Time    Cholesterol, total 141 02/22/2022 11:50 AM    HDL Cholesterol 48 02/22/2022 11:50 AM    LDL, calculated 79 02/22/2022 11:50 AM    Triglyceride 70 02/22/2022 11:50 AM    CHOL/HDL Ratio 2.9 02/22/2022 11:50 AM     No results for input(s): INR, PTP, APTT, INREXT in the last 72 hours. Recent Labs     09/27/22  0432      K 4.3      CO2 26   BUN 18   CREA 1.49*   *   CA 9.0   HGB 8.9*     No results for input(s): AP, TBIL, TP, ALB, GLOB, GGT, AML, LPSE in the last 72 hours. No lab exists for component: SGOT, GPT, AMYP, HLPSE  No components found for: GLPOC  No results for input(s): PH, PCO2, PO2 in the last 72 hours.         Kim Lee MD  9/27/2022

## 2022-09-27 NOTE — PROGRESS NOTES
Alerted  by the Code Blue announcement to the [id number] called at  [phone]. Entered the gym and patient sitting in a chair. Alert , oriented, speech clear. PT voiced patient became pale, unresponsive, and had a shaking, jerking, seizure like moment when standing. Eyes was dazed and fixed. HR was 133, oxygen sats 78 % on room air. Patient assisted to sit in the chair and oxygen sats 92% , patient alert ,and answering questions appropriately. Code Blue cancelled. Patient transported back to room via wheelchair. Oxygen sats 87% on room air oxygen placed NC 2 liters sats 96 % EKG done. Mehul Yap NP at bedside. 112/59, .

## 2022-09-27 NOTE — PROGRESS NOTES
Cardiology consult called ,[phone]. Spoke with Radha Norris  ,Dr. Adelaide Dey or Dr. Maria Dolores Sharma on call .

## 2022-09-27 NOTE — PROGRESS NOTES
1403 - Delivered MOON to patient. See scanned documents. [address]     Attempted to deliver and verbally explain PATEL to patient. Patient was with clinical staff.  Kings Mclaughlin Care Management Assistant

## 2022-09-27 NOTE — PROGRESS NOTES
Transition of Care Plan:  RUR: n/a obs status   Disposition: home with home health- [facility]  Follow up appointments: ortho  DME needed: pt owns dme needed  Transportation at Discharge: family   Mony De [place] or means to access home: family   IM Medicare Letter: n/a obs status   Caregiver Contact: sister Sofia Love [phone]  Discharge Caregiver contacted prior to discharge? Pt wishes to contat  Care Conference needed?: no                  Reason for Admission: right total knee arthroplasty                   RUR Score: n/a obs status                     Plan for utilizing home health: per recommendation          PCP: First and Last name:  Balbina Zurita MD   [facility]   Are you a current patient: Yes/No: yes   Approximate date of last visit: 9/12/22   Can you participate in a virtual visit with your PCP: yes                    Current Advanced Directive/Advance Care Plan: patient declined at this time                   Transition of Care Plan:                      CM made room visit with patient who was alert and oriented. Pt confirmed demographics, insurance, and emergency contact on file. Pt lives alone in a 2 story home with 2 yessenia. Pt has a RW, bsc, and cane. At baseline pt is independent with ADLs and driving. Pt has used HH through [facility] and would like to use them again upon d/c. FOC signed and placed on bedside chart. Referral sent to [facility] and accepted. AVS updated. Care Management Interventions  PCP Verified by CM: Yes  Mode of Transport at Discharge:  Other (see comment) (family)  Transition of Care Consult (CM Consult): Discharge Planning, [address]: Yes  Discharge Durable Medical Equipment: No  Physical Therapy Consult: Yes  Occupational Therapy Consult: No  Speech Therapy Consult: No  Support Systems: Child(susanna), Other Family Member(s)  Confirm Follow Up Transport: Family  The Plan for Transition of Care is Related to the Following Treatment Goals : hh  The Patient and/or Patient Representative was Provided with a Choice of Provider and Agrees with the Discharge Plan?: Yes  Freedom of Choice List was Provided with Basic Dialogue that Supports the Patient's Individualized Plan of Care/Goals, Treatment Preferences and Shares the Quality Data Associated with the Providers?: Yes  Discharge Location  Patient Expects to be Discharged to[de-identified] Home with home health    Augusta Tiera EmekasitaBeth [facility], [address] [facility]

## 2022-09-27 NOTE — PROGRESS NOTES
Problem: Mobility Impaired (Adult and Pediatric)  Goal: *Acute Goals and Plan of Care (Insert Text)  Description: FUNCTIONAL STATUS PRIOR TO ADMISSION: Patient was independent and active without use of DME. Patient with recent R THR in 4/22    HOME SUPPORT PRIOR TO ADMISSION: The patient lived alone with extended family to provide assistance. Physical Therapy Goals  Initiated 9/26/2022    1. Patient will move from supine to sit and sit to supine , scoot up and down, and roll side to side in bed with modified independence within 4 days. 2. Patient will perform sit to stand with modified independence within 4 days. 3. Patient will ambulate with modified independence for 250 feet with the least restrictive device within 4 days. 4. Patient will ascend/descend 13 stairs with 1 handrail(s) with modified independence within 4 days. 5. Patient will perform home exercise program per protocol with independence within 4 days. 6. Patient will demonstrate AROM 0-90 degrees in operative joint within 4 days. Outcome: Progressing Towards Goal   PHYSICAL THERAPY TREATMENT  Patient: Deidre Coronado (81 y.o. female)  Date: 9/27/2022  Diagnosis: S/P revision of total knee, right [Z96.651] <principal problem not specified>  Procedure(s) (LRB):  RIGHT TOTAL KNEE ARTHROPLASTY REVISION (Right) 1 Day Post-Op  Precautions:    Chart, physical therapy assessment, plan of care and goals were reviewed. ASSESSMENT  Patient continues with skilled PT services and is progressing towards goals. Pt was received seated upright in recliner and agreeable to participate with therapy services. Pt with no complaints of increased pain or dizziness throughout session. Pt had a 28 point drop SBP with standing however reports feeling asymptomatic. Pt was able to ambulate 400ft CGA with RW, with slightly antalgic gait, step-through gait pattern, and right LE flexed throughout with poor extension.  Pt took a seated rest break after her ambulation trial, began dazing off with a very blank stare, unresponsive, and body tremors (seizure like?) while seated. Code called and medical team present. Pt was able to regain awareness, A&O x4, responsive, however noted with SOB, oxygen saturation dropping to 84% on RA. BP's stable and noted with elevated 's after activity. Pt was wheeled back via wheelchair to her room, and left in supine with Nurse Practitioner present. Will continue to follow. Vitals:    09/27/22 1012 09/27/22 1015 09/27/22 1023 09/27/22 1100   BP: (!) 145/72 (!) 117/55 127/60 136/75   BP 1 Location: Right upper arm Right upper arm Right upper arm Right upper arm   BP Patient Position: Sitting Standing Sitting  Comment: post-activity Lying   Pulse: 79 97 (!) 110 88   Temp:    97.6 °F (36.4 °C)   Resp:    18   Height:       Weight:       SpO2:    100%        Current Level of Function Impacting Discharge (mobility/balance): CGA with transfers, CGA with RW for gait    Other factors to consider for discharge: medical stability, car/stair training         PLAN :  Patient continues to benefit from skilled intervention to address the above impairments. Continue treatment per established plan of care. to address goals. Recommendation for discharge: (in order for the patient to meet his/her long term goals)  Physical therapy at least 2 days/week in the home AND ensure assist and/or supervision for safety with mobility & ADL's    This discharge recommendation:  Has been made in collaboration with the attending provider and/or case management    IF patient discharges home will need the following DME: rolling walker       SUBJECTIVE:   Patient stated Edward Quintanilla ask me if I was dizzy this morning too, I feel good.     OBJECTIVE DATA SUMMARY:   Critical Behavior:  Neurologic State: Alert, Appropriate for age  Orientation Level: Oriented X4  Cognition: Follows commands    Functional Mobility Training:  Bed Mobility:  Rolling:  (pt was seated in chair upon arrival)    Transfers:  Sit to Stand: Contact guard assistance  Stand to Sit: Contact guard assistance    Balance:  Sitting: Intact  Standing: Impaired  Standing - Static: Good;Constant support  Standing - Dynamic : Fair;Constant support  Ambulation/Gait Training:  Distance (ft): 400 Feet (ft)  Assistive Device: Gait belt;Walker, rolling  Ambulation - Level of Assistance: Contact guard assistance    Gait Abnormalities: Antalgic;Decreased step clearance (RLE flexion)    Base of Support: Widened  Stance: Right decreased  Speed/Veronica: Pace decreased (<100 feet/min)  Step Length: Left shortened;Right shortened    Pain Rating:  No pain rating received from pt    Activity Tolerance:   Fair and observed SOB with activity    After treatment patient left in no apparent distress:   Supine in bed, Call bell within reach, and Side rails x 3    COMMUNICATION/COLLABORATION:   The patients plan of care was discussed with: Nurse Practitioner & Registered nurse.      Cody Walter PTA   Time Calculation: 30 mins

## 2022-09-27 NOTE — PROGRESS NOTES
Laying 135/67, HR 62. Sitting 115/59, HR 71. Standing 112/59. HR 75. Patient denies any dizziness with position changes. Patient assisted to the chair.

## 2022-09-27 NOTE — PROGRESS NOTES
Ortho / Neurosurgery NP Note    POD# 1  s/p RIGHT TOTAL KNEE ARTHROPLASTY REVISION   Pt seen with no visitor at bedside    Pt up to chair, No complaints of pain. Taking tylenol only for pain. Tolerating regular diet well, no N/V  Orthostatic overnight, Stood at chair- no dizziness or lightheadedness noted. VSS Afebrile room air    Voiding status: + void  Output (mL)  Urine Voided: 300 ml (09/27/22 0314)  Unmeasurable Output  Urine Occurrence(s): 1 (09/27/22 0314)      Labs  Lab Results   Component Value Date/Time    HGB 8.9 (L) 09/27/2022 04:32 AM      Lab Results   Component Value Date/Time    INR 1.0 09/19/2022 10:28 AM        Recent Glucose Results:   Lab Results   Component Value Date/Time     (H) 09/27/2022 04:32 AM       Body mass index is 22.6 kg/m². : A BMI > 30 is classified as obesity and > 40 is classified as morbid obesity. Silver occlusive dressing c.d. I  Small spot of  brb on distal end of dressing. Cryotherapy in place over incision  Calves soft and supple; No pain with passive stretch  Sensation and motor intact  SCDs for mechanical DVT proph while in bed     PLAN:  1) PT BID  2) Aspirin 81 mg PO BID for DVT Prophylaxis   3) GI Prophylaxis - Pepcid  4) Readniess for discharge:     [x] Vital Signs stable    [x] Hgb stable    [x] + Voiding    [x] Wound intact, drainage minimal    [x] Tolerating PO intake     [] Cleared by PT (OT if applicable)     [] Stair training completed (if applicable)    [] Independent / Contact Guard Assist (household distance)     [x] Bed mobility     [] Car transfers     [] ADLs    [x] Adequate pain control on oral medication alone     Plan home with family support and HH when clears PT/OT. Zena Precise    Addendum @0407:  Called to room, while working with PT in gym, pt became pale with full body tremors, VS showed HR tachy at 115's, BP stable, O2 sats in the 80's. ..pt regained awareness and brought back to room.  In room HR now 120's, O2 sat 84% on RA, O2 at 3 liters applied. No significant orthostasis this am with activity. Patient denies HA, CP, however does report \"heart racing and SOB\"  followed by cardiology-Dr. Raquel Mcguire for tachycardia- on chart review, she had a similar experience s/p hip surgery in April 2022.  12 Lead EKG ordered- NSR with one PAC, Continue o2 therapy, IVF's and place on remote telemetry.     Viky Evnagelista

## 2022-09-28 LAB
ANION GAP SERPL CALC-SCNC: 6 MMOL/L (ref 5–15)
ATRIAL RATE: 86 BPM
BUN SERPL-MCNC: 19 MG/DL (ref 6–20)
BUN/CREAT SERPL: 16 (ref 12–20)
CALCIUM SERPL-MCNC: 8.4 MG/DL (ref 8.5–10.1)
CALCULATED P AXIS, ECG09: 61 DEGREES
CALCULATED R AXIS, ECG10: 16 DEGREES
CALCULATED T AXIS, ECG11: 39 DEGREES
CHLORIDE SERPL-SCNC: 113 MMOL/L (ref 97–108)
CO2 SERPL-SCNC: 26 MMOL/L (ref 21–32)
CREAT SERPL-MCNC: 1.22 MG/DL (ref 0.55–1.02)
DIAGNOSIS, 93000: NORMAL
GLUCOSE SERPL-MCNC: 118 MG/DL (ref 65–100)
HGB BLD-MCNC: 7.5 G/DL (ref 11.5–16)
P-R INTERVAL, ECG05: 158 MS
POTASSIUM SERPL-SCNC: 3.8 MMOL/L (ref 3.5–5.1)
Q-T INTERVAL, ECG07: 334 MS
QRS DURATION, ECG06: 72 MS
QTC CALCULATION (BEZET), ECG08: 399 MS
SODIUM SERPL-SCNC: 145 MMOL/L (ref 136–145)
VENTRICULAR RATE, ECG03: 86 BPM

## 2022-09-28 PROCEDURE — 97530 THERAPEUTIC ACTIVITIES: CPT

## 2022-09-28 PROCEDURE — 85018 HEMOGLOBIN: CPT

## 2022-09-28 PROCEDURE — 65270000029 HC RM PRIVATE

## 2022-09-28 PROCEDURE — 94760 N-INVAS EAR/PLS OXIMETRY 1: CPT

## 2022-09-28 PROCEDURE — 74011250637 HC RX REV CODE- 250/637: Performed by: INTERNAL MEDICINE

## 2022-09-28 PROCEDURE — 74011250636 HC RX REV CODE- 250/636: Performed by: NURSE PRACTITIONER

## 2022-09-28 PROCEDURE — 77030038269 HC DRN EXT URIN PURWCK BARD -A

## 2022-09-28 PROCEDURE — 74011250637 HC RX REV CODE- 250/637: Performed by: ORTHOPAEDIC SURGERY

## 2022-09-28 PROCEDURE — 77010033678 HC OXYGEN DAILY

## 2022-09-28 PROCEDURE — 74011000250 HC RX REV CODE- 250: Performed by: PHYSICIAN ASSISTANT

## 2022-09-28 PROCEDURE — 74011250637 HC RX REV CODE- 250/637: Performed by: PHYSICIAN ASSISTANT

## 2022-09-28 PROCEDURE — 80048 BASIC METABOLIC PNL TOTAL CA: CPT

## 2022-09-28 PROCEDURE — 97116 GAIT TRAINING THERAPY: CPT

## 2022-09-28 PROCEDURE — 36415 COLL VENOUS BLD VENIPUNCTURE: CPT

## 2022-09-28 RX ORDER — FLECAINIDE ACETATE 100 MG/1
50 TABLET ORAL EVERY 12 HOURS
Status: DISCONTINUED | OUTPATIENT
Start: 2022-09-28 | End: 2022-09-29 | Stop reason: HOSPADM

## 2022-09-28 RX ADMIN — METOPROLOL SUCCINATE 25 MG: 25 TABLET, EXTENDED RELEASE ORAL at 22:10

## 2022-09-28 RX ADMIN — SENNOSIDES AND DOCUSATE SODIUM 1 TABLET: 50; 8.6 TABLET ORAL at 09:13

## 2022-09-28 RX ADMIN — ASPIRIN 81 MG: 81 TABLET, COATED ORAL at 17:25

## 2022-09-28 RX ADMIN — SODIUM CHLORIDE, PRESERVATIVE FREE 10 ML: 5 INJECTION INTRAVENOUS at 04:05

## 2022-09-28 RX ADMIN — ATORVASTATIN CALCIUM 40 MG: 40 TABLET, FILM COATED ORAL at 22:10

## 2022-09-28 RX ADMIN — ACETAMINOPHEN 650 MG: 325 TABLET ORAL at 14:18

## 2022-09-28 RX ADMIN — FLECAINIDE ACETATE 50 MG: 100 TABLET ORAL at 18:22

## 2022-09-28 RX ADMIN — ACETAMINOPHEN 650 MG: 325 TABLET ORAL at 04:04

## 2022-09-28 RX ADMIN — FAMOTIDINE 20 MG: 20 TABLET, FILM COATED ORAL at 09:14

## 2022-09-28 RX ADMIN — CELECOXIB 200 MG: 200 CAPSULE ORAL at 09:13

## 2022-09-28 RX ADMIN — POLYETHYLENE GLYCOL 3350 17 G: 17 POWDER, FOR SOLUTION ORAL at 09:13

## 2022-09-28 RX ADMIN — SODIUM CHLORIDE, PRESERVATIVE FREE 10 ML: 5 INJECTION INTRAVENOUS at 14:18

## 2022-09-28 RX ADMIN — ACETAMINOPHEN 650 MG: 325 TABLET ORAL at 17:25

## 2022-09-28 RX ADMIN — SODIUM CHLORIDE 125 ML/HR: 9 INJECTION, SOLUTION INTRAVENOUS at 09:17

## 2022-09-28 RX ADMIN — ASPIRIN 81 MG: 81 TABLET, COATED ORAL at 09:13

## 2022-09-28 RX ADMIN — SENNOSIDES AND DOCUSATE SODIUM 1 TABLET: 50; 8.6 TABLET ORAL at 17:25

## 2022-09-28 RX ADMIN — SODIUM CHLORIDE, PRESERVATIVE FREE 10 ML: 5 INJECTION INTRAVENOUS at 22:10

## 2022-09-28 NOTE — PROGRESS NOTES
Physical Therapy    Pt chart reviewed and attempted to see for therapy services. Attempted to perform LE therapeutic exercises however pt HR tachy with exercises, elevating into 130's. Further mobility and session aborted. Will continue to follow. Pt continues to be cleared from a physical therapy standpoint and appropriate for HHPT upon discharge pending medical stability.      Isidro Husain, PTA

## 2022-09-28 NOTE — PROGRESS NOTES
Problem: Mobility Impaired (Adult and Pediatric)  Goal: *Acute Goals and Plan of Care (Insert Text)  Description: FUNCTIONAL STATUS PRIOR TO ADMISSION: Patient was independent and active without use of DME. Patient with recent R THR in 4/22    HOME SUPPORT PRIOR TO ADMISSION: The patient lived alone with extended family to provide assistance. Physical Therapy Goals  Initiated 9/26/2022    1. Patient will move from supine to sit and sit to supine , scoot up and down, and roll side to side in bed with modified independence within 4 days. 2. Patient will perform sit to stand with modified independence within 4 days. 3. Patient will ambulate with modified independence for 250 feet with the least restrictive device within 4 days. 4. Patient will ascend/descend 13 stairs with 1 handrail(s) with modified independence within 4 days. 5. Patient will perform home exercise program per protocol with independence within 4 days. 6. Patient will demonstrate AROM 0-90 degrees in operative joint within 4 days. Outcome: Progressing Towards Goal   PHYSICAL THERAPY TREATMENT  Patient: Toyin Lamar (66 y.o. 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 ASSESSMENT  Patient continues with skilled PT services and is progressing towards goals. Pt was received in supine on 3L NC with oxygen saturation 99%, placed on RA prior to therapy services. Pt reports no SOB with activity, and BP stable with positional changes throughout session. Pt ambulated 125ft CGA with RW with multiple seated rest breaks due to elevated HR with activity. 's and noted 138 at the highest with activity.  Pt performed car transfer and stair training, with cues for sequencing and no safety concerns. Pt was returned back via wheelchair, left seated upright, ice pack applied, and left with all needs met. Pt has been cleared from a physical therapy standpoint. Remains appropriate for HHPT upon discharge. Will continue to follow for PM session. 09/28/22 1111 09/28/22 1114 09/28/22 1117   Vital Signs   Pulse (Heart Rate) 86 (!) 112 (!) 117   Heart Rate Source Monitor Monitor Monitor   /63 128/70 123/66   MAP (Calculated) 88 89 85   BP 1 Location Left upper arm Left upper arm Left upper arm   BP 1 Method Automatic Automatic Automatic   BP Patient Position At rest Sitting Standing      09/28/22 1122   Vital Signs   Pulse (Heart Rate) (!) 119   Heart Rate Source Monitor   BP (!) 147/71   MAP (Calculated) 96   BP 1 Location Left upper arm   BP 1 Method Automatic   BP Patient Position Sitting  (post-activity)       Current Level of Function Impacting Discharge (mobility/balance): SBA with bed mobility, CGA with transfers, CGA with RW for gait    Other factors to consider for discharge: tachycardia         PLAN :  Patient continues to benefit from skilled intervention to address the above impairments. Continue treatment per established plan of care. to address goals. Recommendation for discharge: (in order for the patient to meet his/her long term goals)  Physical therapy at least 2 days/week in the home AND ensure assist and/or supervision for safety with mobility & ADL's    This discharge recommendation:  Has been made in collaboration with the attending provider and/or case management    IF patient discharges home will need the following DME: rolling walker       SUBJECTIVE:   Patient stated Chalmer Jeevan won't let me up, I hate this darn thing between my legs.     OBJECTIVE DATA SUMMARY:   Critical Behavior:  Neurologic State: Alert  Orientation Level: Oriented X4  Cognition: Appropriate safety awareness, Follows commands    Functional Mobility Training:  Bed Mobility:  Rolling: Stand-by assistance  Supine to Sit: Stand-by assistance     Scooting: Stand-by assistance    Transfers:  Sit to Stand: Contact guard assistance  Stand to Sit: Contact guard assistance        Bed to Chair: Contact guard assistance    Balance:  Sitting: Intact  Standing: Impaired  Standing - Static: Good;Constant support  Standing - Dynamic : Fair;Good;Constant support  Ambulation/Gait Training:  Distance (ft): 125 Feet (ft) (25/50/50 with seated rest breaks between ambulation trials)  Assistive Device: Gait belt;Walker, rolling  Ambulation - Level of Assistance: Contact guard assistance    Gait Abnormalities: Antalgic;Decreased step clearance (decreased R knee flexion)    Base of Support: Widened  Stance: Right decreased  Speed/Veronica: Pace decreased (<100 feet/min)  Step Length: Left shortened;Right shortened    Interventions: Safety awareness training;Visual/Demos; Verbal cues    Stairs:  Number of Stairs Trained: 4  Stairs - Level of Assistance: Contact guard assistance   Rail Use: Right     Pain Rating:  No pain rating received from pt    Activity Tolerance:   Fair and requires frequent rest breaks; tachy    After treatment patient left in no apparent distress:   Sitting in chair and Call bell within reach    COMMUNICATION/COLLABORATION:   The patients plan of care was discussed with: Nurse Practitioner & Registered nurse.      Alix Leonard PTA   Time Calculation: 32 mins

## 2022-09-28 NOTE — PROGRESS NOTES
Problem: Falls - Risk of  Goal: *Absence of Falls  Description: Document Madhu Allen Fall Risk and appropriate interventions in the flowsheet.   9/28/2022 0430 by Ang Wilson RN  Outcome: Progressing Towards Goal  Note: Fall Risk Interventions:  Mobility Interventions: Communicate number of staff needed for ambulation/transfer, Patient to call before getting OOB, Utilize walker, cane, or other assistive device, Utilize gait belt for transfers/ambulation         Medication Interventions: Bed/chair exit alarm, Patient to call before getting OOB, Teach patient to arise slowly    Elimination Interventions: Call light in reach, Stay With Me (per policy), Toileting schedule/hourly rounds           9/28/2022 0000 by Ang Wilson RN  Outcome: Progressing Towards Goal  Note: Fall Risk Interventions:  Mobility Interventions: Communicate number of staff needed for ambulation/transfer, Patient to call before getting OOB, Utilize walker, cane, or other assistive device, Utilize gait belt for transfers/ambulation         Medication Interventions: Bed/chair exit alarm, Patient to call before getting OOB, Teach patient to arise slowly    Elimination Interventions: Call light in reach, Stay With Me (per policy), Toileting schedule/hourly rounds

## 2022-09-28 NOTE — PROGRESS NOTES
EP/ ARRHYTHMIA    Patient ID:  Patient: Chad Mckeon  MRN: [id number]  Age: 80 y.o.  : 1940    Date of  Admission: 2022  8:35 AM   PCP:  Melanie Almeida MD    Assessment:   Paroxysmal ectopic atrial tachycardia, probably focal mechanism. Has frequent PAC's as well. NO atrial fibrillation has been seen. Hypertension. Hyperlipidemia. CKD stage 3. Rheumatoid arthritis. S/p R knee replacement revision this admission. Plan:     She's minimally symptomatic, but more tachycardia today. I will be a little more aggressive in managing her. Will start low dose flecainide 50 mg po BID, first dose tonight. See if this helps quiet her atrial tachycardia. Risks:benefits discussed. I answered all her questions. Using a low dose of flecainide due to CKD. [x]       High complexity decision making was performed in this patient    Chad Mckeon is a 80 y.o. female with a history of an irregular rhythm and tachycardia. She has shown paroxysmal atrial tachycardia on tele. Describes mild palpitations, but no chest pain, dyspnea, dizziness, syncope, dyspnea. No orthopnea, PND, or edema. No TIA or stroke symptoms.       Allergies   Allergen Reactions    Codeine Itching          Current Facility-Administered Medications   Medication Dose Route Frequency    famotidine (PEPCID) tablet 20 mg  20 mg Oral DAILY    metoprolol succinate (TOPROL-XL) XL tablet 25 mg  25 mg Oral QHS    atorvastatin (LIPITOR) tablet 40 mg  40 mg Oral QHS    sodium chloride (NS) flush 5-40 mL  5-40 mL IntraVENous Q8H    sodium chloride (NS) flush 5-40 mL  5-40 mL IntraVENous PRN    acetaminophen (TYLENOL) tablet 650 mg  650 mg Oral Q6H    naloxone (NARCAN) injection 0.4 mg  0.4 mg IntraVENous PRN    ondansetron (ZOFRAN ODT) tablet 4 mg  4 mg Oral Q6H PRN    diphenhydrAMINE (BENADRYL) 12.5 mg/5 mL oral liquid 12.5 mg  12.5 mg Oral Q6H PRN    senna-docusate (PERICOLACE) 8.6-50 mg per tablet 1 Tablet 1 Tablet Oral BID    polyethylene glycol (MIRALAX) packet 17 g  17 g Oral DAILY    bisacodyL (DULCOLAX) suppository 10 mg  10 mg Rectal DAILY PRN    aspirin delayed-release tablet 81 mg  81 mg Oral BID    traMADoL (ULTRAM) tablet  mg   mg Oral Q6H PRN    celecoxib (CELEBREX) capsule 200 mg  200 mg Oral DAILY       Review of Symptoms:  See HPI as well.   General: negative for fever, chills, sweats, weakness, weight loss   Eyes: negative for blurred vision, eye pain, loss of vision, diplopia   Ear Nose and Throat: negative for rhinorrhea, pharyngitis, otalgia, tinnitus, speech or swallowing difficulties   Respiratory: negative for SOB, cough, sputum production, wheezing, QUESADA, pleuritic pain   Cardiology: negative for chest pain, orthopnea, PND, edema, syncope   Gastrointestinal: negative for abdominal pain, N/V, dysphagia, change in bowel habits, bleeding   Genitourinary: negative for frequency, urgency, dysuria, hematuria, incontinence   Muskuloskeletal : POSITIVE for joint pain  Hematology: negative for easy bruising, bleeding, lymphadenopathy   Dermatological: negative for rash, ulceration, mole change, new lesion   Endocrine: negative for hot flashes or polydipsia   Neurological: negative for headache, dizziness, confusion, focal weakness, paresthesia, memory loss, gait disturbance   Psychological: negative for anxiety, depression, agitation       Objective:      Physical Exam:  Temp (24hrs), Av.9 °F (36.6 °C), Min:97.5 °F (36.4 °C), Max:98.3 °F (36.8 °C)    Patient Vitals for the past 8 hrs:   Pulse   22 1548 (!) 117   22 1336 (!) 102   22 1330 (!) 152   22 1123 92   22 1122 (!) 119   22 1117 (!) 117   22 1114 (!) 112   22 1111 86      Patient Vitals for the past 8 hrs:   Resp   22 1548 19   22 1336 16   22 1123 18      Patient Vitals for the past 8 hrs:   BP   22 1548 121/64   22 1336 132/75   22 1123 123/86 09/28/22 1122 (!) 147/71   09/28/22 1117 123/66   09/28/22 1114 128/70   09/28/22 1111 139/63          Intake/Output Summary (Last 24 hours) at 9/28/2022 1736  Last data filed at 9/28/2022 1328  Gross per 24 hour   Intake --   Output 750 ml   Net -750 ml       Nondiaphoretic, not in acute distress. Unlabored, clear to auscultation bilaterally, symmetric air movement. Regular rate and rhythm, no murmur, pericardial rub, knock, or gallop. No JVD or peripheral edema. Palpable radial pulses bilaterally. Abdomen, soft, nontender, nondistended. Extremities without cyanosis or clubbing. Muscle tone and bulk normal.  The right KNEE has a vertical bandage atop it. Skin warm and dry. No rashes or ulcers. Neuro grossly nonfocal.  No tremor. Awake and appropriate. CARDIOGRAPHICS and STUDIES, I reviewed:    Telemetry:  SR with PAC's, paroxysmal atrial tachycardia. ECHO 3/2022:   Left Ventricle Left ventricle size is normal. Findings consistent with mild concentric hypertrophy. Normal wall motion. Normal left ventricular systolic function with a visually estimated EF of 55 - 60%. Indeterminate diastolic function. Left Atrium Left atrium size is normal.   Right Ventricle Right ventricle size is normal. Normal wall thickness. Normal systolic function. Right Atrium Right atrium is mildly dilated. Aortic Valve Not well visualized. No transvalvular regurgitation. No stenosis. Mitral Valve Valve structure is normal. No transvalvular regurgitation. No stenosis noted. Tricuspid Valve Valve structure is normal. Mild to moderate transvalvular regurgitation. No stenosis noted. Normal RVSP. RVSP is 34 mmHg. Pulmonic Valve The pulmonic valve was not well visualized. Aorta Normal sized sinus of Valsalva. IVC/Hepatic Veins IVC diameter is less than or equal to 21 mm and decreases greater than 50% during inspiration; therefore the estimated right atrial pressure is normal (~3 mmHg).    Pericardium No pericardial effusion. Labs:  No results for input(s): CPK, CKMB, CKNDX, TROIQ in the last 72 hours. No lab exists for component: CPKMB  Lab Results   Component Value Date/Time    Cholesterol, total 141 02/22/2022 11:50 AM    HDL Cholesterol 48 02/22/2022 11:50 AM    LDL, calculated 79 02/22/2022 11:50 AM    Triglyceride 70 02/22/2022 11:50 AM    CHOL/HDL Ratio 2.9 02/22/2022 11:50 AM     No results for input(s): INR, PTP, APTT, INREXT, INREXT in the last 72 hours. Recent Labs     09/28/22  0320 09/27/22  0432    140   K 3.8 4.3   * 107   CO2 26 26   BUN 19 18   CREA 1.22* 1.49*   * 141*   CA 8.4* 9.0   HGB 7.5* 8.9*       No results for input(s): AP, TBIL, TP, ALB, GLOB, GGT, AML, LPSE in the last 72 hours. No lab exists for component: SGOT, GPT, AMYP, HLPSE  No components found for: GLPOC  No results for input(s): PH, PCO2, PO2 in the last 72 hours.         Wilmer Stubbs MD  9/28/2022

## 2022-09-28 NOTE — PROGRESS NOTES
Ortho / Neurosurgery NP Note    POD# 2 s/p RIGHT TOTAL KNEE ARTHROPLASTY REVISION   Pt seen with no visitor at bedside    Pt in bed resting, No complaints of pain, just muscle tightness to right thigh and calf. Taking tylenol only for pain. Tolerating regular diet well, no N/V  Denies Palpitations, CP or SOB. Has not been out of bed overnight  VSS, Afebrile room air    Voiding status: Voiding  Output (mL)  Urine Voided: 1000 ml (09/27/22 1711)  Last Bowel Movement Date: 09/25/22 (09/27/22 2019)  Unmeasurable Output  Urine Occurrence(s): 1 (09/27/22 0314)      Labs  Lab Results   Component Value Date/Time    HGB 7.5 (L) 09/28/2022 03:20 AM      Lab Results   Component Value Date/Time    INR 1.0 09/19/2022 10:28 AM        Recent Glucose Results:   Lab Results   Component Value Date/Time     (H) 09/28/2022 03:20 AM       Body mass index is 22.6 kg/m². : A BMI > 30 is classified as obesity and > 40 is classified as morbid obesity. Silver occlusive dressing c.d. I  Small spot of  brb on distal end of dressing. Cryotherapy in place over incision  Calves soft and supple; No pain with passive stretch  Sensation and motor intact  SCDs for mechanical DVT proph while in bed     PLAN:  1) PT BID  2) Aspirin 81 mg PO BID for DVT Prophylaxis   3) GI Prophylaxis - Pepcid  4) Tachycardia- AT per Cardiology- will continue to monitor on telemetry as well as monitoring her VS when up with therapy. Continue current Metoprolol dose per Cardiology recommendations.   5) Pain control- Tylenol prn  6) Wean O2  7) Readniess for discharge:     [x] Vital Signs stable    [x] Hgb stable    [x] + Voiding    [x] Wound intact, drainage minimal    [x] Tolerating PO intake     [] Cleared by PT (OT if applicable)     [] Stair training completed (if applicable)    [] Independent / Contact Guard Assist (household distance)     [x] Bed mobility     [] Car transfers     [] ADLs    [x] Adequate pain control on oral medication alone Plan home with family support and HH when stable and clears PT/OT.     Kristan Terry

## 2022-09-28 NOTE — PROGRESS NOTES
Problem: Falls - Risk of  Goal: *Absence of Falls  Description: Document Leafy Vargas Fall Risk and appropriate interventions in the flowsheet.   Outcome: Progressing Towards Goal  Note: Fall Risk Interventions:  Mobility Interventions: Communicate number of staff needed for ambulation/transfer, Patient to call before getting OOB, Utilize walker, cane, or other assistive device, Utilize gait belt for transfers/ambulation         Medication Interventions: Bed/chair exit alarm, Patient to call before getting OOB, Teach patient to arise slowly    Elimination Interventions: Call light in reach, Stay With Me (per policy), Toileting schedule/hourly rounds

## 2022-09-29 VITALS
HEART RATE: 117 BPM | WEIGHT: 120.59 LBS | TEMPERATURE: 98.4 F | OXYGEN SATURATION: 100 % | HEIGHT: 61 IN | RESPIRATION RATE: 18 BRPM | SYSTOLIC BLOOD PRESSURE: 105 MMHG | BODY MASS INDEX: 22.77 KG/M2 | DIASTOLIC BLOOD PRESSURE: 69 MMHG

## 2022-09-29 LAB
ANION GAP SERPL CALC-SCNC: 10 MMOL/L (ref 5–15)
BUN SERPL-MCNC: 19 MG/DL (ref 6–20)
BUN/CREAT SERPL: 15 (ref 12–20)
CALCIUM SERPL-MCNC: 9.2 MG/DL (ref 8.5–10.1)
CHLORIDE SERPL-SCNC: 110 MMOL/L (ref 97–108)
CO2 SERPL-SCNC: 24 MMOL/L (ref 21–32)
CREAT SERPL-MCNC: 1.29 MG/DL (ref 0.55–1.02)
GLUCOSE SERPL-MCNC: 98 MG/DL (ref 65–100)
HGB BLD-MCNC: 8.7 G/DL (ref 11.5–16)
POTASSIUM SERPL-SCNC: 3.9 MMOL/L (ref 3.5–5.1)
SODIUM SERPL-SCNC: 144 MMOL/L (ref 136–145)

## 2022-09-29 PROCEDURE — 94760 N-INVAS EAR/PLS OXIMETRY 1: CPT

## 2022-09-29 PROCEDURE — 74011250637 HC RX REV CODE- 250/637: Performed by: INTERNAL MEDICINE

## 2022-09-29 PROCEDURE — 74011000250 HC RX REV CODE- 250: Performed by: PHYSICIAN ASSISTANT

## 2022-09-29 PROCEDURE — 80048 BASIC METABOLIC PNL TOTAL CA: CPT

## 2022-09-29 PROCEDURE — 74011250637 HC RX REV CODE- 250/637: Performed by: PHYSICIAN ASSISTANT

## 2022-09-29 PROCEDURE — 74011250637 HC RX REV CODE- 250/637: Performed by: ORTHOPAEDIC SURGERY

## 2022-09-29 PROCEDURE — 97116 GAIT TRAINING THERAPY: CPT

## 2022-09-29 PROCEDURE — 36415 COLL VENOUS BLD VENIPUNCTURE: CPT

## 2022-09-29 PROCEDURE — 85018 HEMOGLOBIN: CPT

## 2022-09-29 PROCEDURE — 97110 THERAPEUTIC EXERCISES: CPT

## 2022-09-29 RX ORDER — METOPROLOL SUCCINATE 25 MG/1
37.5 TABLET, EXTENDED RELEASE ORAL DAILY
Status: DISCONTINUED | OUTPATIENT
Start: 2022-09-29 | End: 2022-09-29 | Stop reason: HOSPADM

## 2022-09-29 RX ORDER — METOPROLOL SUCCINATE 25 MG/1
37.5 TABLET, EXTENDED RELEASE ORAL DAILY
Qty: 45 TABLET | Refills: 0 | Status: SHIPPED | OUTPATIENT
Start: 2022-09-30 | End: 2022-10-30

## 2022-09-29 RX ORDER — FAMOTIDINE 20 MG/1
20 TABLET, FILM COATED ORAL DAILY
Qty: 30 TABLET | Refills: 0 | Status: SHIPPED | OUTPATIENT
Start: 2022-09-30 | End: 2022-10-20

## 2022-09-29 RX ORDER — FLECAINIDE ACETATE 50 MG/1
50 TABLET ORAL EVERY 12 HOURS
Qty: 60 TABLET | Refills: 0 | Status: SHIPPED | OUTPATIENT
Start: 2022-09-29 | End: 2022-10-29

## 2022-09-29 RX ADMIN — ASPIRIN 81 MG: 81 TABLET, COATED ORAL at 08:52

## 2022-09-29 RX ADMIN — SODIUM CHLORIDE, PRESERVATIVE FREE 10 ML: 5 INJECTION INTRAVENOUS at 06:00

## 2022-09-29 RX ADMIN — POLYETHYLENE GLYCOL 3350 17 G: 17 POWDER, FOR SOLUTION ORAL at 08:52

## 2022-09-29 RX ADMIN — METOPROLOL SUCCINATE 37.5 MG: 25 TABLET, EXTENDED RELEASE ORAL at 08:52

## 2022-09-29 RX ADMIN — CELECOXIB 200 MG: 200 CAPSULE ORAL at 08:52

## 2022-09-29 RX ADMIN — ACETAMINOPHEN 650 MG: 325 TABLET ORAL at 12:41

## 2022-09-29 RX ADMIN — FLECAINIDE ACETATE 50 MG: 100 TABLET ORAL at 08:51

## 2022-09-29 RX ADMIN — FAMOTIDINE 20 MG: 20 TABLET, FILM COATED ORAL at 08:52

## 2022-09-29 RX ADMIN — SENNOSIDES AND DOCUSATE SODIUM 1 TABLET: 50; 8.6 TABLET ORAL at 08:51

## 2022-09-29 RX ADMIN — ACETAMINOPHEN 650 MG: 325 TABLET ORAL at 06:30

## 2022-09-29 NOTE — PROGRESS NOTES
Transition of Care Plan:  RUR: n/a obs status   Disposition: home with home health- [facility]  Follow up appointments: ortho  DME needed: pt owns dme needed  Transportation at Discharge: family- between 1-130pm  Keys or means to access home: family    Medicare Letter: n/a obs status   Caregiver Contact: sister Vitaliy Martin [phone]  Discharge Caregiver contacted prior to discharge? Pt wishes to contat  Care Conference needed?: no    Home with home health through [facility]. IM letter provided.  Cardiology appointment scheduled by CM specialist and placed on BRITTANY Tapia to transport between 1-130pm.    Macario Mathews [facility], [address] \[facility] [place] [facility]\""

## 2022-09-29 NOTE — PROGRESS NOTES
Ortho / Neurosurgery NP Note    POD# 3 s/p RIGHT TOTAL KNEE ARTHROPLASTY REVISION   Pt seen with no visitor at bedside    Pt in bed resting, in NAD. No complaints of pain, just \"tightness\". Taking tylenol only for pain. Tolerating regular diet well, no N/V  Denies Palpitations, CP or SOB. Feeling well and would really like to return home today. VSS, Afebrile room air  Remote tele. Visit Vitals  BP (!) 138/90   Pulse (!) 118   Temp 97.8 °F (36.6 °C)   Resp 18   Ht 5' 1.25\" (1.556 m)   Wt 54.7 kg (120 lb 9.5 oz)   SpO2 96%   BMI 22.60 kg/m²         Voiding status: Voiding  Output (mL)  Urine Voided: 800 ml (09/29/22 0226)  Last Bowel Movement Date: 09/25/22 (09/28/22 0914)  Unmeasurable Output  Urine Occurrence(s): 1 (09/28/22 1328)  Stool Occurrence(s): 1 (09/28/22 1328)      Labs  Lab Results   Component Value Date/Time    HGB 8.7 (L) 09/29/2022 04:04 AM      Lab Results   Component Value Date/Time    INR 1.0 09/19/2022 10:28 AM        Recent Glucose Results:   Lab Results   Component Value Date/Time    GLU 98 09/29/2022 04:04 AM       Body mass index is 22.6 kg/m². : A BMI > 30 is classified as obesity and > 40 is classified as morbid obesity. Silver occlusive dressing c.d. I  Small spot of  brb on distal end of dressing (unchanged)  Cryotherapy in place over incision  Calves soft and supple; No pain with passive stretch  Sensation and motor intact  SCDs for mechanical DVT proph while in bed     PLAN:  1) PT BID  2) Aspirin 81 mg PO BID for DVT Prophylaxis   3) GI Prophylaxis - Pepcid  4) Tachycardia- AT per Cardiology- will continue to monitor on telemetry as well as monitoring her VS when up with therapy. Started on flecainide and metoprolol increased to 37.5mg, to be taken in am. Cardiology okay with mild tachycardia as this is chronic for her. OP follow up in 2-3 weeks. 5) Pain control- Tylenol     Home today with family support and HH, if clears PT.     Dennis Reddy NP

## 2022-09-29 NOTE — PROGRESS NOTES
End of Shift Note    Bedside shift change report given to Aleksandr Solomon (oncoming nurse) by Benito Potts RN (offgoing nurse). Report included the following information SBAR, Kardex, and MAR    Shift worked:  [id number]     Shift summary and any significant changes:     Pt very upset wanted to updated on her condition and talk with a Doctor today     Concerns for physician to address:  Pt wants to talk to her team     Zone phone for oncoming shift:          Activity:  Activity Level: Up with Assistance  Number times ambulated in hallways past shift: 0  Number of times OOB to chair past shift: 0    Cardiac:   Cardiac Monitoring: Yes      Cardiac Rhythm: Sinus Rhythm    Access:  Current line(s): PIV     Genitourinary:   Urinary status: voiding and external catheter    Respiratory:   O2 Device: None (Room air)  Chronic home O2 use?: NO  Incentive spirometer at bedside: NO  Actual Volume (ml): 900 ml    GI:  Last Bowel Movement Date: 09/25/22  Current diet:  ADULT DIET Regular  DIET ONE TIME MESSAGE  Passing flatus: YES  Tolerating current diet: YES       Pain Management:   Patient states pain is manageable on current regimen: YES    Skin:  Basilio Score: 19  Interventions: speciality bed, float heels, increase time out of bed, and internal/external urinary devices    Patient Safety:  Fall Score:  Total Score: 3  Interventions: bed/chair alarm, gripper socks, and pt to call before getting OOB  High Fall Risk: Yes    Length of Stay:  Expected LOS: 3d 7h  Actual LOS: 2      Benito Potts RN

## 2022-09-29 NOTE — PROGRESS NOTES
[id number] follow-up PCP transitional care appointment has been scheduled with NP Jaylin Stanford on 10/11/220 at 1530. Pending patient discharge. Allison Martinez Care Management Assistant     Attempted to schedule hospital follow up PCP appointment. Unable to reach anyone, left callback number. Pending patient discharge.  Myron Ruelas Management Assistant

## 2022-09-29 NOTE — PROGRESS NOTES
Problem: Mobility Impaired (Adult and Pediatric)  Goal: *Acute Goals and Plan of Care (Insert Text)  Description: FUNCTIONAL STATUS PRIOR TO ADMISSION: Patient was independent and active without use of DME. Patient with recent R THR in 4/22    HOME SUPPORT PRIOR TO ADMISSION: The patient lived alone with extended family to provide assistance. Physical Therapy Goals  Initiated 9/26/2022    1. Patient will move from supine to sit and sit to supine , scoot up and down, and roll side to side in bed with modified independence within 4 days. 2. Patient will perform sit to stand with modified independence within 4 days. 3. Patient will ambulate with modified independence for 250 feet with the least restrictive device within 4 days. 4. Patient will ascend/descend 13 stairs with 1 handrail(s) with modified independence within 4 days. 5. Patient will perform home exercise program per protocol with independence within 4 days. 6. Patient will demonstrate AROM 0-90 degrees in operative joint within 4 days. Outcome: Progressing Towards Goal   PHYSICAL THERAPY TREATMENT  Patient: Andres Addison (65 y.o. 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 ASSESSMENT  Patient continues with skilled PT services and is progressing towards goals. Pt was received seated upright in chair and agreeable to participate with therapy services. HR started in upper 's prior to therapy services. Session began with seated LE therapeutic exercises and pt tolerating well, reports no pain however tightness/stiffness.  Pt ambulated 45ft CGA with RW, still with decreased right knee extension, with slight trunk flexion, however no loss of balance. Pt activity tolerance limited due to tachycardia and returned back to sitting. Noted HR highest 130 with activity. Continues to be cleared from a physical therapy standpoint. Current Level of Function Impacting Discharge (mobility/balance): SBA with transfers, CGA with RW for gait    Other factors to consider for discharge: limited in activity tolerance due to tachycardia         PLAN :  Patient continues to benefit from skilled intervention to address the above impairments. Continue treatment per established plan of care. to address goals. Recommendation for discharge: (in order for the patient to meet his/her long term goals)  Physical therapy at least 2 days/week in the home AND ensure assist and/or supervision for safety with mobility & ADL's    This discharge recommendation:  Has been made in collaboration with the attending provider and/or case management    IF patient discharges home will need the following DME: patient owns DME required for discharge       SUBJECTIVE:   Patient stated I know I want to do more, I want to get better, it's this heart rate.     OBJECTIVE DATA SUMMARY:   Critical Behavior:  Neurologic State: Alert  Orientation Level: Oriented X4  Cognition: Appropriate decision making    Functional Mobility Training:  Bed Mobility:  Rolling:  (pt was seated upright upon arrival)    Transfers:  Sit to Stand: Stand-by assistance  Stand to Sit: Stand-by assistance        Bed to Chair: Contact guard assistance    Balance:  Sitting: Intact  Standing: Impaired  Standing - Static: Good;Constant support  Standing - Dynamic : Fair;Good;Constant support  Ambulation/Gait Training:  Distance (ft): 45 Feet (ft)  Assistive Device: Gait belt;Walker, rolling  Ambulation - Level of Assistance: Contact guard assistance    Gait Abnormalities: Antalgic;Decreased step clearance (decreased R knee extension)    Base of Support: Widened  Stance: Right decreased  Speed/Veronica: enavu decreased (<100 feet/min)  Step Length: Left shortened;Right shortened    Therapeutic Exercises:     EXERCISE   Sets   Reps   Active Active Assist   Passive Self ROM   Comments   Ankle Pumps 1 10 [x]                                        []                                        []                                        []                                           Quad Sets   []                                        []                                        []                                        []                                           Hamstring Sets   []                                        []                                        []                                        []                                           Short Arc Quads   []                                        []                                        []                                        []                                           Knee Extension Stretch     []                                          []                                          []                                          []                                           Heel Slides 1 10 [x]                                        []                                        []                                        []                                           Long Arc Quads 1 10 [x]                                        []                                        []                                        []                                           Seated Marching 1 10 [x]                                        []                                        []                                        []                                           Straight Leg Raises 1 10 [x]                                        []                                        []                                        []                                             Pain Rating:  Pt reports no increased pain with mobility, just stiffness/tightness in the knee    Activity Tolerance:   Fair;tachy    After treatment patient left in no apparent distress:   Sitting in chair and Call bell within reach    COMMUNICATION/COLLABORATION:   The patients plan of care was discussed with: Nurse Practitioner, Registered nurse and Case management.      Nico Vargas PTA   Time Calculation: 23 mins

## 2022-09-29 NOTE — PROGRESS NOTES
End of Shift Note    Bedside shift change report given to Sabine Koehler (oncoming nurse) by Nataly Lao RN (offgoing nurse). Report included the following information SBAR, Kardex, OR Summary, Intake/Output, MAR, Recent Results, Cardiac Rhythm a-fib,Sinus tach, Alarm Parameters , and Quality Measures    Shift worked: 7am-7 pm     Shift summary and any significant changes:     HR in 150's with ambulation      Concerns for physician to address: Dr. Otoniel Kendrick notified with  with ambulation new med started and MD voiced elevated HR also r/t  low HGB      Zone phone for oncoming shift:   [phone]       Activity:  Activity Level: Up with Assistance  Number times ambulated in hallways past shift: 1  Number of times OOB to chair past shift: 1    Cardiac:   Cardiac Monitoring: Yes      Cardiac Rhythm: Atrial Fib, Sinus Tachy    Access:  Current line(s): PIV     Genitourinary:   Urinary status: voiding and external catheter    Respiratory:   O2 Device: None (Room air)  Chronic home O2 use?: NO  Incentive spirometer at bedside: YES  Actual Volume (ml): 900 ml    GI:  Last Bowel Movement Date: 09/25/22  Current diet:  ADULT DIET Regular  DIET ONE TIME MESSAGE  Passing flatus: YES  Tolerating current diet: YES       Pain Management:   Patient states pain is manageable on current regimen: YES    Skin:  Basilio Score: 19  Interventions: float heels, increase time out of bed, PT/OT consult, limit briefs, internal/external urinary devices, and nutritional support     Patient Safety:  Fall Score:  Total Score: 3  Interventions: bed/chair alarm, assistive device (walker, cane, etc), gripper socks, pt to call before getting OOB, stay with me (per policy), and gait belt  High Fall Risk: Yes    Length of Stay:  Expected LOS: 3d 7h  Actual LOS: 1      Nataly Lao, LIZ

## 2022-09-29 NOTE — DISCHARGE SUMMARY
Ortho Discharge Summary    Patient ID:  Kady Mast  [id number]  female  80 y.o.  1940    Admit date: 9/26/2022    Discharge date: 9/29/2022    Admitting Physician: Ruddy Crow MD     Consulting Physician(s):   Treatment Team: Attending Provider: Debora Lake MD; Utilization Review: Enrrique Phillips; Care Manager: Radha Lin Consulting Provider: Atul Gaxiola MD; Physical Therapy Assistant: Cora Chandler PTA    Date of Surgery:   9/26/2022     Preoperative Diagnosis:  HISTORY OF RIGHT TOTAL KNEE ARTHROPLASTY    Postoperative Diagnosis:   HISTORY OF RIGHT TOTAL KNEE ARTHROPLASTY    Procedure(s):   RIGHT TOTAL KNEE ARTHROPLASTY REVISION     Anesthesia Type:   General     Surgeon: Debora Lake MD                            HPI:  Pt is a 80 y.o. female who has a history of HISTORY OF RIGHT TOTAL KNEE ARTHROPLASTY  with pain and limitations of activities of daily living who presents at this time for a RIGHT TOTAL KNEE ARTHROPLASTY REVISION following the failure of conservative management.     PMH:   Past Medical History:   Diagnosis Date    Advance care planning     documents pending    Anemia     Arthropathy     Chronic kidney disease     CKD (chronic kidney disease), stage III ([address] 09/08/2014    Yessica Cuadra md    DJD (degenerative joint disease) of knee 10/21/2021    Reche Minors    Family history of colon cancer     H/O total hip arthroplasty, right 04/05/2022    Melina Higgins MD    History of total right knee replacement (TKR) 2011    ghazal mirza md    Hypercholesterolemia     Hypertension     IGT (impaired glucose tolerance) 10/08/2018    Knee pain, left     Osteoarthritis of right hip 02/2021    Pes anserine bursitis 08/11/2020    Rheumatoid arthritis ([facility] [address]     S/P colonoscopy 11/30/2021    Lakeshia Olmos MD -repeat 3 yrs    Trigger finger, right middle finger 06/23/2022       Body mass index is 22.6 kg/m². : A BMI > 30 is classified as obesity and > 40 is classified as morbid obesity. Medications upon admission :   Prior to Admission Medications   Prescriptions Last Dose Informant Patient Reported? Taking? BIOTIN PO 9/25/2022  Yes Yes   Sig: Take 2 Gum by mouth daily. Takes 2 gummies daily   amLODIPine (NORVASC) 5 mg tablet 9/26/2022 at 0700  Yes Yes   Sig: Take 5 mg by mouth daily. atorvastatin (LIPITOR) 40 mg tablet 9/25/2022  No Yes   Sig: Take 1 Tablet by mouth nightly. cholecalciferol, vitamin D3, 50 mcg (2,000 unit) tab 9/25/2022  Yes Yes   Sig: Take 1 Tablet by mouth daily. losartan (COZAAR) 100 mg tablet 9/26/2022 at 0700  No Yes   Sig: TAKE 1 TABLET EVERY DAY   Patient taking differently: Take 100 mg by mouth daily. metoprolol succinate (TOPROL-XL) 25 mg XL tablet 9/25/2022 at 2130  No Yes   Sig: Take 1 Tablet by mouth nightly. multivit-minerals/folic acid (VITAJOY ADULT MULTI PO) 9/25/2022  Yes Yes   Sig: Take 1 Tablet by mouth daily. Facility-Administered Medications: None        Allergies: Allergies   Allergen Reactions    Codeine Itching        Hospital Course: The patient underwent surgery. Complications:  None; patient tolerated the procedure well. Was taken to the PACU in stable condition and then transferred to the ortho floor. On POD#1 patient experienced a syncopal episode while working with PT, immediate VS following showed tachycardia and hypoxia, no significant change in BP. EKG sinus tach with PACs. She was placed on remote telemetry and Cardiology was consulted. She was diagnosed with paroxysmal ectopic atrial tachycardia with frequent PACs ( No Afib seen). She was started on flecainide and metoprolol increased to 37.5mg, and changed to daily in the morning instead of bedtime. Cardiology okay with mild tachycardia as this is chronic for her. OP follow up in 2-3 weeks. She cleared PT on 9/29 with HR increasing no higher than 130 with activity, 110s at rest and asymptomatic.      Perioperative Antibiotics:  Ancef     Postoperative Pain Management:  Tramadol & Tylenol     DVT Prophylaxis: Aspirin 81BID    Postoperative transfusions:    Number of units banked PRBCs =   none     Post Op complications: none    Hemoglobin at discharge:    Lab Results   Component Value Date/Time    HGB 8.7 (L) 09/29/2022 04:04 AM    INR 1.0 09/19/2022 10:28 AM       Dressing remained clean, dry and intact. No significant erythema or swelling. Wound appears to be healing without any evidence of infection. Neurovascular exam found to be within normal limits. Physical Therapy started following surgery and participated in bed mobility, transfers and ambulation. Gait:  Gait  Base of Support: Widened  Speed/Veronica: Pace decreased (<100 feet/min)  Step Length: Left shortened, Right shortened  Stance: Right decreased  Gait Abnormalities: Antalgic, Decreased step clearance (decreased R knee flexion)  Ambulation - Level of Assistance: Contact guard assistance  Distance (ft): 125 Feet (ft) (25/50/50 with seated rest breaks between ambulation trials)  Assistive Device: Gait belt, Walker, rolling  Rail Use: Right   Stairs - Level of Assistance: Contact guard assistance  Number of Stairs Trained: 4  Interventions: Safety awareness training, Visual/Demos, Verbal cues            Interventions: Safety awareness training, Visual/Demos, Verbal cues      Discharged to: Home with HH. Condition on Discharge:   stable    Discharge instructions:  - Anticoagulate with Aspirin 81 BID  - Take pain medications as prescribed  - Resume pre hospital diet      - Discharge activity: activity as tolerated  - Ambulate with assistive device as needed. - Weight bearing status - WBAT  - Wound Care Keep wound clean and dry. See discharge instruction sheet. -DISCHARGE MEDICATION LIST     Current Discharge Medication List        START taking these medications    Details   famotidine (PEPCID) 20 mg tablet Take 1 Tablet by mouth daily for 30 days.   Qty: 30 Tablet, Refills: 0  Start date: 9/30/2022, End date: 10/30/2022      flecainide (TAMBOCOR) 50 mg tablet Take 1 Tablet by mouth every twelve (12) hours for 30 days. Qty: 60 Tablet, Refills: 0  Start date: 9/29/2022, End date: 10/29/2022      aspirin delayed-release 81 mg tablet Take 1 Tablet by mouth two (2) times a day for 30 days. Qty: 60 Tablet, Refills: 0  Start date: 9/26/2022, End date: 10/26/2022      traMADoL (ULTRAM) 50 mg tablet Take 1-2 Tablets by mouth every six (6) hours as needed for Pain for up to 7 days. Max Daily Amount: 400 mg. Qty: 28 Tablet, Refills: 0  Start date: 9/26/2022, End date: 10/3/2022    Associated Diagnoses: Status post total right knee replacement      polyethylene glycol (MIRALAX) 17 gram packet Take 1 Packet by mouth daily as needed (constipation) for up to 15 days. Qty: 15 Packet, Refills: 0  Start date: 9/26/2022, End date: 10/11/2022      senna-docusate (PERICOLACE) 8.6-50 mg per tablet Take 1 Tablet by mouth daily. Qty: 30 Tablet, Refills: 0  Start date: 9/26/2022           CONTINUE these medications which have CHANGED    Details   metoprolol succinate (TOPROL-XL) 25 mg XL tablet Take 1.5 Tablets by mouth daily for 30 days. Qty: 45 Tablet, Refills: 0  Start date: 9/30/2022, End date: 10/30/2022           CONTINUE these medications which have NOT CHANGED    Details   cholecalciferol, vitamin D3, 50 mcg (2,000 unit) tab Take 1 Tablet by mouth daily. BIOTIN PO Take 2 Gum by mouth daily. Takes 2 gummies daily      atorvastatin (LIPITOR) 40 mg tablet Take 1 Tablet by mouth nightly. Qty: 90 Tablet, Refills: 3    Associated Diagnoses: Mixed hyperlipidemia      amLODIPine (NORVASC) 5 mg tablet Take 5 mg by mouth daily. multivit-minerals/folic acid (VITAJOY ADULT MULTI PO) Take 1 Tablet by mouth daily.       losartan (COZAAR) 100 mg tablet TAKE 1 TABLET EVERY DAY  Qty: 90 Tablet, Refills: 3          per medical continuation form      -Follow up in office in 2 weeks      Signed:  Jabari Lopez Danish Schroeder NP  Orthopaedic Nurse Practitioner    9/29/2022  11:24 AM

## 2022-09-30 ENCOUNTER — PATIENT OUTREACH (OUTPATIENT)
Dept: CASE MANAGEMENT | Age: 82
End: 2022-09-30

## 2022-09-30 ENCOUNTER — HOME CARE VISIT (OUTPATIENT)
Dept: SCHEDULING | Facility: HOME HEALTH | Age: 82
End: 2022-09-30
Payer: MEDICARE

## 2022-09-30 VITALS
SYSTOLIC BLOOD PRESSURE: 110 MMHG | TEMPERATURE: 98.2 F | DIASTOLIC BLOOD PRESSURE: 90 MMHG | RESPIRATION RATE: 16 BRPM | HEART RATE: 137 BPM | OXYGEN SATURATION: 97 %

## 2022-09-30 PROCEDURE — 3331090002 HH PPS REVENUE DEBIT

## 2022-09-30 PROCEDURE — 3331090001 HH PPS REVENUE CREDIT

## 2022-09-30 PROCEDURE — 400013 HH SOC

## 2022-09-30 PROCEDURE — 400018 HH-NO PAY CLAIM PROCEDURE

## 2022-09-30 PROCEDURE — G0151 HHCP-SERV OF PT,EA 15 MIN: HCPCS

## 2022-09-30 NOTE — PROGRESS NOTES
Care Transitions Initial Call    Call within 2 business days of discharge: Yes     Patient: Andres Addison Patient : 1940 MRN: [id number]    Last Discharge [address]       Date Complaint Diagnosis Description Type Department Provider    22  Status post total right knee replacement Admission (Discharged) Zoey Mercedes MD            Was this an external facility discharge? No Discharge Facility: na    Challenges to be reviewed by the provider   Additional needs identified to be addressed with provider: no  none         Method of communication with provider : none    Advance Care Planning:   Does patient have an Advance Directive: yes, reviewed and current. Inpatient Readmission Risk score: Unplanned Readmit Risk Score: 8.5    Was this a readmission? no   Patient stated reason for the admission: scheduled knee surgery    Patients top risk factors for readmission: medical condition-     Interventions to address risk factors: Obtained and reviewed discharge summary and/or continuity of care documents    Care Transition Nurse (CTN) contacted the patient by telephone to perform post hospital discharge assessment. Verified name and  with patient as identifiers. Provided introduction to self, and explanation of the CTN role. CTN reviewed discharge instructions, medical action plan and red flags with patient who verbalized understanding. Were discharge instructions available to patient? yes. Reviewed appropriate site of care based on symptoms and resources available to patient including: PCP, Specialist, [address] [place] [address], When to call 911, and [facility] . Patient given an opportunity to ask questions and does not have any further questions or concerns at this time. The patient agrees to contact the PCP office for questions related to their healthcare. Medication reconciliation was performed with patient, who verbalizes understanding of administration of home medications.  Advised obtaining a 90-day supply of all daily and as-needed medications. Referral to Pharm D needed: no     Home Health/Outpatient orders at discharge: [address]: [facility]  Date of initial visit: 9/30/2022      Discussed follow-up appointments. If no appointment was previously scheduled, appointment scheduling offered:  na . Is follow up appointment scheduled within 7 days of discharge? no.   [facility] follow up appointment(s):   Future Appointments   Date Time Provider Joselito Rios   9/30/2022 10:30 AM CASSIUS Feliciano   12/12/2022 10:00 AM Deidre Aquino MD [facility] MAIN BS AMB     Non-[facility] follow up appointment(s): cardiology 10/11/2022, pt will call to schedule follow up with surgeon    Plan for follow-up call in 5-7 days based on severity of symptoms and risk factors. Plan for next call:  will review healing, pain levels, follow up appts. CTN provided contact information for future needs. Goals Addressed                   This Visit's Progress     Prevent complications post hospitalization. 9/30/2022  Pt reports that medications will be picked up today and confirmed that she has maintenance medications. Pt confirmed follow up appt with cardiology on 10/11/2022 and will call for surgeon's follow up. Confirmed follow up with PCP on 12/12/2022. Reviewed red flags on discharge with pt and pt states understanding to call 911 for s/s of stroke or heart attack. Reviewed ortho discharge instructions and pt states understanding. Pt does have a walker and shower chair as well as 2 canes and had nieces and family nearby. Pt reports that therapist is to see her at 58 today (9/30/2022). Pt confirmed that she has an active mychart and is agreeable to receiving messages. Will call pt in 7 days to review any s/s of infection at wound, verify follow up with ortho, and review any cardiac concerns.   Vinayak Hamlin RN [address] [facility], Care Transitions Nurse, [phone]

## 2022-10-01 PROCEDURE — 3331090001 HH PPS REVENUE CREDIT

## 2022-10-01 PROCEDURE — 3331090002 HH PPS REVENUE DEBIT

## 2022-10-02 PROCEDURE — 3331090001 HH PPS REVENUE CREDIT

## 2022-10-02 PROCEDURE — 3331090002 HH PPS REVENUE DEBIT

## 2022-10-03 ENCOUNTER — HOME CARE VISIT (OUTPATIENT)
Dept: SCHEDULING | Facility: HOME HEALTH | Age: 82
End: 2022-10-03
Payer: MEDICARE

## 2022-10-03 VITALS
SYSTOLIC BLOOD PRESSURE: 105 MMHG | OXYGEN SATURATION: 88 % | DIASTOLIC BLOOD PRESSURE: 65 MMHG | TEMPERATURE: 98.8 F | RESPIRATION RATE: 16 BRPM | HEART RATE: 108 BPM

## 2022-10-03 PROCEDURE — 3331090001 HH PPS REVENUE CREDIT

## 2022-10-03 PROCEDURE — 3331090002 HH PPS REVENUE DEBIT

## 2022-10-03 PROCEDURE — G0151 HHCP-SERV OF PT,EA 15 MIN: HCPCS

## 2022-10-04 PROCEDURE — 3331090001 HH PPS REVENUE CREDIT

## 2022-10-04 PROCEDURE — 3331090002 HH PPS REVENUE DEBIT

## 2022-10-05 ENCOUNTER — HOME CARE VISIT (OUTPATIENT)
Dept: SCHEDULING | Facility: HOME HEALTH | Age: 82
End: 2022-10-05
Payer: MEDICARE

## 2022-10-05 VITALS
TEMPERATURE: 97.8 F | DIASTOLIC BLOOD PRESSURE: 60 MMHG | OXYGEN SATURATION: 98 % | HEART RATE: 84 BPM | SYSTOLIC BLOOD PRESSURE: 120 MMHG | RESPIRATION RATE: 16 BRPM

## 2022-10-05 PROCEDURE — G0151 HHCP-SERV OF PT,EA 15 MIN: HCPCS

## 2022-10-05 PROCEDURE — 3331090001 HH PPS REVENUE CREDIT

## 2022-10-05 PROCEDURE — 3331090002 HH PPS REVENUE DEBIT

## 2022-10-06 PROCEDURE — 3331090002 HH PPS REVENUE DEBIT

## 2022-10-06 PROCEDURE — 3331090001 HH PPS REVENUE CREDIT

## 2022-10-07 ENCOUNTER — HOME CARE VISIT (OUTPATIENT)
Dept: SCHEDULING | Facility: HOME HEALTH | Age: 82
End: 2022-10-07
Payer: MEDICARE

## 2022-10-07 VITALS
OXYGEN SATURATION: 99 % | SYSTOLIC BLOOD PRESSURE: 122 MMHG | RESPIRATION RATE: 16 BRPM | TEMPERATURE: 98.7 F | DIASTOLIC BLOOD PRESSURE: 70 MMHG | HEART RATE: 82 BPM

## 2022-10-07 PROCEDURE — 3331090003 HH PPS REVENUE ADJ

## 2022-10-07 PROCEDURE — 3331090001 HH PPS REVENUE CREDIT

## 2022-10-07 PROCEDURE — G0151 HHCP-SERV OF PT,EA 15 MIN: HCPCS

## 2022-10-07 PROCEDURE — 3331090002 HH PPS REVENUE DEBIT

## 2022-10-07 RX ORDER — LOSARTAN POTASSIUM 100 MG/1
TABLET ORAL
Qty: 90 TABLET | Refills: 3 | Status: SHIPPED | OUTPATIENT
Start: 2022-10-07

## 2022-10-08 PROCEDURE — 3331090002 HH PPS REVENUE DEBIT

## 2022-10-08 PROCEDURE — 3331090001 HH PPS REVENUE CREDIT

## 2022-10-09 PROCEDURE — 3331090002 HH PPS REVENUE DEBIT

## 2022-10-09 PROCEDURE — 3331090001 HH PPS REVENUE CREDIT

## 2022-10-11 ENCOUNTER — PATIENT OUTREACH (OUTPATIENT)
Dept: CASE MANAGEMENT | Age: 82
End: 2022-10-11

## 2022-10-11 NOTE — PROGRESS NOTES
Care Transitions Outreach Attempt    Attempted to reach patient for transitions of care follow up. Unable to reach patient. Left voicemail stating my name, CTN with [employer], date and time of call, and return telephone number. Patient: Yanet Bueno Patient : 1940 MRN: [id number]    Last Discharge [address]       Date Complaint Diagnosis Description Type Department Provider    22  Status post total right knee replacement Admission (Discharged) Nasir Bright MD            Noted following upcoming appointments from discharge chart review:   [address] follow up appointment(s):   Future Appointments   Date Time Provider Joselito Rios   10/20/2022  1:00 PM Lucia Portillo MD [facility] MAIN BS AMB     Non-[facility] follow up appointment(s): cardiology 10/11/2022. Goals        Prevent complications post hospitalization. 2022  Pt reports that medications will be picked up today and confirmed that she has maintenance medications. Pt confirmed follow up appt with cardiology on 10/11/2022 and will call for surgeon's follow up. Confirmed follow up with PCP on 2022. Reviewed red flags on discharge with pt and pt states understanding to call 911 for s/s of stroke or heart attack. Reviewed ortho discharge instructions and pt states understanding. Pt does have a walker and shower chair as well as 2 canes and had nieces and family nearby. Pt reports that therapist is to see her at 58 today (2022). Pt confirmed that she has an active mychart and is agreeable to receiving messages. Will call pt in 7 days to review any s/s of infection at wound, verify follow up with ortho, and review any cardiac concerns. Lexie Aldridge RN [address] [facility], Care Transitions Nurse, [phone]     10/11/2022  Left voicemail stating my name, CTN with [employer], date and time of call, and return telephone number.  Lexie Aldridge RN [address] [facility], Care Transitions Nurse, [phone]

## 2022-10-12 NOTE — PERIOP NOTES
Pt had a CBC, A1C, and Urinalysis completed on 9/12/22 at her PCP's office. REFUSED MED'S:   Humberto keita man told me not too\".

## 2022-10-13 PROBLEM — N39.0 UTI (URINARY TRACT INFECTION): Status: ACTIVE | Noted: 2022-10-13

## 2022-10-18 ENCOUNTER — PATIENT OUTREACH (OUTPATIENT)
Dept: CASE MANAGEMENT | Age: 82
End: 2022-10-18

## 2022-10-18 NOTE — PROGRESS NOTES
Goals        Prevent complications post hospitalization. 9/30/2022  Pt reports that medications will be picked up today and confirmed that she has maintenance medications. Pt confirmed follow up appt with cardiology on 10/11/2022 and will call for surgeon's follow up. Confirmed follow up with PCP on 12/12/2022. Reviewed red flags on discharge with pt and pt states understanding to call 911 for s/s of stroke or heart attack. Reviewed ortho discharge instructions and pt states understanding. Pt does have a walker and shower chair as well as 2 canes and had nieces and family nearby. Pt reports that therapist is to see her at 23 [phone] today (9/30/2022). Pt confirmed that she has an active mychart and is agreeable to receiving messages. Will call pt in 7 days to review any s/s of infection at wound, verify follow up with ortho, and review any cardiac concerns. Reather Krabbe RN [address] [facility], Care Transitions Nurse, [phone]     10/11/2022  Left voicemail stating my name, CTN with [employer], date and time of call, and return telephone number. Reather Krabbe RN [address] [facility], Care Transitions Nurse, [phone]     10/18/2022  Pt confirmed follow up with cardiology on 10/11/2022. Performed medication reconciliation with pt. Pt confirmed follow up with ortho on 10/17/2022 and reports that bandage and sutures have been removed and discomfort is much improved and surgical site is healing well. Pt confirmed follow up with PCP on 10/20/2022. Pt reminded to take all medications and COVID19 vaccine card with her to appt. Pt reports that she received the latest COVID19 booster and flu shot. Pt will ask PCP to update vaccines. Pt reminded to carry a mask in case it is required. Pt will follow up with PT on site at PCP office. Pt is agreeable to a call in 7 days to review PCP follow up appt instructions.   Reather Krabbe RN [address] [facility], Care Transitions Nurse, [phone]

## 2022-10-20 ENCOUNTER — OFFICE VISIT (OUTPATIENT)
Dept: INTERNAL MEDICINE CLINIC | Age: 82
End: 2022-10-20
Payer: MEDICARE

## 2022-10-20 VITALS
DIASTOLIC BLOOD PRESSURE: 76 MMHG | TEMPERATURE: 97.9 F | BODY MASS INDEX: 24.17 KG/M2 | HEART RATE: 62 BPM | RESPIRATION RATE: 20 BRPM | SYSTOLIC BLOOD PRESSURE: 123 MMHG | HEIGHT: 61 IN | OXYGEN SATURATION: 100 % | WEIGHT: 128 LBS

## 2022-10-20 DIAGNOSIS — Z96.651 S/P REVISION OF TOTAL KNEE, RIGHT: Primary | ICD-10-CM

## 2022-10-20 DIAGNOSIS — N18.31 STAGE 3A CHRONIC KIDNEY DISEASE (HCC): ICD-10-CM

## 2022-10-20 DIAGNOSIS — R73.02 IGT (IMPAIRED GLUCOSE TOLERANCE): ICD-10-CM

## 2022-10-20 DIAGNOSIS — I10 PRIMARY HYPERTENSION: ICD-10-CM

## 2022-10-20 DIAGNOSIS — E78.2 MIXED HYPERLIPIDEMIA: ICD-10-CM

## 2022-10-20 PROBLEM — Z96.659 HISTORY OF REVISION OF TOTAL KNEE ARTHROPLASTY: Status: ACTIVE | Noted: 2022-09-26

## 2022-10-20 PROCEDURE — 99495 TRANSJ CARE MGMT MOD F2F 14D: CPT | Performed by: INTERNAL MEDICINE

## 2022-10-20 PROCEDURE — G8427 DOCREV CUR MEDS BY ELIG CLIN: HCPCS | Performed by: INTERNAL MEDICINE

## 2022-10-20 NOTE — PROGRESS NOTES
SPORTS MEDICINE AND PRIMARY CARE  Jaelyn Ceja MD, [address] [address]  Phone:  [phone]  Fax: [phone]       Chief Complaint   Patient presents with    Hospital Follow Up   . SUBJECTIVE:    Maynor Carrasquillo is a 80 y.o. female The patient returns today following admission by Jessica Bethea MD, on 09/26 and discharged on 09/29 and underwent a right total knee arthroplasty revision for failure of conservative management. She tolerated the procedure well, had an uneventful postoperative course except for paroxysmal ectopic atrial tachycardia with frequent PACs, no atrial fib was seen and she was started on flecainide and metoprolol which was increased to 37.5 mg and cardiology wanted follow up for her mild tachycardia in two to three weeks. This is a transitional care visit. Nurse navigator saw the patient on 10/11/22 and 10/18/22 for medication reconciliation and assessment of her condition. The patient is seen for evaluation. The patient has a followup appointment to see Dr. Shelley Conway at [facility] for followup of her tachycardia. She is currently on metoprolol and flecainide. Other new complaints are denied except she is very uncomfortable because of the pain. Tramadol causes pruritus and we recommend that she take Benadryl with it. Current Outpatient Medications   Medication Sig Dispense Refill    losartan (COZAAR) 100 mg tablet TAKE 1 TABLET EVERY DAY 90 Tablet 3    vit C-vit D3-E-zinc-elderberry 65 mg-3.15 mcg- 3.35 mg-1 mg chew Take 2 Gum by mouth daily. chew 2 gummies by mouth daily      metoprolol succinate (TOPROL-XL) 25 mg XL tablet Take 1.5 Tablets by mouth daily for 30 days. 45 Tablet 0    flecainide (TAMBOCOR) 50 mg tablet Take 1 Tablet by mouth every twelve (12) hours for 30 days. 60 Tablet 0    aspirin delayed-release 81 mg tablet Take 1 Tablet by mouth two (2) times a day for 30 days.  60 Tablet 0    senna-docusate (PERICOLACE) 8.6-50 mg per tablet Take 1 Tablet by mouth daily. 30 Tablet 0    cholecalciferol, vitamin D3, 50 mcg (2,000 unit) tab Take 1 Tablet by mouth daily. BIOTIN PO Take 2 Gum by mouth daily. Takes 2 gummies daily. 5000 mcg Biotin      atorvastatin (LIPITOR) 40 mg tablet Take 1 Tablet by mouth nightly. 90 Tablet 3    amLODIPine (NORVASC) 5 mg tablet Take 5 mg by mouth daily. multivit-minerals/folic acid (VITAJOY ADULT MULTI PO) Take 1 Tablet by mouth daily.        Past Medical History:   Diagnosis Date    Advance care planning     documents pending    Anemia     Arthropathy     Chronic kidney disease     CKD (chronic kidney disease), stage III ([address] 09/08/2014    Yessica Cuadra md    DJD (degenerative joint disease) of knee 10/21/2021    Yuliya Forrest    Family history of colon cancer     H/O total hip arthroplasty, right 04/05/2022    Meir Arias MD    History of revision of total knee arthroplasty 09/26/2022    Aliza Ramírez MD    History of total right knee replacement (TKR) 2011    ghazal mirza md    Hypercholesterolemia     Hypertension     IGT (impaired glucose tolerance) 10/08/2018    Knee pain, left     Osteoarthritis of right hip 02/2021    Pes anserine bursitis 08/11/2020    Rheumatoid arthritis [address] [address]     S/P colonoscopy 11/30/2021    Larry Jasmine MD -repeat 3 yrs    Trigger finger, right middle finger 06/23/2022    UTI (urinary tract infection)      Past Surgical History:   Procedure Laterality Date    COLONOSCOPY N/A 11/29/2018    COLONOSCOPY performed by Larry Jasmine MD at [facility] ENDOSCOPY    COLONOSCOPY N/A 11/30/2021    COLONOSCOPY   V performed by Larry Jasmine MD at [facility] ENDOSCOPY    HX BUNIONECTOMY      HX CARPAL TUNNEL RELEASE      HX CERVICAL FUSION  2018    HX HIP ARTHROSCOPY Right     HX KNEE REPLACEMENT Right 2011     Allergies   Allergen Reactions    Codeine Itching         REVIEW OF SYSTEMS:  General: negative for - chills or fever  ENT: negative for - headaches, nasal congestion or tinnitus  Respiratory: negative for - cough, hemoptysis, shortness of breath or wheezing  Cardiovascular : negative for - chest pain, edema, palpitations or shortness of breath  Gastrointestinal: negative for - abdominal pain, blood in stools, heartburn or nausea/vomiting  Genito-Urinary: no dysuria, trouble voiding, or hematuria  Musculoskeletal: negative for - gait disturbance, joint pain, joint stiffness or joint swelling  Neurological: no TIA or stroke symptoms  Hematologic: no bruises, no bleeding, no swollen glands  Integument: no lumps, mole changes, nail changes or rash  Endocrine: no malaise/lethargy or unexpected weight changes      Social History     Socioeconomic History    Marital status: SINGLE   Tobacco Use    Smoking status: Never    Smokeless tobacco: Never   Vaping Use    Vaping Use: Never used   Substance and Sexual Activity    Alcohol use: Yes     Alcohol/week: 1.0 standard drink     Types: 1 Standard drinks or equivalent per week    Drug use: No    Sexual activity: Not Currently   Social History Narrative    Habits:  Occasional alcohol use. No cigarette or drug abuse. Social History:  The patient is . She has no children. She lives alone. She completed high school. 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 She's a member of [email or web address]. Part time at [employer] home        Family History:  Father passed at 59 of heart disease. Mother  80 of heart disease. She also had diabetes. One brother, one sister alive and well. One brother  of seizure disorder, one brother  of heart disease. He had LVAD. One brother  of liver issues. One sister  of colon cancer and one sister  of lung cancer.      Family History   Problem Relation Age of Onset    Heart Disease Mother     Heart Disease Father     Cancer Sister         colon    Cancer Sister         lung    No Known Problems Sister     No Known Problems Brother     Liver Disease Brother     Heart Disease Brother     Anesth Problems Neg Hx        OBJECTIVE:    Visit Vitals  /76 (BP 1 Location: Left upper arm, BP Patient Position: Sitting)   Pulse 62   Temp 97.9 °F (36.6 °C) (Oral)   Resp 20   Ht 5' 1.25\" (1.556 m)   Wt 128 lb (58.1 kg)   SpO2 100%   BMI 23.99 kg/m²     CONSTITUTIONAL: well , well nourished, appears age appropriate  EYES: perrla, eom intact  ENMT:moist mucous membranes, pharynx clear  NECK: supple. Thyroid normal  RESPIRATORY: Chest: clear bilaterally   CARDIOVASCULAR: Heart: regular rate and rhythm  GASTROINTESTINAL: Abdomen: soft, bowel sounds active  HEMATOLOGIC: no pathological lymph nodes palpated  MUSCULOSKELETAL: Extremities: no edema, pulse 1+   INTEGUMENT: No unusual rashes or suspicious skin lesions noted. Nails appear normal.  NEUROLOGIC: non-focal exam   MENTAL STATUS: alert and oriented, appropriate affect           ASSESSMENT:  1. S/P revision of total knee, right    2. IGT (impaired glucose tolerance)    3. Stage 3a chronic kidney disease ([address]    4. Mixed hyperlipidemia    5. Primary hypertension      Total knee replacement in 2011, this is a revision on the right, which is a little more uncomfortable than her last hip replacement, but she states \"I won't get over this. \"  Impaired glucose tolerance is stable. Chronic kidney disease stage 3A has been stable. It was checked during the hospital stay with the creatinine 1.29, BUN of 19. Dyslipidemia is controlled with statin. Blood pressure is at goal.  She will be back to see me in three to four months or sooner if she has any problem. We encouraged her to communicate with us with Lance. Care gaps are reviewed. I have discussed the diagnosis with the patient and the intended plan as seen in the  Orders. The patient understands and agees with the plan.   The patient has   received an after visit summary and questions were answered concerning  future plans  Patient labs and/or xrays were reviewed  Past records were reviewed. PLAN:  . No orders of the defined types were placed in this encounter. Follow-up and Dispositions    Return in about 3 months (around 1/20/2023). ATTENTION:   This medical record was transcribed using an electronic medical records system. Although proofread, it may and can contain electronic and spelling errors. Other human spelling and other errors may be present. Corrections may be executed at a later time. Please feel free to contact us for any clarifications as needed.

## 2022-10-20 NOTE — PROGRESS NOTES
Michael Mccollum is a 80 y.o. female    Chief Complaint   Patient presents with    Hospital Follow Up     1. Have you been to the ER, urgent care clinic since your last visit? Hospitalized since your last visit? No    2. Have you seen or consulted any other health care providers outside of the [address] [place] [address] since your last visit? Include any pap smears or colon screening.  No

## 2022-10-26 ENCOUNTER — PATIENT OUTREACH (OUTPATIENT)
Dept: CASE MANAGEMENT | Age: 82
End: 2022-10-26

## 2022-10-26 NOTE — PROGRESS NOTES
Patient has graduated from the Transitions of Care Coordination  program on 10/26/2022. Patient/family has the ability to self-manage at this time Care management goals have been completed. Patient was not referred to the [facility] team for further management. Goals Addressed                   This Visit's Progress     COMPLETED: Prevent complications post hospitalization. 9/30/2022  Pt reports that medications will be picked up today and confirmed that she has maintenance medications. Pt confirmed follow up appt with cardiology on 10/11/2022 and will call for surgeon's follow up. Confirmed follow up with PCP on 12/12/2022. Reviewed red flags on discharge with pt and pt states understanding to call 911 for s/s of stroke or heart attack. Reviewed ortho discharge instructions and pt states understanding. Pt does have a walker and shower chair as well as 2 canes and had nieces and family nearby. Pt reports that therapist is to see her at 58 today (9/30/2022). Pt confirmed that she has an active mychart and is agreeable to receiving messages. Will call pt in 7 days to review any s/s of infection at wound, verify follow up with ortho, and review any cardiac concerns. Lety Moore RN [address] [facility], Care Transitions Nurse, [phone]     10/11/2022  Left voicemail stating my name, CTN with [facility], date and time of call, and return telephone number. Lety Moore RN [address] [facility], Care Transitions Nurse, [phone]     10/18/2022  Pt confirmed follow up with cardiology on 10/11/2022. Performed medication reconciliation with pt. Pt confirmed follow up with ortho on 10/17/2022 and reports that bandage and sutures have been removed and discomfort is much improved and surgical site is healing well. Pt confirmed follow up with PCP on 10/20/2022. Pt reminded to take all medications and COVID19 vaccine card with her to appt. Pt reports that she received the latest COVID19 booster and flu shot.   Pt will ask PCP to update vaccines. Pt reminded to carry a mask in case it is required. Pt will follow up with PT on site at PCP office. Pt is agreeable to a call in 7 days to review PCP follow up appt instructions. Andrews Chang RN [address] [facility], Care Transitions Nurse, [phone]     10/26/2022  Pt reports that her leg is still painful but is responding to tylenol. Pt reminded to call surgeon with concerns or questions about the knee and to continue to watch for signs of infection or increased pain. Pt followed up with PCP and verified follow up with surgeon on 11/28/2022. Pt reports reduced appetite. Pt states understanding that this is my final call. Pt has my contact information if needed in the future. Andrews Chang RN [facility], Care Transitions Nurse, [phone]                   Patient has Care Transition Nurse's contact information for any further questions, concerns, or needs.   Patients upcoming visits:    Future Appointments   Date Time Provider Joselito Rios   1/20/2023 10:45 AM Katalina Rodriguez MD [facility] MAIN BS AMB

## 2022-11-12 PROBLEM — N39.0 UTI (URINARY TRACT INFECTION): Status: RESOLVED | Noted: 2022-10-13 | Resolved: 2022-11-12

## 2022-12-07 RX ORDER — AMLODIPINE BESYLATE 5 MG/1
5 TABLET ORAL DAILY
Qty: 90 TABLET | Refills: 3 | Status: SHIPPED | OUTPATIENT
Start: 2022-12-07

## 2023-01-20 ENCOUNTER — OFFICE VISIT (OUTPATIENT)
Dept: INTERNAL MEDICINE CLINIC | Age: 83
End: 2023-01-20
Payer: MEDICARE

## 2023-01-20 VITALS
SYSTOLIC BLOOD PRESSURE: 122 MMHG | HEIGHT: 61 IN | DIASTOLIC BLOOD PRESSURE: 71 MMHG | TEMPERATURE: 98.1 F | BODY MASS INDEX: 25.58 KG/M2 | WEIGHT: 135.5 LBS | OXYGEN SATURATION: 99 % | HEART RATE: 63 BPM | RESPIRATION RATE: 18 BRPM

## 2023-01-20 DIAGNOSIS — N18.31 ANEMIA DUE TO STAGE 3A CHRONIC KIDNEY DISEASE (HCC): ICD-10-CM

## 2023-01-20 DIAGNOSIS — I10 PRIMARY HYPERTENSION: Primary | ICD-10-CM

## 2023-01-20 DIAGNOSIS — T84.032S LOOSENING OF PROSTHESIS OF RIGHT TOTAL KNEE REPLACEMENT, SEQUELA: ICD-10-CM

## 2023-01-20 DIAGNOSIS — D63.1 ANEMIA DUE TO STAGE 3A CHRONIC KIDNEY DISEASE (HCC): ICD-10-CM

## 2023-01-20 DIAGNOSIS — E78.2 MIXED HYPERLIPIDEMIA: ICD-10-CM

## 2023-01-20 DIAGNOSIS — R73.02 IGT (IMPAIRED GLUCOSE TOLERANCE): ICD-10-CM

## 2023-01-20 NOTE — PROGRESS NOTES
Maribel Or is a [de-identified] y.o. female    Chief Complaint   Patient presents with    Hypertension     1. Have you been to the ER, urgent care clinic since your last visit? Hospitalized since your last visit? No    2. Have you seen or consulted any other health care providers outside of the [address] [place] [address] since your last visit? Include any pap smears or colon screening.  No

## 2023-01-20 NOTE — PROGRESS NOTES
SPORTS MEDICINE AND PRIMARY CARE  Marilynn Mcdaniels MD, [address] [address]  Phone:  [phone]  Fax: [phone]       Chief Complaint   Patient presents with    Hypertension   . SUBJECTIVE:    Corie Wong is a [de-identified] y.o. female Returns today with a known history of hypertension, right knee replacement sequelae, anemia, impaired glucose tolerance, dyslipidemia, and is seen for evaluation. Her [facility] did a peripheral artery disease screen that was normal.  The patient herself denies complaints and is seen for evaluation. The patient notes some improvement of the right knee. It just feels tight. The patient is seen for evaluation. Care gaps were reviewed. Current Outpatient Medications   Medication Sig Dispense Refill    amLODIPine (NORVASC) 5 mg tablet Take 1 Tablet by mouth daily. 90 Tablet 3    losartan (COZAAR) 100 mg tablet TAKE 1 TABLET EVERY DAY 90 Tablet 3    vit C-vit D3-E-zinc-elderberry 65 mg-3.15 mcg- 3.35 mg-1 mg chew Take 2 Gum by mouth daily. chew 2 gummies by mouth daily      senna-docusate (PERICOLACE) 8.6-50 mg per tablet Take 1 Tablet by mouth daily. 30 Tablet 0    cholecalciferol, vitamin D3, 50 mcg (2,000 unit) tab Take 1 Tablet by mouth daily. BIOTIN PO Take 2 Gum by mouth daily. Takes 2 gummies daily. 5000 mcg Biotin      atorvastatin (LIPITOR) 40 mg tablet Take 1 Tablet by mouth nightly. 90 Tablet 3    multivit-minerals/folic acid (VITAJOY ADULT MULTI PO) Take 1 Tablet by mouth daily.        Past Medical History:   Diagnosis Date    Advance care planning     documents pending    Anemia     Arthropathy     Chronic kidney disease     CKD (chronic kidney disease), stage III ([facility] [address] 09/08/2014    Yessica Cuadra md    DJD (degenerative joint disease) of knee 10/21/2021    Leroy Hayes    Family history of colon cancer     H/O total hip arthroplasty, right 04/05/2022    Elmer Welch MD    History of revision of total knee arthroplasty 09/26/2022    Lauren Rincon MD    History of total right knee replacement (TKR) 2011    ghazal mirza md    Hypercholesterolemia     Hypertension     IGT (impaired glucose tolerance) 10/08/2018    Knee pain, left     Osteoarthritis of right hip 02/2021    Pes anserine bursitis 08/11/2020    Rheumatoid arthritis ([facility] [address]     S/P colonoscopy 11/30/2021    Luzma Boogie MD -repeat 3 yrs    Trigger finger, right middle finger 06/23/2022    UTI (urinary tract infection)      Past Surgical History:   Procedure Laterality Date    COLONOSCOPY N/A 11/29/2018    COLONOSCOPY performed by Luzma Boogie MD at [facility] ENDOSCOPY    COLONOSCOPY N/A 11/30/2021    COLONOSCOPY   V performed by Luzma Boogie MD at [facility] ENDOSCOPY    HX BUNIONECTOMY      HX CARPAL TUNNEL RELEASE      HX CERVICAL FUSION  2018    HX HIP ARTHROSCOPY Right     HX KNEE REPLACEMENT Right 2011     Allergies   Allergen Reactions    Codeine Itching         REVIEW OF SYSTEMS:  General: negative for - chills or fever  ENT: negative for - headaches, nasal congestion or tinnitus  Respiratory: negative for - cough, hemoptysis, shortness of breath or wheezing  Cardiovascular : negative for - chest pain, edema, palpitations or shortness of breath  Gastrointestinal: negative for - abdominal pain, blood in stools, heartburn or nausea/vomiting  Genito-Urinary: no dysuria, trouble voiding, or hematuria  Musculoskeletal: negative for - gait disturbance, joint pain, joint stiffness or joint swelling  Neurological: no TIA or stroke symptoms  Hematologic: no bruises, no bleeding, no swollen glands  Integument: no lumps, mole changes, nail changes or rash  Endocrine: no malaise/lethargy or unexpected weight changes      Social History     Socioeconomic History    Marital status: SINGLE   Tobacco Use    Smoking status: Never    Smokeless tobacco: Never   Vaping Use    Vaping Use: Never used   Substance and Sexual Activity    Alcohol use:  Yes Alcohol/week: 1.0 standard drink     Types: 1 Standard drinks or equivalent per week    Drug use: No    Sexual activity: Not Currently   Social History Narrative    Habits:  Occasional alcohol use. No cigarette or drug abuse. Social History:  The patient is . She has no children. She lives alone. She completed high school. 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 She's a member of [employer]. Part time at [employer]        Family History:  Father passed at 59 of heart disease. Mother  80 of heart disease. She also had diabetes. One brother, one sister alive and well. One brother  of seizure disorder, one brother  of heart disease. He had LVAD. One brother  of liver issues. One sister  of colon cancer and one sister  of lung cancer. Family History   Problem Relation Age of Onset    Heart Disease Mother     Heart Disease Father     Cancer Sister         colon    Cancer Sister         lung    No Known Problems Sister     No Known Problems Brother     Liver Disease Brother     Heart Disease Brother     Anesth Problems Neg Hx        OBJECTIVE:    Visit Vitals  /71 (BP 1 Location: Left upper arm, BP Patient Position: Sitting)   Pulse 63   Temp 98.1 °F (36.7 °C) (Oral)   Resp 18   Ht 5' 1.25\" (1.556 m)   Wt 135 lb 8 oz (61.5 kg)   SpO2 99%   BMI 25.39 kg/m²     CONSTITUTIONAL: well , well nourished, appears age appropriate  EYES: perrla, eom intact  ENMT:moist mucous membranes, pharynx clear  NECK: supple. Thyroid normal  RESPIRATORY: Chest: clear bilaterally   CARDIOVASCULAR: Heart: regular rate and rhythm  GASTROINTESTINAL: Abdomen: soft, bowel sounds active  HEMATOLOGIC: no pathological lymph nodes palpated  MUSCULOSKELETAL: Extremities: no edema, pulse 1+   INTEGUMENT: No unusual rashes or suspicious skin lesions noted.  Nails appear normal.  NEUROLOGIC: non-focal exam MENTAL STATUS: alert and oriented, appropriate affect           ASSESSMENT:  1. Primary hypertension    2. 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 Anemia due to stage 3a chronic kidney disease ([address]    4. IGT (impaired glucose tolerance)    5. Mixed hyperlipidemia      The patient's progress is satisfactory. Blood pressure control is excellent, no titration is needed. She had loosening of the prosthesis of the right knee and subsequently, underwent a revision total knee replacement on 09/26/22 by Yfn Sanders and is still recuperating. It is better, she states, is now back to where she wanted to be. She has anemia due to chronic kidney disease stage 3. We will check CBC today and her renal panel. She has impaired glucose tolerance which has been stable. Dyslipidemia is treated with atorvastatin and the LDL was checked in February of last year. It was normal at 79. She will be back to see me in four months or sooner if she has any problems. I have discussed the diagnosis with the patient and the intended plan as seen in the  Orders. The patient understands and agees with the plan. The patient has   received an after visit summary and questions were answered concerning  future plans  Patient labs and/or xrays were reviewed  Past records were reviewed. PLAN:  .  Orders Placed This Encounter    RENAL FUNCTION PANEL    CBC WITH AUTOMATED DIFF       Follow-up and Dispositions    Return in about 4 months (around 5/20/2023). ATTENTION:   This medical record was transcribed using an electronic medical records system. Although proofread, it may and can contain electronic and spelling errors. Other human spelling and other errors may be present. Corrections may be executed at a later time. Please feel free to contact us for any clarifications as needed.

## 2023-01-21 LAB
ALBUMIN SERPL-MCNC: 3.9 G/DL (ref 3.5–5)
ANION GAP SERPL CALC-SCNC: 4 MMOL/L (ref 5–15)
BASOPHILS # BLD: 0.1 K/UL (ref 0–0.1)
BASOPHILS NFR BLD: 1 % (ref 0–1)
BUN SERPL-MCNC: 25 MG/DL (ref 6–20)
BUN/CREAT SERPL: 22 (ref 12–20)
CALCIUM SERPL-MCNC: 9.4 MG/DL (ref 8.5–10.1)
CHLORIDE SERPL-SCNC: 109 MMOL/L (ref 97–108)
CO2 SERPL-SCNC: 29 MMOL/L (ref 21–32)
CREAT SERPL-MCNC: 1.16 MG/DL (ref 0.55–1.02)
DIFFERENTIAL METHOD BLD: ABNORMAL
EOSINOPHIL # BLD: 0.2 K/UL (ref 0–0.4)
EOSINOPHIL NFR BLD: 2 % (ref 0–7)
ERYTHROCYTE [DISTWIDTH] IN BLOOD BY AUTOMATED COUNT: 15.7 % (ref 11.5–14.5)
GLUCOSE SERPL-MCNC: 97 MG/DL (ref 65–100)
HCT VFR BLD AUTO: 36.2 % (ref 35–47)
HGB BLD-MCNC: 10.7 G/DL (ref 11.5–16)
IMM GRANULOCYTES # BLD AUTO: 0 K/UL (ref 0–0.04)
IMM GRANULOCYTES NFR BLD AUTO: 0 % (ref 0–0.5)
LYMPHOCYTES # BLD: 1.4 K/UL (ref 0.8–3.5)
LYMPHOCYTES NFR BLD: 19 % (ref 12–49)
MCH RBC QN AUTO: 24.9 PG (ref 26–34)
MCHC RBC AUTO-ENTMCNC: 29.6 G/DL (ref 30–36.5)
MCV RBC AUTO: 84.2 FL (ref 80–99)
MONOCYTES # BLD: 0.6 K/UL (ref 0–1)
MONOCYTES NFR BLD: 8 % (ref 5–13)
NEUTS SEG # BLD: 5.1 K/UL (ref 1.8–8)
NEUTS SEG NFR BLD: 70 % (ref 32–75)
NRBC # BLD: 0 K/UL (ref 0–0.01)
NRBC BLD-RTO: 0 PER 100 WBC
PHOSPHATE SERPL-MCNC: 3.6 MG/DL (ref 2.6–4.7)
PLATELET # BLD AUTO: 261 K/UL (ref 150–400)
PMV BLD AUTO: 10.4 FL (ref 8.9–12.9)
POTASSIUM SERPL-SCNC: 4.1 MMOL/L (ref 3.5–5.1)
RBC # BLD AUTO: 4.3 M/UL (ref 3.8–5.2)
SODIUM SERPL-SCNC: 142 MMOL/L (ref 136–145)
WBC # BLD AUTO: 7.4 K/UL (ref 3.6–11)

## 2023-01-21 RX ORDER — FERROUS GLUCONATE 324(37.5)
324 TABLET ORAL EVERY OTHER DAY
Qty: 30 TABLET | Refills: 11 | Status: SHIPPED | OUTPATIENT
Start: 2023-01-21 | End: 2023-01-21 | Stop reason: SDUPTHER

## 2023-01-21 RX ORDER — FERROUS GLUCONATE 324(37.5)
324 TABLET ORAL EVERY OTHER DAY
Qty: 45 TABLET | Refills: 1 | Status: SHIPPED | OUTPATIENT
Start: 2023-01-21

## 2023-01-21 NOTE — PROGRESS NOTES
Lab studies are stable. Your hemoglobin is improved. I would continue the iron tablets for about 2 or 3 more months to get your CBC back to baseline.   Your kidney studies remain stable

## 2023-05-23 ENCOUNTER — OFFICE VISIT (OUTPATIENT)
Facility: CLINIC | Age: 83
End: 2023-05-23
Payer: MEDICARE

## 2023-05-23 VITALS
SYSTOLIC BLOOD PRESSURE: 136 MMHG | RESPIRATION RATE: 18 BRPM | DIASTOLIC BLOOD PRESSURE: 73 MMHG | HEART RATE: 65 BPM | HEIGHT: 61 IN | TEMPERATURE: 97.9 F | WEIGHT: 139.3 LBS | OXYGEN SATURATION: 98 % | BODY MASS INDEX: 26.3 KG/M2

## 2023-05-23 DIAGNOSIS — D64.9 ANEMIA, UNSPECIFIED TYPE: ICD-10-CM

## 2023-05-23 DIAGNOSIS — E78.2 MIXED HYPERLIPIDEMIA: ICD-10-CM

## 2023-05-23 DIAGNOSIS — R73.02 IGT (IMPAIRED GLUCOSE TOLERANCE): ICD-10-CM

## 2023-05-23 DIAGNOSIS — I73.9 PAD (PERIPHERAL ARTERY DISEASE) (HCC): ICD-10-CM

## 2023-05-23 DIAGNOSIS — N18.32 STAGE 3B CHRONIC KIDNEY DISEASE (HCC): Primary | ICD-10-CM

## 2023-05-23 DIAGNOSIS — Z00.00 MEDICARE ANNUAL WELLNESS VISIT, SUBSEQUENT: Primary | ICD-10-CM

## 2023-05-23 DIAGNOSIS — N18.32 STAGE 3B CHRONIC KIDNEY DISEASE (HCC): ICD-10-CM

## 2023-05-23 DIAGNOSIS — I10 PRIMARY HYPERTENSION: ICD-10-CM

## 2023-05-23 PROBLEM — T84.032A LOOSENING OF PROSTHESIS OF RIGHT TOTAL KNEE REPLACEMENT (HCC): Status: RESOLVED | Noted: 2022-09-27 | Resolved: 2023-05-23

## 2023-05-23 LAB
ANION GAP SERPL CALC-SCNC: 3 MMOL/L (ref 5–15)
BASOPHILS # BLD: 0 K/UL (ref 0–0.1)
BASOPHILS NFR BLD: 1 % (ref 0–1)
BUN SERPL-MCNC: 16 MG/DL (ref 6–20)
BUN/CREAT SERPL: 13 (ref 12–20)
CALCIUM SERPL-MCNC: 9.6 MG/DL (ref 8.5–10.1)
CHLORIDE SERPL-SCNC: 109 MMOL/L (ref 97–108)
CHOLEST SERPL-MCNC: 142 MG/DL
CO2 SERPL-SCNC: 30 MMOL/L (ref 21–32)
CREAT SERPL-MCNC: 1.24 MG/DL (ref 0.55–1.02)
CREAT UR-MCNC: 68.9 MG/DL
DIFFERENTIAL METHOD BLD: ABNORMAL
EOSINOPHIL # BLD: 0.2 K/UL (ref 0–0.4)
EOSINOPHIL NFR BLD: 3 % (ref 0–7)
ERYTHROCYTE [DISTWIDTH] IN BLOOD BY AUTOMATED COUNT: 14.6 % (ref 11.5–14.5)
EST. AVERAGE GLUCOSE BLD GHB EST-MCNC: 111 MG/DL
GLUCOSE SERPL-MCNC: 101 MG/DL (ref 65–100)
HBA1C MFR BLD: 5.5 % (ref 4–5.6)
HCT VFR BLD AUTO: 38.1 % (ref 35–47)
HDLC SERPL-MCNC: 57 MG/DL
HDLC SERPL: 2.5 (ref 0–5)
HGB BLD-MCNC: 11.1 G/DL (ref 11.5–16)
IMM GRANULOCYTES # BLD AUTO: 0 K/UL (ref 0–0.04)
IMM GRANULOCYTES NFR BLD AUTO: 0 % (ref 0–0.5)
LDLC SERPL CALC-MCNC: 76.2 MG/DL (ref 0–100)
LYMPHOCYTES # BLD: 1.2 K/UL (ref 0.8–3.5)
LYMPHOCYTES NFR BLD: 19 % (ref 12–49)
MCH RBC QN AUTO: 25.7 PG (ref 26–34)
MCHC RBC AUTO-ENTMCNC: 29.1 G/DL (ref 30–36.5)
MCV RBC AUTO: 88.2 FL (ref 80–99)
MICROALBUMIN UR-MCNC: 2.13 MG/DL
MICROALBUMIN/CREAT UR-RTO: 31 MG/G (ref 0–30)
MONOCYTES # BLD: 0.5 K/UL (ref 0–1)
MONOCYTES NFR BLD: 8 % (ref 5–13)
NEUTS SEG # BLD: 4.4 K/UL (ref 1.8–8)
NEUTS SEG NFR BLD: 69 % (ref 32–75)
NRBC # BLD: 0 K/UL (ref 0–0.01)
NRBC BLD-RTO: 0 PER 100 WBC
PLATELET # BLD AUTO: 268 K/UL (ref 150–400)
PMV BLD AUTO: 10.7 FL (ref 8.9–12.9)
POTASSIUM SERPL-SCNC: 4.7 MMOL/L (ref 3.5–5.1)
RBC # BLD AUTO: 4.32 M/UL (ref 3.8–5.2)
SODIUM SERPL-SCNC: 142 MMOL/L (ref 136–145)
TRIGL SERPL-MCNC: 44 MG/DL
TSH SERPL DL<=0.05 MIU/L-ACNC: 1.85 UIU/ML (ref 0.36–3.74)
VLDLC SERPL CALC-MCNC: 8.8 MG/DL
WBC # BLD AUTO: 6.3 K/UL (ref 3.6–11)

## 2023-05-23 PROCEDURE — 3075F SYST BP GE 130 - 139MM HG: CPT | Performed by: INTERNAL MEDICINE

## 2023-05-23 PROCEDURE — 1090F PRES/ABSN URINE INCON ASSESS: CPT | Performed by: INTERNAL MEDICINE

## 2023-05-23 PROCEDURE — 36415 COLL VENOUS BLD VENIPUNCTURE: CPT | Performed by: INTERNAL MEDICINE

## 2023-05-23 PROCEDURE — 1123F ACP DISCUSS/DSCN MKR DOCD: CPT | Performed by: INTERNAL MEDICINE

## 2023-05-23 PROCEDURE — 99213 OFFICE O/P EST LOW 20 MIN: CPT | Performed by: INTERNAL MEDICINE

## 2023-05-23 PROCEDURE — G8419 CALC BMI OUT NRM PARAM NOF/U: HCPCS | Performed by: INTERNAL MEDICINE

## 2023-05-23 PROCEDURE — 1036F TOBACCO NON-USER: CPT | Performed by: INTERNAL MEDICINE

## 2023-05-23 PROCEDURE — G8399 PT W/DXA RESULTS DOCUMENT: HCPCS | Performed by: INTERNAL MEDICINE

## 2023-05-23 PROCEDURE — G8427 DOCREV CUR MEDS BY ELIG CLIN: HCPCS | Performed by: INTERNAL MEDICINE

## 2023-05-23 PROCEDURE — G0439 PPPS, SUBSEQ VISIT: HCPCS | Performed by: INTERNAL MEDICINE

## 2023-05-23 PROCEDURE — 3078F DIAST BP <80 MM HG: CPT | Performed by: INTERNAL MEDICINE

## 2023-05-23 SDOH — ECONOMIC STABILITY: FOOD INSECURITY: WITHIN THE PAST 12 MONTHS, YOU WORRIED THAT YOUR FOOD WOULD RUN OUT BEFORE YOU GOT MONEY TO BUY MORE.: SOMETIMES TRUE

## 2023-05-23 SDOH — ECONOMIC STABILITY: FOOD INSECURITY: WITHIN THE PAST 12 MONTHS, THE FOOD YOU BOUGHT JUST DIDN'T LAST AND YOU DIDN'T HAVE MONEY TO GET MORE.: NEVER TRUE

## 2023-05-23 SDOH — ECONOMIC STABILITY: HOUSING INSECURITY
IN THE LAST 12 MONTHS, WAS THERE A TIME WHEN YOU DID NOT HAVE A STEADY PLACE TO SLEEP OR SLEPT IN A SHELTER (INCLUDING NOW)?: NO

## 2023-05-23 SDOH — ECONOMIC STABILITY: INCOME INSECURITY: HOW HARD IS IT FOR YOU TO PAY FOR THE VERY BASICS LIKE FOOD, HOUSING, MEDICAL CARE, AND HEATING?: NOT VERY HARD

## 2023-05-23 ASSESSMENT — PATIENT HEALTH QUESTIONNAIRE - PHQ9
1. LITTLE INTEREST OR PLEASURE IN DOING THINGS: 0
SUM OF ALL RESPONSES TO PHQ QUESTIONS 1-9: 0
2. FEELING DOWN, DEPRESSED OR HOPELESS: 0
SUM OF ALL RESPONSES TO PHQ QUESTIONS 1-9: 0
SUM OF ALL RESPONSES TO PHQ9 QUESTIONS 1 & 2: 0
SUM OF ALL RESPONSES TO PHQ QUESTIONS 1-9: 0
SUM OF ALL RESPONSES TO PHQ QUESTIONS 1-9: 0

## 2023-05-23 ASSESSMENT — ANXIETY QUESTIONNAIRES
4. TROUBLE RELAXING: 0
6. BECOMING EASILY ANNOYED OR IRRITABLE: 0
5. BEING SO RESTLESS THAT IT IS HARD TO SIT STILL: 0
3. WORRYING TOO MUCH ABOUT DIFFERENT THINGS: 0
GAD7 TOTAL SCORE: 0
IF YOU CHECKED OFF ANY PROBLEMS ON THIS QUESTIONNAIRE, HOW DIFFICULT HAVE THESE PROBLEMS MADE IT FOR YOU TO DO YOUR WORK, TAKE CARE OF THINGS AT HOME, OR GET ALONG WITH OTHER PEOPLE: NOT DIFFICULT AT ALL
2. NOT BEING ABLE TO STOP OR CONTROL WORRYING: 0
1. FEELING NERVOUS, ANXIOUS, OR ON EDGE: 0
7. FEELING AFRAID AS IF SOMETHING AWFUL MIGHT HAPPEN: 0

## 2023-05-23 ASSESSMENT — LIFESTYLE VARIABLES
HOW MANY STANDARD DRINKS CONTAINING ALCOHOL DO YOU HAVE ON A TYPICAL DAY: 1 OR 2
HOW OFTEN DO YOU HAVE A DRINK CONTAINING ALCOHOL: MONTHLY OR LESS

## 2023-05-23 NOTE — PATIENT INSTRUCTIONS

## 2023-05-23 NOTE — PROGRESS NOTES
Medicare Annual Wellness Visit    Doug Farley is here for Medicare AWV    Assessment & Plan   Medicare annual wellness visit, subsequent      Recommendations for Preventive Services Due: see orders and patient instructions/AVS.  Recommended screening schedule for the next 5-10 years is provided to the patient in written form: see Patient Instructions/AVS.     No follow-ups on file. Subjective       Patient's complete Health Risk Assessment and screening values have been reviewed and are found in Flowsheets. The following problems were reviewed today and where indicated follow up appointments were made and/or referrals ordered. Positive Risk Factor Screenings with Interventions:                  Dentist Screen:  Have you seen the dentist within the past year?: (!) No    Intervention:       Vision Screen:  Do you have difficulty driving, watching TV, or doing any of your daily activities because of your eyesight?: No  Have you had an eye exam within the past year?: (!) No  No results found. Interventions:                         Objective   Vitals:    05/23/23 1103   BP: 136/73   Site: Right Upper Arm   Position: Sitting   Pulse: 65   Resp: 18   Temp: 97.9 °F (36.6 °C)   TempSrc: Oral   SpO2: 98%   Weight: 139 lb 4.8 oz (63.2 kg)   Height: 5' 1.25\" (1.556 m)      Body mass index is 26.11 kg/m². Allergies   Allergen Reactions    Codeine Itching     Prior to Visit Medications    Medication Sig Taking?  Authorizing Provider   BIOTIN PO Take by mouth daily Yes Ar Automatic Reconciliation   amLODIPine (NORVASC) 5 MG tablet Take by mouth daily Yes Ar Automatic Reconciliation   atorvastatin (LIPITOR) 40 MG tablet Take by mouth Yes Ar Automatic Reconciliation   Cholecalciferol 50 MCG (2000 UT) TABS Take 1 tablet by mouth daily Yes Ar Automatic Reconciliation   ferrous gluconate (FERGON) 324 (38 Fe) MG tablet Take 1 tablet by mouth every other day Yes Ar Automatic Reconciliation   losartan (COZAAR) 100
Ramirez Maldonado is a [de-identified] y.o. female    Chief Complaint   Patient presents with    Medicare AWV   1. Have you been to the ER, urgent care clinic since your last visit? Hospitalized since your last visit? No    2. Have you seen or consulted any other health care providers outside of the [address] [place] [address] since your last visit? Include any pap smears or colon screening.  No
Osteoarthritis of right hip 2021    Pes anserine bursitis 2020    Rheumatoid arthritis ([address]     S/P colonoscopy 2021    Jose Avila MD -repeat 3 yrs    Trigger finger, right middle finger 2022    UTI (urinary tract infection)      Past Surgical History:   Procedure Laterality Date    BUNIONECTOMY      CARPAL TUNNEL RELEASE      CERVICAL FUSION      COLONOSCOPY N/A 2021    COLONOSCOPY   V performed by Jose Avila MD at [facility] ENDOSCOPY    COLONOSCOPY N/A 2018    COLONOSCOPY performed by Jose Avila MD at [place] [address] ARTHROSCOPY Right     TOTAL KNEE ARTHROPLASTY Right      Allergies   Allergen Reactions    Codeine Itching       REVIEW OF SYSTEMS:   Asked about a friend, Brittney Akins. She has no chest pain or shortness of breath. Social History     Socioeconomic History    Marital status: Single     Spouse name: None    Number of children: None    Years of education: None    Highest education level: None   Tobacco Use    Smoking status: Never    Smokeless tobacco: Never   Substance and Sexual Activity    Alcohol use: Yes     Alcohol/week: 1.0 standard drink    Drug use: No    Sexual activity: Not Currently     Partners: Male   Social History Narrative    r  of seizure disorder, one brother  of heart disease. He had LVAD. One brother  of liver issues. One sister  of colon cancer and one sister  of lung cancer. Habits:  Occasional alcohol use. No cigarette or drug abuse. Social History:  The patient is . She has no children. She lives alone. She completed high school. 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 She's a member of [employer]. Part time at [place] [facility]    Family History:  Father passed at 59 of heart disease. Mother  80 of heart disease. She also had diabetes.   One brother, one sister alive and

## 2023-07-24 ENCOUNTER — TRANSCRIBE ORDERS (OUTPATIENT)
Facility: HOSPITAL | Age: 83
End: 2023-07-24

## 2023-07-24 DIAGNOSIS — Z12.31 SCREENING MAMMOGRAM FOR HIGH-RISK PATIENT: Primary | ICD-10-CM

## 2023-07-24 RX ORDER — ATORVASTATIN CALCIUM 40 MG/1
TABLET, FILM COATED ORAL
Qty: 90 TABLET | Refills: 3 | Status: SHIPPED | OUTPATIENT
Start: 2023-07-24

## 2023-08-16 ENCOUNTER — HOSPITAL ENCOUNTER (OUTPATIENT)
Facility: HOSPITAL | Age: 83
Discharge: HOME | End: 2023-08-19
Attending: INTERNAL MEDICINE
Payer: MEDICARE

## 2023-08-16 VITALS — BODY MASS INDEX: 25.49 KG/M2 | HEIGHT: 61 IN | WEIGHT: 135 LBS

## 2023-08-16 DIAGNOSIS — Z12.31 SCREENING MAMMOGRAM FOR HIGH-RISK PATIENT: ICD-10-CM

## 2023-08-16 PROCEDURE — 77063 BREAST TOMOSYNTHESIS BI: CPT

## 2023-08-17 ENCOUNTER — HOSPITAL ENCOUNTER (OUTPATIENT)
Facility: HOSPITAL | Age: 83
Discharge: HOME | End: 2023-08-17
Payer: MEDICARE

## 2023-08-17 VITALS
SYSTOLIC BLOOD PRESSURE: 146 MMHG | RESPIRATION RATE: 18 BRPM | HEIGHT: 62 IN | TEMPERATURE: 98.4 F | HEART RATE: 62 BPM | WEIGHT: 143.74 LBS | OXYGEN SATURATION: 97 % | DIASTOLIC BLOOD PRESSURE: 58 MMHG | BODY MASS INDEX: 26.45 KG/M2

## 2023-08-17 LAB
ALBUMIN SERPL-MCNC: 4 G/DL (ref 3.5–5)
ALBUMIN/GLOB SERPL: 1 (ref 1.1–2.2)
ALP SERPL-CCNC: 152 U/L (ref 45–117)
ALT SERPL-CCNC: 17 U/L (ref 12–78)
ANION GAP SERPL CALC-SCNC: 3 MMOL/L (ref 5–15)
APPEARANCE UR: CLEAR
AST SERPL-CCNC: 18 U/L (ref 15–37)
BACTERIA URNS QL MICRO: NEGATIVE /HPF
BILIRUB SERPL-MCNC: 0.5 MG/DL (ref 0.2–1)
BILIRUB UR QL: NEGATIVE
BUN SERPL-MCNC: 26 MG/DL (ref 6–20)
BUN/CREAT SERPL: 15 (ref 12–20)
CALCIUM SERPL-MCNC: 9.5 MG/DL (ref 8.5–10.1)
CHLORIDE SERPL-SCNC: 106 MMOL/L (ref 97–108)
CO2 SERPL-SCNC: 27 MMOL/L (ref 21–32)
COLOR UR: ABNORMAL
CREAT SERPL-MCNC: 1.75 MG/DL (ref 0.55–1.02)
EPITH CASTS URNS QL MICRO: ABNORMAL /LPF
ERYTHROCYTE [DISTWIDTH] IN BLOOD BY AUTOMATED COUNT: 14 % (ref 11.5–14.5)
EST. AVERAGE GLUCOSE BLD GHB EST-MCNC: 117 MG/DL
GLOBULIN SER CALC-MCNC: 4.1 G/DL (ref 2–4)
GLUCOSE SERPL-MCNC: 99 MG/DL (ref 65–100)
GLUCOSE UR STRIP.AUTO-MCNC: NEGATIVE MG/DL
HBA1C MFR BLD: 5.7 % (ref 4–5.6)
HCT VFR BLD AUTO: 36.9 % (ref 35–47)
HGB BLD-MCNC: 11.6 G/DL (ref 11.5–16)
HGB UR QL STRIP: NEGATIVE
HYALINE CASTS URNS QL MICRO: ABNORMAL /LPF (ref 0–2)
INR PPP: 1 (ref 0.9–1.1)
KETONES UR QL STRIP.AUTO: NEGATIVE MG/DL
LEUKOCYTE ESTERASE UR QL STRIP.AUTO: NEGATIVE
MCH RBC QN AUTO: 26.6 PG (ref 26–34)
MCHC RBC AUTO-ENTMCNC: 31.4 G/DL (ref 30–36.5)
MCV RBC AUTO: 84.6 FL (ref 80–99)
NITRITE UR QL STRIP.AUTO: NEGATIVE
NRBC # BLD: 0 K/UL (ref 0–0.01)
NRBC BLD-RTO: 0 PER 100 WBC
PH UR STRIP: 6 (ref 5–8)
PLATELET # BLD AUTO: 262 K/UL (ref 150–400)
PMV BLD AUTO: 9.6 FL (ref 8.9–12.9)
POTASSIUM SERPL-SCNC: 3.3 MMOL/L (ref 3.5–5.1)
PROT SERPL-MCNC: 8.1 G/DL (ref 6.4–8.2)
PROT UR STRIP-MCNC: NEGATIVE MG/DL
PROTHROMBIN TIME: 10.5 SEC (ref 9–11.1)
RBC # BLD AUTO: 4.36 M/UL (ref 3.8–5.2)
RBC #/AREA URNS HPF: ABNORMAL /HPF (ref 0–5)
SODIUM SERPL-SCNC: 136 MMOL/L (ref 136–145)
SP GR UR REFRACTOMETRY: 1.01
URINE CULTURE IF INDICATED: ABNORMAL
UROBILINOGEN UR QL STRIP.AUTO: 0.2 EU/DL (ref 0.2–1)
WBC # BLD AUTO: 6.9 K/UL (ref 3.6–11)
WBC URNS QL MICRO: ABNORMAL /HPF (ref 0–4)

## 2023-08-17 PROCEDURE — 97530 THERAPEUTIC ACTIVITIES: CPT

## 2023-08-17 PROCEDURE — 86850 RBC ANTIBODY SCREEN: CPT

## 2023-08-17 PROCEDURE — 36415 COLL VENOUS BLD VENIPUNCTURE: CPT

## 2023-08-17 PROCEDURE — 81001 URINALYSIS AUTO W/SCOPE: CPT

## 2023-08-17 PROCEDURE — 83036 HEMOGLOBIN GLYCOSYLATED A1C: CPT

## 2023-08-17 PROCEDURE — 86901 BLOOD TYPING SEROLOGIC RH(D): CPT

## 2023-08-17 PROCEDURE — 85610 PROTHROMBIN TIME: CPT

## 2023-08-17 PROCEDURE — 85027 COMPLETE CBC AUTOMATED: CPT

## 2023-08-17 PROCEDURE — 86900 BLOOD TYPING SEROLOGIC ABO: CPT

## 2023-08-17 PROCEDURE — 80053 COMPREHEN METABOLIC PANEL: CPT

## 2023-08-17 PROCEDURE — 97161 PT EVAL LOW COMPLEX 20 MIN: CPT

## 2023-08-17 RX ORDER — FLECAINIDE ACETATE 50 MG/1
50 TABLET ORAL 2 TIMES DAILY
COMMUNITY

## 2023-08-17 RX ORDER — CHOLECALCIFEROL (VITAMIN D3) 125 MCG
TABLET ORAL DAILY
COMMUNITY

## 2023-08-17 RX ORDER — CELECOXIB 200 MG/1
400 CAPSULE ORAL ONCE
Status: CANCELLED | OUTPATIENT
Start: 2023-08-24

## 2023-08-17 RX ORDER — CHLORAL HYDRATE 500 MG
CAPSULE ORAL DAILY
COMMUNITY

## 2023-08-17 RX ORDER — ACETAMINOPHEN 500 MG
1000 TABLET ORAL ONCE
Status: CANCELLED | OUTPATIENT
Start: 2023-08-24

## 2023-08-17 RX ORDER — SODIUM CHLORIDE, SODIUM LACTATE, POTASSIUM CHLORIDE, CALCIUM CHLORIDE 600; 310; 30; 20 MG/100ML; MG/100ML; MG/100ML; MG/100ML
INJECTION, SOLUTION INTRAVENOUS CONTINUOUS
Status: CANCELLED | OUTPATIENT
Start: 2023-08-24

## 2023-08-17 ASSESSMENT — PAIN SCALES - GENERAL: PAINLEVEL_OUTOF10: 4

## 2023-08-17 NOTE — PERIOP NOTE

## 2023-08-17 NOTE — PERIOP NOTE
EKG not done during PAT visit. Last EKG done on 6/13/23 during cardiologist visit. EKG tracing on chart. Called pt in regards to K+ level. Luis Alebrto De Luna NP notified. Pt was told to increase potassium-rich foods in diet. Pt verbalized understanding.

## 2023-08-17 NOTE — PERIOP NOTE
Orthopedic and Spine Patients: Instructions on When You Can   Eat or Drink Before Surgery      You have been provided 2 pre-surgery drinks received at your pre-admission testing appointment. Night before surgery: You should drink 1 bottle of the  pre-surgery drink at bedtime. No food after midnight! Day of Surgery:  Complete 2nd bottle of the pre-surgery drink 1 hour prior to arrival at hospital.  For questions call Pre-Admission Testing at [phone]. They are available from 8:00am-5:00pm, Monday through Friday.

## 2023-08-18 ENCOUNTER — OFFICE VISIT (OUTPATIENT)
Facility: CLINIC | Age: 83
End: 2023-08-18
Payer: MEDICARE

## 2023-08-18 VITALS
WEIGHT: 144.3 LBS | TEMPERATURE: 98.9 F | OXYGEN SATURATION: 98 % | HEART RATE: 66 BPM | HEIGHT: 61 IN | BODY MASS INDEX: 27.24 KG/M2 | RESPIRATION RATE: 18 BRPM | DIASTOLIC BLOOD PRESSURE: 69 MMHG | SYSTOLIC BLOOD PRESSURE: 149 MMHG

## 2023-08-18 DIAGNOSIS — R73.02 IGT (IMPAIRED GLUCOSE TOLERANCE): ICD-10-CM

## 2023-08-18 DIAGNOSIS — I10 PRIMARY HYPERTENSION: ICD-10-CM

## 2023-08-18 DIAGNOSIS — E78.2 MIXED HYPERLIPIDEMIA: ICD-10-CM

## 2023-08-18 DIAGNOSIS — Z96.651 HISTORY OF TOTAL RIGHT KNEE REPLACEMENT (TKR): ICD-10-CM

## 2023-08-18 DIAGNOSIS — Z96.659 HISTORY OF REVISION OF TOTAL KNEE ARTHROPLASTY: ICD-10-CM

## 2023-08-18 DIAGNOSIS — N18.32 STAGE 3B CHRONIC KIDNEY DISEASE (HCC): ICD-10-CM

## 2023-08-18 DIAGNOSIS — Z01.818 PREOP EXAMINATION: Primary | ICD-10-CM

## 2023-08-18 DIAGNOSIS — M17.12 PRIMARY OSTEOARTHRITIS OF LEFT KNEE: ICD-10-CM

## 2023-08-18 DIAGNOSIS — Z96.641 H/O TOTAL HIP ARTHROPLASTY, RIGHT: ICD-10-CM

## 2023-08-18 LAB
BACTERIA SPEC CULT: NORMAL
BACTERIA SPEC CULT: NORMAL
SERVICE CMNT-IMP: NORMAL

## 2023-08-18 PROCEDURE — 99214 OFFICE O/P EST MOD 30 MIN: CPT | Performed by: INTERNAL MEDICINE

## 2023-08-18 PROCEDURE — G8427 DOCREV CUR MEDS BY ELIG CLIN: HCPCS | Performed by: INTERNAL MEDICINE

## 2023-08-18 PROCEDURE — G8419 CALC BMI OUT NRM PARAM NOF/U: HCPCS | Performed by: INTERNAL MEDICINE

## 2023-08-18 PROCEDURE — 1036F TOBACCO NON-USER: CPT | Performed by: INTERNAL MEDICINE

## 2023-08-18 PROCEDURE — 1090F PRES/ABSN URINE INCON ASSESS: CPT | Performed by: INTERNAL MEDICINE

## 2023-08-18 PROCEDURE — 3078F DIAST BP <80 MM HG: CPT | Performed by: INTERNAL MEDICINE

## 2023-08-18 PROCEDURE — G8399 PT W/DXA RESULTS DOCUMENT: HCPCS | Performed by: INTERNAL MEDICINE

## 2023-08-18 PROCEDURE — 1123F ACP DISCUSS/DSCN MKR DOCD: CPT | Performed by: INTERNAL MEDICINE

## 2023-08-18 PROCEDURE — 3077F SYST BP >= 140 MM HG: CPT | Performed by: INTERNAL MEDICINE

## 2023-08-18 ASSESSMENT — PATIENT HEALTH QUESTIONNAIRE - PHQ9
1. LITTLE INTEREST OR PLEASURE IN DOING THINGS: 0
2. FEELING DOWN, DEPRESSED OR HOPELESS: 0
SUM OF ALL RESPONSES TO PHQ QUESTIONS 1-9: 0
SUM OF ALL RESPONSES TO PHQ QUESTIONS 1-9: 0
SUM OF ALL RESPONSES TO PHQ9 QUESTIONS 1 & 2: 0
SUM OF ALL RESPONSES TO PHQ QUESTIONS 1-9: 0
SUM OF ALL RESPONSES TO PHQ QUESTIONS 1-9: 0

## 2023-08-18 ASSESSMENT — ANXIETY QUESTIONNAIRES
2. NOT BEING ABLE TO STOP OR CONTROL WORRYING: 0
6. BECOMING EASILY ANNOYED OR IRRITABLE: 0
4. TROUBLE RELAXING: 0
IF YOU CHECKED OFF ANY PROBLEMS ON THIS QUESTIONNAIRE, HOW DIFFICULT HAVE THESE PROBLEMS MADE IT FOR YOU TO DO YOUR WORK, TAKE CARE OF THINGS AT HOME, OR GET ALONG WITH OTHER PEOPLE: NOT DIFFICULT AT ALL
7. FEELING AFRAID AS IF SOMETHING AWFUL MIGHT HAPPEN: 0
3. WORRYING TOO MUCH ABOUT DIFFERENT THINGS: 0
5. BEING SO RESTLESS THAT IT IS HARD TO SIT STILL: 0
1. FEELING NERVOUS, ANXIOUS, OR ON EDGE: 0
GAD7 TOTAL SCORE: 0

## 2023-08-18 NOTE — PROGRESS NOTES
Marv Kellogg is a 82 y.o. female    Chief Complaint   Patient presents with    Pre-op Exam     1. Have you been to the ER, urgent care clinic since your last visit? Hospitalized since your last visit? No    2. Have you seen or consulted any other health care providers outside of the [address] [place] [address] since your last visit? Include any pap smears or colon screening.  No
palpated  MUSCULOSKELETAL: Extremities: no edema, pulse 1+   INTEGUMENT: No unusual rashes or suspicious skin lesions noted. Nails appear normal.  NEUROLOGIC: non-focal exam   MENTAL STATUS: alert and oriented, appropriate affect           ASSESSMENT:  1. Preop examination    2. Stage 3b chronic kidney disease ([address])    3. History of revision of total knee arthroplasty    4. H/O total hip arthroplasty, right    5. Primary hypertension    6. Primary osteoarthritis of left knee    7. History of total right knee replacement (TKR)    8. IGT (impaired glucose tolerance)    9. Mixed hyperlipidemia      Preoperative evaluation for her planned left total knee replacement. Patient's medical status is stable for the procedure. Laboratory studies are reviewed and remarkable for a potassium of 3.3, which will be corrected with bananas and OJ. I think she is at low cardiovascular risk for the procedure except related to her age. She has stage 3A CKD, which is stable. Blood pressure control is adequate, 140/70. She has impaired glucose tolerance and hemoglobin A1c is acceptable. History of dyslipidemia, also acceptable. She will be back to see us in four to six months, sooner if she has any problems. I have discussed the diagnosis with the patient and the intended plan as seen in the  Orders. The patient understands and agees with the plan. The patient has   received an after visit summary and questions were answered concerning  future plans  Patient labs and/or xrays were reviewed  Past records were reviewed. PLAN:  No orders of the defined types were placed in this encounter. Follow-up and Dispositions    Return in about 4 months (around 12/18/2023). ATTENTION:   This medical record was transcribed using an electronic medical records system. Although proofread, it may and can contain electronic and spelling errors. Other human spelling and other errors may be present.

## 2023-08-19 LAB
ABO + RH BLD: NORMAL
BLOOD GROUP ANTIBODIES SERPL: NORMAL
SPECIMEN EXP DATE BLD: NORMAL

## 2023-08-23 ENCOUNTER — ANESTHESIA EVENT (OUTPATIENT)
Facility: HOSPITAL | Age: 83
End: 2023-08-23
Payer: MEDICARE

## 2023-08-23 NOTE — ANESTHESIA PRE PROCEDURE
8.1 08/17/2023 10:45 AM    CALCIUM 9.5 08/17/2023 10:45 AM    BILITOT 0.5 08/17/2023 10:45 AM    ALKPHOS 152 08/17/2023 10:45 AM    ALKPHOS 137 09/19/2022 10:28 AM    AST 18 08/17/2023 10:45 AM    ALT 17 08/17/2023 10:45 AM       POC Tests: No results for input(s): POCGLU, POCNA, POCK, POCCL, POCBUN, POCHEMO, POCHCT in the last 72 hours. Coags:   Lab Results   Component Value Date/Time    PROTIME 10.5 08/17/2023 10:45 AM    INR 1.0 08/17/2023 10:45 AM       HCG (If Applicable): No results found for: PREGTESTUR, PREGSERUM, HCG, HCGQUANT     ABGs: No results found for: PHART, PO2ART, ZOP5SEK, UGI3IIE, BEART, T2NISYUJ     Type & Screen (If Applicable):  No results found for: LABABO, [address] [facility]    Drug/Infectious Status (If Applicable):  Lab Results   Component Value Date/Time    HEPCAB NONREACTIVE 10/21/2021 08:40 PM       COVID-19 Screening (If Applicable):   Lab Results   Component Value Date/Time    COVID19 Not detected 11/26/2021 01:30 PM           Anesthesia Evaluation  Patient summary reviewed and Nursing notes reviewed no history of anesthetic complications:   Airway: Mallampati: II  TM distance: >3 FB   Neck ROM: full  Mouth opening: > = 3 FB   Dental:    (+) edentulous      Pulmonary:Negative Pulmonary ROS and normal exam  breath sounds clear to auscultation                             Cardiovascular:    (+) hypertension:, dysrhythmias (atrial tachycardia):, hyperlipidemia        Rhythm: regular  Rate: normal                    Neuro/Psych:   Negative Neuro/Psych ROS               ROS comment: CERVICAL FUSION GI/Hepatic/Renal: Neg GI/Hepatic/Renal ROS  (+) renal disease: CRI,           Endo/Other:    (+) : arthritis: rheumatoid. , .                 Abdominal: normal exam            Vascular: negative vascular ROS.  + PVD, aortic or cerebral, . Other Findings:           Anesthesia Plan      spinal and regional     ASA 3     (Spinal + Adductor canal)  Induction: intravenous.     MIPS: Postoperative opioids

## 2023-08-24 ENCOUNTER — ANESTHESIA (OUTPATIENT)
Facility: HOSPITAL | Age: 83
End: 2023-08-24
Payer: MEDICARE

## 2023-08-24 ENCOUNTER — HOSPITAL ENCOUNTER (OUTPATIENT)
Facility: HOSPITAL | Age: 83
Setting detail: OBSERVATION
Discharge: HOME HEALTH | End: 2023-08-25
Attending: ORTHOPAEDIC SURGERY | Admitting: ORTHOPAEDIC SURGERY
Payer: MEDICARE

## 2023-08-24 PROBLEM — Z96.652 S/P TOTAL KNEE ARTHROPLASTY, LEFT: Status: ACTIVE | Noted: 2023-08-24

## 2023-08-24 PROCEDURE — 6360000002 HC RX W HCPCS: Performed by: STUDENT IN AN ORGANIZED HEALTH CARE EDUCATION/TRAINING PROGRAM

## 2023-08-24 PROCEDURE — 3700000001 HC ADD 15 MINUTES (ANESTHESIA): Performed by: ORTHOPAEDIC SURGERY

## 2023-08-24 PROCEDURE — 6360000002 HC RX W HCPCS: Performed by: PHYSICIAN ASSISTANT

## 2023-08-24 PROCEDURE — 2580000003 HC RX 258: Performed by: ORTHOPAEDIC SURGERY

## 2023-08-24 PROCEDURE — 2500000003 HC RX 250 WO HCPCS: Performed by: NURSE ANESTHETIST, CERTIFIED REGISTERED

## 2023-08-24 PROCEDURE — 97161 PT EVAL LOW COMPLEX 20 MIN: CPT

## 2023-08-24 PROCEDURE — 6360000002 HC RX W HCPCS

## 2023-08-24 PROCEDURE — 6370000000 HC RX 637 (ALT 250 FOR IP): Performed by: PHYSICIAN ASSISTANT

## 2023-08-24 PROCEDURE — 6360000002 HC RX W HCPCS: Performed by: ORTHOPAEDIC SURGERY

## 2023-08-24 PROCEDURE — 7100000001 HC PACU RECOVERY - ADDTL 15 MIN: Performed by: ORTHOPAEDIC SURGERY

## 2023-08-24 PROCEDURE — C1713 ANCHOR/SCREW BN/BN,TIS/BN: HCPCS | Performed by: ORTHOPAEDIC SURGERY

## 2023-08-24 PROCEDURE — 3600000015 HC SURGERY LEVEL 5 ADDTL 15MIN: Performed by: ORTHOPAEDIC SURGERY

## 2023-08-24 PROCEDURE — 6360000002 HC RX W HCPCS: Performed by: NURSE ANESTHETIST, CERTIFIED REGISTERED

## 2023-08-24 PROCEDURE — 97530 THERAPEUTIC ACTIVITIES: CPT

## 2023-08-24 PROCEDURE — 6370000000 HC RX 637 (ALT 250 FOR IP): Performed by: ORTHOPAEDIC SURGERY

## 2023-08-24 PROCEDURE — 3600000005 HC SURGERY LEVEL 5 BASE: Performed by: ORTHOPAEDIC SURGERY

## 2023-08-24 PROCEDURE — 7100000000 HC PACU RECOVERY - FIRST 15 MIN: Performed by: ORTHOPAEDIC SURGERY

## 2023-08-24 PROCEDURE — 3700000000 HC ANESTHESIA ATTENDED CARE: Performed by: ORTHOPAEDIC SURGERY

## 2023-08-24 PROCEDURE — 2580000003 HC RX 258: Performed by: NURSE ANESTHETIST, CERTIFIED REGISTERED

## 2023-08-24 PROCEDURE — C1776 JOINT DEVICE (IMPLANTABLE): HCPCS | Performed by: ORTHOPAEDIC SURGERY

## 2023-08-24 PROCEDURE — 2580000003 HC RX 258: Performed by: PHYSICIAN ASSISTANT

## 2023-08-24 PROCEDURE — G0378 HOSPITAL OBSERVATION PER HR: HCPCS

## 2023-08-24 PROCEDURE — 64447 NJX AA&/STRD FEMORAL NRV IMG: CPT | Performed by: STUDENT IN AN ORGANIZED HEALTH CARE EDUCATION/TRAINING PROGRAM

## 2023-08-24 PROCEDURE — 2709999900 HC NON-CHARGEABLE SUPPLY: Performed by: ORTHOPAEDIC SURGERY

## 2023-08-24 DEVICE — TRULIANT POROUS TIBIAL TRAY
Type: IMPLANTABLE DEVICE | Site: KNEE | Status: FUNCTIONAL
Brand: TRULIANT

## 2023-08-24 DEVICE — TRULIANT CR POROUS FEMORAL
Type: IMPLANTABLE DEVICE | Site: KNEE | Status: FUNCTIONAL
Brand: TRULIANT

## 2023-08-24 DEVICE — IMPLANTABLE DEVICE
Type: IMPLANTABLE DEVICE | Site: KNEE | Status: FUNCTIONAL
Brand: ALTEON

## 2023-08-24 DEVICE — IMPLANTABLE DEVICE
Type: IMPLANTABLE DEVICE | Site: KNEE | Status: FUNCTIONAL
Brand: TRULIANT

## 2023-08-24 DEVICE — COMPONENT TOT KNEE CAPPED K2 HEMI ADV CMNTLS K2EXACTECH: Type: IMPLANTABLE DEVICE | Status: FUNCTIONAL

## 2023-08-24 RX ORDER — FLECAINIDE ACETATE 100 MG/1
50 TABLET ORAL 2 TIMES DAILY
Status: DISCONTINUED | OUTPATIENT
Start: 2023-08-24 | End: 2023-08-25 | Stop reason: HOSPADM

## 2023-08-24 RX ORDER — ACETAMINOPHEN 650 MG
TABLET, EXTENDED RELEASE ORAL PRN
Status: DISCONTINUED | OUTPATIENT
Start: 2023-08-24 | End: 2023-08-24 | Stop reason: ALTCHOICE

## 2023-08-24 RX ORDER — AMLODIPINE BESYLATE 5 MG/1
5 TABLET ORAL DAILY
Status: DISCONTINUED | OUTPATIENT
Start: 2023-08-24 | End: 2023-08-24

## 2023-08-24 RX ORDER — SENNA AND DOCUSATE SODIUM 50; 8.6 MG/1; MG/1
1 TABLET, FILM COATED ORAL 2 TIMES DAILY
Status: DISCONTINUED | OUTPATIENT
Start: 2023-08-24 | End: 2023-08-25 | Stop reason: HOSPADM

## 2023-08-24 RX ORDER — 0.9 % SODIUM CHLORIDE 0.9 %
500 INTRAVENOUS SOLUTION INTRAVENOUS ONCE AS NEEDED
Status: DISCONTINUED | OUTPATIENT
Start: 2023-08-24 | End: 2023-08-25 | Stop reason: HOSPADM

## 2023-08-24 RX ORDER — ROPIVACAINE HYDROCHLORIDE 5 MG/ML
INJECTION, SOLUTION EPIDURAL; INFILTRATION; PERINEURAL
Status: COMPLETED | OUTPATIENT
Start: 2023-08-24 | End: 2023-08-24

## 2023-08-24 RX ORDER — PHENYLEPHRINE HCL IN 0.9% NACL 0.4MG/10ML
SYRINGE (ML) INTRAVENOUS PRN
Status: DISCONTINUED | OUTPATIENT
Start: 2023-08-24 | End: 2023-08-24 | Stop reason: SDUPTHER

## 2023-08-24 RX ORDER — TRAMADOL HYDROCHLORIDE 50 MG/1
100 TABLET ORAL EVERY 12 HOURS PRN
Status: DISCONTINUED | OUTPATIENT
Start: 2023-08-24 | End: 2023-08-25 | Stop reason: HOSPADM

## 2023-08-24 RX ORDER — SODIUM CHLORIDE 0.9 % (FLUSH) 0.9 %
5-40 SYRINGE (ML) INJECTION PRN
Status: DISCONTINUED | OUTPATIENT
Start: 2023-08-24 | End: 2023-08-25 | Stop reason: HOSPADM

## 2023-08-24 RX ORDER — BISACODYL 10 MG
10 SUPPOSITORY, RECTAL RECTAL DAILY PRN
Status: DISCONTINUED | OUTPATIENT
Start: 2023-08-24 | End: 2023-08-25 | Stop reason: HOSPADM

## 2023-08-24 RX ORDER — TRAMADOL HYDROCHLORIDE 50 MG/1
50 TABLET ORAL EVERY 12 HOURS PRN
Status: DISCONTINUED | OUTPATIENT
Start: 2023-08-24 | End: 2023-08-25 | Stop reason: HOSPADM

## 2023-08-24 RX ORDER — ACETAMINOPHEN 500 MG
1000 TABLET ORAL ONCE
Status: COMPLETED | OUTPATIENT
Start: 2023-08-24 | End: 2023-08-24

## 2023-08-24 RX ORDER — SODIUM CHLORIDE 0.9 % (FLUSH) 0.9 %
5-40 SYRINGE (ML) INJECTION EVERY 12 HOURS SCHEDULED
Status: DISCONTINUED | OUTPATIENT
Start: 2023-08-24 | End: 2023-08-25 | Stop reason: HOSPADM

## 2023-08-24 RX ORDER — ACETAMINOPHEN 325 MG/1
650 TABLET ORAL EVERY 6 HOURS
Status: DISCONTINUED | OUTPATIENT
Start: 2023-08-24 | End: 2023-08-25 | Stop reason: HOSPADM

## 2023-08-24 RX ORDER — DIPHENHYDRAMINE HYDROCHLORIDE 50 MG/ML
25 INJECTION INTRAMUSCULAR; INTRAVENOUS EVERY 6 HOURS PRN
Status: DISCONTINUED | OUTPATIENT
Start: 2023-08-24 | End: 2023-08-25 | Stop reason: HOSPADM

## 2023-08-24 RX ORDER — SODIUM CHLORIDE, SODIUM LACTATE, POTASSIUM CHLORIDE, CALCIUM CHLORIDE 600; 310; 30; 20 MG/100ML; MG/100ML; MG/100ML; MG/100ML
INJECTION, SOLUTION INTRAVENOUS CONTINUOUS PRN
Status: DISCONTINUED | OUTPATIENT
Start: 2023-08-24 | End: 2023-08-24 | Stop reason: SDUPTHER

## 2023-08-24 RX ORDER — ONDANSETRON 2 MG/ML
4 INJECTION INTRAMUSCULAR; INTRAVENOUS EVERY 6 HOURS PRN
Status: DISCONTINUED | OUTPATIENT
Start: 2023-08-24 | End: 2023-08-25 | Stop reason: HOSPADM

## 2023-08-24 RX ORDER — ATORVASTATIN CALCIUM 40 MG/1
40 TABLET, FILM COATED ORAL NIGHTLY
Status: DISCONTINUED | OUTPATIENT
Start: 2023-08-24 | End: 2023-08-25 | Stop reason: HOSPADM

## 2023-08-24 RX ORDER — MORPHINE SULFATE 2 MG/ML
2 INJECTION, SOLUTION INTRAMUSCULAR; INTRAVENOUS
Status: ACTIVE | OUTPATIENT
Start: 2023-08-24 | End: 2023-08-25

## 2023-08-24 RX ORDER — DIPHENHYDRAMINE HCL 25 MG
25 CAPSULE ORAL EVERY 6 HOURS PRN
Status: DISCONTINUED | OUTPATIENT
Start: 2023-08-24 | End: 2023-08-25 | Stop reason: HOSPADM

## 2023-08-24 RX ORDER — ONDANSETRON 2 MG/ML
INJECTION INTRAMUSCULAR; INTRAVENOUS PRN
Status: DISCONTINUED | OUTPATIENT
Start: 2023-08-24 | End: 2023-08-24 | Stop reason: SDUPTHER

## 2023-08-24 RX ORDER — FAMOTIDINE 20 MG/1
10 TABLET, FILM COATED ORAL DAILY
Status: DISCONTINUED | OUTPATIENT
Start: 2023-08-24 | End: 2023-08-25 | Stop reason: HOSPADM

## 2023-08-24 RX ORDER — ONDANSETRON 4 MG/1
4 TABLET, ORALLY DISINTEGRATING ORAL EVERY 8 HOURS PRN
Status: DISCONTINUED | OUTPATIENT
Start: 2023-08-24 | End: 2023-08-25 | Stop reason: HOSPADM

## 2023-08-24 RX ORDER — AMLODIPINE BESYLATE 5 MG/1
5 TABLET ORAL DAILY
Status: DISCONTINUED | OUTPATIENT
Start: 2023-08-25 | End: 2023-08-25 | Stop reason: HOSPADM

## 2023-08-24 RX ORDER — ASPIRIN 81 MG/1
81 TABLET ORAL 2 TIMES DAILY
Status: DISCONTINUED | OUTPATIENT
Start: 2023-08-24 | End: 2023-08-25 | Stop reason: HOSPADM

## 2023-08-24 RX ORDER — TRANEXAMIC ACID 100 MG/ML
INJECTION, SOLUTION INTRAVENOUS PRN
Status: DISCONTINUED | OUTPATIENT
Start: 2023-08-24 | End: 2023-08-24 | Stop reason: SDUPTHER

## 2023-08-24 RX ORDER — SODIUM CHLORIDE, SODIUM LACTATE, POTASSIUM CHLORIDE, CALCIUM CHLORIDE 600; 310; 30; 20 MG/100ML; MG/100ML; MG/100ML; MG/100ML
INJECTION, SOLUTION INTRAVENOUS CONTINUOUS
Status: DISCONTINUED | OUTPATIENT
Start: 2023-08-24 | End: 2023-08-24 | Stop reason: HOSPADM

## 2023-08-24 RX ORDER — ROPIVACAINE HYDROCHLORIDE 5 MG/ML
INJECTION, SOLUTION EPIDURAL; INFILTRATION; PERINEURAL PRN
Status: DISCONTINUED | OUTPATIENT
Start: 2023-08-24 | End: 2023-08-24 | Stop reason: ALTCHOICE

## 2023-08-24 RX ORDER — DEXAMETHASONE SODIUM PHOSPHATE 4 MG/ML
INJECTION, SOLUTION INTRA-ARTICULAR; INTRALESIONAL; INTRAMUSCULAR; INTRAVENOUS; SOFT TISSUE PRN
Status: DISCONTINUED | OUTPATIENT
Start: 2023-08-24 | End: 2023-08-24 | Stop reason: SDUPTHER

## 2023-08-24 RX ORDER — PROPOFOL 10 MG/ML
INJECTION, EMULSION INTRAVENOUS PRN
Status: DISCONTINUED | OUTPATIENT
Start: 2023-08-24 | End: 2023-08-24 | Stop reason: SDUPTHER

## 2023-08-24 RX ORDER — POLYETHYLENE GLYCOL 3350 17 G/17G
17 POWDER, FOR SOLUTION ORAL DAILY
Status: DISCONTINUED | OUTPATIENT
Start: 2023-08-24 | End: 2023-08-25 | Stop reason: HOSPADM

## 2023-08-24 RX ORDER — SODIUM CHLORIDE 9 MG/ML
INJECTION, SOLUTION INTRAVENOUS CONTINUOUS
Status: DISCONTINUED | OUTPATIENT
Start: 2023-08-24 | End: 2023-08-25 | Stop reason: HOSPADM

## 2023-08-24 RX ADMIN — ASPIRIN 81 MG: 81 TABLET, COATED ORAL at 16:12

## 2023-08-24 RX ADMIN — Medication 80 MCG: at 10:34

## 2023-08-24 RX ADMIN — PROPOFOL 50 MCG/KG/MIN: 10 INJECTION, EMULSION INTRAVENOUS at 09:22

## 2023-08-24 RX ADMIN — SODIUM CHLORIDE, PRESERVATIVE FREE 10 ML: 5 INJECTION INTRAVENOUS at 21:36

## 2023-08-24 RX ADMIN — PROPOFOL 25 MG: 10 INJECTION, EMULSION INTRAVENOUS at 08:50

## 2023-08-24 RX ADMIN — ROPIVACAINE HYDROCHLORIDE 20 ML: 5 INJECTION, SOLUTION EPIDURAL; INFILTRATION; PERINEURAL at 09:00

## 2023-08-24 RX ADMIN — SENNOSIDES AND DOCUSATE SODIUM 1 TABLET: 50; 8.6 TABLET ORAL at 16:12

## 2023-08-24 RX ADMIN — ACETAMINOPHEN 650 MG: 325 TABLET ORAL at 21:31

## 2023-08-24 RX ADMIN — MEPIVACAINE HYDROCHLORIDE 55 MG: 20 INJECTION, SOLUTION EPIDURAL; INFILTRATION at 08:55

## 2023-08-24 RX ADMIN — ACETAMINOPHEN 650 MG: 325 TABLET ORAL at 16:12

## 2023-08-24 RX ADMIN — PROPOFOL 5060 MG: 10 INJECTION, EMULSION INTRAVENOUS at 09:21

## 2023-08-24 RX ADMIN — Medication 80 MCG: at 09:55

## 2023-08-24 RX ADMIN — Medication 3 AMPULE: at 08:18

## 2023-08-24 RX ADMIN — SODIUM CHLORIDE, POTASSIUM CHLORIDE, SODIUM LACTATE AND CALCIUM CHLORIDE: 600; 310; 30; 20 INJECTION, SOLUTION INTRAVENOUS at 10:25

## 2023-08-24 RX ADMIN — DEXAMETHASONE SODIUM PHOSPHATE 8 MG: 4 INJECTION INTRA-ARTICULAR; INTRALESIONAL; INTRAMUSCULAR; INTRAVENOUS; SOFT TISSUE at 09:40

## 2023-08-24 RX ADMIN — WATER 2000 MG: 1 INJECTION INTRAMUSCULAR; INTRAVENOUS; SUBCUTANEOUS at 09:21

## 2023-08-24 RX ADMIN — SODIUM CHLORIDE: 9 INJECTION, SOLUTION INTRAVENOUS at 13:31

## 2023-08-24 RX ADMIN — ATORVASTATIN CALCIUM 40 MG: 40 TABLET, FILM COATED ORAL at 21:31

## 2023-08-24 RX ADMIN — Medication 40 MCG: at 09:47

## 2023-08-24 RX ADMIN — ACETAMINOPHEN 1000 MG: 500 TABLET ORAL at 08:17

## 2023-08-24 RX ADMIN — TRANEXAMIC ACID 1000 MG: 100 INJECTION, SOLUTION INTRAVENOUS at 09:25

## 2023-08-24 RX ADMIN — FAMOTIDINE 10 MG: 20 TABLET, FILM COATED ORAL at 16:12

## 2023-08-24 RX ADMIN — TRAMADOL HYDROCHLORIDE 50 MG: 50 TABLET ORAL at 12:46

## 2023-08-24 RX ADMIN — SODIUM CHLORIDE, POTASSIUM CHLORIDE, SODIUM LACTATE AND CALCIUM CHLORIDE: 600; 310; 30; 20 INJECTION, SOLUTION INTRAVENOUS at 09:12

## 2023-08-24 RX ADMIN — WATER 2000 MG: 1 INJECTION INTRAMUSCULAR; INTRAVENOUS; SUBCUTANEOUS at 17:06

## 2023-08-24 RX ADMIN — Medication 80 MCG: at 10:09

## 2023-08-24 RX ADMIN — ONDANSETRON 4 MG: 2 INJECTION INTRAMUSCULAR; INTRAVENOUS at 09:36

## 2023-08-24 RX ADMIN — FLECAINIDE ACETATE 50 MG: 100 TABLET ORAL at 21:31

## 2023-08-24 ASSESSMENT — PAIN DESCRIPTION - LOCATION: LOCATION: KNEE

## 2023-08-24 ASSESSMENT — PAIN - FUNCTIONAL ASSESSMENT: PAIN_FUNCTIONAL_ASSESSMENT: 0-10

## 2023-08-24 ASSESSMENT — PAIN DESCRIPTION - DESCRIPTORS
DESCRIPTORS: DULL;ACHING
DESCRIPTORS: TENDER

## 2023-08-24 ASSESSMENT — PAIN SCALES - GENERAL: PAINLEVEL_OUTOF10: 1

## 2023-08-24 ASSESSMENT — PAIN DESCRIPTION - ORIENTATION: ORIENTATION: LEFT

## 2023-08-24 NOTE — ANESTHESIA PROCEDURE NOTES
Peripheral Block    Patient location during procedure: pre-op  Reason for block: post-op pain management and at surgeon's request  Start time: 8/24/2023 8:55 AM  End time: 8/24/2023 9:00 AM  Staffing  Performed: anesthesiologist   Anesthesiologist: Emma Patterson MD  Preanesthetic Checklist  Completed: patient identified, IV checked, site marked, risks and benefits discussed, surgical/procedural consents, equipment checked, pre-op evaluation, timeout performed, anesthesia consent given, oxygen available, monitors applied/VS acknowledged and fire risk safety assessment completed and verbalized  Peripheral Block   Patient position: supine  Prep: ChloraPrep  Provider prep: mask and sterile gloves  Patient monitoring: cardiac monitor, continuous pulse ox, frequent blood pressure checks, IV access and oxygen  Block type: Saphenous  Laterality: left  Injection technique: single-shot  Guidance: ultrasound guided  Local infiltration: lidocaine  Local infiltration: lidocaine    Needle   Needle type: insulated echogenic nerve stimulator needle   Needle gauge: 21 G  Needle localization: anatomical landmarks and ultrasound guidance  Needle length: 10 cm  Assessment   Injection assessment: negative aspiration for heme, no paresthesia on injection, local visualized surrounding nerve on ultrasound and no intravascular symptoms  Paresthesia pain: none  Slow fractionated injection: yes  Hemodynamics: stable  Real-time US image taken/store: yes  Outcomes: uncomplicated and patient tolerated procedure well    Medications Administered  ropivacaine (NAROPIN) injection 0.5% - Perineural   20 mL - 8/24/2023 9:00:00 AM

## 2023-08-24 NOTE — ANESTHESIA PROCEDURE NOTES
Spinal Block    Patient location during procedure: pre-op  End time: 8/24/2023 8:55 AM  Reason for block: primary anesthetic  Staffing  Performed: anesthesiologist   Anesthesiologist: Tommy Mitchell MD  Spinal Block  Patient position: sitting  Prep: DuraPrep  Patient monitoring: cardiac monitor, continuous pulse ox, frequent blood pressure checks and oxygen  Approach: midline  Location: L4/L5  Provider prep: mask and sterile gloves  Local infiltration: lidocaine  Needle  Needle type: Quincke   Needle gauge: 22 G  Needle length: 5 in  Assessment  Sensory level: T4  Events: None  Swirl obtained: Yes  CSF: clear  Attempts: 2  Hemodynamics: stable  Preanesthetic Checklist  Completed: patient identified, IV checked, site marked, risks and benefits discussed, surgical/procedural consents, equipment checked, pre-op evaluation, timeout performed, anesthesia consent given, oxygen available, monitors applied/VS acknowledged and fire risk safety assessment completed and verbalized

## 2023-08-24 NOTE — OP NOTE
were explained to the patient and they wished to proceed. They understood no guarantees could be given about the outcome of the procedure. DESCRIPTION OF PROCEDURE:  The patient was brought in to the operating room and placed supine on a standard OR table. Anesthesia was provided by the anesthesia team without difficulty. A thigh tourniquet was applied to the operative limb which was then prepped and draped in the usual fashion. Pre-operative antibiotics were administered. An appropriate time-out was performed. The limb was exsanguinated with an Esmarch and tourniquet inflated. A standard medial parapatellar arthrotomy was used. The fat pad was excised. The patella was then subluxed laterally and attention turned to the femur. Navigation was used after the registration sequence to make the appropriate distal femoral cut, and drill for the lugs of the 4-in-1 guide. The femoral cut was confirmed with navigation. The ACL was excised along with the anterior portions of the menisci. The  4-in-1 guide position was confirmed with navigation and pinned in parallel to the epicondylar axis and perpendicular to Baraga's line. The anterior, posterior and chamfer cuts were performed, protecting the PCL and collateral ligaments at all times. The posterior capsule was cleared and any osteophytes were removed. Attention was then turned to the tibia. The navigation system was used to perform the tibial cut perpendicular to the mechanical axis. The remainder of the menisci were excised and the tibia sized. The patella was noted to be in acceptable condition and was not resurfaced. Selective denervation was performed. Trial components were then placed and the knee taken through a range of motion and found to have excellent range of motion, stability, and ligamentous balance. The rotation of the tibial tray was marked and the trials removed.  The trial tibial tray was reinserted and the tibial prepared for the keel of the tray.      The final implants were then impacted into place and two 6.5mm tibial screws placed. The tibial tray liner was inserted into the locking mechanism and the knee reduced. It was again taken through a range of motion and found to be stable in all planes with excellent tracking of the patella. The wound was thoroughly lavaged. The extensor mechanism was repaired with heavy interrupted suture and a running stitch. Periarticular injection was performed. Skin closure was then performed in layers. Sterile compressive dressings were applied. The patient was awakened, moved to the stretcher and taken to the recovery room in stable condition. At the conclusion of the procedure, all counts were correct. There no immediate complications.

## 2023-08-24 NOTE — PROGRESS NOTES
TRANSFER - IN REPORT:    Verbal report received from [facility]  being received from PACU for routine post-op      Report consisted of patient's Situation, Background, Assessment and   Recommendations(SBAR). Information from the following report(s) Nurse Handoff Report and Index was reviewed with the receiving nurse. Opportunity for questions and clarification was provided. Assessment completed upon patient's arrival to unit and care assumed.

## 2023-08-24 NOTE — ANESTHESIA POSTPROCEDURE EVALUATION
Department of Anesthesiology  Postprocedure Note    Patient: Sherren Herder  MRN: [id number]  YOB: 1940  Date of evaluation: 8/24/2023      Procedure Summary     Date: 08/24/23 Room / Location: [facility] MAIN OR M8 / [facility] MAIN OR    Anesthesia Start: 0912 Anesthesia Stop: 5771    Procedure: LEFT TOTAL KNEE ARTHROPLASTY WITH NAVIGATION (Left: Knee) Diagnosis:       Localized osteoarthritis of left knee      (Localized osteoarthritis of left knee [M17.12])    Providers: Hilda Brandon MD Responsible Provider: Eliecer Jalloh MD    Anesthesia Type: Regional, TIVA, Spinal ASA Status: 3          Anesthesia Type: Regional, TIVA, Spinal    Rachael Phase I: Rachael Score: 10    Rachael Phase II:        Anesthesia Post Evaluation    Patient location during evaluation: PACU  Patient participation: complete - patient participated  Level of consciousness: sleepy but conscious  Airway patency: patent  Nausea & Vomiting: no nausea and no vomiting  Complications: no  Cardiovascular status: hemodynamically stable  Respiratory status: acceptable and nasal cannula  Hydration status: stable  Multimodal analgesia pain management approach

## 2023-08-24 NOTE — H&P
Omar Tapia MD - Adult Reconstruction and Total Joint Replacement     Orthopaedic History and Physical        NAME: Maria Del Rosario Armstrong       :  1940       MRN:  [id number]        Subjective:   Patient ID: Lauryn Welsh is a 82 y.o. female. Chief Complaint: Follow-up of the Left Knee and Follow-up of the Right Knee    Lauryn Welsh is a pleasant 82 y.o. female for evaluation of left knee pain. Her right knee gives her occasional discomfort nearly 1 year out from her revision but she tends to think it is overuse as it has become her dominant leg. Generally her right knee feels very well. Left knee is giving her severe disabling pain. She has difficulty with basic daily activities despite being very active and independent at her age. Relying on a cane for the left knee.     Patient Active Problem List    Diagnosis Date Noted    PAD (peripheral artery disease) ([address]) 2023    S/P revision of total knee, right 2022    History of revision of total knee arthroplasty 2022    Trigger finger, right middle finger 2022    Sinus tachycardia 2022    Primary localized osteoarthritis of right hip 2022    H/O total hip arthroplasty, right 2022    Preop examination 2022    CKD (chronic kidney disease), stage III ([address])     S/P colonoscopy 2021    DJD (degenerative joint disease) of knee 10/21/2021    Osteoarthritis of right hip 2021    Pes anserine bursitis 2020    Knee pain, left     IGT (impaired glucose tolerance) 10/08/2018    Family history of colon cancer     Glaucoma screening 03/15/2017    ACP (advance care planning) 2016    Anemia 10/06/2014    HTN (hypertension) 2014    Hyperlipidemia 2014    History of total right knee replacement (TKR) 2011     Past Medical History:   Diagnosis Date    Advance care planning     documents pending    Anemia     Arthropathy     Chronic kidney disease     CKD (chronic kidney disease), stage III

## 2023-08-24 NOTE — H&P
Date of Surgery Update:  Gustavo Ocampo was seen and examined on the day of surgery prior to the procedure. There were no significant clinical changes since the completion of the History and Physical.    Exam today prior to surgery showed no acute cardiac findings, no respiratory difficulty, and no abdominal complaints or pain. This patient is a candidate for TXA. The patient was counseled at length about the risks of hanna Covid-19 during their perioperative period and any recovery window from their procedure. The patient was made aware that hanna Covid-19  may worsen their prognosis for recovering from their procedure and lend to a higher morbidity and/or mortality risk. All material risks, benefits, and reasonable alternatives including postponing the procedure were discussed. The patient does wish to proceed with the procedure at this time. Documentation of Medical Necessity:    Symptoms: pain with activity and at rest, antalgia, interferes with ADLs    Conservative Treatment: activity modification, multiple medications, injection    Physical Findings: painful AROM/PROM, antalgia on ambulation, no trochanteric pain    See PCP/Cardiology/PAT/Anesthesia record for cardiopulmonary evaluation. Imaging: significant OA, sclerosis and osteophytes    Indications:   Failure of conservative treatments with daily pain and functional limitations. Appropriate imaging demonstrating significant disease. Appropriate physical findings consistent with significant degenerative joint disease. All pertinent risks, benefits, and alternatives to operative management including continued conservative care were explained at length. The patient has elected to proceed with appropriately indicated and medically necessary total joint arthroplasty. They understand no guarantees can be given about the outcome.     Signed By: MILIND Ross     August 24, 2023 8:52 AM

## 2023-08-24 NOTE — PROGRESS NOTES
End of Shift Note    Bedside shift change report given to mp (oncoming nurse) by Ken Goff RN (offgoing nurse). Report included the following information SBAR and Kardex    Shift worked:  days     Shift summary and any significant changes:     Pod 0, orthostatic with therapy. Tolerating pivoting to [facility] with nursing. Concerns for physician to address:       Zone phone for oncoming shift:   [phone]       Activity:     Number times ambulated in hallways past shift: 0  Number of times OOB to chair past shift: 0    Cardiac:   Cardiac Monitoring: No           Access:  Current line(s): PIV     Genitourinary:   Urinary status: voiding    Respiratory:      Chronic home O2 use?: NO  Incentive spirometer at bedside: NO       GI:     Current diet:  ADULT DIET; Regular  Passing flatus: NO  Tolerating current diet: NO       Pain Management:   Patient states pain is manageable on current regimen: YES    Skin:     Interventions: float heels, increase time out of bed, and PT/OT consult    Patient Safety:  Fall Score:    Interventions: bed/chair alarm, assistive device (walker, cane.  etc), and gripper socks       Length of Stay:  Expected LOS: 1  Actual LOS: 0      Ken Goff RN

## 2023-08-24 NOTE — PERIOP NOTE
0800 - PT DENIES FEVER, COLD, COUGH, SOB, N/V, DIARRHEA. ...... PRE-OP TCHING DONE - PT VERBALIZES UNDERSTANDING. STRETCHER IN LOWEST POSITION, CB IN PLACE AND SR UP X2.    [id number] - TIMEOUT DONE - DR. PIPER AT BEDSIDE TO PLACE SPINAL. PT AYDIN WELL.  VSS.    7929 - SECOND TIMEOUT DONE - DR. PIPER AT BEDSIDE TO PLACE LEFT KNEE BLOCK. PT AYDIN WELL.  VSS.

## 2023-08-25 ENCOUNTER — HOME HEALTH ADMISSION (OUTPATIENT)
Dept: HOME HEALTH SERVICES | Facility: HOME HEALTH | Age: 83
End: 2023-08-25
Payer: MEDICARE

## 2023-08-25 VITALS
WEIGHT: 141.76 LBS | HEART RATE: 74 BPM | DIASTOLIC BLOOD PRESSURE: 70 MMHG | TEMPERATURE: 98.2 F | BODY MASS INDEX: 26.76 KG/M2 | SYSTOLIC BLOOD PRESSURE: 152 MMHG | HEIGHT: 61 IN | OXYGEN SATURATION: 97 % | RESPIRATION RATE: 16 BRPM

## 2023-08-25 LAB
ANION GAP SERPL CALC-SCNC: 7 MMOL/L (ref 5–15)
BUN SERPL-MCNC: 16 MG/DL (ref 6–20)
BUN/CREAT SERPL: 13 (ref 12–20)
CALCIUM SERPL-MCNC: 8.9 MG/DL (ref 8.5–10.1)
CHLORIDE SERPL-SCNC: 108 MMOL/L (ref 97–108)
CO2 SERPL-SCNC: 26 MMOL/L (ref 21–32)
CREAT SERPL-MCNC: 1.26 MG/DL (ref 0.55–1.02)
GLUCOSE SERPL-MCNC: 102 MG/DL (ref 65–100)
HCT VFR BLD AUTO: 33 % (ref 35–47)
HGB BLD-MCNC: 10.2 G/DL (ref 11.5–16)
POTASSIUM SERPL-SCNC: 3.9 MMOL/L (ref 3.5–5.1)
SODIUM SERPL-SCNC: 141 MMOL/L (ref 136–145)

## 2023-08-25 PROCEDURE — 85014 HEMATOCRIT: CPT

## 2023-08-25 PROCEDURE — 97116 GAIT TRAINING THERAPY: CPT

## 2023-08-25 PROCEDURE — G0378 HOSPITAL OBSERVATION PER HR: HCPCS

## 2023-08-25 PROCEDURE — 6370000000 HC RX 637 (ALT 250 FOR IP)

## 2023-08-25 PROCEDURE — 6370000000 HC RX 637 (ALT 250 FOR IP): Performed by: PHYSICIAN ASSISTANT

## 2023-08-25 PROCEDURE — 85018 HEMOGLOBIN: CPT

## 2023-08-25 PROCEDURE — 97530 THERAPEUTIC ACTIVITIES: CPT

## 2023-08-25 PROCEDURE — 36415 COLL VENOUS BLD VENIPUNCTURE: CPT

## 2023-08-25 PROCEDURE — 2580000003 HC RX 258: Performed by: PHYSICIAN ASSISTANT

## 2023-08-25 PROCEDURE — 80048 BASIC METABOLIC PNL TOTAL CA: CPT

## 2023-08-25 PROCEDURE — 6360000002 HC RX W HCPCS: Performed by: PHYSICIAN ASSISTANT

## 2023-08-25 RX ORDER — ASPIRIN 81 MG/1
81 TABLET ORAL 2 TIMES DAILY
Qty: 30 TABLET | Refills: 3 | Status: SHIPPED | OUTPATIENT
Start: 2023-08-25

## 2023-08-25 RX ORDER — ACETAMINOPHEN 325 MG/1
650 TABLET ORAL EVERY 6 HOURS
Qty: 120 TABLET | Refills: 3 | Status: SHIPPED | COMMUNITY
Start: 2023-08-25

## 2023-08-25 RX ORDER — FAMOTIDINE 10 MG
10 TABLET ORAL DAILY
Qty: 14 TABLET | Refills: 0 | Status: SHIPPED | OUTPATIENT
Start: 2023-08-25 | End: 2023-09-08

## 2023-08-25 RX ADMIN — AMLODIPINE BESYLATE 5 MG: 5 TABLET ORAL at 08:34

## 2023-08-25 RX ADMIN — ACETAMINOPHEN 650 MG: 325 TABLET ORAL at 06:51

## 2023-08-25 RX ADMIN — ASPIRIN 81 MG: 81 TABLET, COATED ORAL at 06:51

## 2023-08-25 RX ADMIN — SODIUM CHLORIDE: 9 INJECTION, SOLUTION INTRAVENOUS at 02:12

## 2023-08-25 RX ADMIN — METOPROLOL TARTRATE 37.5 MG: 25 TABLET, FILM COATED ORAL at 08:34

## 2023-08-25 RX ADMIN — SODIUM CHLORIDE, PRESERVATIVE FREE 10 ML: 5 INJECTION INTRAVENOUS at 08:35

## 2023-08-25 RX ADMIN — POLYETHYLENE GLYCOL 3350 17 G: 17 POWDER, FOR SOLUTION ORAL at 08:34

## 2023-08-25 RX ADMIN — SENNOSIDES AND DOCUSATE SODIUM 1 TABLET: 50; 8.6 TABLET ORAL at 06:51

## 2023-08-25 RX ADMIN — ACETAMINOPHEN 650 MG: 325 TABLET ORAL at 08:34

## 2023-08-25 RX ADMIN — FLECAINIDE ACETATE 50 MG: 100 TABLET ORAL at 08:34

## 2023-08-25 RX ADMIN — WATER 2000 MG: 1 INJECTION INTRAMUSCULAR; INTRAVENOUS; SUBCUTANEOUS at 02:07

## 2023-08-25 ASSESSMENT — PAIN SCALES - GENERAL: PAINLEVEL_OUTOF10: 0

## 2023-08-25 NOTE — CARE COORDINATION
Pt is clear from CM standpoint. CM introduce self, explain role and confirm demographics with pt. Referral sent to [facility] and [facility] was able to accept pt. Referral was sent to At [address] [place] [address] too but unable to accept due to pt's insurance.     Marisol Galvez

## 2023-08-25 NOTE — PLAN OF CARE
Problem: Physical Therapy - Adult  Goal: By Discharge: Performs mobility at highest level of function for planned discharge setting. See evaluation for individualized goals. Description: FUNCTIONAL STATUS PRIOR TO ADMISSION: Pt independent and active PTA. Limited by L knee pain     HOME SUPPORT PRIOR TO ADMISSION: Pt lives alone in 2 story home, 2 CRUZ (wide landings with sidewalk between). Has RW and BSC. 13 steps to 2nd floor with rail, bedroom upstairs but possibly able to stay on first floor. Family (niece and sister) to stay with pt at [place] for several days. Physical Therapy Goals  Initiated 8/24/2023  1. Patient will move from supine to sit and sit to supine, scoot up and down, and roll side to side in bed with supervision/set-up within 7 day(s). 2.  Patient will perform sit to stand with supervision/set-up within 7 day(s). 3.  Patient will transfer from bed to chair and chair to bed with contact guard assist using the least restrictive device within 7 day(s). 4.  Patient will ambulate with contact guard assist for 100 feet with the least restrictive device within 7 day(s).    5.  Patient will ascend/descend 2 stairs with no handrail(s) with contact guard assist within 7 day(s).   8/25/2023 1003 by Lisa Sofia PTA  Outcome: Progressing     Problem: Pain  Goal: Verbalizes/displays adequate comfort level or baseline comfort level  8/25/2023 1016 by Jonathan Fernandes RN  Outcome: Adequate for Discharge  8/25/2023 0854 by Jonathan Fernandes RN  Outcome: Progressing  8/24/2023 2305 by Mayra Sampson RN  Outcome: Progressing  Flowsheets (Taken 8/24/2023 2305)  Verbalizes/displays adequate comfort level or baseline comfort level:   Encourage patient to monitor pain and request assistance   Assess pain using appropriate pain scale   Administer analgesics based on type and severity of pain and evaluate response   Implement non-pharmacological measures as appropriate and evaluate response   Consider orders to increase flexion toward goal   Instruct patient/family in ordered activity level  Goal: Maintain proper alignment of affected body part  8/25/2023 1016 by Shruti Villalba RN  Outcome: Adequate for Discharge  8/25/2023 8041 by Shruti Villalba RN  Outcome: Progressing  Flowsheets (Taken 8/25/2023 0744)  Maintain proper alignment of affected body part: Support and protect limb and body alignment per provider's orders  8/24/2023 2305 by Loki Mobley RN  Outcome: Progressing  Flowsheets (Taken 8/24/2023 2305)  Maintain proper alignment of affected body part:   Support and protect limb and body alignment per provider's orders   Instruct and reinforce with patient and family use of appropriate assistive device and precautions (e.g. spinal or hip dislocation precautions)  Goal: Return ADL status to a safe level of function  8/25/2023 1016 by Shruti Villalba RN  Outcome: Adequate for Discharge  8/25/2023 2916 by Shruti Villalba RN  Outcome: Progressing  Flowsheets (Taken 8/25/2023 0744)  Return ADL Status to a Safe Level of Function: Administer medication as ordered  8/24/2023 2305 by Loki Mobley RN  Outcome: Progressing  Flowsheets (Taken 8/24/2023 2305)  Return ADL Status to a Safe Level of Function:   Administer medication as ordered   Assess activities of daily living deficits and provide assistive devices as needed   Obtain physical therapy/occupational therapy consults as needed   Assist and instruct patient to increase activity and self care as tolerated

## 2023-08-25 NOTE — PLAN OF CARE
Problem: Pain  Goal: Verbalizes/displays adequate comfort level or baseline comfort level  Outcome: Progressing  Flowsheets (Taken 8/24/2023 2305)  Verbalizes/displays adequate comfort level or baseline comfort level:   Encourage patient to monitor pain and request assistance   Assess pain using appropriate pain scale   Administer analgesics based on type and severity of pain and evaluate response   Implement non-pharmacological measures as appropriate and evaluate response   Consider cultural and social influences on pain and pain management   Notify Licensed Independent Practitioner if interventions unsuccessful or patient reports new pain     Problem: Safety - Adult  Goal: Free from fall injury  Outcome: Progressing  Flowsheets (Taken 8/24/2023 2305)  Free From Fall Injury:   Instruct family/caregiver on patient safety   Based on caregiver fall risk screen, instruct family/caregiver to ask for assistance with transferring infant if caregiver noted to have fall risk factors     Problem: ABCDS Injury Assessment  Goal: Absence of physical injury  Outcome: Progressing  Flowsheets (Taken 8/24/2023 2305)  Absence of Physical Injury: Implement safety measures based on patient assessment     Problem: Physical Therapy - Adult  Goal: By Discharge: Performs mobility at highest level of function for planned discharge setting. See evaluation for individualized goals. Description: FUNCTIONAL STATUS PRIOR TO ADMISSION: Pt independent and active PTA. Limited by L knee pain     HOME SUPPORT PRIOR TO ADMISSION: Pt lives alone in 2 story home, 2 CRUZ (wide landings with sidewalk between). Has RW and BSC. 13 steps to 2nd floor with rail, bedroom upstairs but possibly able to stay on first floor. Family (niece and sister) to stay with pt at [place] for several days. Physical Therapy Goals  Initiated 8/24/2023  1.   Patient will move from supine to sit and sit to supine, scoot up and down, and roll side to side in bed with practices   Implement preventative oral hygiene regimen   Implement oral medicated treatments as ordered     Problem: Musculoskeletal - Adult  Goal: Return mobility to safest level of function  Outcome: Progressing  Flowsheets (Taken 8/24/2023 2305)  Return Mobility to Safest Level of Function:   Assess patient stability and activity tolerance for standing, transferring and ambulating with or without assistive devices   Assist with transfers and ambulation using safe patient handling equipment as needed   Ensure adequate protection for wounds/incisions during mobilization   Obtain physical therapy/occupational therapy consults as needed   Apply continuous passive motion per provider or physical therapy orders to increase flexion toward goal   Instruct patient/family in ordered activity level  Goal: Maintain proper alignment of affected body part  Outcome: Progressing  Flowsheets (Taken 8/24/2023 2305)  Maintain proper alignment of affected body part:   Support and protect limb and body alignment per provider's orders   Instruct and reinforce with patient and family use of appropriate assistive device and precautions (e.g. spinal or hip dislocation precautions)  Goal: Return ADL status to a safe level of function  Outcome: Progressing  Flowsheets (Taken 8/24/2023 2305)  Return ADL Status to a Safe Level of Function:   Administer medication as ordered   Assess activities of daily living deficits and provide assistive devices as needed   Obtain physical therapy/occupational therapy consults as needed   Assist and instruct patient to increase activity and self care as tolerated

## 2023-08-25 NOTE — PLAN OF CARE
Problem: Physical Therapy - Adult  Goal: By Discharge: Performs mobility at highest level of function for planned discharge setting. See evaluation for individualized goals. Description: FUNCTIONAL STATUS PRIOR TO ADMISSION: Pt independent and active PTA. Limited by L knee pain     HOME SUPPORT PRIOR TO ADMISSION: Pt lives alone in 2 story home, 2 CRUZ (wide landings with sidewalk between). Has RW and BSC. 13 steps to 2nd floor with rail, bedroom upstairs but possibly able to stay on first floor. Family (niece and sister) to stay with pt at [place] for several days. Physical Therapy Goals  Initiated 8/24/2023  1. Patient will move from supine to sit and sit to supine, scoot up and down, and roll side to side in bed with supervision/set-up within 7 day(s). 2.  Patient will perform sit to stand with supervision/set-up within 7 day(s). 3.  Patient will transfer from bed to chair and chair to bed with contact guard assist using the least restrictive device within 7 day(s). 4.  Patient will ambulate with contact guard assist for 100 feet with the least restrictive device within 7 day(s). 5.  Patient will ascend/descend 2 stairs with no handrail(s) with contact guard assist within 7 day(s). Outcome: Progressing   PHYSICAL THERAPY TREATMENT    Patient: Bry Sharp (18 y.o. female)  Date: 8/25/2023  Diagnosis: Localized osteoarthritis of left knee [M17.12]  S/P total knee arthroplasty, left [Z96.652] S/P total knee arthroplasty, left  Procedure(s) (LRB):  LEFT TOTAL KNEE ARTHROPLASTY WITH NAVIGATION (Left) 1 Day Post-Op  Precautions: LLE WBAT    ASSESSMENT:  Patient continues to benefit from skilled PT services and is progressing towards goals. Pt was received seated upright and very agreeable to participate with therapy services. Pt reports no pain at rest and with no complaints of pain with mobility.  Pt moves well overall with standby assist and was able to ambulate to the orthopedic gym and back 400ft with use of RW. Pt had fair gait speed and good reciprocal gait pattern. Pt performed car transfers well with supervision and stair training, ascending/descending 8 steps using railings as support and cues for proper sequencing with no safety concerns. Pt has been cleared from a physical therapy standpoint. PLAN:  Patient continues to benefit from skilled intervention to address the above impairments. Continue treatment per established plan of care. Recommendation for discharge: (in order for the patient to meet his/her long term goals): Therapy 2 days/week in the home and assist for safety    Other factors to consider for discharge: has support    IF patient discharges home will need the following DME: patient owns DME required for discharge       SUBJECTIVE:   Patient stated, \"suprisingly there's no pain at all. \"    OBJECTIVE DATA SUMMARY:   Critical Behavior:  Orientation  Overall Orientation Status: Within Normal Limits  Orientation Level: Oriented X4  Cognition  Overall Cognitive Status: WNL    Functional Mobility Training:  Bed Mobility:  Bed Mobility Training  Bed Mobility Training: No (pt was seated upright upon arrival)  Transfers:  Transfer Training  Transfer Training: Yes  Interventions: Safety awareness training;Verbal cues  Sit to Stand: Stand-by assistance  Stand to Sit: Stand-by assistance  Car Transfer: Supervision  Balance:  Balance  Sitting: Intact  Standing: With support  Standing - Static: Constant support;Good  Standing - Dynamic: Good;Constant support   Ambulation/Gait Training:     Gait  Overall Level of Assistance: Stand-by assistance; Adaptive equipment  Interventions: Safety awareness training;Verbal cues  Base of Support: Widened  Speed/Janet: Pace decreased (< 100 feet/min)  Step Length: Right shortened;Left shortened  Gait Abnormalities: Decreased step clearance  Distance (ft): 400 Feet (200ft x2)  Assistive Device: Gait belt;Walker, rolling    Pain Rating:  No pain rating received

## 2023-08-25 NOTE — PLAN OF CARE
Healing Without Sign and Symptoms of Infection:   ADMISSION and DAILY: Assess and document risk factors for pressure ulcer development   TWICE DAILY: Assess and document skin integrity   TWICE DAILY: Assess and document dressing/incision, wound bed, drain sites and surrounding tissue   Implement wound care per orders   Initiate isolation precautions as appropriate   Initiate pressure ulcer prevention bundle as indicated  Goal: Oral mucous membranes remain intact  8/25/2023 0854 by Dwayne Mooney RN  Outcome: Progressing  Flowsheets (Taken 8/25/2023 0744)  Oral Mucous Membranes Remain Intact: Assess oral mucosa and hygiene practices  8/24/2023 2305 by Leanne Mendez RN  Outcome: Progressing  Flowsheets (Taken 8/24/2023 2305)  Oral Mucous Membranes Remain Intact:   Assess oral mucosa and hygiene practices   Implement preventative oral hygiene regimen   Implement oral medicated treatments as ordered     Problem: Musculoskeletal - Adult  Goal: Return mobility to safest level of function  8/25/2023 0854 by Dwayne Mooney RN  Outcome: Progressing  Flowsheets (Taken 8/25/2023 0744)  Return Mobility to Safest Level of Function: Assess patient stability and activity tolerance for standing, transferring and ambulating with or without assistive devices  8/24/2023 2305 by Leanne Mendez RN  Outcome: Progressing  Flowsheets (Taken 8/24/2023 2305)  Return Mobility to Safest Level of Function:   Assess patient stability and activity tolerance for standing, transferring and ambulating with or without assistive devices   Assist with transfers and ambulation using safe patient handling equipment as needed   Ensure adequate protection for wounds/incisions during mobilization   Obtain physical therapy/occupational therapy consults as needed   Apply continuous passive motion per provider or physical therapy orders to increase flexion toward goal   Instruct patient/family in ordered activity level  Goal: Maintain proper alignment of affected body part  8/25/2023 0854 by Krishna Helms RN  Outcome: Progressing  Flowsheets (Taken 8/25/2023 5057)  Maintain proper alignment of affected body part: Support and protect limb and body alignment per provider's orders  8/24/2023 2305 by Loni Harris RN  Outcome: Progressing  Flowsheets (Taken 8/24/2023 2305)  Maintain proper alignment of affected body part:   Support and protect limb and body alignment per provider's orders   Instruct and reinforce with patient and family use of appropriate assistive device and precautions (e.g. spinal or hip dislocation precautions)  Goal: Return ADL status to a safe level of function  8/25/2023 0854 by Krishna Helms RN  Outcome: Progressing  Flowsheets (Taken 8/25/2023 0744)  Return ADL Status to a Safe Level of Function: Administer medication as ordered  8/24/2023 2305 by Loni Harris RN  Outcome: Progressing  Flowsheets (Taken 8/24/2023 2305)  Return ADL Status to a Safe Level of Function:   Administer medication as ordered   Assess activities of daily living deficits and provide assistive devices as needed   Obtain physical therapy/occupational therapy consults as needed   Assist and instruct patient to increase activity and self care as tolerated

## 2023-08-25 NOTE — PLAN OF CARE
Problem: Pain  Goal: Verbalizes/displays adequate comfort level or baseline comfort level  8/25/2023 1018 by Rebekah Zhao RN  Outcome: Progressing  8/25/2023 1016 by Rebekah Zaho RN  Outcome: Adequate for Discharge  8/25/2023 0854 by Rebekah Zhao RN  Outcome: Progressing  8/24/2023 2305 by Caleb Nicholson RN  Outcome: Progressing  Flowsheets (Taken 8/24/2023 2305)  Verbalizes/displays adequate comfort level or baseline comfort level:   Encourage patient to monitor pain and request assistance   Assess pain using appropriate pain scale   Administer analgesics based on type and severity of pain and evaluate response   Implement non-pharmacological measures as appropriate and evaluate response   Consider cultural and social influences on pain and pain management   Notify Licensed Independent Practitioner if interventions unsuccessful or patient reports new pain     Problem: Safety - Adult  Goal: Free from fall injury  8/25/2023 1018 by Rebekah Zhao RN  Outcome: Progressing  8/25/2023 1016 by Rebekah Zhao RN  Outcome: Adequate for Discharge  8/25/2023 0854 by Rebekah Zhao RN  Outcome: Progressing  8/24/2023 2305 by Caleb Nicholson RN  Outcome: Progressing  Flowsheets (Taken 8/24/2023 2305)  Free From Fall Injury:   Instruct family/caregiver on patient safety   Based on caregiver fall risk screen, instruct family/caregiver to ask for assistance with transferring infant if caregiver noted to have fall risk factors     Problem: ABCDS Injury Assessment  Goal: Absence of physical injury  8/25/2023 1018 by Rebekah Zhao RN  Outcome: Progressing  8/25/2023 1016 by Rebekah Zhao RN  Outcome: Adequate for Discharge  8/25/2023 0854 by Rebekah Zhao RN  Outcome: Progressing  8/24/2023 2305 by Caleb Nicholson RN  Outcome: Progressing  Flowsheets (Taken 8/24/2023 2305)  Absence of Physical Injury: Implement safety measures based on patient assessment     Problem: Physical Therapy - Function: Assess patient stability and activity tolerance for standing, transferring and ambulating with or without assistive devices  8/24/2023 2305 by Nga Rodrigez RN  Outcome: Progressing  Flowsheets (Taken 8/24/2023 2305)  Return Mobility to Safest Level of Function:   Assess patient stability and activity tolerance for standing, transferring and ambulating with or without assistive devices   Assist with transfers and ambulation using safe patient handling equipment as needed   Ensure adequate protection for wounds/incisions during mobilization   Obtain physical therapy/occupational therapy consults as needed   Apply continuous passive motion per provider or physical therapy orders to increase flexion toward goal   Instruct patient/family in ordered activity level  Goal: Maintain proper alignment of affected body part  8/25/2023 1018 by Chon Nugent RN  Outcome: Progressing  8/25/2023 1016 by Chon Nugent RN  Outcome: Adequate for Discharge  8/25/2023 0854 by Chon Nugent RN  Outcome: Progressing  Flowsheets (Taken 8/25/2023 0744)  Maintain proper alignment of affected body part: Support and protect limb and body alignment per provider's orders  8/24/2023 2305 by Nga Rodrigez RN  Outcome: Progressing  Flowsheets (Taken 8/24/2023 2305)  Maintain proper alignment of affected body part:   Support and protect limb and body alignment per provider's orders   Instruct and reinforce with patient and family use of appropriate assistive device and precautions (e.g. spinal or hip dislocation precautions)  Goal: Return ADL status to a safe level of function  8/25/2023 1018 by Chon Nugent RN  Outcome: Progressing  8/25/2023 1016 by Chon Nugent RN  Outcome: Adequate for Discharge  8/25/2023 0854 by Chon Nugent RN  Outcome: Progressing  Flowsheets (Taken 8/25/2023 0744)  Return ADL Status to a Safe Level of Function: Administer medication as ordered  8/24/2023 2305 by Nga Rodrigez

## 2023-08-25 NOTE — PROGRESS NOTES
DISCHARGE NOTE FROM [facility]    Patient determined to be stable for discharge by attending provider. I have reviewed the discharge instructions with the patient. They verbalized understanding and all questions were answered to their satisfaction. No complaints or further questions were expressed. Medications sent to pharmacy. Appropriate educational materials and medication side effect teaching were provided. PIV were removed prior to discharge. Patient did not discharge with any line, wilson, or drain. Personal items and valuables accounted for at discharge by patient and/or family: Yes    Post-op patient: Yes-Patient given post-op discharge kit and instructed on use. Yue Mariscal RN     Pt awaiting family arrival, once here she will be discharged home with personal belongings.

## 2023-08-25 NOTE — PROGRESS NOTES
End of Shift Note    Bedside shift change report given to GRACE Francis (oncoming nurse) by Marciano Coleman RN (offgoing nurse). Report included the following information SBAR and Kardex    Shift worked:  night     Shift summary and any significant changes:     POD 1, orthostatic with therapy yesterday. Tolerating pivoting to [facility] with nursing, voiding. Pt c/o no pain, tylenol given. Concerns for physician to address:  See above     Zone phone for oncoming shift:   [phone]       Activity:     Number times ambulated in hallways past shift: 0  Number of times OOB to chair past shift: 0    Cardiac:   Cardiac Monitoring: No           Access:  Current line(s): PIV     Genitourinary:   Urinary status: voiding    Respiratory:      Chronic home O2 use?: NO  Incentive spirometer at bedside: NO       GI:     Current diet:  ADULT DIET; Regular  Passing flatus: NO  Tolerating current diet: NO       Pain Management:   Patient states pain is manageable on current regimen: YES    Skin:     Interventions: float heels, increase time out of bed, and PT/OT consult    Patient Safety:  Fall Score:    Interventions: bed/chair alarm, assistive device (walker, cane.  etc), and gripper socks       Length of Stay:  Expected LOS: 1  Actual LOS: 0      Marciano Coleman RN

## 2023-08-26 ENCOUNTER — HOME CARE VISIT (OUTPATIENT)
Facility: HOME HEALTH | Age: 83
End: 2023-08-26

## 2023-08-26 VITALS
DIASTOLIC BLOOD PRESSURE: 64 MMHG | HEART RATE: 90 BPM | SYSTOLIC BLOOD PRESSURE: 138 MMHG | WEIGHT: 141 LBS | TEMPERATURE: 97.4 F | OXYGEN SATURATION: 100 % | RESPIRATION RATE: 16 BRPM | BODY MASS INDEX: 26.42 KG/M2

## 2023-08-26 PROCEDURE — G0151 HHCP-SERV OF PT,EA 15 MIN: HCPCS

## 2023-08-26 PROCEDURE — 0221000100 HH NO PAY CLAIM PROCEDURE

## 2023-08-26 ASSESSMENT — ENCOUNTER SYMPTOMS
PAIN LOCATION - PAIN QUALITY: ACHING, THROBBING, STIFF
DYSPNEA ACTIVITY LEVEL: AFTER AMBULATING MORE THAN 20 FT

## 2023-08-26 NOTE — HOME HEALTH
Skilled reason for admission/summary of clinical condition: 82year old female admitted for PT due to recent hospitalization s/p left TKR  Diagnosis: left TKR  Subjective: \"i'm doing better but my knee hurts. \"  Caregiver: relative. Caregiver assists with: Meals, IADL and Transportation Caregiver unable to assist with: needs instruction in home program, safely assisting patient with mobility. Caregiver is available Regularly Caregiver is  present at this visit and did participate with clinician. Medications reconciled and all medications are available in the home this visit. The following education was provided regarding medications: medication interactions and look alike medications: n/a. Patient/CG able to demonstrate knowledge through teach back with 100 percent accuracy. Medications are somewhat effective at this time. notified of any discrepancies/medication interactions n/a. A list of reconciled medications has been given to the patient/caregiver  and a copy has been uploaded to media.     High alert medication teaching on aspirin 81, antiplatelet therapy education;purpose, dose, and frequency, food/drug interactions, risks of co-administration with other medications such as ASA, NSAID, and herbals, bleeding prevention strategies such as the use of a soft toothbrush, gentle flossing, use of electric razor, on the potential for increased bleeding from falls and lacerations, to notify medical providers of antiplatelet therapy, consider carrying an ID card, signs and symptoms of abnormal bleeding such as unexplained bruising, dizziness/lightheadedness, red or tarry looking stool, blood in urine, blood in vomit and to call home care agency and/or physician for any problems or concerns    High alert medication teaching on tramadol- Opioid medication education Purpose, dose, and frequency, Proper storage Store in the original packaging, Keep in a locked cabinet, lockbox, or other location where they can't

## 2023-08-30 ENCOUNTER — HOME CARE VISIT (OUTPATIENT)
Facility: HOME HEALTH | Age: 83
End: 2023-08-30

## 2023-08-30 PROCEDURE — G0151 HHCP-SERV OF PT,EA 15 MIN: HCPCS

## 2023-08-31 VITALS
HEART RATE: 84 BPM | DIASTOLIC BLOOD PRESSURE: 84 MMHG | RESPIRATION RATE: 16 BRPM | TEMPERATURE: 97.7 F | SYSTOLIC BLOOD PRESSURE: 137 MMHG | OXYGEN SATURATION: 98 %

## 2023-08-31 ASSESSMENT — ENCOUNTER SYMPTOMS: PAIN LOCATION - PAIN QUALITY: ACHY/SORE

## 2023-08-31 NOTE — HOME HEALTH
Subjective: I am getting better each day    Falls since last visit No(if yes complete the Fall Tracking Form and include bsrifallreport):   Caregiver involvement changes: None. Pt is staying at home alone but has caregiver that lives next door that assists as needed    Home health supplies by type and quantity ordered/delivered this visit include: NA    Clinician asked if patient has had any physician contact since last home care visit and patient states: NO  Clinician asked if patient has any new or changed medications and patient states:  NO   If Yes, were medications reconciled? NA  Was the certifying physician notified of changes in medications? NA    Clinical assessment (what this visit means for the patient overall and need for ongoing skilled care) and progress or lack of progress towards SPECIFIC goals: Skilled PT is needed for rehab from L TKR. Pt has pain, weakness and impaired mobility that require PT instruction. Written Teaching Material Utilized: None yet. HEP to be built and established. Interdisciplinary communication with: N/A     Discharge planning as follows: Is no longer homebound, Will discharge when the patient has reached their maximum functional potential and maximum safety in their home and When goals are met    Specific plan for next visit: Instruct caregiver/patient in home therex for strength and ROM improvement of the L knee,  Progress therex as tolerated.

## 2023-09-01 ENCOUNTER — HOME CARE VISIT (OUTPATIENT)
Facility: HOME HEALTH | Age: 83
End: 2023-09-01
Payer: MEDICARE

## 2023-09-01 PROCEDURE — G0151 HHCP-SERV OF PT,EA 15 MIN: HCPCS

## 2023-09-04 ENCOUNTER — HOME CARE VISIT (OUTPATIENT)
Facility: HOME HEALTH | Age: 83
End: 2023-09-04
Payer: MEDICARE

## 2023-09-04 VITALS
SYSTOLIC BLOOD PRESSURE: 126 MMHG | TEMPERATURE: 97.2 F | OXYGEN SATURATION: 98 % | HEART RATE: 84 BPM | RESPIRATION RATE: 16 BRPM | DIASTOLIC BLOOD PRESSURE: 60 MMHG

## 2023-09-04 PROCEDURE — G0151 HHCP-SERV OF PT,EA 15 MIN: HCPCS

## 2023-09-04 ASSESSMENT — ENCOUNTER SYMPTOMS: PAIN LOCATION - PAIN QUALITY: ACHY/SORE

## 2023-09-04 NOTE — HOME HEALTH
Subjective: I am getting better each day    Falls since last visit No(if yes complete the Fall Tracking Form and include bsrifallreport):   Caregiver involvement changes: None. Pt is staying at home alone but has caregiver that lives next door that assists as needed    Home health supplies by type and quantity ordered/delivered this visit include: NA    Clinician asked if patient has had any physician contact since last home care visit and patient states: NO  Clinician asked if patient has any new or changed medications and patient states:  NO   If Yes, were medications reconciled? NA  Was the certifying physician notified of changes in medications? NA    Clinical assessment (what this visit means for the patient overall and need for ongoing skilled care) and progress or lack of progress towards SPECIFIC goals: Skilled PT is needed for rehab from L TKR. Pt has pain, weakness and impaired mobility that require PT instruction. Written Teaching Material Utilized: None yet. HEP to be built and established. Interdisciplinary communication with: N/A     Discharge planning as follows: Is no longer homebound, Will discharge when the patient has reached their maximum functional potential and maximum safety in their home and When goals are met    Specific plan for next visit: Instruct caregiver/patient in home therex for strength and ROM improvement of the L knee,  Progress therex as tolerated. Continue stair transfer training to ensure patient can be independent in the home.

## 2023-09-06 VITALS
OXYGEN SATURATION: 97 % | HEART RATE: 80 BPM | DIASTOLIC BLOOD PRESSURE: 60 MMHG | TEMPERATURE: 97.3 F | RESPIRATION RATE: 16 BRPM | SYSTOLIC BLOOD PRESSURE: 125 MMHG

## 2023-09-06 ASSESSMENT — ENCOUNTER SYMPTOMS: PAIN LOCATION - PAIN QUALITY: ACHY/SORE

## 2023-09-06 NOTE — HOME HEALTH
Subjective: I am getting better each day. My pain is less. Falls since last visit No(if yes complete the Fall Tracking Form and include bsrifallreport):   Caregiver involvement changes: None. Pt is staying at home alone but has caregiver that lives next door that assists as needed    Home health supplies by type and quantity ordered/delivered this visit include: NA    Clinician asked if patient has had any physician contact since last home care visit and patient states: NO  Clinician asked if patient has any new or changed medications and patient states:  NO   If Yes, were medications reconciled? NA  Was the certifying physician notified of changes in medications? NA    Clinical assessment (what this visit means for the patient overall and need for ongoing skilled care) and progress or lack of progress towards SPECIFIC goals: Skilled PT is needed for rehab from L TKR. Pt has pain, weakness and impaired mobility that require PT instruction. Written Teaching Material Utilized:  Written HEP. Interdisciplinary communication with: N/A     Discharge planning as follows: Is no longer homebound, Will discharge when the patient has reached their maximum functional potential and maximum safety in their home and When goals are met    Specific plan for next visit: Instruct caregiver/patient in home therex for strength and ROM improvement of the L knee,  Progress therex as tolerated. Continue stair transfer training to ensure patient can be independent in the home.

## 2023-09-08 ENCOUNTER — HOME CARE VISIT (OUTPATIENT)
Facility: HOME HEALTH | Age: 83
End: 2023-09-08
Payer: MEDICARE

## 2023-09-08 PROCEDURE — G0151 HHCP-SERV OF PT,EA 15 MIN: HCPCS

## 2023-09-11 VITALS
RESPIRATION RATE: 16 BRPM | OXYGEN SATURATION: 98 % | SYSTOLIC BLOOD PRESSURE: 126 MMHG | DIASTOLIC BLOOD PRESSURE: 75 MMHG | HEART RATE: 78 BPM | TEMPERATURE: 97.5 F

## 2023-09-11 ASSESSMENT — ENCOUNTER SYMPTOMS: PAIN LOCATION - PAIN QUALITY: SORE

## 2023-09-11 NOTE — HOME HEALTH
Subjective: My doctor said I am doing great and he gave me a script for OP PT to start in 10 days. Falls since last visit No(if yes complete the Fall Tracking Form and include bsrifallreport):   Caregiver involvement changes: None. Home health supplies by type and quantity ordered/delivered this visit include: NA    Clinician asked if patient has had any physician contact since last home care visit and patient states: NO  Clinician asked if patient has any new or changed medications and patient states:  NO   If Yes, were medications reconciled? NA  Was the certifying physician notified of changes in medications? NA    Clinical assessment (what this visit means for the patient overall and need for ongoing skilled care) and progress or lack of progress towards SPECIFIC goals: Skilled PT is needed for rehab from L TKR. Pt has pain, weakness and impaired mobility that require PT instruction. Written Teaching Material Utilized:  Written HEP. Interdisciplinary communication with: N/A     Discharge planning as follows: Home/Self care with transition to OP therapy.

## 2023-09-17 PROBLEM — Z01.818 PREOP EXAMINATION: Status: RESOLVED | Noted: 2022-04-01 | Resolved: 2023-09-17

## 2023-09-21 RX ORDER — AMLODIPINE BESYLATE 5 MG/1
5 TABLET ORAL DAILY
Qty: 90 TABLET | Refills: 3 | OUTPATIENT
Start: 2023-09-21

## 2024-05-14 NOTE — PROGRESS NOTES
EP/ ARRHYTHMIA    Patient ID:  Patient: Beena Ponce  MRN: [id number]  Age: 80 y.o.  : 1940    Date of  Admission: 2022  8:35 AM   PCP:  Roger Wade MD    Assessment:   Paroxysmal ectopic atrial tachycardia, probably focal mechanism. Has frequent PAC's as well. NO atrial fibrillation has been seen. Hypertension. Hyperlipidemia. CKD stage 3. Rheumatoid arthritis. S/p R knee replacement revision this admission. Plan:     She's minimally symptomatic, but more tachycardia later in admission. Started low dose flecainide 50 mg po BID, first dose  PM.  See if this helps quiet her atrial tachycardia. Risks:benefits discussed with her prior. Continue metoprolol ER but increase to 37.5 mg daily. I want her to take this in the AM, not at night, going forward. I answered all her questions. The flecainide will take 2-3 days to build up effect. Would tolerate some intermittent tachycardia, this is a CHRONIC issue for her. We can F/U with her in the office in 2-3 weeks (OK to see the NP). [x]       High complexity decision making was performed in this patient    Beena Ponce is a 80 y.o. female with a history of an irregular rhythm and tachycardia. She has shown paroxysmal atrial tachycardia on tele. Describes mild palpitations, but no chest pain, dyspnea, dizziness, syncope, dyspnea. No orthopnea, PND, or edema. No TIA or stroke symptoms.       Allergies   Allergen Reactions    Codeine Itching          Current Facility-Administered Medications   Medication Dose Route Frequency    flecainide (TAMBOCOR) tablet 50 mg  50 mg Oral Q12H    famotidine (PEPCID) tablet 20 mg  20 mg Oral DAILY    metoprolol succinate (TOPROL-XL) XL tablet 25 mg  25 mg Oral QHS    atorvastatin (LIPITOR) tablet 40 mg  40 mg Oral QHS    sodium chloride (NS) flush 5-40 mL  5-40 mL IntraVENous Q8H    sodium chloride (NS) flush 5-40 mL  5-40 mL IntraVENous PRN    acetaminophen (TYLENOL) tablet 650 mg  650 mg Oral Q6H    naloxone (NARCAN) injection 0.4 mg  0.4 mg IntraVENous PRN    ondansetron (ZOFRAN ODT) tablet 4 mg  4 mg Oral Q6H PRN    diphenhydrAMINE (BENADRYL) 12.5 mg/5 mL oral liquid 12.5 mg  12.5 mg Oral Q6H PRN    senna-docusate (PERICOLACE) 8.6-50 mg per tablet 1 Tablet  1 Tablet Oral BID    polyethylene glycol (MIRALAX) packet 17 g  17 g Oral DAILY    bisacodyL (DULCOLAX) suppository 10 mg  10 mg Rectal DAILY PRN    aspirin delayed-release tablet 81 mg  81 mg Oral BID    traMADoL (ULTRAM) tablet  mg   mg Oral Q6H PRN    celecoxib (CELEBREX) capsule 200 mg  200 mg Oral DAILY       Review of Symptoms:  See HPI as well.   General: negative for fever, chills, sweats, weakness, weight loss   Eyes: negative for blurred vision, eye pain, loss of vision, diplopia   Ear Nose and Throat: negative for rhinorrhea, pharyngitis, otalgia, tinnitus, speech or swallowing difficulties   Respiratory: negative for SOB, cough, sputum production, wheezing, QUESADA, pleuritic pain   Cardiology: negative for chest pain, orthopnea, PND, edema, syncope   Gastrointestinal: negative for abdominal pain, N/V, dysphagia, change in bowel habits, bleeding   Genitourinary: negative for frequency, urgency, dysuria, hematuria, incontinence   Muskuloskeletal : POSITIVE for joint pain  Hematology: negative for easy bruising, bleeding, lymphadenopathy   Dermatological: negative for rash, ulceration, mole change, new lesion   Endocrine: negative for hot flashes or polydipsia   Neurological: negative for headache, dizziness, confusion, focal weakness, paresthesia, memory loss, gait disturbance   Psychological: negative for anxiety, depression, agitation       Objective:      Physical Exam:  Temp (24hrs), Av.9 °F (36.6 °C), Min:97.8 °F (36.6 °C), Max:98.1 °F (36.7 °C)    Patient Vitals for the past 8 hrs:   Pulse   22 0753 (!) 118   22 0628 (!) 117      Patient Vitals for the past 8 hrs:   Resp 09/29/22 0753 18      Patient Vitals for the past 8 hrs:   BP   09/29/22 0753 (!) 138/90   09/29/22 0628 136/74          Intake/Output Summary (Last 24 hours) at 9/29/2022 0830  Last data filed at 9/29/2022 0226  Gross per 24 hour   Intake --   Output 2250 ml   Net -2250 ml       Nondiaphoretic, not in acute distress. Unlabored, clear to auscultation bilaterally, symmetric air movement. Regular rate and rhythm, no murmur, pericardial rub, knock, or gallop. No JVD or peripheral edema. Palpable radial pulses bilaterally. Abdomen, soft, nontender, nondistended. Extremities without cyanosis or clubbing. Muscle tone and bulk normal.  The right KNEE has a vertical bandage atop it. Skin warm and dry. No rashes or ulcers. Neuro grossly nonfocal.  No tremor. Awake and appropriate. CARDIOGRAPHICS and STUDIES, I reviewed:    Telemetry:  SR with PAC's, paroxysmal atrial tachycardia. ECHO 3/2022:   Left Ventricle Left ventricle size is normal. Findings consistent with mild concentric hypertrophy. Normal wall motion. Normal left ventricular systolic function with a visually estimated EF of 55 - 60%. Indeterminate diastolic function. Left Atrium Left atrium size is normal.   Right Ventricle Right ventricle size is normal. Normal wall thickness. Normal systolic function. Right Atrium Right atrium is mildly dilated. Aortic Valve Not well visualized. No transvalvular regurgitation. No stenosis. Mitral Valve Valve structure is normal. No transvalvular regurgitation. No stenosis noted. Tricuspid Valve Valve structure is normal. Mild to moderate transvalvular regurgitation. No stenosis noted. Normal RVSP. RVSP is 34 mmHg. Pulmonic Valve The pulmonic valve was not well visualized. Aorta Normal sized sinus of Valsalva. IVC/Hepatic Veins IVC diameter is less than or equal to 21 mm and decreases greater than 50% during inspiration; therefore the estimated right atrial pressure is normal (~3 mmHg). Pericardium No pericardial effusion. Labs:  No results for input(s): CPK, CKMB, CKNDX, TROIQ in the last 72 hours. No lab exists for component: CPKMB  Lab Results   Component Value Date/Time    Cholesterol, total 141 02/22/2022 11:50 AM    HDL Cholesterol 48 02/22/2022 11:50 AM    LDL, calculated 79 02/22/2022 11:50 AM    Triglyceride 70 02/22/2022 11:50 AM    CHOL/HDL Ratio 2.9 02/22/2022 11:50 AM     No results for input(s): INR, PTP, APTT, INREXT, INREXT in the last 72 hours. Recent Labs     09/29/22  0404 09/28/22  0320 09/27/22  0432    145 140   K 3.9 3.8 4.3   * 113* 107   CO2 24 26 26   BUN 19 19 18   CREA 1.29* 1.22* 1.49*   GLU 98 118* 141*   CA 9.2 8.4* 9.0   HGB 8.7* 7.5* 8.9*       No results for input(s): AP, TBIL, TP, ALB, GLOB, GGT, AML, LPSE in the last 72 hours. No lab exists for component: SGOT, GPT, AMYP, HLPSE  No components found for: GLPOC  No results for input(s): PH, PCO2, PO2 in the last 72 hours.         John Carr MD  9/29/2022 800

## (undated) DEVICE — TRAP SURG QUAD PARABOLA SLOT DSGN SFTY SCRN TRAPEASE

## (undated) DEVICE — GLOVE SURG SZ 8 L12IN FNGR THK79MIL GRN LTX FREE

## (undated) DEVICE — QUILTED PREMIUM COMFORT UNDERPAD,EXTRA HEAVY: Brand: WINGS

## (undated) DEVICE — TRANSFER SET 3": Brand: MEDLINE INDUSTRIES, INC.

## (undated) DEVICE — SOLUTION IRRIG 1000ML STRL H2O USP PLAS POUR BTL

## (undated) DEVICE — 1200 GUARD II KIT W/5MM TUBE W/O VAC TUBE: Brand: GUARDIAN

## (undated) DEVICE — DRESSING WND ISLAND STD 4X10 IN MULT LAYR STRL SILVERLON

## (undated) DEVICE — WRISTBAND ID AD W1XL11.5IN RED POLY ALRG PREPRINTED PERM

## (undated) DEVICE — Z DISCONTINUED NO SUB IDED SET EXTN W/ 4 W STPCOCK M SPIN LOK 36IN

## (undated) DEVICE — PREP KIT PEEL PTCH POVIDONE IOD

## (undated) DEVICE — ZIMMER® STERILE DISPOSABLE TOURNIQUET CUFF WITH PROTECTIVE SLEEVE AND PLC, DUAL PORT, SINGLE BLADDER, 34 IN. (86 CM)

## (undated) DEVICE — TOTAL JOINT-MRMC: Brand: MEDLINE INDUSTRIES, INC.

## (undated) DEVICE — HEWSON SUTURE RETRIEVER: Brand: HEWSON SUTURE RETRIEVER

## (undated) DEVICE — SUTURE MCRYL SZ 3-0 L27IN ABSRB UD L24MM PS-1 3/8 CIR PRIM Y936H

## (undated) DEVICE — TOTAL JOINT - SMH: Brand: MEDLINE INDUSTRIES, INC.

## (undated) DEVICE — GLOVE SURG SZ 65 L12IN FNGR THK79MIL GRN LTX FREE

## (undated) DEVICE — GLOVE ORTHO 8   MSG9480

## (undated) DEVICE — DRAPE,ORTHOMAX,EXTREMITY: Brand: MEDLINE

## (undated) DEVICE — CANN NASAL O2 CAPNOGRAPHY AD -- FILTERLINE

## (undated) DEVICE — SUTURE VCRL SZ 0 L36IN ABSRB VLT L40MM CT 1/2 CIR J358H

## (undated) DEVICE — AIRLIFE™ U/CONNECT-IT OXYGEN TUBING 7 FEET (2.1 M) CRUSH-RESISTANT OXYGEN TUBING, VINYL TIPPED: Brand: AIRLIFE™

## (undated) DEVICE — ELECTRODE PT RET AD L9FT HI MOIST COND ADH HYDRGEL CORDED

## (undated) DEVICE — SUTURE STRATAFIX SPRL SZ 1 L14IN ABSRB VLT L48CM CTX 1/2 SXPD2B405

## (undated) DEVICE — SOLUTION IV 50ML 0.9% SOD CHL

## (undated) DEVICE — STRYKER PERFORMANCE SERIES SAGITTAL BLADE: Brand: STRYKER PERFORMANCE SERIES

## (undated) DEVICE — SET ADMIN 16ML TBNG L100IN 2 Y INJ SITE IV PIGGY BK DISP

## (undated) DEVICE — DRESSING HYDROCOLLOID BORDER 35X10 IN ALUM PRIMASEAL

## (undated) DEVICE — APPLICATOR MEDICATED 26 CC SOLUTION HI LT ORNG CHLORAPREP

## (undated) DEVICE — SUTURE VCRL SZ 1 L36IN ABSRB UD L36MM CT-1 1/2 CIR J947H

## (undated) DEVICE — ATTUNE SOLO PINNING SYSTEM

## (undated) DEVICE — 3M™ IOBAN™ 2 ANTIMICROBIAL INCISE DRAPE 6650EZ: Brand: IOBAN™ 2

## (undated) DEVICE — CEMENT MIXING SYSTEM WITH FEMORAL BREAKWAY NOZZLE: Brand: REVOLUTION

## (undated) DEVICE — FCPS BX HOT RJ4 2.2MMX240CM -- RADIAL JAW 4 BX/40

## (undated) DEVICE — SPONGE GZ W4XL4IN COT 12 PLY TYP VII WVN C FLD DSGN

## (undated) DEVICE — DERMABOND SKIN ADH 0.7ML -- DERMABOND ADVANCED 12/BX

## (undated) DEVICE — ENDO CARRY-ON PROCEDURE KIT INCLUDES ENZYMATIC SPONGE, GAUZE, BIOHAZARD LABEL, TRAY, LUBRICANT, DIRTY SCOPE LABEL, WATER LABEL, TRAY, DRAWSTRING PAD, AND DEFENDO 4-PIECE KIT.: Brand: ENDO CARRY-ON PROCEDURE KIT

## (undated) DEVICE — REM POLYHESIVE ADULT PATIENT RETURN ELECTRODE: Brand: VALLEYLAB

## (undated) DEVICE — COVER,MAYO STAND,STERILE: Brand: MEDLINE

## (undated) DEVICE — CONNECTOR TBNG AUX H2O JET DISP FOR OLY 160/180 SER

## (undated) DEVICE — BAG SPEC BIOHZD LF 2MIL 6X10IN -- CONVERT TO ITEM 357326

## (undated) DEVICE — CATH IV AUTOGRD BC BLU 22GA 25 -- INSYTE

## (undated) DEVICE — KENDALL RADIOLUCENT FOAM MONITORING ELECTRODE -RECTANGULAR SHAPE: Brand: KENDALL

## (undated) DEVICE — ADHESIVE SKIN CLOSURE WND 8.661X1.5 IN 22 CM LIQUIBAND SECUR

## (undated) DEVICE — ELECTRODE BLDE L4IN NONINSULATED EDGE

## (undated) DEVICE — SOLUTION IRRIG 3000ML 0.9% SOD CHL USP UROMATIC PLAS CONT

## (undated) DEVICE — Z DISCONTINUED USE 2751540 TUBING IRRIG L10IN DISP PMP ENDOGATOR

## (undated) DEVICE — SOLUTION SURG PREP 26 CC PURPREP

## (undated) DEVICE — 3M™ STERI-DRAPE™ 2 INCISE DRAPE 2050: Brand: STERI-DRAPE™

## (undated) DEVICE — DRAPE,U/ SHT,SPLIT,PLAS,STERIL: Brand: MEDLINE

## (undated) DEVICE — BAG BELONG PT PERS CLEAR HANDL

## (undated) DEVICE — TOTAL TRAY, 16FR 10ML SIL FOLEY, URN: Brand: MEDLINE

## (undated) DEVICE — Device: Brand: MEDICAL ACTION INDUSTRIES

## (undated) DEVICE — HYPODERMIC SAFETY NEEDLE: Brand: MAGELLAN

## (undated) DEVICE — SYRINGE 20ML LL S/C 50

## (undated) DEVICE — PATIENT PROTECTIVE PAD FOR IMP UNIVERSAL LATERAL HIP POSITIONER (ULP) (6/CASE): Brand: PATIENT PROTECTIVE PAD

## (undated) DEVICE — BLADE SAW W098XL354IN THK0047IN CUT THK0047IN SAG

## (undated) DEVICE — SUTURE ABSRB L30CM 2-0 VLT SPRL PDS + STRATAFIX SXPP1B410

## (undated) DEVICE — ALCOHOL RUBBING ISO 16OZ 70%

## (undated) DEVICE — PACK SURG PROC KNEE USER GPS

## (undated) DEVICE — PIN EXT FIX SCHNZ 3 MM

## (undated) DEVICE — Z CONVERTED USE 2274299 CUFF BLD PRESSURE LNG MED AD 25-35 CM ARM FLEXIPORT DISP

## (undated) DEVICE — SYR 20ML LL STRL LF --

## (undated) DEVICE — HANDPIECE SET WITH COAXIAL HIGH FLOW TIP AND SUCTION TUBE: Brand: INTERPULSE

## (undated) DEVICE — PAD BD MATTRESS 73X32 IN STD CONVOLUTED FOAM LTX FREE

## (undated) DEVICE — GOWN,PREVENTION PLUS,XLN/2XL,ST,22/CS: Brand: MEDLINE

## (undated) DEVICE — SOLIDIFIER FLUID 3000 CC ABSORB

## (undated) DEVICE — SYRINGE MED 20ML STD CLR PLAS LUERLOCK TIP N CTRL DISP

## (undated) DEVICE — SCRUB DRY SURG EZ SCRUB BRUSH PREOPERATIVE GRN

## (undated) DEVICE — BW-412T DISP COMBO CLEANING BRUSH: Brand: SINGLE USE COMBINATION CLEANING BRUSH

## (undated) DEVICE — YANKAUER,OPEN TIP,W/O VENT,STERILE: Brand: MEDLINE INDUSTRIES, INC.

## (undated) DEVICE — SUTURE VCRL SZ 2-0 L36IN ABSRB UD L40MM CT 1/2 CIR J957H

## (undated) DEVICE — CONTAINER,SPECIMEN,3OZ,OR STRL: Brand: MEDLINE

## (undated) DEVICE — GLOVE SURG SZ 65 L12IN FNGR THK94MIL STD WHT LTX FREE

## (undated) DEVICE — SYRINGE CATH TIP 50ML

## (undated) DEVICE — DECANTER BAG 9": Brand: MEDLINE INDUSTRIES, INC.

## (undated) DEVICE — PREP SKN CHLRAPRP APL 26ML STR --

## (undated) DEVICE — Device: Brand: JELCO

## (undated) DEVICE — TIP SUCT CRV REG REDI

## (undated) DEVICE — NEEDLE HYPO 18GA L1.5IN PNK S STL HUB POLYPR SHLD REG BVL

## (undated) DEVICE — SUTURE ETHBND EXCEL SZ 2 L30IN NONABSORBABLE GRN L40MM V-37 MX69G

## (undated) DEVICE — SUTURE STRATAFIX SZ 3-0 30CM NONABSORB UD 26MM FS 3/8 SXMP2B412

## (undated) DEVICE — GLOVE ORANGE PI 8 1/2   MSG9085

## (undated) DEVICE — BIT DRL L5IN DIA2MM STD ST S STL TWST BUSA

## (undated) DEVICE — T4 HOOD

## (undated) DEVICE — TUBESET BNE PREP CO2 CARBOJET

## (undated) DEVICE — BANDAGE COMPR M W6INXL10YD WHT BGE VELC E MTRX HK AND LOOP

## (undated) DEVICE — NEONATAL-ADULT SPO2 SENSOR: Brand: NELLCOR

## (undated) DEVICE — CONTAINER SPEC 20 ML LID NEUT BUFF FORMALIN 10 % POLYPR STS

## (undated) DEVICE — SUTURE VCRL SZ 0 L36IN ABSRB UD L36MM CT-1 1/2 CIR J946H

## (undated) DEVICE — GLOVE SURG SZ 85 L12IN FNGR ORTHO 126MIL CRM LTX FREE